# Patient Record
Sex: MALE | Race: WHITE | NOT HISPANIC OR LATINO | Employment: OTHER | URBAN - METROPOLITAN AREA
[De-identification: names, ages, dates, MRNs, and addresses within clinical notes are randomized per-mention and may not be internally consistent; named-entity substitution may affect disease eponyms.]

---

## 2017-03-28 ENCOUNTER — ALLSCRIPTS OFFICE VISIT (OUTPATIENT)
Dept: OTHER | Facility: OTHER | Age: 77
End: 2017-03-28

## 2017-04-06 ENCOUNTER — GENERIC CONVERSION - ENCOUNTER (OUTPATIENT)
Dept: OTHER | Facility: OTHER | Age: 77
End: 2017-04-06

## 2017-05-06 ENCOUNTER — LAB CONVERSION - ENCOUNTER (OUTPATIENT)
Dept: OTHER | Facility: OTHER | Age: 77
End: 2017-05-06

## 2017-05-06 LAB — PROSTATE SPECIFIC ANTIGEN TOTAL (HISTORICAL): 4.6 NG/ML

## 2017-05-07 ENCOUNTER — GENERIC CONVERSION - ENCOUNTER (OUTPATIENT)
Dept: OTHER | Facility: OTHER | Age: 77
End: 2017-05-07

## 2017-06-02 ENCOUNTER — ALLSCRIPTS OFFICE VISIT (OUTPATIENT)
Dept: OTHER | Facility: OTHER | Age: 77
End: 2017-06-02

## 2017-06-02 LAB
BILIRUB UR QL STRIP: NORMAL
CLARITY UR: NORMAL
COLOR UR: YELLOW
GLUCOSE (HISTORICAL): NORMAL
HGB UR QL STRIP.AUTO: NORMAL
KETONES UR STRIP-MCNC: NORMAL MG/DL
LEUKOCYTE ESTERASE UR QL STRIP: NORMAL
NITRITE UR QL STRIP: NORMAL
PH UR STRIP.AUTO: 5 [PH]
PROT UR STRIP-MCNC: NORMAL MG/DL
SP GR UR STRIP.AUTO: 1.03
UROBILINOGEN UR QL STRIP.AUTO: NORMAL

## 2017-10-03 LAB
A/G RATIO (HISTORICAL): 1.7 (CALC) (ref 1–2.5)
ALBUMIN SERPL BCP-MCNC: 4.1 G/DL (ref 3.6–5.1)
ALP SERPL-CCNC: 50 U/L (ref 40–115)
ALT SERPL W P-5'-P-CCNC: 29 U/L (ref 9–46)
AST SERPL W P-5'-P-CCNC: 20 U/L (ref 10–35)
BILIRUB SERPL-MCNC: 0.7 MG/DL (ref 0.2–1.2)
BUN SERPL-MCNC: 19 MG/DL (ref 7–25)
BUN/CREA RATIO (HISTORICAL): 15 (CALC) (ref 6–22)
CALCIUM (ADJUSTED FOR ALBUMIN) (HISTORICAL): 9.5 MG/DL (CALC) (ref 8.6–10.2)
CALCIUM SERPL-MCNC: 9.3 MG/DL (ref 8.6–10.3)
CHLORIDE SERPL-SCNC: 103 MMOL/L (ref 98–110)
CHOLEST SERPL-MCNC: 175 MG/DL
CHOLEST/HDLC SERPL: 4.9 (CALC)
CO2 SERPL-SCNC: 27 MMOL/L (ref 20–31)
CREAT SERPL-MCNC: 1.27 MG/DL (ref 0.7–1.18)
EGFR AFRICAN AMERICAN (HISTORICAL): 63 ML/MIN/1.73M2
EGFR-AMERICAN CALC (HISTORICAL): 55 ML/MIN/1.73M2
GAMMA GLOBULIN (HISTORICAL): 2.4 G/DL (CALC) (ref 1.9–3.7)
GLUCOSE (HISTORICAL): 86 MG/DL (ref 65–99)
HDLC SERPL-MCNC: 36 MG/DL
LDL CHOLESTEROL (HISTORICAL): 114 MG/DL (CALC)
NON-HDL-CHOL (CHOL-HDL) (HISTORICAL): 139 MG/DL (CALC)
POTASSIUM SERPL-SCNC: 4.6 MMOL/L (ref 3.5–5.3)
PROSTATE SPECIFIC ANTIGEN FREE (HISTORICAL): 1.1 NG/ML
PROSTATE SPECIFIC ANTIGEN PERCENT FREE (HISTORICAL): 15 % (CALC)
PROSTATE SPECIFIC ANTIGEN TOTAL (HISTORICAL): 7.3 NG/ML
SODIUM SERPL-SCNC: 139 MMOL/L (ref 135–146)
TOTAL PROTEIN (HISTORICAL): 6.5 G/DL (ref 6.1–8.1)
TRIGL SERPL-MCNC: 135 MG/DL

## 2017-10-06 ENCOUNTER — ALLSCRIPTS OFFICE VISIT (OUTPATIENT)
Dept: OTHER | Facility: OTHER | Age: 77
End: 2017-10-06

## 2017-10-27 ENCOUNTER — GENERIC CONVERSION - ENCOUNTER (OUTPATIENT)
Dept: OTHER | Facility: OTHER | Age: 77
End: 2017-10-27

## 2017-11-02 ENCOUNTER — GENERIC CONVERSION - ENCOUNTER (OUTPATIENT)
Dept: OTHER | Facility: OTHER | Age: 77
End: 2017-11-02

## 2018-01-10 NOTE — RESULT NOTES
Verified Results  (1) PSA (SCREEN) (Dx V76 44 Screen for Prostate Cancer) 20NRV2588 01:32PM Silvia Bowen     Test Name Result Flag Reference   PSA, TOTAL 4 6 ng/mL H < OR = 4 0   This test was performed using the Siemens  chemiluminescent method  Values obtained from  different assay methods cannot be used  interchangeably  PSA levels, regardless of  value, should not be interpreted as absolute  evidence of the presence or absence of disease         Plan  Elevated PSA    · *1 -  [facility]  *  Status: Active  Requested for:  12FHC7285  Care Summary provided  : Yes

## 2018-01-14 VITALS
BODY MASS INDEX: 31.9 KG/M2 | OXYGEN SATURATION: 97 % | SYSTOLIC BLOOD PRESSURE: 136 MMHG | TEMPERATURE: 98 F | RESPIRATION RATE: 16 BRPM | HEIGHT: 68 IN | DIASTOLIC BLOOD PRESSURE: 88 MMHG | WEIGHT: 210.5 LBS | HEART RATE: 53 BPM

## 2018-01-14 VITALS
RESPIRATION RATE: 16 BRPM | DIASTOLIC BLOOD PRESSURE: 88 MMHG | BODY MASS INDEX: 32.8 KG/M2 | HEART RATE: 53 BPM | TEMPERATURE: 97.9 F | SYSTOLIC BLOOD PRESSURE: 134 MMHG | HEIGHT: 67 IN | OXYGEN SATURATION: 95 % | WEIGHT: 209 LBS

## 2018-01-14 NOTE — PROGRESS NOTES
Assessment    1  Encounter for preventive health examination (V70 0) (Z00 00)   2  Elevated PSA (790 93) (R97 20)   3  Benign essential hypertension (401 1) (I10)   4  Hyperlipidemia (272 4) (E78 5)   5  Inflamed sebaceous cyst (706 2) (L72 3)   6  Need for vaccination (V05 9) (Z23)    Plan  Benign essential hypertension    · AmLODIPine Besylate 5 MG Oral Tablet; Take 1 tablet daily  Benign essential hypertension, Hyperlipidemia    · (1) CBC/PLT/DIFF; Status:Hold For - Exact Date; Requested for:After [id number];    · (1) COMPREHENSIVE METABOLIC PANEL; Status:Hold For - Exact Date; Requested  for:After [id number];    · (1) LIPID PANEL FASTING W DIRECT LDL REFLEX; Status:Hold For - Exact Date; Requested for:After [id number];    · (1) TSH WITH FT4 REFLEX; Status:Hold For - Exact Date; Requested for:After  [id number];   Need for vaccination    · Fluzone High-Dose 0 5 ML Intramuscular Suspension Prefilled Syringe;  INJECT 0 5  ML Intramuscular; To Be Done: 18IAO8724  PMH: Screening for colon cancer    · COLONOSCOPY ; every 10 years; Last 72TAD9965; Next 35DOC3034; Status:Active  Screening for depression    · *VB-Depression Screening; Status:Complete;   Done: 87THT6406 01:25PM  Screening for other and unspecified genitourinary condition    · *VB - Urinary Incontinence Screen (Dx Z13 89 Screen for UI); Status:Complete;   Done:  08GRX1297 01:25PM  Special screening for other neurological conditions    · *VB - Fall Risk Assessment  (Dx Z13 89 Screen for Neurologic Disorder);  Status:Complete;   Done: 82UWF9614 01:24PM    Discussion/Summary    69year-old physical examination/checkup/wellness exam  Depression screen negative  All risk was negative  Patient was given information regarding advanced directives and living will/healthcare power of   Flu shot given today  Current with pneumonia and Zostavax vaccination  Current with PSA and colonoscopy  Impression: Subsequent Annual Wellness Visit       Cardiovascular screening and counseling: the risks and benefits of screening were discussed  Diabetes screening and counseling: the risks and benefits of screening were discussed  Colorectal cancer screening and counseling: the risks and benefits of screening were discussed  Prostate cancer screening and counseling: the risks and benefits of screening were discussed  Osteoporosis screening and counseling: the risks and benefits of screening were discussed  Abdominal aortic aneurysm screening and counseling: the risks and benefits of screening were discussed  HIV screening and counseling: the risks and benefits of screening were discussed  Immunizations: the risks and benefits of influenza vaccination were discussed with the patient, influenza vaccine is due today, the risks and benefits of pneumococcal vaccination were discussed with the patient, the lifetime pneumococcal vaccine has been completed, the risks and benefits of the Zostavax vaccine were discussed with the patient, Zostavax vaccination up to date and the risks and benefits of the Td vaccine were discussed with the patient  Advance Directive Planning: not complete, paperwork and instructions were given to the patient, he was encouraged to follow-up with me to discuss his questions and/or decisions  Advice and education were given regarding nutrition (non-diabetic)  Chief Complaint  Pt presents for AWV  History of Present Illness  HPI: 18-year-old physical examination/checkup/Medicare wellness visit today  Welcome to [employer] and Wellness Visits: The patient is being seen for the subsequent annual wellness visit  Medicare Screening and Risk Factors   Hospitalizations: no previous hospitalizations  Once per lifetime medicare screening tests: ECG has not been done and AAA screening US has not yet been done  Medicare Screening Tests Risk Questions   Abdominal aortic aneurysm risk assessment: none indicated     Osteoporosis risk assessment: , over 48years of age and alcohol use, but none indicated  HIV risk assessment: none indicated  Drug and Alcohol Use: The patient has never smoked cigarettes and has never used smokeless tobacco  The patient reports frequent alcohol use and drinking 2 drinks per week  He is not ready to quit drinking  He has never used illicit drugs  Diet and Physical Activity: Current diet includes well balanced meals, low fat food choices, 1 servings of fruit per day, 1 servings of vegetables per day, 2 servings of meat per day, 1 servings of whole grains per day, 1 servings of dairy products per day, 2 cups of coffee per day, 0 cans of regular soda per day and 0 cans of diet soda per day  He exercises daily and exercises 3-4 times per week  Exercise: walking, swimming, [place]ARDEN[place] 30 minutes per day  Mood Disorder and Cognitive Impairment Screening: PHQ-9 Depression Scale   Over the past 2 weeks, how often have you been bothered by the following problems? 1 ) Little interest or pleasure in doing things? Not at all    2 ) Feeling down, depressed or hopeless? Not at all    3 ) Trouble falling asleep or sleeping too much? Half the days or more  4 ) Feeling tired or having little energy? Several days  5 ) Poor appetite or overeating? Several days  6 ) Feeling bad about yourself, or that you are a failure, or have let yourself or your family down? Not at all    7 ) Trouble concentrating on things, such as reading a newspaper or watching television? Not at all    8 ) Moving or speaking so slowly that other people could have noticed, or the opposite, moving or speaking faster than usual? Not at all  TOTAL SCORE: 4    How difficult have these problems made it for you to do your work, take care of things at home, or get along with people? Not at all  Depression screening  negative for symptoms  He denies feeling down, depressed, or hopeless over the past two weeks   He denies feeling little interest or pleasure in doing things over the past two weeks  Cognitive impairment screening: denies difficulty learning/retaining new information, denies difficulty handling complex tasks, denies difficulty with reasoning, denies difficulty with spatial ability and orientation, denies difficulty with language and denies difficulty with behavior  Functional Ability/Level of Safety: Hearing is slightly decreased, slightly decreased in the right ear and slightly decreased in the left ear  He denies hearing difficulties  He does not use a hearing aid  The patient is currently able to do activities of daily living without limitations, able to do instrumental activities of daily living without limitations, able to participate in social activities without limitations and able to drive without limitations  Activities of daily living details: does not need help using the phone, no transportation help needed, does not need help shopping, no meal preparation help needed, does not need help doing housework, does not need help managing medications and does not need help managing money  Fall risk factors: The patient fell 0 times in the past 12 months  Injury History: no polypharmacy, alcohol use, no mobility impairment, no antidepressant use, no deconditioning, no postural hypotension, no sedative use, no visual impairment, no urinary incontinence, no antihypertensive use, no cognitive impairment and up and go test was normal    Home safety risk factors:  loose rugs and no grab bars in the bathroom, but no unfamiliar surroundings, no poor household lighting, no uneven floors, no household clutter and handrails on the stairs  Advance Directives: Advance directives: living will, durable power of  for health care directives and advance directives  end of life decisions were reviewed with the patient and I agree with the patient's decisions     Co-Managers and Medical Equipment/Suppliers: See Patient Care Team   Reviewed Updated Jasmina Plana:   Last Medicare Wellness Visit Information was reviewed, patient interviewed and updates made to the record today  Patient Care Team    Care Team Member Role Specialty Office Number   Seema Mary DSOUZA  Orthopedic Surgery [phone]   [facility]  Orthopedic Surgery [phone]   Asaf Joshua MD  Urology [phone]     Review of Systems    Constitutional: negative  Cardiovascular: negative  Respiratory: negative  Gastrointestinal: negative  Genitourinary: negative  Over the past 2 weeks, how often have you been bothered by the following problems? 1 ) Little interest or pleasure in doing things? Not at all    2 ) Feeling down, depressed or hopeless? Not at all    3 ) Trouble falling asleep or sleeping too much? Half the days or more  4 ) Feeling tired or having little energy? Several days  5 ) Poor appetite or overeating? Several days  6 ) Feeling bad about yourself, or that you are a failure, or have let yourself or your family down? Not at all    7 ) Trouble concentrating on things, such as reading a newspaper or watching television? Not at all    8 ) Moving or speaking so slowly that other people could have noticed, or the opposite, moving or speaking faster than usual? Not at all  How difficult have these problems made it for you to do your work, take care of things at home, or get along with people? Not at all  Score 4      Active Problems    1  Benign essential hypertension (401 1) (I10)   2  Degeneration of cervical intervertebral disc (722 4) (M50 90)   3  Elevated PSA (790 93) (R97 20)   4  Erectile dysfunction (607 84) (N52 9)   5  Hyperlipidemia (272 4) (E78 5)   6  Obstruction of left eustachian tube (381 60) (H68 102)   7  Screening for depression (V79 0) (Z13 89)   8  Screening for other and unspecified genitourinary condition (V81 6) (Z13 89)   9  Special screening for other neurological conditions (V80 09) (Z13 89)   10   Vitamin D deficiency (268 9) (E55 9)    Past Medical History    · History of Acute upper respiratory infection (465 9) (J06 9)   · 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)   · History of Dermatophytosis of nail (110 1) (B35 1)   · History of Dog bite of finger (883 0,E906 0) (P30 377I,Z41  0XXA)   · History of anemia (V12 3) (Z86 2)   · History of chest pain (V13 89) (N88 702)   · History of dizziness (V13 89) (I47 333)   · History of Nail fungus (110 1) (B35 1)   · History of Need for influenza vaccination (V04 81) (Z23)   · History of Need for influenza vaccination (V04 81) (Z23)   · History of Need for pneumococcal vaccination (V03 82) (Z23)   · History of Persistent cough (786 2) (R05)   · History of PND (post-nasal drip) (784 91) (R09 82)   · History of Polyp of sigmoid colon (211 3) (D12 5)   · History of Rib pain on left side (786 50) (R07 81)   · History of Screening for colon cancer (V76 51) (Z12 11)   · History of Screening for depression (V79 0) (Z13 89)   · History of Screening for other and unspecified genitourinary condition (V81 6) (Z13 89)   · History of Special screening examination for neoplasm of prostate (V76 44) (Z12 5)   · History of Tinnitus, unspecified laterality (388 30) (H93 19)    The active problems and past medical history were reviewed and updated today  Surgical History    · History of Hemorrhoidectomy    Family History  Family History    · Denied: Family history of substance abuse   · Denied: Family history of Mental or behavioral problem    Social History    · Being A Social Drinker   · Never A Smoker  The social history was reviewed and updated today  The social history was reviewed and is unchanged  Current Meds   1  Aspirin Adult Low Strength 81 MG Oral Tablet Delayed Release; TAKE 1 TABLET DAILY; Therapy: 49SHJ1918 to Recorded   2  Fluticasone Propionate 50 MCG/ACT Nasal Suspension; USE 2 SPRAYS IN EACH   NOSTRIL ONCE DAILY;    Therapy: 20BOI5696 to (Last Rx:28Mar2017)  Requested for: 74UIC8009 Ordered   3  Losartan Potassium 100 MG Oral Tablet; take 1 tablet by mouth once daily; Therapy: 53XQZ6317 to (Evaluate:11Oct2017)  Requested for: 14Apr2017; Last   Rx:68Aaq3779 Ordered   4  Sildenafil Citrate 20 MG Oral Tablet; 20 mg as needed prior to intercourse; Therapy: 59JQX6596 to (Last Rx:02Jun2017)  Requested for: 02Jun2017 Ordered   5  Viagra 100 MG Oral Tablet; take as directed; Therapy: 67AUK0521 to (Evaluate:15Oct2017)  Requested for: 17ORS3542; Last   Rx:23Pzr6509 Ordered   6  Vitamin D 1000 UNIT Oral Tablet; Take 1 tablet daily; Therapy: 47HRO5433 to Recorded    The medication list was reviewed and updated today  Allergies    1  No Known Drug Allergies    Immunizations   ** Printed in Appendix #1 below  Vitals  Signs    Systolic: 626  Diastolic: 88   Temperature: 97 9 F, Tympanic  Heart Rate: 53  Pulse Quality: Normal  Respiration Quality: Normal  Respiration: 16  Systolic: 462, LUE, Sitting  Diastolic: 92, LUE, Sitting  Height: 5 ft 7 in  Weight: 209 lb   BMI Calculated: 32 73  BSA Calculated: 2 06  O2 Saturation: 95, RA  Pain Scale: 0    Physical Exam    Constitutional   General appearance: No acute distress, well appearing and well nourished  Pulmonary   Respiratory effort: No increased work of breathing or signs of respiratory distress  Auscultation of lungs: Clear to auscultation  Cardiovascular   Palpation of heart: Normal PMI, no thrills  Auscultation of heart: Normal rate and rhythm, normal S1 and S2, without murmurs  Examination of extremities for edema and/or varicosities: Normal     Abdomen   Abdomen: Non-tender, no masses  Liver and spleen: No hepatomegaly or splenomegaly  Lymphatic   Palpation of lymph nodes in neck: No lymphadenopathy      Musculoskeletal   Gait and station: Normal     Psychiatric   Orientation to person, place and time: Normal     Mood and affect: Normal        Results/Data  PHQ-9 Adult Depression Screening 72VLT9555 01: 28PM Vidal Arvinds     Test Name Result Flag Reference   PHQ-9 Adult Depression Score 4     Over the last two weeks, how often have you been bothered by any of the following problems? Little interest or pleasure in doing things: Not at all - 0  Feeling down, depressed, or hopeless: Not at all - 0  Trouble falling or staying asleep, or sleeping too much: More than half the days - 2  Feeling tired or having little energy: Several days - 1  Poor appetite or over eating: Several days - 1  Feeling bad about yourself - or that you are a failure or have let yourself or your family down: Not at all - 0  Trouble concentrating on things, such as reading the newspaper or watching television: Not at all - 0  Moving or speaking so slowly that other people could have noticed  Or the opposite -  being so fidgety or restless that you have been moving around a lot more than usual: Not at all - 0  Thoughts that you would be better off dead, or of hurting yourself in some way: Not at all - 0   PHQ-9 Adult Depression Screening Negative     PHQ-9 Difficulty Level Not difficult at all     PHQ-9 Severity Minimal Depression       *VB - Urinary Incontinence Screen (Dx Z13 89 Screen for UI) 18LCU8113 01:25PM Sabina Lien     Test Name Result Flag Reference   Urinary Incontinence Assessment 62FLU0705       *VB-Depression Screening 81RSY0013 01:25PM SabinaTrufflsn     Test Name Result Flag Reference   Depression Scale Result      Depression Screen - Negative For Symptoms     *VB - Fall Risk Assessment  (Dx Z13 89 Screen for Neurologic Disorder) 01GDI0571 01:24PM Slate Realtyn     Test Name Result Flag Reference   Falls Risk      No falls in the past year       Health Management  History of Screening for colon cancer   COLONOSCOPY; every 10 years; Last 44SWD7715; Next Due: 88EMF8498; Active  Health Maintenance   Medicare Annual Wellness Visit; every 1 year; Last 55GWV9276; Next Due: 45Ydy4177;   Overdue    Future Appointments    Date/Time Provider Specialty Site   2018 07:45 AM [facility]  Urology  Liseth ROSALES     Signatures   Electronically signed by :  Moncho Boudreaux DO; Oct  6 2017  2:04PM EST                       (Author)    Appendix #1     Patient: Pamela Araujo ; : 1940; MRN: [id number]      1 2 3 4 5    Influenza  20-Sep-2012 17-Sep-2013 04-Sep-2014 17-Sep-2015 23-Sep-2016    PCV  16-Mar-2015        PPSV  2011        Tdap  2011        Zoster  10-Vik-2014

## 2018-01-15 VITALS
WEIGHT: 209 LBS | SYSTOLIC BLOOD PRESSURE: 160 MMHG | DIASTOLIC BLOOD PRESSURE: 120 MMHG | HEART RATE: 64 BPM | HEIGHT: 67 IN | BODY MASS INDEX: 32.8 KG/M2

## 2018-01-22 VITALS
BODY MASS INDEX: 31.73 KG/M2 | WEIGHT: 209.38 LBS | RESPIRATION RATE: 18 BRPM | HEART RATE: 74 BPM | SYSTOLIC BLOOD PRESSURE: 130 MMHG | DIASTOLIC BLOOD PRESSURE: 90 MMHG | TEMPERATURE: 97.5 F | HEIGHT: 68 IN | OXYGEN SATURATION: 95 %

## 2018-01-22 VITALS
HEIGHT: 68 IN | SYSTOLIC BLOOD PRESSURE: 130 MMHG | TEMPERATURE: 96.9 F | RESPIRATION RATE: 17 BRPM | BODY MASS INDEX: 31.84 KG/M2 | DIASTOLIC BLOOD PRESSURE: 78 MMHG | HEART RATE: 52 BPM | OXYGEN SATURATION: 96 % | WEIGHT: 210.06 LBS

## 2018-03-08 ENCOUNTER — OFFICE VISIT (OUTPATIENT)
Dept: FAMILY MEDICINE CLINIC | Facility: CLINIC | Age: 78
End: 2018-03-08
Payer: COMMERCIAL

## 2018-03-08 VITALS
WEIGHT: 210.2 LBS | DIASTOLIC BLOOD PRESSURE: 78 MMHG | HEART RATE: 63 BPM | TEMPERATURE: 98.9 F | HEIGHT: 68 IN | OXYGEN SATURATION: 96 % | SYSTOLIC BLOOD PRESSURE: 122 MMHG | BODY MASS INDEX: 31.86 KG/M2 | RESPIRATION RATE: 16 BRPM

## 2018-03-08 DIAGNOSIS — R07.89 OTHER CHEST PAIN: Primary | ICD-10-CM

## 2018-03-08 PROCEDURE — 93000 ELECTROCARDIOGRAM COMPLETE: CPT | Performed by: FAMILY MEDICINE

## 2018-03-08 PROCEDURE — 99214 OFFICE O/P EST MOD 30 MIN: CPT | Performed by: FAMILY MEDICINE

## 2018-03-08 RX ORDER — SILDENAFIL CITRATE 20 MG/1
TABLET ORAL
COMMUNITY
Start: 2017-06-02 | End: 2018-03-08 | Stop reason: ALTCHOICE

## 2018-03-08 RX ORDER — AMLODIPINE BESYLATE 5 MG/1
1 TABLET ORAL DAILY
COMMUNITY
Start: 2017-10-06 | End: 2018-03-16 | Stop reason: SDUPTHER

## 2018-03-08 RX ORDER — SILDENAFIL 100 MG/1
TABLET, FILM COATED ORAL
COMMUNITY
Start: 2016-03-14 | End: 2019-01-07 | Stop reason: ALTCHOICE

## 2018-03-08 RX ORDER — ASPIRIN 81 MG/1
81 TABLET ORAL DAILY
COMMUNITY
Start: 2016-09-23

## 2018-03-08 RX ORDER — LOSARTAN POTASSIUM 100 MG/1
1 TABLET ORAL DAILY
COMMUNITY
Start: 2013-04-09 | End: 2018-03-16 | Stop reason: SDUPTHER

## 2018-03-08 NOTE — PROGRESS NOTES
Assessment/Plan:    Other chest pain  Pt started an intense workout regimen in the past few weeks  Pt has experienced a few episodes during (or immediately after exercise) that pt describes as chest pressure and dyspnea, but rapidly improves spontaneously  Pt does not experience any sxs during the rest of the day (or w/ rest)  He is currently asymptomatic  EKG todayShows sinus bradycardia with nonspecific ST changes     Will send for nuclear stress test (treadmill)   Continue low-dose aspirin  Hold off on Viagra for now  If symptoms recur before stress test is performed, recommend going directly to ER          Diagnoses and all orders for this visit:    Other chest pain  -     POCT ECG  -     NM myocardial perfusion spect (rx stress and/or rest); Future    Other orders  -     amLODIPine (NORVASC) 5 mg tablet; Take 1 tablet by mouth daily  -     aspirin (ASPIRIN ADULT LOW STRENGTH) 81 mg EC tablet; Take 1 tablet by mouth daily  -     losartan (COZAAR) 100 MG tablet; Take 1 tablet by mouth daily  -     Discontinue: sildenafil (REVATIO) 20 mg tablet; Take by mouth  -     sildenafil (VIAGRA) 100 mg tablet; Take by mouth  -     cholecalciferol (VITAMIN D3) 1,000 units tablet; Take 1 tablet by mouth daily          Subjective:      Patient ID: Miguel Smith is a 69 y o  male  Pt started an intense workout regimen in the past few weeks  Pt has experienced a few episodes during (or immediately after exercise) that pt describes as chest pressure and dyspnea, but rapidly improves spontaneously  Pt does not experience any sxs during the rest of the day (or w/ rest)  The following portions of the patient's history were reviewed and updated as appropriate: allergies, current medications, past family history, past medical history, past social history, past surgical history and problem list     Review of Systems   Respiratory: Negative  Cardiovascular: Positive for chest pain  Gastrointestinal: Negative  Genitourinary: Negative  Objective:      /78 (BP Location: Left arm, Patient Position: Sitting, Cuff Size: Standard)   Pulse 63   Temp 98 9 °F (37 2 °C) (Tympanic)   Resp 16   Ht 5' 7 52" (1 715 m)   Wt 95 3 kg (210 lb 3 2 oz)   SpO2 96%   BMI 32 42 kg/m²          Physical Exam   Cardiovascular: Normal rate, regular rhythm, normal heart sounds and intact distal pulses  Carotids: no bruits  Ext: no edema   Pulmonary/Chest: Effort normal  No respiratory distress  He has no wheezes  He has no rales  Psychiatric: He has a normal mood and affect   His behavior is normal  Thought content normal

## 2018-03-08 NOTE — ASSESSMENT & PLAN NOTE
Pt started an intense workout regimen in the past few weeks  Pt has experienced a few episodes during (or immediately after exercise) that pt describes as chest pressure and dyspnea, but rapidly improves spontaneously  Pt does not experience any sxs during the rest of the day (or w/ rest)  He is currently asymptomatic  EKG todayShows sinus bradycardia with nonspecific ST changes     Will send for nuclear stress test (treadmill)   Continue low-dose aspirin  Hold off on Viagra for now    If symptoms recur before stress test is performed, recommend going directly to ER

## 2018-03-13 ENCOUNTER — TELEPHONE (OUTPATIENT)
Dept: FAMILY MEDICINE CLINIC | Facility: CLINIC | Age: 78
End: 2018-03-13

## 2018-03-14 NOTE — TELEPHONE ENCOUNTER
I spoke with patient  His stress test has been denied    Waiting for appeal   I advised patient to go directly to the ER should his chest pain recur

## 2018-03-16 DIAGNOSIS — I10 ESSENTIAL HYPERTENSION: Primary | ICD-10-CM

## 2018-03-16 RX ORDER — LOSARTAN POTASSIUM 100 MG/1
TABLET ORAL
Qty: 30 TABLET | Refills: 5 | Status: SHIPPED | OUTPATIENT
Start: 2018-03-16 | End: 2018-11-04 | Stop reason: SDUPTHER

## 2018-03-16 RX ORDER — AMLODIPINE BESYLATE 5 MG/1
TABLET ORAL
Qty: 30 TABLET | Refills: 5 | Status: SHIPPED | OUTPATIENT
Start: 2018-03-16 | End: 2018-03-28

## 2018-03-20 ENCOUNTER — TELEPHONE (OUTPATIENT)
Dept: FAMILY MEDICINE CLINIC | Facility: CLINIC | Age: 78
End: 2018-03-20

## 2018-03-22 NOTE — TELEPHONE ENCOUNTER
New auth started  Not enough info was given for the previous auth and it was denied  Pt aware  New auth started under dr green

## 2018-03-26 ENCOUNTER — TELEPHONE (OUTPATIENT)
Dept: FAMILY MEDICINE CLINIC | Facility: CLINIC | Age: 78
End: 2018-03-26

## 2018-03-27 ENCOUNTER — TELEPHONE (OUTPATIENT)
Dept: FAMILY MEDICINE CLINIC | Facility: CLINIC | Age: 78
End: 2018-03-27

## 2018-03-27 NOTE — TELEPHONE ENCOUNTER
[facility] a few [facility] notes, blood work  any cardiology results  Records will be faxed to Leonor Hernandez at [phone]  Phone is [phone] I will be sending office notes, blood work and an ekg on patient

## 2018-03-28 ENCOUNTER — TRANSITIONAL CARE MANAGEMENT (OUTPATIENT)
Dept: FAMILY MEDICINE CLINIC | Facility: CLINIC | Age: 78
End: 2018-03-28

## 2018-03-28 ENCOUNTER — OFFICE VISIT (OUTPATIENT)
Dept: FAMILY MEDICINE CLINIC | Facility: CLINIC | Age: 78
End: 2018-03-28
Payer: COMMERCIAL

## 2018-03-28 VITALS
TEMPERATURE: 97.9 F | DIASTOLIC BLOOD PRESSURE: 70 MMHG | BODY MASS INDEX: 33.3 KG/M2 | HEIGHT: 67 IN | OXYGEN SATURATION: 98 % | WEIGHT: 212.19 LBS | SYSTOLIC BLOOD PRESSURE: 124 MMHG | RESPIRATION RATE: 18 BRPM | HEART RATE: 62 BPM

## 2018-03-28 DIAGNOSIS — N28.1 RENAL CYST: ICD-10-CM

## 2018-03-28 DIAGNOSIS — E78.2 MIXED HYPERLIPIDEMIA: ICD-10-CM

## 2018-03-28 DIAGNOSIS — I25.118 CORONARY ARTERY DISEASE OF NATIVE HEART WITH STABLE ANGINA PECTORIS, UNSPECIFIED VESSEL OR LESION TYPE (HCC): Primary | ICD-10-CM

## 2018-03-28 DIAGNOSIS — K76.9 LIVER LESION: ICD-10-CM

## 2018-03-28 DIAGNOSIS — I10 BENIGN ESSENTIAL HYPERTENSION: ICD-10-CM

## 2018-03-28 DIAGNOSIS — R91.1 PULMONARY NODULE: ICD-10-CM

## 2018-03-28 PROBLEM — R07.89 OTHER CHEST PAIN: Status: RESOLVED | Noted: 2018-03-08 | Resolved: 2018-03-28

## 2018-03-28 PROCEDURE — 99496 TRANSJ CARE MGMT HIGH F2F 7D: CPT | Performed by: FAMILY MEDICINE

## 2018-03-28 RX ORDER — METOPROLOL SUCCINATE 25 MG/1
12.5 TABLET, EXTENDED RELEASE ORAL DAILY
COMMUNITY
Start: 2018-03-27 | End: 2018-11-04 | Stop reason: SDUPTHER

## 2018-03-28 RX ORDER — ATORVASTATIN CALCIUM 40 MG/1
40 TABLET, FILM COATED ORAL
COMMUNITY
Start: 2018-03-26

## 2018-03-28 RX ORDER — CLOPIDOGREL BISULFATE 75 MG/1
75 TABLET ORAL DAILY
COMMUNITY
Start: 2018-03-27 | End: 2018-11-29 | Stop reason: ALTCHOICE

## 2018-03-28 RX ORDER — NITROGLYCERIN 0.4 MG/1
TABLET SUBLINGUAL
COMMUNITY
Start: 2018-03-26 | End: 2018-11-29 | Stop reason: ALTCHOICE

## 2018-03-28 NOTE — ASSESSMENT & PLAN NOTE
Patient was found to have questionable liver nodule/lesion during recent CT scan of the chest   Will sent for MRI of abdomen to clarify this

## 2018-03-28 NOTE — ASSESSMENT & PLAN NOTE
During recent admission patient was found to have what appeared to be renal cysts on CT scan of the chest   Will sent for renal ultrasound for clarification

## 2018-03-28 NOTE — PROGRESS NOTES
Assessment/Plan:    Benign essential hypertension  Controlled on losartan 100 and metoprolol xl 25    Hyperlipidemia  Pt on atorvastatin 40 now due to recent dx of CAD  Coronary artery disease of native heart with stable angina pectoris [facility])  This is a transition of care visit for patient who was admitted to [facility] from March 25th to March 27th due to persistent chest pain radiating into his arm  Patient had stress test which was positive for ischemia, which was followed by cardiac catheterization which confirmed near total occlusion of his left anterior descending artery  Patient received a stent at that time  He was discharged on March 27th in stable condition  He is now taking metoprolol 25 mg daily along with atorvastatin 40 mg daily (in addition to his other current medications)  He is currently feeling well without any chest pain or shortness of breath  He has nitroglycerin in his pocket, but has not needed  He will be starting cardiac rehab in the near future  His examination today is unremarkable  Recommend follow up with cardiology as directed  Renal cyst  During recent admission patient was found to have what appeared to be renal cysts on CT scan of the chest   Will sent for renal ultrasound for clarification    Liver lesion  Patient was found to have questionable liver nodule/lesion during recent CT scan of the chest   Will sent for MRI of abdomen to clarify this    Pulmonary nodule  During recent admission patient was found to have 5 mm right lower lobe nodule on CT scan  Patient is a nonsmoker  He is asymptomatic  Will repeat CT chest in 6 months       Diagnoses and all orders for this visit:    Coronary artery disease of native heart with stable angina pectoris, unspecified vessel or lesion type ([address] )    Benign essential hypertension    Mixed hyperlipidemia    Renal cyst  -     US retroperitoneal complete;  Future    Liver lesion  -     MRI abdomen w wo contrast; Future    Pulmonary nodule  -     CT chest wo contrast; Future    Other orders  -     clopidogrel (PLAVIX) 75 mg tablet; Take 75 mg by mouth daily  -     atorvastatin (LIPITOR) 40 mg tablet; Take 40 mg by mouth  -     metoprolol succinate (TOPROL-XL) 25 mg 24 hr tablet; Take 25 mg by mouth daily  -     nitroglycerin (NITROSTAT) 0 4 mg SL tablet;       Keep next regularly scheduled appointment in near future  Patient has Rx for blood work followed by appointment    Subjective:   Date and time hospital follow up call was made:  3/27/2018 10:21 AM  Hospital care reviewed:  Records reviewed  Patient was hopsitalized at:  [facility]  Date of admission:  3/25/18  Date of discharge:  3/27/18  Diagnosis:  Chest pain, unspecified type (Primary Dx); Coronary artery disease involving native coronary artery of native heart with unstable angina pectoris ([facility] [address] )  Were the patients medicaitons reviewed and updated:  Yes  Current symptoms:  None  Post hospital issues:  None  Should patient be enrolled in anticoag monitoring?:  No  Scheduled for follow up?:  Yes  Patients specialists:  Cardiologist  Did you obtain your prescribed medications:  Yes  Do you need help managing your perscriptions or medications:  No  Is transportation to your appointments needed:  No  I have advised the patient to call PCP with any new or worsening symptoms (please type in name along with any credentials):  Iris Mathews        Patient ID: Keerthi Rasheed is a 69 y o  male  This is a transition of care visit for patient who was admitted to [facility] from March 25th to March 27th due to persistent chest pain radiating into his arm  Patient had stress test which was positive for ischemia, which was followed by cardiac catheterization which confirmed near total occlusion of his left anterior descending artery  Patient received a stent at that time  He was discharged on March 27th in stable condition    He is now taking metoprolol 25 mg daily along with atorvastatin 40 mg daily (in addition to his other current medications)  He is currently feeling well without any chest pain or shortness of breath  He has nitroglycerin in his pocket, but has not needed  He will be starting cardiac rehab in the near future  He has a follow-up with his cardiologist in the near future as well  The following portions of the patient's history were reviewed and updated as appropriate: allergies, current medications, past family history, past medical history, past social history, past surgical history and problem list     Review of Systems   Respiratory: Negative  Cardiovascular: Negative  Gastrointestinal: Negative  Genitourinary: Negative  Objective:      /70 (BP Location: Left arm, Patient Position: Sitting, Cuff Size: Standard)   Pulse 62   Temp 97 9 °F (36 6 °C) (Tympanic)   Resp 18   Ht 5' 7" (1 702 m)   Wt 96 2 kg (212 lb 3 oz)   SpO2 98%   BMI 33 23 kg/m²          Physical Exam   Cardiovascular: Normal rate, regular rhythm, normal heart sounds and intact distal pulses  Carotids: no bruits  Ext: no edema   Pulmonary/Chest: Effort normal  No respiratory distress  He has no wheezes  He has no rales  Psychiatric: He has a normal mood and affect   His behavior is normal  Thought content normal

## 2018-03-28 NOTE — ASSESSMENT & PLAN NOTE
This is a transition of care visit for patient who was admitted to [facility] from March 25th to March 27th due to persistent chest pain radiating into his arm  Patient had stress test which was positive for ischemia, which was followed by cardiac catheterization which confirmed near total occlusion of his left anterior descending artery  Patient received a stent at that time  He was discharged on March 27th in stable condition  He is now taking metoprolol 25 mg daily along with atorvastatin 40 mg daily (in addition to his other current medications)  He is currently feeling well without any chest pain or shortness of breath  He has nitroglycerin in his pocket, but has not needed  He will be starting cardiac rehab in the near future  His examination today is unremarkable  Recommend follow up with cardiology as directed

## 2018-03-28 NOTE — ASSESSMENT & PLAN NOTE
During recent admission patient was found to have 5 mm right lower lobe nodule on CT scan  Patient is a nonsmoker  He is asymptomatic    Will repeat CT chest in 6 months

## 2018-03-30 ENCOUNTER — DOCUMENTATION (OUTPATIENT)
Dept: FAMILY MEDICINE CLINIC | Facility: CLINIC | Age: 78
End: 2018-03-30

## 2018-04-04 ENCOUNTER — HOSPITAL ENCOUNTER (OUTPATIENT)
Dept: ULTRASOUND IMAGING | Facility: MEDICAL CENTER | Age: 78
Discharge: HOME | End: 2018-04-04
Payer: COMMERCIAL

## 2018-04-04 DIAGNOSIS — N28.1 RENAL CYST: ICD-10-CM

## 2018-04-04 PROCEDURE — 76770 US EXAM ABDO BACK WALL COMP: CPT

## 2018-04-08 ENCOUNTER — HOSPITAL ENCOUNTER (OUTPATIENT)
Dept: MRI IMAGING | Facility: HOSPITAL | Age: 78
Discharge: HOME | End: 2018-04-08
Payer: COMMERCIAL

## 2018-04-08 DIAGNOSIS — K76.9 LIVER LESION: ICD-10-CM

## 2018-04-08 PROCEDURE — 74183 MRI ABD W/O CNTR FLWD CNTR: CPT

## 2018-04-08 PROCEDURE — A9585 GADOBUTROL INJECTION: HCPCS | Performed by: FAMILY MEDICINE

## 2018-04-08 RX ADMIN — GADOBUTROL 9 ML: 604.72 INJECTION INTRAVENOUS at 15:39

## 2018-04-10 ENCOUNTER — TELEPHONE (OUTPATIENT)
Dept: UROLOGY | Facility: CLINIC | Age: 78
End: 2018-04-10

## 2018-04-10 DIAGNOSIS — R97.20 ELEVATED PSA: Primary | ICD-10-CM

## 2018-04-10 NOTE — TELEPHONE ENCOUNTER
Pt has been himanshu for his OV on 5/2/18 @ 2:00  I faxed his lab slip to [facility] in [place] @ [phone]    Pt aware to get blood work prior to his appt

## 2018-04-10 NOTE — TELEPHONE ENCOUNTER
Patient saw me last in 6/2017  I received communication from his PCP about his most recent renal US, which shows large benign cysts  Although these are not concerning, I did review a PSA drawn in 9/2017 after our visit last year which did show a dramatic rise  I will ask the patient to obtain a repeated PSA and see me back sooner than our planned 6/2018 appointment

## 2018-04-29 LAB
ALBUMIN SERPL-MCNC: 4.1 G/DL (ref 3.6–5.1)
ALBUMIN/GLOB SERPL: 1.9 (CALC) (ref 1–2.5)
ALP SERPL-CCNC: 45 U/L (ref 40–115)
ALT SERPL-CCNC: 24 U/L (ref 9–46)
AST SERPL-CCNC: 18 U/L (ref 10–35)
BASOPHILS # BLD AUTO: 71 CELLS/UL (ref 0–200)
BASOPHILS NFR BLD AUTO: 1.2 %
BILIRUB SERPL-MCNC: 0.8 MG/DL (ref 0.2–1.2)
BUN SERPL-MCNC: 22 MG/DL (ref 7–25)
BUN/CREAT SERPL: 17 (CALC) (ref 6–22)
CALCIUM SERPL-MCNC: 9.2 MG/DL (ref 8.6–10.3)
CHLORIDE SERPL-SCNC: 103 MMOL/L (ref 98–110)
CHOLEST SERPL-MCNC: 107 MG/DL
CHOLEST/HDLC SERPL: 3.1 (CALC)
CO2 SERPL-SCNC: 31 MMOL/L (ref 20–31)
CREAT SERPL-MCNC: 1.28 MG/DL (ref 0.7–1.18)
EOSINOPHIL # BLD AUTO: 212 CELLS/UL (ref 15–500)
EOSINOPHIL NFR BLD AUTO: 3.6 %
ERYTHROCYTE [DISTWIDTH] IN BLOOD BY AUTOMATED COUNT: 12.7 % (ref 11–15)
GLOBULIN SER CALC-MCNC: 2.2 G/DL (CALC) (ref 1.9–3.7)
GLUCOSE SERPL-MCNC: 89 MG/DL (ref 65–99)
HCT VFR BLD AUTO: 44.2 % (ref 38.5–50)
HDLC SERPL-MCNC: 34 MG/DL
HGB BLD-MCNC: 15.5 G/DL (ref 13.2–17.1)
LDLC SERPL CALC-MCNC: 57 MG/DL (CALC)
LYMPHOCYTES # BLD AUTO: 1050 CELLS/UL (ref 850–3900)
LYMPHOCYTES NFR BLD AUTO: 17.8 %
MCH RBC QN AUTO: 31.7 PG (ref 27–33)
MCHC RBC AUTO-ENTMCNC: 35.1 G/DL (ref 32–36)
MCV RBC AUTO: 90.4 FL (ref 80–100)
MONOCYTES # BLD AUTO: 460 CELLS/UL (ref 200–950)
MONOCYTES NFR BLD AUTO: 7.8 %
NEUTROPHILS # BLD AUTO: 4106 CELLS/UL (ref 1500–7800)
NEUTROPHILS NFR BLD AUTO: 69.6 %
NONHDLC SERPL-MCNC: 73 MG/DL (CALC)
PLATELET # BLD AUTO: 170 THOUSAND/UL (ref 140–400)
PMV BLD REES-ECKER: 11.2 FL (ref 7.5–12.5)
POTASSIUM SERPL-SCNC: 4.8 MMOL/L (ref 3.5–5.3)
PROT SERPL-MCNC: 6.3 G/DL (ref 6.1–8.1)
RBC # BLD AUTO: 4.89 MILLION/UL (ref 4.2–5.8)
SL AMB EGFR AFRICAN AMERICAN: 63 ML/MIN/1.73M2
SL AMB EGFR NON AFRICAN AMERICAN: 54 ML/MIN/1.73M2
SODIUM SERPL-SCNC: 139 MMOL/L (ref 135–146)
TRIGL SERPL-MCNC: 80 MG/DL
TSH SERPL-ACNC: 1.47 MIU/L (ref 0.4–4.5)
WBC # BLD AUTO: 5.9 THOUSAND/UL (ref 3.8–10.8)

## 2018-05-02 ENCOUNTER — OFFICE VISIT (OUTPATIENT)
Dept: UROLOGY | Facility: CLINIC | Age: 78
End: 2018-05-02
Payer: COMMERCIAL

## 2018-05-02 VITALS
HEIGHT: 67 IN | HEART RATE: 61 BPM | SYSTOLIC BLOOD PRESSURE: 118 MMHG | DIASTOLIC BLOOD PRESSURE: 80 MMHG | WEIGHT: 207 LBS | BODY MASS INDEX: 32.49 KG/M2

## 2018-05-02 DIAGNOSIS — R97.20 ELEVATED PSA: Primary | ICD-10-CM

## 2018-05-02 DIAGNOSIS — N28.1 RENAL CYST: ICD-10-CM

## 2018-05-02 DIAGNOSIS — N52.03 COMBINED ARTERIAL INSUFFICIENCY AND CORPORO-VENOUS OCCLUSIVE ERECTILE DYSFUNCTION: ICD-10-CM

## 2018-05-02 DIAGNOSIS — R91.1 PULMONARY NODULE: ICD-10-CM

## 2018-05-02 PROBLEM — N40.2 PROSTATE NODULE: Status: ACTIVE | Noted: 2018-05-02

## 2018-05-02 PROCEDURE — 99213 OFFICE O/P EST LOW 20 MIN: CPT | Performed by: UROLOGY

## 2018-05-02 RX ORDER — BILBERRY FRUIT 1000 MG
3600 CAPSULE ORAL
COMMUNITY
End: 2019-05-30

## 2018-05-02 NOTE — LETTER
May 2, 2018     Andreas Olivera MD  [address] [address]    Patient: Alma Yun   YOB: 1940   Date of Visit: 5/2/2018       Dear Dr Donald Neely: Thank you for referring Norberto Pratt to me for evaluation  Below are my notes for this consultation  If you have questions, please do not hesitate to call me  I look forward to following your patient along with you  Sincerely,        Tiny Betancourt MD        CC: No Recipients  Tiny Betancourt MD  5/2/2018  2:39 PM  Sign at close encounter    [place] UP NOTE     Shoshana Gomez is a 69 y o  male with a complaint of   Chief Complaint   Patient presents with    Elevated PSA     PSA=8 6     Erectile Dysfunction       History of Present Illness:     69 y o  gentleman who has been undergoing routine prostate cancer screening with his primary care team  The patient has had a steady PSA but in March of 2017 was noted to have some oscillation  The patient was given a course of antibiotics and his PSA came down from the mid 5 range to 4 6  He initially presented in June 2017  Follow-up of his PSA values demonstrated concern for rapid rise  Patient was asked to return at a sooner time frame  4/30/18 PSA 8 6, free 19 6%  9/30/17 PSA 7 3, free 15%  5/5/17 PSA 4 6  3/20/17 PSA 5 4     The patient has erectile dysfunction and has been doing well with Viagra 50 mg  These are cost prohibitive however  We switched to sildenafil 20mg tablets at last visit  The patient was unable to obtain the medication  Patient has not been using sildenafil since his heart episode described below  In April, the patient was having asymptomatic chest pain and underwent coronary stenting  He is on dual anti-platelet therapy at this time      Past Medical History:     Past Medical History:   Diagnosis Date    Anemia     last assessed  1/7/14     Polyp of sigmoid colon     Tinnitus unspecified laterality / last assessed 4/9/13       PAST SURGICAL HISTORY:     Past Surgical History:   Procedure Laterality Date    HEMORRHOID SURGERY         CURRENT MEDICATIONS:     Current Outpatient Prescriptions   Medication Sig Dispense Refill    aspirin (ASPIRIN ADULT LOW STRENGTH) 81 mg EC tablet Take 1 tablet by mouth daily      atorvastatin (LIPITOR) 40 mg tablet Take 40 mg by mouth      cholecalciferol (VITAMIN D3) 1,000 units tablet Take 1 tablet by mouth daily      clopidogrel (PLAVIX) 75 mg tablet Take 75 mg by mouth daily      losartan (COZAAR) 100 MG tablet take 1 tablet by mouth once daily 30 tablet 5    metoprolol succinate (TOPROL-XL) 25 mg 24 hr tablet Take 25 mg by mouth daily      nitroglycerin (NITROSTAT) 0 4 mg SL tablet       sildenafil (VIAGRA) 100 mg tablet Take by mouth       No current facility-administered medications for this visit  ALLERGIES:   No Known Allergies    SOCIAL HISTORY:     Social History     Social History    Marital status: /Civil Union     Spouse name: N/A    Number of children: N/A    Years of education: N/A     Occupational History    retired      Social History Main Topics    Smoking status: Never Smoker    Smokeless tobacco: Never Used    Alcohol use Yes      Comment: Occuational / social     Drug use: No    Sexual activity: Not on file     Other Topics Concern    Not on file     Social History Narrative    Always uses seat belt     Daily caffeine consumption 2-3 servings a day     Feels safe at home       SOCIAL HISTORY:     Family History   Problem Relation Age of Onset   [facility] Breast cancer Mother     Hypertension Father        REVIEW OF SYSTEMS:     Review of Systems   Constitutional: Negative for chills, fever and unexpected weight change  Respiratory: Negative  Cardiovascular: Positive for chest pain  Gastrointestinal: Negative  Genitourinary: Negative  Musculoskeletal: Negative  Neurological: Negative  Psychiatric/Behavioral: Negative  PHYSICAL EXAM:     /80   Pulse 61   Ht 5' 7" (1 702 m)   Wt 93 9 kg (207 lb)   BMI 32 42 kg/m²      General:  Healthy appearing male in no acute distress  They have a normal affect  There is not appear to be any gross neurologic defects or abnormalities  HEENT:  Normocephalic, atraumatic  Neck is supple without any palpable lymphadenopathy  Cardiovascular:  Patient has normal palpable distal radial pulses  There is no significant peripheral edema  No JVD is noted  Respiratory:  Patient has unlabored respirations  There is no audible wheeze or rhonchi  Abdomen:  Abdomen is without surgical scars  Abdomen is soft and nontender  There is no tympany  Inguinal and umbilical hernia are not appreciated  Genitourinary:  Circumcised phallus, orthotopic meatus, testicles descended, digital rectal exam shows a 50 g prostate  There is a 1 to 1-1/2 cm nodule at the right side of the median sulcus towards the apex    Musculoskeletal:  Patient does not have significant CVA tenderness in the flank with palpation or percussion  They full range of motion in all 4 extremities  Strength in all 4 extremities appears congruent  Patient is able to ambulate without assistance or difficulty  Dermatologic:  Patient has no skin abnormalities or rashes  LABS:     CBC:   Lab Results   Component Value Date    WBC 5 8 2017    HGB 15 5 2018    HCT 44 2 2018    MCV 90 4 2018     2018       BMP:   Lab Results   Component Value Date    CALCIUM 9 2 2018     2017    K 4 6 2017    CO2 27 2017     2017    BUN 22 2018    CREATININE 1 28 (H) 2018     PSA trend in HPI      IMAGIN/4/18  RENAL ULTRASOUND     INDICATION:   N28 1: Cyst of kidney, acquired  71-year-old male with known renal cyst   This is for follow-up  Patient has no current complaints      COMPARISON: None    Note is made of a CT scan completed at an outside institution however those images are not available for review at this time      TECHNIQUE:   Ultrasound of the retroperitoneum was performed with a curvilinear transducer utilizing volumetric sweeps and still imaging techniques       FINDINGS:     KIDNEYS:  Symmetric and normal size  Right kidney:  11 4 x 6 4 cm with cortical thickness 1 5 cm  Left kidney:  13 3 x 7 17 m with cortical thickness 1 5 cm      Right kidney  Normal echogenicity and contour  No suspicious masses detected  Prominence of the renal collecting system compatible with mild hydronephrosis  In addition, there is a 1 4 x 0 7 x 0 5 cm and 1 4 x 0 9 x 0 7 cm anechoic parapelvic structure consistent with parapelvic cyst   No shadowing calculi  No perinephric fluid collections      Left kidney  Normal echogenicity and contour  No suspicious masses detected  Along the lower pole is an exophytic anechoic 9 2 x 9 2 x 8 5 cm nonvascular structure consistent with cyst   Additional 1 7 x 1 4 x 1 1 cm anechoic cysts noted in the interpolar region  No hydronephrosis  Within the lower pole is a 0 9 x 0 5 cm echogenic structure with shadowing consistent with nonobstructing stone  In addition there is twinkle artifact  There may be a separate stone adjacent to the lower pole cyst measuring 0 7 cm with shadowing  No perinephric fluid collections      URETERS:  Nonvisualized      BLADDER:   Normally distended  No focal thickening or mass lesions  Bilateral ureteral jets detected         IMPRESSION:     On the images provided, there is prominence of the right renal collecting system concerning for mild hydronephrosis  Comparison with the outside CT scan would be of benefit  Suspected parapelvic cysts are also noted      Additionally, there is a large 9 cm exophytic anechoic cyst and dating from the left kidney lower pole  Additional cyst is noted within the interpolar region    Additionally, there is at least one if not 2 nonobstructing stones within the lower pole of   the kidney      Recommend obtaining the outside scan for comparison and for permanent record within the [facility]  Can also obtain follow-up ultrasound in 6 months to evaluate for stability of these cysts  4/8/18  MRI OF THE ABDOMEN (LIVER) WITH AND WITHOUT CONTRAST     INDICATION:  Possible liver lesion      COMPARISON:  Renal ultrasound 4/4/2018      TECHNIQUE:  The following pulse sequences were obtained on a 1 5 T scanner:  Coronal and axial T2 with TE of 90 and 180 respectively, axial T2 with fat saturation, axial FIESTA fat-sat, axial T1-weighted in-and-out-of phase, axial DWI/ADC, pre-contrast   axial T1 with fat saturation, post-contrast dynamic axial T1 with fat saturation at 20, 70, and 180 seconds, followed by coronal and 7 minute delayed axial T1 with fat saturation        IV Contrast:  9 mL of gadobutrol injection (MULTI-DOSE)      FINDINGS:     LIVER:    General:  Normal in size and contour  No loss of signal on out-of-phase images to suggest hepatic steatosis  Lesions:  No enhancing solid lesions identified  No areas of abnormal arterial enhancement  There are 2 adjacent bilobed cysts identified measuring 1 9 x 1 7 x 1 8 cm within segment 8 of the right lobe and 2 2 x 1 5 x 2 3 cm within segment 5 of the   right lobe  Vasculature:  Portal and hepatic veins patent without evidence of thrombosis      BILIARY TREE:  Normal        GALLBLADDER:  Normal      PANCREAS:  Normal      ADRENAL GLANDS:  Normal      SPLEEN:  Normal      KIDNEYS:  Bilateral parapelvic and cortical cyst identified the largest measuring 10 cm exophytic from the left lower pole  No hydronephrosis, enhancing mass lesions, or perinephric collections      ABDOMINAL CAVITY:  No lymphadenopathy or mass   No ascites      BOWEL:  Unremarkable MRI appearance      OSSEOUS STRUCTURES:  No osseous destruction      EXTRAHEPATIC VASCULAR STRUCTURES:  Visualized vasculature is normal      ABDOMINAL WALL:  Normal      LOWER CHEST:  Unremarkable MRI appearance      IMPRESSION:        1   2 simple bilobed cysts in the right hepatic lobe  No suspicious solid mass lesions or arterial enhancement  2   Bilateral renal cysts, the largest measuring 10 cm exophytic from the left lower pole      ASSESSMENT:     69 y o  male with prostate nodule and elevated PSA    PLAN:     Based on the rising PSA and a prostate nodule, I would be more inclined to recommend a prostate biopsy  However, the patient recently has had a percutaneous cardiac intervention is on dual platelet therapy  Patient will likely need to remain until platelet therapy for 6 months to a year  I would be comfortable doing the biopsy on aspirin alone but not both aspirin and Plavix  I will reach out to the patient's cardiology team   I will plan to see the patient back in 6 months with an updated PS A  If he is cleared to stop his Plavix at that time, we will consider prostate biopsy  Patient will avoid use of sildenafil until cleared by his cardiologist     I have also reviewed the patient's recent renal ultrasound and MR I  Patient has benign cyst   They are large approaching 10 cm but the patient is asymptomatic  I would certainly not recommend any decortication or decompression at this point time

## 2018-05-02 NOTE — LETTER
May 2, 2018     Everett Deluca MD  [address] [address]    Patient: Susan Ziegler   YOB: 1940   Date of Visit: 5/2/2018       Dear Dr Zenon Owens: Thank you for referring Annie Dawn to me for evaluation  Below are my notes for this consultation  If you have questions, please do not hesitate to call me  I look forward to following your patient along with you  Sincerely,        Dolly Davila MD        CC: No Recipients  Dolly Davila MD  5/2/2018  2:01 PM  Sign at close encounter    UROLOGY {male adult master:[id number]::"NEW CONSULT","NEW ER FOLLOW UP","HOSPITAL FOLLOW Sally Skill FOLLOW UP","PROCEDURE","POSTOP","***"} NOTE     CHIEF Jamarcus Artist is a 69 y o  {Desc; male/female:1::"male"} with a complaint of   Chief Complaint   Patient presents with    Elevated PSA     PSA=8 6     Erectile Dysfunction       History of Present Illness:     69 y o  gentleman who has been undergoing routine prostate cancer screening with his primary care team  The patient has had a steady PSA but in March of 2017 was noted to have some oscillation  The patient was given a course of antibiotics and his PSA came down from the mid 5 range to 4 6  He initially presented in June 2017 4/30/18 PSA 8 6, free 19 6%  9/30/17 PSA 7 3, free 15%  5/5/17 PSA 4 6  3/20/17 PSA 5 4    The patient has erectile dysfunction and has been doing well with Viagra 50 mg  These are cost prohibitive however  We switched to sildenafil 20mg tablets at last visit      Past Medical History:     Past Medical History:   Diagnosis Date    Anemia     last assessed  1/7/14     Polyp of sigmoid colon     Tinnitus     unspecified laterality / last assessed 4/9/13       PAST SURGICAL HISTORY:     Past Surgical History:   Procedure Laterality Date    HEMORRHOID SURGERY         CURRENT MEDICATIONS:     Current Outpatient Prescriptions   Medication Sig Dispense Refill    aspirin (ASPIRIN ADULT LOW STRENGTH) 81 mg EC tablet Take 1 tablet by mouth daily      atorvastatin (LIPITOR) 40 mg tablet Take 40 mg by mouth      cholecalciferol (VITAMIN D3) 1,000 units tablet Take 1 tablet by mouth daily      clopidogrel (PLAVIX) 75 mg tablet Take 75 mg by mouth daily      losartan (COZAAR) 100 MG tablet take 1 tablet by mouth once daily 30 tablet 5    metoprolol succinate (TOPROL-XL) 25 mg 24 hr tablet Take 25 mg by mouth daily      nitroglycerin (NITROSTAT) 0 4 mg SL tablet       sildenafil (VIAGRA) 100 mg tablet Take by mouth       No current facility-administered medications for this visit  ALLERGIES:   No Known Allergies    SOCIAL HISTORY:     Social History     Social History    Marital status: /Civil Union     Spouse name: N/A    Number of children: N/A    Years of education: N/A     Occupational History    retired      Social History Main Topics    Smoking status: Never Smoker    Smokeless tobacco: Never Used    Alcohol use Yes      Comment: Occuational / social     Drug use: No    Sexual activity: Not on file     Other Topics Concern    Not on file     Social History Narrative    Always uses seat belt     Daily caffeine consumption 2-3 servings a day     Feels safe at home       SOCIAL HISTORY:     Family History   Problem Relation Age of Onset   [facility] Breast cancer Mother     Hypertension Father        REVIEW OF SYSTEMS:     Review of Systems      PHYSICAL EXAM:     /80   Pulse 61   Ht 5' 7" (1 702 m)   Wt 93 9 kg (207 lb)   BMI 32 42 kg/m²      General:  Healthy appearing {Desc; male/female:1::"male"} in no acute distress  They have a normal affect  There is not appear to be any gross neurologic defects or abnormalities  HEENT:  Normocephalic, atraumatic  Neck is supple without any palpable lymphadenopathy  Cardiovascular:  Patient has normal palpable distal radial pulses  There is no significant peripheral edema  No JVD is noted    Respiratory: Patient has unlabored respirations  There is no audible wheeze or rhonchi  Abdomen:  Abdomen is *** surgical scars  Abdomen is soft and nontender  There is no tympany  Inguinal and umbilical hernia are not appreciated  Genitourinary: {pe male genitalia:622823::"no penile lesions or discharge, no testicular masses or tenderness, no hernias"}    Musculoskeletal:  Patient {DOES NOT does:29857::"does not"} have significant CVA tenderness in the {RIGHT/LEFT:20294} flank with palpation or percussion  They full range of motion in all 4 extremities  Strength in all 4 extremities appears congruent  Patient is able to ambulate without assistance or difficulty  Dermatologic:  Patient has no skin abnormalities or rashes        LABS:     CBC:   Lab Results   Component Value Date    WBC 5 8 03/20/2017    HGB 15 5 04/28/2018    HCT 44 2 04/28/2018    MCV 90 4 04/28/2018     04/28/2018       BMP:   Lab Results   Component Value Date    CALCIUM 9 2 04/28/2018     09/30/2017    K 4 6 09/30/2017    CO2 27 09/30/2017     09/30/2017    BUN 22 04/28/2018    CREATININE 1 28 (H) 04/28/2018       IMAGING:     ***    PATHOLOGY:     ***    PROCEDURE:     ***    ASSESSMENT:     69 y o  {Desc; male/female:1::"male"} with ***    PLAN:     ***

## 2018-05-02 NOTE — PROGRESS NOTES
UROLOGY ROUTINE FOLLOW UP NOTE     CHIEF COMPLAINT   Sarah Deras is a 69 y o  male with a complaint of   Chief Complaint   Patient presents with    Elevated PSA     PSA=8 6     Erectile Dysfunction       History of Present Illness:     69 y o  gentleman who has been undergoing routine prostate cancer screening with his primary care team  The patient has had a steady PSA but in March of 2017 was noted to have some oscillation  The patient was given a course of antibiotics and his PSA came down from the mid 5 range to 4 6  He initially presented in June 2017  Follow-up of his PSA values demonstrated concern for rapid rise  Patient was asked to return at a sooner time frame  4/30/18 PSA 8 6, free 19 6%  9/30/17 PSA 7 3, free 15%  5/5/17 PSA 4 6  3/20/17 PSA 5 4     The patient has erectile dysfunction and has been doing well with Viagra 50 mg  These are cost prohibitive however  We switched to sildenafil 20mg tablets at last visit  The patient was unable to obtain the medication  Patient has not been using sildenafil since his heart episode described below  In April, the patient was having asymptomatic chest pain and underwent coronary stenting  He is on dual anti-platelet therapy at this time      Past Medical History:     Past Medical History:   Diagnosis Date    Anemia     last assessed  1/7/14     Polyp of sigmoid colon     Tinnitus     unspecified laterality / last assessed 4/9/13       PAST SURGICAL HISTORY:     Past Surgical History:   Procedure Laterality Date    HEMORRHOID SURGERY         CURRENT MEDICATIONS:     Current Outpatient Prescriptions   Medication Sig Dispense Refill    aspirin (ASPIRIN ADULT LOW STRENGTH) 81 mg EC tablet Take 1 tablet by mouth daily      atorvastatin (LIPITOR) 40 mg tablet Take 40 mg by mouth      cholecalciferol (VITAMIN D3) 1,000 units tablet Take 1 tablet by mouth daily      clopidogrel (PLAVIX) 75 mg tablet Take 75 mg by mouth daily      losartan (COZAAR) 100 MG tablet take 1 tablet by mouth once daily 30 tablet 5    metoprolol succinate (TOPROL-XL) 25 mg 24 hr tablet Take 25 mg by mouth daily      nitroglycerin (NITROSTAT) 0 4 mg SL tablet       sildenafil (VIAGRA) 100 mg tablet Take by mouth       No current facility-administered medications for this visit  ALLERGIES:   No Known Allergies    SOCIAL HISTORY:     Social History     Social History    Marital status: /Civil Union     Spouse name: N/A    Number of children: N/A    Years of education: N/A     Occupational History    retired      Social History Main Topics    Smoking status: Never Smoker    Smokeless tobacco: Never Used    Alcohol use Yes      Comment: Occuational / social     Drug use: No    Sexual activity: Not on file     Other Topics Concern    Not on file     Social History Narrative    Always uses seat belt     Daily caffeine consumption 2-3 servings a day     Feels safe at home       SOCIAL HISTORY:     Family History   Problem Relation Age of Onset   [facility] Breast cancer Mother     Hypertension Father        REVIEW OF SYSTEMS:     Review of Systems   Constitutional: Negative for chills, fever and unexpected weight change  Respiratory: Negative  Cardiovascular: Positive for chest pain  Gastrointestinal: Negative  Genitourinary: Negative  Musculoskeletal: Negative  Neurological: Negative  Psychiatric/Behavioral: Negative  PHYSICAL EXAM:     /80   Pulse 61   Ht 5' 7" (1 702 m)   Wt 93 9 kg (207 lb)   BMI 32 42 kg/m²     General:  Healthy appearing male in no acute distress  They have a normal affect  There is not appear to be any gross neurologic defects or abnormalities  HEENT:  Normocephalic, atraumatic  Neck is supple without any palpable lymphadenopathy  Cardiovascular:  Patient has normal palpable distal radial pulses  There is no significant peripheral edema  No JVD is noted    Respiratory:  Patient has unlabored respirations  There is no audible wheeze or rhonchi  Abdomen:  Abdomen is without surgical scars  Abdomen is soft and nontender  There is no tympany  Inguinal and umbilical hernia are not appreciated  Genitourinary:  Circumcised phallus, orthotopic meatus, testicles descended, digital rectal exam shows a 50 g prostate  There is a 1 to 1-1/2 cm nodule at the right side of the median sulcus towards the apex    Musculoskeletal:  Patient does not have significant CVA tenderness in the flank with palpation or percussion  They full range of motion in all 4 extremities  Strength in all 4 extremities appears congruent  Patient is able to ambulate without assistance or difficulty  Dermatologic:  Patient has no skin abnormalities or rashes  LABS:     CBC:   Lab Results   Component Value Date    WBC 5 8 2017    HGB 15 5 2018    HCT 44 2 2018    MCV 90 4 2018     2018       BMP:   Lab Results   Component Value Date    CALCIUM 9 2 2018     2017    K 4 6 2017    CO2 27 2017     2017    BUN 22 2018    CREATININE 1 28 (H) 2018     PSA trend in HPI      IMAGIN/4/18  RENAL ULTRASOUND     INDICATION:   N28 1: Cyst of kidney, acquired  52-year-old male with known renal cyst   This is for follow-up  Patient has no current complaints      COMPARISON: None  Note is made of a CT scan completed at an outside institution however those images are not available for review at this time      TECHNIQUE:   Ultrasound of the retroperitoneum was performed with a curvilinear transducer utilizing volumetric sweeps and still imaging techniques       FINDINGS:     KIDNEYS:  Symmetric and normal size  Right kidney:  11 4 x 6 4 cm with cortical thickness 1 5 cm  Left kidney:  13 3 x 7 17 m with cortical thickness 1 5 cm      Right kidney  Normal echogenicity and contour  No suspicious masses detected     Prominence of the renal collecting system compatible with mild hydronephrosis  In addition, there is a 1 4 x 0 7 x 0 5 cm and 1 4 x 0 9 x 0 7 cm anechoic parapelvic structure consistent with parapelvic cyst   No shadowing calculi  No perinephric fluid collections      Left kidney  Normal echogenicity and contour  No suspicious masses detected  Along the lower pole is an exophytic anechoic 9 2 x 9 2 x 8 5 cm nonvascular structure consistent with cyst   Additional 1 7 x 1 4 x 1 1 cm anechoic cysts noted in the interpolar region  No hydronephrosis  Within the lower pole is a 0 9 x 0 5 cm echogenic structure with shadowing consistent with nonobstructing stone  In addition there is twinkle artifact  There may be a separate stone adjacent to the lower pole cyst measuring 0 7 cm with shadowing  No perinephric fluid collections      URETERS:  Nonvisualized      BLADDER:   Normally distended  No focal thickening or mass lesions  Bilateral ureteral jets detected         IMPRESSION:     On the images provided, there is prominence of the right renal collecting system concerning for mild hydronephrosis  Comparison with the outside CT scan would be of benefit  Suspected parapelvic cysts are also noted      Additionally, there is a large 9 cm exophytic anechoic cyst and dating from the left kidney lower pole  Additional cyst is noted within the interpolar region  Additionally, there is at least one if not 2 nonobstructing stones within the lower pole of   the kidney      Recommend obtaining the outside scan for comparison and for permanent record within the [facility]'s PACS  Can also obtain follow-up ultrasound in 6 months to evaluate for stability of these cysts      4/8/18  MRI OF THE ABDOMEN (LIVER) WITH AND WITHOUT CONTRAST     INDICATION:  Possible liver lesion      COMPARISON:  Renal ultrasound 4/4/2018      TECHNIQUE:  The following pulse sequences were obtained on a 1 5 T scanner:  Coronal and axial T2 with TE of 90 and 180 respectively, axial T2 with fat saturation, axial FIESTA fat-sat, axial T1-weighted in-and-out-of phase, axial DWI/ADC, pre-contrast   axial T1 with fat saturation, post-contrast dynamic axial T1 with fat saturation at 20, 70, and 180 seconds, followed by coronal and 7 minute delayed axial T1 with fat saturation        IV Contrast:  9 mL of gadobutrol injection (MULTI-DOSE)      FINDINGS:     LIVER:    General:  Normal in size and contour  No loss of signal on out-of-phase images to suggest hepatic steatosis  Lesions:  No enhancing solid lesions identified  No areas of abnormal arterial enhancement  There are 2 adjacent bilobed cysts identified measuring 1 9 x 1 7 x 1 8 cm within segment 8 of the right lobe and 2 2 x 1 5 x 2 3 cm within segment 5 of the   right lobe  Vasculature:  Portal and hepatic veins patent without evidence of thrombosis      BILIARY TREE:  Normal        GALLBLADDER:  Normal      PANCREAS:  Normal      ADRENAL GLANDS:  Normal      SPLEEN:  Normal      KIDNEYS:  Bilateral parapelvic and cortical cyst identified the largest measuring 10 cm exophytic from the left lower pole  No hydronephrosis, enhancing mass lesions, or perinephric collections      ABDOMINAL CAVITY:  No lymphadenopathy or mass  No ascites      BOWEL:  Unremarkable MRI appearance      OSSEOUS STRUCTURES:  No osseous destruction      EXTRAHEPATIC VASCULAR STRUCTURES:  Visualized vasculature is normal      ABDOMINAL WALL:  Normal      LOWER CHEST:  Unremarkable MRI appearance      IMPRESSION:        1   2 simple bilobed cysts in the right hepatic lobe  No suspicious solid mass lesions or arterial enhancement  2   Bilateral renal cysts, the largest measuring 10 cm exophytic from the left lower pole      ASSESSMENT:     69 y o  male with prostate nodule and elevated PSA    PLAN:     Based on the rising PSA and a prostate nodule, I would be more inclined to recommend a prostate biopsy    However, the patient recently has had a percutaneous cardiac intervention is on dual platelet therapy  Patient will likely need to remain until platelet therapy for 6 months to a year  I would be comfortable doing the biopsy on aspirin alone but not both aspirin and Plavix  I will reach out to the patient's cardiology team   I will plan to see the patient back in 6 months with an updated PS A  If he is cleared to stop his Plavix at that time, we will consider prostate biopsy  Patient will avoid use of sildenafil until cleared by his cardiologist     I have also reviewed the patient's recent renal ultrasound and MR I  Patient has benign cyst   They are large approaching 10 cm but the patient is asymptomatic  I would certainly not recommend any decortication or decompression at this point time

## 2018-05-09 ENCOUNTER — OFFICE VISIT (OUTPATIENT)
Dept: FAMILY MEDICINE CLINIC | Facility: CLINIC | Age: 78
End: 2018-05-09
Payer: COMMERCIAL

## 2018-05-09 VITALS
SYSTOLIC BLOOD PRESSURE: 126 MMHG | DIASTOLIC BLOOD PRESSURE: 84 MMHG | BODY MASS INDEX: 32.08 KG/M2 | OXYGEN SATURATION: 98 % | WEIGHT: 204.4 LBS | HEART RATE: 67 BPM | TEMPERATURE: 98.3 F | HEIGHT: 67 IN | RESPIRATION RATE: 18 BRPM

## 2018-05-09 DIAGNOSIS — I25.118 CORONARY ARTERY DISEASE OF NATIVE HEART WITH STABLE ANGINA PECTORIS, UNSPECIFIED VESSEL OR LESION TYPE (HCC): ICD-10-CM

## 2018-05-09 DIAGNOSIS — R97.20 ELEVATED PSA: ICD-10-CM

## 2018-05-09 DIAGNOSIS — I10 BENIGN ESSENTIAL HYPERTENSION: Primary | ICD-10-CM

## 2018-05-09 DIAGNOSIS — E78.2 MIXED HYPERLIPIDEMIA: ICD-10-CM

## 2018-05-09 PROCEDURE — 3079F DIAST BP 80-89 MM HG: CPT | Performed by: FAMILY MEDICINE

## 2018-05-09 PROCEDURE — 3074F SYST BP LT 130 MM HG: CPT | Performed by: FAMILY MEDICINE

## 2018-05-09 PROCEDURE — 99214 OFFICE O/P EST MOD 30 MIN: CPT | Performed by: FAMILY MEDICINE

## 2018-05-09 NOTE — PROGRESS NOTES
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)  Stable without chest pain or shortness of breath  Patient recently finished cardiac rehab  Continue follow-up with his cardiologist at [facility]    Elevated PSA  Most recent PSA 8 6, was 7 3  Patient was recently seen by his urologist   They discussed possibility of getting a prostate biopsy     Continue follow-up with Dr Nichole Bonilla  Diagnoses and all orders for this visit:    Benign essential hypertension  -     TSH, 3rd generation with T4 reflex; Future    Mixed hyperlipidemia  -     Comprehensive metabolic panel; Future  -     Lipid Panel with Direct LDL reflex; Future  -     TSH, 3rd generation with T4 reflex; Future    Coronary artery disease of native heart with stable angina pectoris, unspecified vessel or lesion type (HCC)  -     TSH, 3rd generation with T4 reflex; Future    Elevated PSA        Recheck 6 months, fasting blood work at [facility] prior  Also AWV    Subjective:      Patient ID: Marcia Barillas is a 69 y o  male  Patient presents for recheck of chronic medical problems today  He has been seeing Urology because he has a prostate nodule and his elevated PSA  Doing well on blood pressure medications, losartan and metoprolol  Doing well on atorvastatin for high cholesterol  Patient had labs drawn at [facility] on April 27th        The following portions of the patient's history were reviewed and updated as appropriate: allergies, current medications, past family history, past medical history, past social history, past surgical history and problem list     Review of Systems   Respiratory: Negative  Cardiovascular: Negative  Gastrointestinal: Negative  Genitourinary: Negative            Objective:      /84 Pulse 67   Temp 98 3 °F (36 8 °C) (Tympanic)   Resp 18   Ht 5' 7" (1 702 m)   Wt 92 7 kg (204 lb 6 4 oz)   SpO2 98%   BMI 32 01 kg/m²          Physical Exam   Cardiovascular: Normal rate, regular rhythm, normal heart sounds and intact distal pulses  Carotids: no bruits  Ext: no edema   Pulmonary/Chest: Effort normal  No respiratory distress  He has no wheezes  He has no rales  Psychiatric: He has a normal mood and affect   His behavior is normal  Thought content normal

## 2018-05-09 NOTE — ASSESSMENT & PLAN NOTE
Stable without chest pain or shortness of breath  Patient recently finished cardiac rehab    Continue follow-up with his cardiologist at [facility]

## 2018-05-09 NOTE — ASSESSMENT & PLAN NOTE
Most recent PSA 8 6, was 7 3  Patient was recently seen by his urologist   They discussed possibility of getting a prostate biopsy     Continue follow-up with Dr Efra Hernández

## 2018-06-02 DIAGNOSIS — Z12.5 ENCOUNTER FOR SCREENING FOR MALIGNANT NEOPLASM OF PROSTATE: ICD-10-CM

## 2018-06-02 DIAGNOSIS — R97.20 ELEVATED PROSTATE SPECIFIC ANTIGEN (PSA): ICD-10-CM

## 2018-07-23 ENCOUNTER — OFFICE VISIT (OUTPATIENT)
Dept: FAMILY MEDICINE CLINIC | Facility: CLINIC | Age: 78
End: 2018-07-23
Payer: COMMERCIAL

## 2018-07-23 VITALS
HEART RATE: 54 BPM | RESPIRATION RATE: 15 BRPM | HEIGHT: 67 IN | DIASTOLIC BLOOD PRESSURE: 72 MMHG | WEIGHT: 198.1 LBS | SYSTOLIC BLOOD PRESSURE: 110 MMHG | BODY MASS INDEX: 31.09 KG/M2 | OXYGEN SATURATION: 94 % | TEMPERATURE: 97.4 F
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
## 2018-07-23 DIAGNOSIS — S61.412A LACERATION OF LEFT HAND WITHOUT FOREIGN BODY, INITIAL ENCOUNTER: Primary | ICD-10-CM

## 2018-07-23 PROCEDURE — 99213 OFFICE O/P EST LOW 20 MIN: CPT | Performed by: PHYSICIAN ASSISTANT

## 2018-07-23 RX ORDER — ISOSORBIDE MONONITRATE 30 MG/1
30 TABLET, EXTENDED RELEASE ORAL DAILY
Refills: 0 | COMMUNITY
Start: 2018-05-18 | End: 2018-11-29 | Stop reason: ALTCHOICE

## 2018-07-26 PROBLEM — Z95.5 S/P CORONARY ARTERY STENT PLACEMENT: Status: ACTIVE | Noted: 2018-04-16

## 2018-07-26 PROBLEM — I25.118 CORONARY ARTERY DISEASE OF NATIVE ARTERY OF NATIVE HEART WITH STABLE ANGINA PECTORIS (HCC): Status: ACTIVE | Noted: 2018-04-05

## 2018-07-26 PROBLEM — N40.0 BPH (BENIGN PROSTATIC HYPERPLASIA): Status: ACTIVE | Noted: 2017-11-02

## 2018-07-26 PROBLEM — L72.3 INFLAMED SEBACEOUS CYST: Status: ACTIVE | Noted: 2017-10-27

## 2018-07-26 NOTE — PROGRESS NOTES
Assessment/Plan:         Diagnoses and all orders for this visit:    Laceration of left hand without foreign body, initial encounter    Injury while gardening    Other orders  -     isosorbide mononitrate (IMDUR) 30 mg 24 hr tablet; Take 30 mg by mouth daily      Pt instructed to keep wound clean, dressed, and dry, and to monitor for signs of local infection  He is to RTO PRN  Chief Complaint   Patient presents with    cut left hand     pt is concerned of infection, insterment was not sterile  Subjective:      Patient ID: Charo Moctezuma is a 69 y o  male  Pt presents with what he reports is a laceration of his left palm which occurred earlier today while using a gardening tool  He reports that it was very superficial but is concerned about infection as the instrument was not clean  Denies FB, loss of sensation or function  He is UTD with his tetanus status  The following portions of the patient's history were reviewed and updated as appropriate:   He  has a past medical history of Anemia; Polyp of sigmoid colon; and Tinnitus    He   Patient Active Problem List    Diagnosis Date Noted    Combined arterial insufficiency and corporo-venous occlusive erectile dysfunction 05/02/2018    Prostate nodule 05/02/2018    S/P coronary artery stent placement 04/16/2018    Elevated PSA 04/10/2018    Coronary artery disease of native artery of native heart with stable angina pectoris ([facility] [address] ) 04/05/2018    Coronary artery disease of native heart with stable angina pectoris ([facility] [address] ) 03/28/2018    Renal cyst 03/28/2018    Liver lesion 03/28/2018    Pulmonary nodule 03/28/2018    BPH (benign prostatic hyperplasia) 11/02/2017    Inflamed sebaceous cyst 10/27/2017    Obstruction of left eustachian tube 03/14/2016    Vitamin D deficiency 09/04/2014    DDD (degenerative disc disease), cervical 01/22/2014    Hyperlipidemia 03/11/2013    Benign essential hypertension 08/08/2012     He  has a past surgical history that includes Hemorrhoid surgery  His family history includes Breast cancer in his mother; Hypertension in his father  He  reports that he has never smoked  He has never used smokeless tobacco  He reports that he drinks alcohol  He reports that he does not use drugs  Current Outpatient Prescriptions   Medication Sig Dispense Refill    aspirin (ASPIRIN ADULT LOW STRENGTH) 81 mg EC tablet Take 1 tablet by mouth daily      atorvastatin (LIPITOR) 40 mg tablet Take 40 mg by mouth      cholecalciferol (VITAMIN D3) 1,000 units tablet Take 1 tablet by mouth daily      clopidogrel (PLAVIX) 75 mg tablet Take 75 mg by mouth daily      isosorbide mononitrate (IMDUR) 30 mg 24 hr tablet Take 30 mg by mouth daily  0    losartan (COZAAR) 100 MG tablet take 1 tablet by mouth once daily 30 tablet 5    metoprolol succinate (TOPROL-XL) 25 mg 24 hr tablet Take 25 mg by mouth daily      nitroglycerin (NITROSTAT) 0 4 mg SL tablet       Saw [place] 1000 MG CAPS Take 3,600 mg by mouth      sildenafil (VIAGRA) 100 mg tablet Take by mouth       No current facility-administered medications for this visit  Current Outpatient Prescriptions on File Prior to Visit   Medication Sig    aspirin (ASPIRIN ADULT LOW STRENGTH) 81 mg EC tablet Take 1 tablet by mouth daily    atorvastatin (LIPITOR) 40 mg tablet Take 40 mg by mouth    cholecalciferol (VITAMIN D3) 1,000 units tablet Take 1 tablet by mouth daily    clopidogrel (PLAVIX) 75 mg tablet Take 75 mg by mouth daily    losartan (COZAAR) 100 MG tablet take 1 tablet by mouth once daily    metoprolol succinate (TOPROL-XL) 25 mg 24 hr tablet Take 25 mg by mouth daily    nitroglycerin (NITROSTAT) 0 4 mg SL tablet     [place] 1000 MG CAPS Take 3,600 mg by mouth    sildenafil (VIAGRA) 100 mg tablet Take by mouth     No current facility-administered medications on file prior to visit  He has No Known Allergies       Review of Systems   Constitutional: Negative  Respiratory: Negative  Cardiovascular: Negative  Gastrointestinal: Negative  Genitourinary: Negative  Skin: Positive for wound (Left palm)  Neurological: Negative  Objective:      /72 (BP Location: Left arm, Patient Position: Sitting, Cuff Size: Large)   Pulse (!) 54   Temp (!) 97 4 °F (36 3 °C) (Tympanic)   Resp 15   Ht 5' 7" (1 702 m)   Wt 89 9 kg (198 lb 1 6 oz)   SpO2 94%   BMI 31 03 kg/m²          Physical Exam   Constitutional: He is oriented to person, place, and time  He appears well-developed and well-nourished  No distress  Neurological: He is alert and oriented to person, place, and time  Skin:   Left palm with small, < 1cm superficial linear laceration  Hemostasis has been achieved  No FB visualized  Edges are well approximated  Dressed with Neosporin and Band Aid after thorough cleansing with hydrogen peroxide and saline  Psychiatric: He has a normal mood and affect  Vitals reviewed

## 2018-08-28 ENCOUNTER — TELEPHONE (OUTPATIENT)
Dept: FAMILY MEDICINE CLINIC | Facility: CLINIC | Age: 78
End: 2018-08-28

## 2018-08-28 NOTE — TELEPHONE ENCOUNTER
I spoke with patient  His cardiologist recently decreased his metoprolol to 12 5 mg in his losartan to 50 mg due to elevated creatinine level  His home blood pressures have been excellent ( 110 over 60s)  He is questioning whether not he can go off of these medications  I advised him to stay on present dose of meds for now, because they are protective for his heart  Also he has a follow-up appointment with me in 1 month with fasting blood work prior  We will re-evaluate his creatinine at that visit and determine further action ( IE further adjustment of medication versus referral to nephrologist)

## 2018-08-28 NOTE — TELEPHONE ENCOUNTER
PT HAS SOME QUESTIONS ABOUT THE RX'S HE HAS BEEN ON AND HIS BP RATES   Mahin Garcia HIM @[phone]

## 2018-09-07 ENCOUNTER — DOCUMENTATION (OUTPATIENT)
Dept: FAMILY MEDICINE CLINIC | Facility: CLINIC | Age: 78
End: 2018-09-07

## 2018-09-14 ENCOUNTER — OFFICE VISIT (OUTPATIENT)
Dept: FAMILY MEDICINE CLINIC | Facility: CLINIC | Age: 78
End: 2018-09-14
Payer: COMMERCIAL

## 2018-09-14 VITALS
DIASTOLIC BLOOD PRESSURE: 70 MMHG | SYSTOLIC BLOOD PRESSURE: 118 MMHG | HEIGHT: 67 IN | OXYGEN SATURATION: 95 % | TEMPERATURE: 98.3 F | WEIGHT: 193.7 LBS | BODY MASS INDEX: 30.4 KG/M2 | HEART RATE: 57 BPM

## 2018-09-14 DIAGNOSIS — H60.332 ACUTE SWIMMER'S EAR OF LEFT SIDE: ICD-10-CM

## 2018-09-14 DIAGNOSIS — Z23 NEED FOR VACCINATION: Primary | ICD-10-CM

## 2018-09-14 PROCEDURE — 99213 OFFICE O/P EST LOW 20 MIN: CPT | Performed by: FAMILY MEDICINE

## 2018-09-14 PROCEDURE — 90662 IIV NO PRSV INCREASED AG IM: CPT

## 2018-09-14 PROCEDURE — G0008 ADMIN INFLUENZA VIRUS VAC: HCPCS

## 2018-09-14 RX ORDER — CIPROFLOXACIN 500 MG/1
500 TABLET, FILM COATED ORAL EVERY 12 HOURS SCHEDULED
Qty: 10 TABLET | Refills: 0 | Status: SHIPPED | OUTPATIENT
Start: 2018-09-14 | End: 2018-09-19

## 2018-09-14 RX ORDER — NEOMYCIN SULFATE, POLYMYXIN B SULFATE AND HYDROCORTISONE 10; 3.5; 1 MG/ML; MG/ML; [USP'U]/ML
4 SUSPENSION/ DROPS AURICULAR (OTIC) 4 TIMES DAILY
Qty: 10 ML | Refills: 1 | Status: SHIPPED | OUTPATIENT
Start: 2018-09-14 | End: 2018-11-14 | Stop reason: ALTCHOICE

## 2018-09-14 NOTE — PROGRESS NOTES
Assessment/Plan:         Diagnoses and all orders for this visit:    Need for vaccination  -     influenza vaccine, 9827-7135, high-dose, PF 0 5 mL, for patients 65 yr+ (FLUZONE HIGH-DOSE)    Acute swimmer's ear of left side  Comments:   Rx given for Cipro 500 b i d  x5 days along with Cortisporin otic  Call further problems  Orders:  -     neomycin-polymyxin-hydrocortisone (CORTISPORIN) 0 35%-10,000 units/mL-1% otic suspension; Administer 4 drops into the left ear 4 (four) times a day  -     ciprofloxacin (CIPRO) 500 mg tablet; Take 1 tablet (500 mg total) by mouth every 12 (twelve) hours for 5 days       Flu shot given today    Subjective:      Patient ID: Jorge Traore is a 69 y o  male  L ear feels blocked w/ pain recently  Also, some associated jaw pain  Denies uri sxs  Denies fevers  Earache          The following portions of the patient's history were reviewed and updated as appropriate: allergies, current medications, past family history, past medical history, past social history, past surgical history and problem list     Review of Systems   HENT: Positive for ear pain  Respiratory: Negative  Cardiovascular: Negative            Objective:      /70 (BP Location: Left arm, Patient Position: Sitting)   Pulse 57   Temp 98 3 °F (36 8 °C) (Tympanic)   Ht 5' 6 73" (1 695 m)   Wt 87 9 kg (193 lb 11 2 oz)   SpO2 95%   BMI 30 58 kg/m²          Physical Exam

## 2018-09-28 ENCOUNTER — HOSPITAL ENCOUNTER (OUTPATIENT)
Dept: CT IMAGING | Facility: HOSPITAL | Age: 78
Discharge: HOME | End: 2018-09-28
Payer: COMMERCIAL

## 2018-09-28 DIAGNOSIS — R91.1 PULMONARY NODULE: ICD-10-CM

## 2018-09-28 PROCEDURE — 71250 CT THORAX DX C-: CPT

## 2018-10-01 ENCOUNTER — TELEPHONE (OUTPATIENT)
Dept: FAMILY MEDICINE CLINIC | Facility: CLINIC | Age: 78
End: 2018-10-01

## 2018-10-01 NOTE — TELEPHONE ENCOUNTER
Pt called the office his blood pressure has been elevated today per pt constantly high  Pt checked today it hs been around 140- 1150/90  Pt did not check his blood pressure this past Saturday 9/29/2018 and or yesterday 9/30/2018  Pt has had a slight headache the past few days  No dizziness or vomiting  Pt is asking would you recommend he change his blood pressure medication dosage  Pt is currently taking Losartan 50 mg daily  Metoprolol 12 5 mg  Aspirin OTC 81 mg  Plavix 75 mg   Lipitor 40 mg  Losartan 50 mg     Pt was advised I could place a message out for another provider and or Dr Cid he preferred Dr Bejarano I advised pt that you will be back in the office later tomorrow morning  Pt was advised if his symptoms worsen for example blood pressure goes higher, dizziness and vomiting we would recommend he go to ER

## 2018-10-02 ENCOUNTER — TELEPHONE (OUTPATIENT)
Dept: UROLOGY | Facility: CLINIC | Age: 78
End: 2018-10-02

## 2018-10-02 DIAGNOSIS — R97.20 ELEVATED PSA: Primary | ICD-10-CM

## 2018-10-02 NOTE — TELEPHONE ENCOUNTER
I called and spoke to London Kin advised him that you recommended he increase losartan to 100 mg daily and continue checking blood pressure at home and will check at next scheduled appointment which is in November  Pt does think it was raised to lack of hydration pt called his cardiologist last night and they advised him to take another 50 mg of his losartan so yesterday he had a total dose of 100 mg  He drank a lot of water last night and feels that helped he stated systolic was around average of 123  This morning  his blood pressure before any medication was 146-93 and pulse of 60 and after he ate breakfast and went to the gym it was 123/78 and a pulse of 70   Pt  stated it did drop and whenever his blood pressure drops his pulse gets higher  Pt states he has enough of his rx since usually he takes 1/2 of the 100 mg tablet of losartan so he gets a daily dose of 50 gm I did advise pt that since he is taking a full tablet to take 200 mg he may be going through them faster he stated he is in good shape I did advise pt if he finds he is running more low to please call us so we can send in an rx  He states the last 4 week he had been only taking half of the 100 mg  Losartan

## 2018-10-02 NOTE — TELEPHONE ENCOUNTER
Patient's primary doctor called me about abnormal CT chest with sclerotic lesion  He is obtaining bone scan and repeat PSA  He has appt end of November  Can we please move up his appointment to review bone scan and PSA once they are scheduled? Thanks

## 2018-10-02 NOTE — TELEPHONE ENCOUNTER
I spoke with patient  He states that his blood pressure has since normalized  Will maintain at losartan 50 for now    Patient will continue home blood pressure monitoring and I will recheck it had his next regular appointment

## 2018-10-02 NOTE — TELEPHONE ENCOUNTER
Call patient  Recommend increasing losartan to 100 mg daily for now  Continue home blood pressure monitoring  I will recheck his blood pressure at his  Next regularly scheduled appointment   What pharmacy would he like me to send increase dosage to?

## 2018-10-03 NOTE — TELEPHONE ENCOUNTER
Spoke with patient about scheduling  Bone scan and obtaining PSA prior to visit  Patient aware CT chest shows abnormality of T10 vertebra  Patient wishes bone scan to  be scheduled at [facility] and requesting a morning time  Instructed patient to obtain PSA at the same time of bone scan  Once bone scan is scheduled will reschedule appointment with Dr Jesus Salazar  Patient aware of the above

## 2018-10-03 NOTE — TELEPHONE ENCOUNTER
Patient is scheduled for Bone Scan at [facility] on Friday 10/5/18 for 7:30am injection and then return at 10:00 for bone scan  Follow up with Dr Katie Grove is scheduled for Monday 10/8/18 at 9:30am   I called the reading room and they will make sure that the radiologist has testing read prior to appointment with Dr Katie Grove  I left message for patient to call me back to confirm these appointments

## 2018-10-05 ENCOUNTER — APPOINTMENT (OUTPATIENT)
Dept: LAB | Facility: HOSPITAL | Age: 78
End: 2018-10-05
Attending: UROLOGY
Payer: COMMERCIAL

## 2018-10-05 ENCOUNTER — HOSPITAL ENCOUNTER (OUTPATIENT)
Dept: NUCLEAR MEDICINE | Facility: HOSPITAL | Age: 78
Discharge: HOME | End: 2018-10-05
Attending: UROLOGY
Payer: COMMERCIAL

## 2018-10-05 DIAGNOSIS — R97.20 ELEVATED PSA: ICD-10-CM

## 2018-10-05 PROCEDURE — A9503 TC99M MEDRONATE: HCPCS

## 2018-10-05 PROCEDURE — 84153 ASSAY OF PSA TOTAL: CPT

## 2018-10-05 PROCEDURE — 36415 COLL VENOUS BLD VENIPUNCTURE: CPT

## 2018-10-05 PROCEDURE — 78306 BONE IMAGING WHOLE BODY: CPT

## 2018-10-05 PROCEDURE — 84154 ASSAY OF PSA FREE: CPT

## 2018-10-06 LAB
PSA FREE MFR SERPL: 19.8 %
PSA FREE SERPL-MCNC: 1.94 NG/ML
PSA SERPL-MCNC: 9.8 NG/ML (ref 0–4)

## 2018-10-08 ENCOUNTER — OFFICE VISIT (OUTPATIENT)
Dept: UROLOGY | Facility: CLINIC | Age: 78
End: 2018-10-08
Payer: COMMERCIAL

## 2018-10-08 VITALS
WEIGHT: 192.6 LBS | SYSTOLIC BLOOD PRESSURE: 142 MMHG | DIASTOLIC BLOOD PRESSURE: 80 MMHG | HEIGHT: 67 IN | BODY MASS INDEX: 30.23 KG/M2

## 2018-10-08 DIAGNOSIS — Z95.5 S/P CORONARY ARTERY STENT PLACEMENT: ICD-10-CM

## 2018-10-08 DIAGNOSIS — R97.20 ELEVATED PSA: ICD-10-CM

## 2018-10-08 DIAGNOSIS — R94.8 ABNORMAL RADIONUCLIDE BONE SCAN: ICD-10-CM

## 2018-10-08 DIAGNOSIS — N40.2 PROSTATE NODULE: Primary | ICD-10-CM

## 2018-10-08 PROCEDURE — 99213 OFFICE O/P EST LOW 20 MIN: CPT | Performed by: UROLOGY

## 2018-10-08 NOTE — LETTER
October 8, 2018     Savi De Guzman DO  [address] [address]    Patient: Shun Paredes   YOB: 1940   Date of Visit: 10/8/2018       Dear Dr Callie Hernandez:    Thank you for referring Ashwin Khan to me for evaluation  Below are my notes for this consultation  If you have questions, please do not hesitate to call me  I look forward to following your patient along with you  Sincerely,        Linda Navarro MD        CC: Keturah Brittle, MD Orvan Harm, MD  10/8/2018  9:53 AM  Sign at close encounter    [place] UP NOTE     Dia Major is a 69 y o  male with a complaint of   Chief Complaint   Patient presents with    Elevated PSA       History of Present Illness:     69 y o  gentleman who has been undergoing routine prostate cancer screening with his primary care team  The patient has had a steady PSA but in March of 2017 was noted to have some oscillation  The patient was given a course of antibiotics and his PSA came down from the mid 5 range to 4 6  He initially presented in June 2017  We initially recommended follow-up in 1 year  Follow-up of his PSA values ordered by his primary team demonstrated concern for rapid rise  The patient return to see me in May of 2018  At that time, we recommended a prostate biopsy but the patient had just undergone percutaneous stenting and was on dual anti-platelet therapy  We recommend delay  The patient underwent a CT scan of his chest which demonstrated some sclerotic lesions in his primary care team again contacted us  We recommended repeat PSA and a bone scan  PSA has risen again and bone scan does demonstrate some concern  10/5/18 PSA 9 8  4/30/18 PSA 8 6, free 19 6%  9/30/17 PSA 7 3, free 15%  5/5/17 PSA 4 6  3/20/17 PSA 5 4     The patient has erectile dysfunction and has been doing well with Viagra 50 mg  These are cost prohibitive however   We switched to sildenafil 20mg tablets at last visit  The patient was unable to obtain the medication  Patient has not been using sildenafil since his heart episode described below  Patient is doing very well from a cardiac standpoint  He is still on Plavix and aspirin  He has lost 40 pounds with aggressive cardiac rehab and is exercising daily  He feels very good  He denies bony pain  He is concerned about the findings of his bone scan  Past Medical History:     Past Medical History:   Diagnosis Date    Anemia     last assessed  1/7/14     Polyp of sigmoid colon     Tinnitus     unspecified laterality / last assessed 4/9/13       PAST SURGICAL HISTORY:     Past Surgical History:   Procedure Laterality Date    HEMORRHOID SURGERY         CURRENT MEDICATIONS:     Current Outpatient Prescriptions   Medication Sig Dispense Refill    aspirin (ASPIRIN ADULT LOW STRENGTH) 81 mg EC tablet Take 1 tablet by mouth daily      cholecalciferol (VITAMIN D3) 1,000 units tablet Take 1 tablet by mouth daily      clopidogrel (PLAVIX) 75 mg tablet Take 75 mg by mouth daily      losartan (COZAAR) 100 MG tablet take 1 tablet by mouth once daily 30 tablet 5    metoprolol tartrate (LOPRESSOR) 25 mg tablet Take 25 mg by mouth      neomycin-polymyxin-hydrocortisone (CORTISPORIN) 0 35%-10,000 units/mL-1% otic suspension Administer 4 drops into the left ear 4 (four) times a day 10 mL 1    Saw Palmetto 1000 MG CAPS Take 3,600 mg by mouth      sildenafil (VIAGRA) 100 mg tablet Take by mouth      atorvastatin (LIPITOR) 40 mg tablet Take 40 mg by mouth      isosorbide mononitrate (IMDUR) 30 mg 24 hr tablet Take 30 mg by mouth daily  0    metoprolol succinate (TOPROL-XL) 25 mg 24 hr tablet Take 12 5 mg by mouth daily        nitroglycerin (NITROSTAT) 0 4 mg SL tablet        No current facility-administered medications for this visit          ALLERGIES:   No Known Allergies    SOCIAL HISTORY:     Social History     Social History    Marital status: /Civil [employer]     Spouse name: N/A    Number of children: N/A    Years of education: N/A     Occupational History    retired      Social History Main Topics    Smoking status: Never Smoker    Smokeless tobacco: Never Used    Alcohol use Yes      Comment: Occuational / social     Drug use: No    Sexual activity: Not Asked     Other Topics Concern    None     Social History Narrative    Always uses seat belt     Daily caffeine consumption 2-3 servings a day     Feels safe at home       SOCIAL HISTORY:     Family History   Problem Relation Age of Onset   Juan Montgomery Breast cancer Mother     Hypertension Father        REVIEW OF SYSTEMS:     Review of Systems   Constitutional: Negative for activity change, chills, fever and unexpected weight change (Planned weight loss with exercise)  Respiratory: Negative  Cardiovascular: Positive for chest pain  Gastrointestinal: Negative  Genitourinary: Negative  Musculoskeletal: Negative  Negative for back pain  Neurological: Negative  Psychiatric/Behavioral: Negative  PHYSICAL EXAM:     /80 (BP Location: Right arm, Patient Position: Sitting)   Ht 5' 7" (1 702 m)   Wt 87 4 kg (192 lb 9 6 oz)   BMI 30 17 kg/m²      General:  Healthy appearing male in no acute distress  They have a normal affect  There is not appear to be any gross neurologic defects or abnormalities  HEENT:  Normocephalic, atraumatic  Neck is supple without any palpable lymphadenopathy  Cardiovascular:  Patient has normal palpable distal radial pulses  There is no significant peripheral edema  No JVD is noted  Respiratory:  Patient has unlabored respirations  There is no audible wheeze or rhonchi  Abdomen:  Abdomen is without surgical scars  Abdomen is soft and nontender  There is no tympany  Inguinal and umbilical hernia are not appreciated  Genitourinary:  Circumcised phallus, orthotopic meatus, testicles descended, digital rectal exam shows a 50 g prostate  There is a 1 to 1-1/2 cm nodule at the right side of the median sulcus towards the apex    Musculoskeletal:  Patient does not have significant CVA tenderness in the flank with palpation or percussion  They full range of motion in all 4 extremities  Strength in all 4 extremities appears congruent  Patient is able to ambulate without assistance or difficulty  Dermatologic:  Patient has no skin abnormalities or rashes  LABS:     CBC:   Lab Results   Component Value Date    WBC 5 9 04/28/2018    HGB 15 5 04/28/2018    HCT 44 2 04/28/2018    MCV 90 4 04/28/2018     04/28/2018       BMP:   Lab Results   Component Value Date    CALCIUM 9 2 04/28/2018     09/30/2017    K 4 6 09/30/2017    CO2 31 04/28/2018     04/28/2018    BUN 22 04/28/2018    CREATININE 1 28 (H) 04/28/2018     10/5/18 PSA 9 8      IMAGING:      10/5/18  BONE SCAN  WHOLE BODY     INDICATION: R97 20: Elevated prostate specific antigen (PSA)     PREVIOUS FILM CORRELATION:    CT chest 9/28/2018     TECHNIQUE:   This study was performed following the intravenous administration of 26 3 mCi Tc-99m labeled MDP  Delayed, anterior and posterior whole body images were acquired, 2-3 hours after radiopharmaceutical administration      FINDINGS:  Focal radiotracer activity in the posterior lower thoracic spine likely at the T10 level and corresponding with previously seen sclerotic focus  Additional sclerotic focus in the left posterior 7th rib  This also corresponds to a sclerotic density seen   on prior CT  Increased activity in the distal right clavicle may be degenerative or metastatic  Degenerative change in the spine  Activity in the posterior right ankle region may be degenerative or posttraumatic  Symmetric renal uptake      IMPRESSION:     1  Focal activity in the posterior T10 level and left posterior 7th rib, most concerning for osseous metastases    2   Increased activity in the distal right clavicle may be degenerative or metastatic  ASSESSMENT:     69 y o  male with prostate nodule and elevated PSA, now with abnormal sclerotic lesions and positive bone scan    PLAN:     Despite the patient's dual anti-platelet therapy, I think at this point, it is reasonable to move forward with IR bone biopsy  This will help us to determine whether not the patient has metastatic prostate cancer to bone  Although it is less likely given the patient's PSA being less than 10, it is not impossible and a bone biopsy will help us to elucidate the source of these bony abnormalities  Patient will reach out to his cardiologist to determine whether not he can stop 1 of the 2 anti-platelet therapies  We will need to discuss with interventional Radiology potential biopsy during the use of Plavix  I do not think it is reasonable to continue to wait until the patient can come off of these medications which would likely be around 1 year (April 2019)  Should the patient's bone biopsy come back negative for prostate cancer, I would recommend moving forward with a transrectal ultrasound-guided biopsy of the prostate  This would be based on the patient's PSA and abnormal exam     The patient is having some urinary symptoms and has previously used saw palmetto  If these biopsies are negative, we can consider instituting therapy in the form of alpha blockade or 5 alpha reductase inhibitors  Patient will return after his IR biopsy

## 2018-10-08 NOTE — PROGRESS NOTES
UROLOGY ROUTINE FOLLOW UP NOTE     CHIEF COMPLAINT   Apryl Michel is a 69 y o  male with a complaint of   Chief Complaint   Patient presents with    Elevated PSA       History of Present Illness:     69 y o  gentleman who has been undergoing routine prostate cancer screening with his primary care team  The patient has had a steady PSA but in March of 2017 was noted to have some oscillation  The patient was given a course of antibiotics and his PSA came down from the mid 5 range to 4 6  He initially presented in June 2017  We initially recommended follow-up in 1 year  Follow-up of his PSA values ordered by his primary team demonstrated concern for rapid rise  The patient return to see me in May of 2018  At that time, we recommended a prostate biopsy but the patient had just undergone percutaneous stenting and was on dual anti-platelet therapy  We recommend delay  The patient underwent a CT scan of his chest which demonstrated some sclerotic lesions in his primary care team again contacted us  We recommended repeat PSA and a bone scan  PSA has risen again and bone scan does demonstrate some concern  10/5/18 PSA 9 8  4/30/18 PSA 8 6, free 19 6%  9/30/17 PSA 7 3, free 15%  5/5/17 PSA 4 6  3/20/17 PSA 5 4     The patient has erectile dysfunction and has been doing well with Viagra 50 mg  These are cost prohibitive however  We switched to sildenafil 20mg tablets at last visit  The patient was unable to obtain the medication  Patient has not been using sildenafil since his heart episode described below  Patient is doing very well from a cardiac standpoint  He is still on Plavix and aspirin  He has lost 40 pounds with aggressive cardiac rehab and is exercising daily  He feels very good  He denies bony pain  He is concerned about the findings of his bone scan      Past Medical History:     Past Medical History:   Diagnosis Date    Anemia     last assessed  1/7/14     Polyp of sigmoid colon     Tinnitus     unspecified laterality / last assessed 4/9/13       PAST SURGICAL HISTORY:     Past Surgical History:   Procedure Laterality Date    HEMORRHOID SURGERY         CURRENT MEDICATIONS:     Current Outpatient Prescriptions   Medication Sig Dispense Refill    aspirin (ASPIRIN ADULT LOW STRENGTH) 81 mg EC tablet Take 1 tablet by mouth daily      cholecalciferol (VITAMIN D3) 1,000 units tablet Take 1 tablet by mouth daily      clopidogrel (PLAVIX) 75 mg tablet Take 75 mg by mouth daily      losartan (COZAAR) 100 MG tablet take 1 tablet by mouth once daily 30 tablet 5    metoprolol tartrate (LOPRESSOR) 25 mg tablet Take 25 mg by mouth      neomycin-polymyxin-hydrocortisone (CORTISPORIN) 0 35%-10,000 units/mL-1% otic suspension Administer 4 drops into the left ear 4 (four) times a day 10 mL 1    Saw Palmetto 1000 MG CAPS Take 3,600 mg by mouth      sildenafil (VIAGRA) 100 mg tablet Take by mouth      atorvastatin (LIPITOR) 40 mg tablet Take 40 mg by mouth      isosorbide mononitrate (IMDUR) 30 mg 24 hr tablet Take 30 mg by mouth daily  0    metoprolol succinate (TOPROL-XL) 25 mg 24 hr tablet Take 12 5 mg by mouth daily        nitroglycerin (NITROSTAT) 0 4 mg SL tablet        No current facility-administered medications for this visit          ALLERGIES:   No Known Allergies    SOCIAL HISTORY:     Social History     Social History    Marital status: /Civil Union     Spouse name: N/A    Number of children: N/A    Years of education: N/A     Occupational History    retired      Social History Main Topics    Smoking status: Never Smoker    Smokeless tobacco: Never Used    Alcohol use Yes      Comment: Occuational / social     Drug use: No    Sexual activity: Not Asked     Other Topics Concern    None     Social History Narrative    Always uses seat belt     Daily caffeine consumption 2-3 servings a day     Feels safe at home       SOCIAL HISTORY:     Family History   Problem Relation Age of Onset    Breast cancer Mother     Hypertension Father        REVIEW OF SYSTEMS:     Review of Systems   Constitutional: Negative for activity change, chills, fever and unexpected weight change (Planned weight loss with exercise)  Respiratory: Negative  Cardiovascular: Positive for chest pain  Gastrointestinal: Negative  Genitourinary: Negative  Musculoskeletal: Negative  Negative for back pain  Neurological: Negative  Psychiatric/Behavioral: Negative  PHYSICAL EXAM:     /80 (BP Location: Right arm, Patient Position: Sitting)   Ht 5' 7" (1 702 m)   Wt 87 4 kg (192 lb 9 6 oz)   BMI 30 17 kg/m²     General:  Healthy appearing male in no acute distress  They have a normal affect  There is not appear to be any gross neurologic defects or abnormalities  HEENT:  Normocephalic, atraumatic  Neck is supple without any palpable lymphadenopathy  Cardiovascular:  Patient has normal palpable distal radial pulses  There is no significant peripheral edema  No JVD is noted  Respiratory:  Patient has unlabored respirations  There is no audible wheeze or rhonchi  Abdomen:  Abdomen is without surgical scars  Abdomen is soft and nontender  There is no tympany  Inguinal and umbilical hernia are not appreciated  Genitourinary:  Circumcised phallus, orthotopic meatus, testicles descended, digital rectal exam shows a 50 g prostate  There is a 1 to 1-1/2 cm nodule at the right side of the median sulcus towards the apex    Musculoskeletal:  Patient does not have significant CVA tenderness in the flank with palpation or percussion  They full range of motion in all 4 extremities  Strength in all 4 extremities appears congruent  Patient is able to ambulate without assistance or difficulty  Dermatologic:  Patient has no skin abnormalities or rashes        LABS:     CBC:   Lab Results   Component Value Date    WBC 5 9 04/28/2018    HGB 15 5 04/28/2018    HCT 44 2 04/28/2018    MCV 90 4 04/28/2018     04/28/2018       BMP:   Lab Results   Component Value Date    CALCIUM 9 2 04/28/2018     09/30/2017    K 4 6 09/30/2017    CO2 31 04/28/2018     04/28/2018    BUN 22 04/28/2018    CREATININE 1 28 (H) 04/28/2018     10/5/18 PSA 9 8      IMAGING:      10/5/18  BONE SCAN  WHOLE BODY     INDICATION: R97 20: Elevated prostate specific antigen (PSA)     PREVIOUS FILM CORRELATION:    CT chest 9/28/2018     TECHNIQUE:   This study was performed following the intravenous administration of 26 3 mCi Tc-99m labeled MDP  Delayed, anterior and posterior whole body images were acquired, 2-3 hours after radiopharmaceutical administration      FINDINGS:  Focal radiotracer activity in the posterior lower thoracic spine likely at the T10 level and corresponding with previously seen sclerotic focus  Additional sclerotic focus in the left posterior 7th rib  This also corresponds to a sclerotic density seen   on prior CT  Increased activity in the distal right clavicle may be degenerative or metastatic  Degenerative change in the spine  Activity in the posterior right ankle region may be degenerative or posttraumatic  Symmetric renal uptake      IMPRESSION:     1  Focal activity in the posterior T10 level and left posterior 7th rib, most concerning for osseous metastases  2   Increased activity in the distal right clavicle may be degenerative or metastatic  ASSESSMENT:     69 y o  male with prostate nodule and elevated PSA, now with abnormal sclerotic lesions and positive bone scan    PLAN:     Despite the patient's dual anti-platelet therapy, I think at this point, it is reasonable to move forward with IR bone biopsy  This will help us to determine whether not the patient has metastatic prostate cancer to bone    Although it is less likely given the patient's PSA being less than 10, it is not impossible and a bone biopsy will help us to elucidate the source of these bony abnormalities  Patient will reach out to his cardiologist to determine whether not he can stop 1 of the 2 anti-platelet therapies  We will need to discuss with interventional Radiology potential biopsy during the use of Plavix  I do not think it is reasonable to continue to wait until the patient can come off of these medications which would likely be around 1 year (April 2019)  Should the patient's bone biopsy come back negative for prostate cancer, I would recommend moving forward with a transrectal ultrasound-guided biopsy of the prostate  This would be based on the patient's PSA and abnormal exam     The patient is having some urinary symptoms and has previously used saw palmetto  If these biopsies are negative, we can consider instituting therapy in the form of alpha blockade or 5 alpha reductase inhibitors  Patient will return after his IR biopsy

## 2018-10-09 ENCOUNTER — TELEPHONE (OUTPATIENT)
Dept: UROLOGY | Facility: CLINIC | Age: 78
End: 2018-10-09

## 2018-10-09 DIAGNOSIS — R94.8 ABNORMAL RADIONUCLIDE BONE SCAN: Primary | ICD-10-CM

## 2018-10-09 DIAGNOSIS — N40.2 PROSTATE NODULE: ICD-10-CM

## 2018-10-09 DIAGNOSIS — R97.20 ELEVATED PSA: ICD-10-CM

## 2018-10-09 RX ORDER — CIPROFLOXACIN 500 MG/1
500 TABLET, FILM COATED ORAL EVERY 12 HOURS SCHEDULED
Qty: 6 TABLET | Refills: 0 | Status: SHIPPED | OUTPATIENT
Start: 2018-10-09 | End: 2018-10-12

## 2018-10-09 NOTE — TELEPHONE ENCOUNTER
After discussion with Dr Juliane Canavan in interventional radiology, it is felt that thoracic spinal lesion and rib lesion are not amenable to interventional biopsy  We will cancel the plan for bone biopsy  I have discussed this with the patient  I will plan to move forward with a transrectal ultrasound-guided biopsy of the prostate  The patient is currently on dual anti-platelet therapy given his coronary stenting in April of 2018  I have reached out to Dr Shannon Modi, [address] [place] [address] cardiology to assess if either can be stopped  Dr Marcellus Hurt feels that the Plavix can be stopped 5 days prior to the biopsy and should be restarted within 48 hr  The patient should remain on aspirin  The risk of bleeding is certainly present on a single anti-platelet therapy  There is also risk for occlusion of the patient's recent stents  The patient understands that we will move forward with TRUS biopsy

## 2018-10-10 NOTE — TELEPHONE ENCOUNTER
Patient scheduled for 10/24/18 at 11:30 in the [facility] [address] office with Dr Jason Baum  Please call patient with appointment time

## 2018-10-11 NOTE — TELEPHONE ENCOUNTER
Patient is aware of this appointment and also the prep instructions  Prep instructions for biopsy were emailed to patient as well  Please advise of a discussion spot for follow up

## 2018-10-12 NOTE — TELEPHONE ENCOUNTER
Patient scheduled for prostate biopsy result discussion on 11/9/18 at 11:00  Please call patient with appointment time

## 2018-10-16 ENCOUNTER — TELEPHONE (OUTPATIENT)
Dept: UROLOGY | Facility: CLINIC | Age: 78
End: 2018-10-16

## 2018-10-16 NOTE — TELEPHONE ENCOUNTER
Patient managed by Dr Sharmin Salazar, seen in [place] office  Patient calling today, wants to confirm that he needs to stop his Plavix prior to his prostate biopsy next week on 10/24/18  Per previous telephone note, Dr Sharmin Salazar had talked with patient's cardiologist Dr Alonzo Joaquin, who had advised Plavix can be stopped 5 days prior to biopsy, but patient should continue his aspirin  Patient instructed on same

## 2018-10-22 ENCOUNTER — TELEPHONE (OUTPATIENT)
Dept: FAMILY MEDICINE CLINIC | Facility: CLINIC | Age: 78
End: 2018-10-22

## 2018-10-22 NOTE — TELEPHONE ENCOUNTER
Teto Ramos called to verify if his fasting blood work script had been sent to [id number] and if it is fasting    I did advise pt I would gladly fax his fasting blood work script to the [address] [place] [address] in [place] to [phone] pt's  Munira Diaz was faxed on 10/22/18 at 4:11pm

## 2018-10-24 ENCOUNTER — PROCEDURE VISIT (OUTPATIENT)
Dept: UROLOGY | Facility: CLINIC | Age: 78
End: 2018-10-24
Payer: COMMERCIAL

## 2018-10-24 VITALS
WEIGHT: 193 LBS | HEART RATE: 48 BPM | BODY MASS INDEX: 30.23 KG/M2 | DIASTOLIC BLOOD PRESSURE: 90 MMHG | SYSTOLIC BLOOD PRESSURE: 130 MMHG

## 2018-10-24 DIAGNOSIS — R97.20 ELEVATED PSA: ICD-10-CM

## 2018-10-24 DIAGNOSIS — N40.2 PROSTATE NODULE: Primary | ICD-10-CM

## 2018-10-24 DIAGNOSIS — R94.8 ABNORMAL RADIONUCLIDE BONE SCAN: ICD-10-CM

## 2018-10-24 PROCEDURE — 76872 US TRANSRECTAL: CPT | Performed by: UROLOGY

## 2018-10-24 PROCEDURE — 76942 ECHO GUIDE FOR BIOPSY: CPT | Performed by: UROLOGY

## 2018-10-24 PROCEDURE — 88363 XM ARCHIVE TISSUE MOLEC ANAL: CPT | Performed by: PATHOLOGY

## 2018-10-24 PROCEDURE — G0416 PROSTATE BIOPSY, ANY MTHD: HCPCS | Performed by: PATHOLOGY

## 2018-10-24 PROCEDURE — 55700 PR BIOPSY OF PROSTATE,NEEDLE/PUNCH: CPT | Performed by: UROLOGY

## 2018-10-24 NOTE — PROGRESS NOTES
UROLOGY ROUTINE FOLLOW UP NOTE     CHIEF COMPLAINT   Cayetano Ngo is a 69 y o  male with a complaint of   Chief Complaint   Patient presents with    prostate nodule       History of Present Illness:     69 y o  gentleman who has been undergoing routine prostate cancer screening with his primary care team  The patient has had a steady PSA but in March of 2017 was noted to have some oscillation  The patient was given a course of antibiotics and his PSA came down from the mid 5 range to 4 6  He initially presented in June 2017  We initially recommended follow-up in 1 year  Follow-up of his PSA values ordered by his primary team demonstrated concern for rapid rise  The patient return to see me in May of 2018  At that time, we recommended a prostate biopsy but the patient had just undergone percutaneous stenting and was on dual anti-platelet therapy  We recommend delay  The patient underwent a CT scan of his chest which demonstrated some sclerotic lesions in his primary care team again contacted us  We recommended repeat PSA and a bone scan  PSA has risen again and bone scan does demonstrate some concern  10/5/18 PSA 9 8  4/30/18 PSA 8 6, free 19 6%  9/30/17 PSA 7 3, free 15%  5/5/17 PSA 4 6  3/20/17 PSA 5 4     The patient has erectile dysfunction and has been doing well with Viagra 50 mg  These are cost prohibitive however  We switched to sildenafil 20mg tablets at last visit  The patient was unable to obtain the medication  Patient has not been using sildenafil since his heart episode described below  Patient is doing very well from a cardiac standpoint  He is still on Plavix and aspirin  He has lost 40 pounds with aggressive cardiac rehab and is exercising daily  He feels very good  He denies bony pain  He is concerned about the findings of his bone scan      We did discuss with Interventional Radiology possible biopsy of the patient's thoracic and rib lesions although these were not felt to be safe for attempt  As such, the patient was arranged for a prostate biopsy here in our office  I did discuss with the patient's cardiologist who felt it would be safe for the patient to stop his Plavix but remain on his aspirin for the procedure  He presents today for this  Past Medical History:     Past Medical History:   Diagnosis Date    Anemia     last assessed  1/7/14     Polyp of sigmoid colon     Tinnitus     unspecified laterality / last assessed 4/9/13       PAST SURGICAL HISTORY:     Past Surgical History:   Procedure Laterality Date    HEMORRHOID SURGERY      PROSTATE BIOPSY  10/24/2018       CURRENT MEDICATIONS:     Current Outpatient Prescriptions   Medication Sig Dispense Refill    aspirin (ASPIRIN ADULT LOW STRENGTH) 81 mg EC tablet Take 1 tablet by mouth daily      cholecalciferol (VITAMIN D3) 1,000 units tablet Take 1 tablet by mouth daily      losartan (COZAAR) 100 MG tablet take 1 tablet by mouth once daily 30 tablet 5    metoprolol tartrate (LOPRESSOR) 25 mg tablet Take 25 mg by mouth      nitroglycerin (NITROSTAT) 0 4 mg SL tablet       Saw [place] 1000 MG CAPS Take 3,600 mg by mouth      atorvastatin (LIPITOR) 40 mg tablet Take 40 mg by mouth      bisacodyl (FLEET) 10 MG/30ML ENEM Insert 30 mL (10 mg total) into the rectum once for 1 dose Day of biopsy 30 mL 0    clopidogrel (PLAVIX) 75 mg tablet Take 75 mg by mouth daily      isosorbide mononitrate (IMDUR) 30 mg 24 hr tablet Take 30 mg by mouth daily  0    metoprolol succinate (TOPROL-XL) 25 mg 24 hr tablet Take 12 5 mg by mouth daily        neomycin-polymyxin-hydrocortisone (CORTISPORIN) 0 35%-10,000 units/mL-1% otic suspension Administer 4 drops into the left ear 4 (four) times a day 10 mL 1    sildenafil (VIAGRA) 100 mg tablet Take by mouth       No current facility-administered medications for this visit          ALLERGIES:   No Known Allergies    SOCIAL HISTORY:     Social History     Social History    Marital status: /Civil Union     Spouse name: N/A    Number of children: N/A    Years of education: N/A     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    Smoking status: Never Smoker    Smokeless tobacco: Never Used    Alcohol use Yes      Comment: Occuational / social     Drug use: No    Sexual activity: Not Asked     Other Topics Concern    None     Social History Narrative    Always uses seat belt     Daily caffeine consumption 2-3 servings a day     Feels safe at home       SOCIAL HISTORY:     Family History   Problem Relation Age of Onset   Juan Rosebud Breast cancer Mother     Hypertension Father        REVIEW OF SYSTEMS:     Review of Systems   Constitutional: Negative for activity change, chills, fever and unexpected weight change (Planned weight loss with exercise)  Respiratory: Negative  Cardiovascular: Positive for chest pain  Gastrointestinal: Negative  Genitourinary: Negative  Musculoskeletal: Negative  Negative for back pain  Neurological: Negative  Psychiatric/Behavioral: Negative  PHYSICAL EXAM:     /90   Pulse (!) 48   Wt 87 5 kg (193 lb)   BMI 30 23 kg/m²     General:  Healthy appearing male in no acute distress  They have a normal affect  There is not appear to be any gross neurologic defects or abnormalities  HEENT:  Normocephalic, atraumatic  Neck is supple without any palpable lymphadenopathy  Cardiovascular:  Patient has normal palpable distal radial pulses  There is no significant peripheral edema  No JVD is noted  Respiratory:  Patient has unlabored respirations  There is no audible wheeze or rhonchi  Abdomen:  Abdomen is without surgical scars  Abdomen is soft and nontender  There is no tympany  Inguinal and umbilical hernia are not appreciated  Genitourinary:  Circumcised phallus, orthotopic meatus, testicles descended, digital rectal exam shows a 50 g prostate    There is a 1 to 1-1/2 cm nodule at the right side of the median sulcus towards the apex    Musculoskeletal:  Patient does not have significant CVA tenderness in the flank with palpation or percussion  They full range of motion in all 4 extremities  Strength in all 4 extremities appears congruent  Patient is able to ambulate without assistance or difficulty  Dermatologic:  Patient has no skin abnormalities or rashes  LABS:     CBC:   Lab Results   Component Value Date    WBC 5 9 04/28/2018    HGB 15 5 04/28/2018    HCT 44 2 04/28/2018    MCV 90 4 04/28/2018     04/28/2018       BMP:   Lab Results   Component Value Date    CALCIUM 9 2 04/28/2018     09/30/2017    K 4 8 04/28/2018    CO2 31 04/28/2018     04/28/2018    BUN 22 04/28/2018    CREATININE 1 27 (H) 09/30/2017     10/5/18 PSA 9 8  Lab Results   Component Value Date    PSA 9 8 (H) 10/05/2018     IMAGING:      10/5/18  BONE SCAN  WHOLE BODY     INDICATION: R97 20: Elevated prostate specific antigen (PSA)     PREVIOUS FILM CORRELATION:    CT chest 9/28/2018     TECHNIQUE:   This study was performed following the intravenous administration of 26 3 mCi Tc-99m labeled MDP  Delayed, anterior and posterior whole body images were acquired, 2-3 hours after radiopharmaceutical administration      FINDINGS:  Focal radiotracer activity in the posterior lower thoracic spine likely at the T10 level and corresponding with previously seen sclerotic focus  Additional sclerotic focus in the left posterior 7th rib  This also corresponds to a sclerotic density seen   on prior CT  Increased activity in the distal right clavicle may be degenerative or metastatic  Degenerative change in the spine  Activity in the posterior right ankle region may be degenerative or posttraumatic  Symmetric renal uptake      IMPRESSION:     1  Focal activity in the posterior T10 level and left posterior 7th rib, most concerning for osseous metastases    2   Increased activity in the distal right clavicle may be degenerative or metastatic  PROCEDURE:     SEE NOTE    ASSESSMENT:     69 y o  male with prostate nodule and elevated PSA, now with abnormal sclerotic lesions and positive bone scan    PLAN:     Bony lesions were not able to be biopsied after discussion with interventional radiology  If the patient has localized prostate cancer, we will have to consider additional imaging available for the thoracic lesions including possible PET scan  Presented for biopsy today  I discussed with the patient potential side effects from prostate biopsy including bleeding from his bladder, bleeding from his rectum, and bleeding in his semen up to about 2-4 weeks  The patient knows that should he have severe uncontrolled bleeding he is to call the office or proceed immediately to the emergency room  Additionally counseled the patient to monitor for fevers greater 100 3  He will finish out his course of antibiotics  Patient will return in 2 weeks for discussion of his pathology

## 2018-11-01 LAB
ALBUMIN SERPL-MCNC: 4.2 G/DL (ref 3.6–5.1)
ALBUMIN/GLOB SERPL: 1.7 (CALC) (ref 1–2.5)
ALP SERPL-CCNC: 53 U/L (ref 40–115)
ALT SERPL-CCNC: 22 U/L (ref 9–46)
AST SERPL-CCNC: 22 U/L (ref 10–35)
BILIRUB SERPL-MCNC: 0.9 MG/DL (ref 0.2–1.2)
BUN SERPL-MCNC: 20 MG/DL (ref 7–25)
BUN/CREAT SERPL: 16 (CALC) (ref 6–22)
CALCIUM SERPL-MCNC: 9.5 MG/DL (ref 8.6–10.3)
CHLORIDE SERPL-SCNC: 103 MMOL/L (ref 98–110)
CHOLEST SERPL-MCNC: 117 MG/DL
CHOLEST/HDLC SERPL: 2.7 (CALC)
CO2 SERPL-SCNC: 32 MMOL/L (ref 20–32)
CREAT SERPL-MCNC: 1.23 MG/DL (ref 0.7–1.18)
GLOBULIN SER CALC-MCNC: 2.5 G/DL (CALC) (ref 1.9–3.7)
GLUCOSE SERPL-MCNC: 98 MG/DL (ref 65–99)
HDLC SERPL-MCNC: 43 MG/DL
LDLC SERPL CALC-MCNC: 59 MG/DL (CALC)
NONHDLC SERPL-MCNC: 74 MG/DL (CALC)
POTASSIUM SERPL-SCNC: 4.7 MMOL/L (ref 3.5–5.3)
PROT SERPL-MCNC: 6.7 G/DL (ref 6.1–8.1)
SL AMB EGFR AFRICAN AMERICAN: 65 ML/MIN/1.73M2
SL AMB EGFR NON AFRICAN AMERICAN: 56 ML/MIN/1.73M2
SODIUM SERPL-SCNC: 140 MMOL/L (ref 135–146)
TRIGL SERPL-MCNC: 66 MG/DL
TSH SERPL-ACNC: 2.13 MIU/L (ref 0.4–4.5)

## 2018-11-02 DIAGNOSIS — I10 ESSENTIAL HYPERTENSION: Primary | ICD-10-CM

## 2018-11-02 RX ORDER — METOPROLOL TARTRATE 50 MG/1
TABLET, FILM COATED ORAL
Qty: 30 TABLET | Refills: 5 | Status: SHIPPED | OUTPATIENT
Start: 2018-11-02 | End: 2018-11-14 | Stop reason: ALTCHOICE

## 2018-11-04 ENCOUNTER — OFFICE VISIT (OUTPATIENT)
Dept: FAMILY MEDICINE CLINIC | Facility: CLINIC | Age: 78
End: 2018-11-04
Payer: COMMERCIAL

## 2018-11-04 VITALS
HEIGHT: 65 IN | RESPIRATION RATE: 16 BRPM | HEART RATE: 50 BPM | SYSTOLIC BLOOD PRESSURE: 130 MMHG | BODY MASS INDEX: 32.37 KG/M2 | WEIGHT: 194.3 LBS | TEMPERATURE: 97.1 F | OXYGEN SATURATION: 99 % | DIASTOLIC BLOOD PRESSURE: 90 MMHG

## 2018-11-04 DIAGNOSIS — S39.012A STRAIN OF LUMBAR REGION, INITIAL ENCOUNTER: Primary | ICD-10-CM

## 2018-11-04 DIAGNOSIS — I10 ESSENTIAL HYPERTENSION: ICD-10-CM

## 2018-11-04 PROCEDURE — 3725F SCREEN DEPRESSION PERFORMED: CPT | Performed by: FAMILY MEDICINE

## 2018-11-04 PROCEDURE — 99213 OFFICE O/P EST LOW 20 MIN: CPT | Performed by: FAMILY MEDICINE

## 2018-11-04 RX ORDER — MELOXICAM 15 MG/1
15 TABLET ORAL DAILY
Qty: 15 TABLET | Refills: 0 | Status: SHIPPED | OUTPATIENT
Start: 2018-11-04 | End: 2018-11-14 | Stop reason: ALTCHOICE

## 2018-11-04 RX ORDER — TIZANIDINE 2 MG/1
2 TABLET ORAL 2 TIMES DAILY
Qty: 20 TABLET | Refills: 0 | Status: SHIPPED | OUTPATIENT
Start: 2018-11-04 | End: 2018-11-14 | Stop reason: ALTCHOICE

## 2018-11-04 RX ORDER — METOPROLOL SUCCINATE 25 MG/1
12.5 TABLET, EXTENDED RELEASE ORAL DAILY
Qty: 90 TABLET | Refills: 1 | Status: SHIPPED | OUTPATIENT
Start: 2018-11-04 | End: 2019-09-05 | Stop reason: SDUPTHER

## 2018-11-04 RX ORDER — LOSARTAN POTASSIUM 100 MG/1
100 TABLET ORAL DAILY
Qty: 90 TABLET | Refills: 1 | Status: SHIPPED | OUTPATIENT
Start: 2018-11-04 | End: 2019-04-28 | Stop reason: SDUPTHER

## 2018-11-04 NOTE — PROGRESS NOTES
Assessment/Plan:      Lumbar strain  Treat with anti-inflammatory, muscle relaxant moist heat and stretches  Diagnoses and all orders for this visit:    Strain of lumbar region, initial encounter  -     meloxicam (MOBIC) 15 mg tablet; Take 1 tablet (15 mg total) by mouth daily  -     tiZANidine (ZANAFLEX) 2 mg tablet; Take 1 tablet (2 mg total) by mouth 2 (two) times a day    Essential hypertension  -     metoprolol succinate (TOPROL-XL) 25 mg 24 hr tablet; Take 0 5 tablets (12 5 mg total) by mouth daily for 180 days  -     losartan (COZAAR) 100 MG tablet; Take 1 tablet (100 mg total) by mouth daily          Subjective:      Patient ID: Tatyana Harrington is a 69 y o  male  Patient presents with a chief complaint of pain in his left lower back  This is been present approximately 1 week  It began while he was exercising  He felt a pull in his low back and is persisted over the course of the last week  Does not radiate  He describes it as a tightness and a pulling  He is wearing a support belt and he has taken Tylenol without significant improvement in his symptoms  He is concerned because he does have cardiac stress test scheduled for tomorrow  The following portions of the patient's history were reviewed and updated as appropriate: allergies, current medications, past family history, past medical history, past social history, past surgical history and problem list     Review of Systems   Musculoskeletal: Positive for back pain  Objective:      /90 (BP Location: Left arm, Patient Position: Sitting, Cuff Size: Adult)   Pulse (!) 50   Temp (!) 97 1 °F (36 2 °C) (Tympanic)   Resp 16   Ht 5' 5" (1 651 m)   Wt 88 1 kg (194 lb 4 8 oz)   SpO2 99%   BMI 32 33 kg/m²          Physical Exam   Musculoskeletal:     Mild tenderness with spasm left lumbar region  Negative straight leg raising and normal reflexes

## 2018-11-09 ENCOUNTER — OFFICE VISIT (OUTPATIENT)
Dept: UROLOGY | Facility: CLINIC | Age: 78
End: 2018-11-09
Payer: COMMERCIAL

## 2018-11-09 VITALS
WEIGHT: 195 LBS | HEART RATE: 60 BPM | BODY MASS INDEX: 32.45 KG/M2 | SYSTOLIC BLOOD PRESSURE: 140 MMHG | DIASTOLIC BLOOD PRESSURE: 90 MMHG

## 2018-11-09 DIAGNOSIS — C61 PROSTATE CANCER (HCC): Primary | ICD-10-CM

## 2018-11-09 PROBLEM — R97.20 ELEVATED PSA: Status: RESOLVED | Noted: 2018-04-10 | Resolved: 2018-11-09

## 2018-11-09 PROCEDURE — 99214 OFFICE O/P EST MOD 30 MIN: CPT | Performed by: UROLOGY

## 2018-11-09 PROCEDURE — 96372 THER/PROPH/DIAG INJ SC/IM: CPT

## 2018-11-09 NOTE — LETTER
November 9, 2018     Radha Ellis DO  [address] [address]    Patient: Arlie Lombard   YOB: 1940   Date of Visit: 11/9/2018       Dear Dr Valiente Sol:    Thank you for referring Brenden Enciso to me for evaluation  Below are my notes for this consultation  If you have questions, please do not hesitate to call me  I look forward to following your patient along with you  Sincerely,        Carlyn Fair MD        CC: No Recipients  Carlyn Fair MD  11/9/2018  8:10 AM  Sign at close encounter    [facility] NOTE     CHIEF COMPLAINT   Arlie Lombard is a 69 y o  male with a complaint of   No chief complaint on file  History of Present Illness:     69 y o  gentleman who has been undergoing routine prostate cancer screening with his primary care team  The patient has had a steady PSA but in March of 2017 was noted to have some oscillation  The patient was given a course of antibiotics and his PSA came down from the mid 5 range to 4 6  He initially presented in June 2017  We initially recommended follow-up in 1 year  Follow-up of his PSA values ordered by his primary team demonstrated concern for rapid rise  The patient return to see me in May of 2018  At that time, we recommended a prostate biopsy but the patient had just undergone percutaneous stenting and was on dual anti-platelet therapy  We recommend delay  The patient underwent a CT scan of his chest which demonstrated some sclerotic lesions in his primary care team again contacted us  We recommended repeat PSA and a bone scan  PSA has risen again and bone scan does demonstrate some concern  10/5/18 PSA 9 8  4/30/18 PSA 8 6, free 19 6%  9/30/17 PSA 7 3, free 15%  5/5/17 PSA 4 6  3/20/17 PSA 5 4     The patient has erectile dysfunction and has been doing well with Viagra 50 mg  These are cost prohibitive however  We switched to sildenafil 20mg tablets at last visit    The patient was unable to obtain the medication  Patient has not been using sildenafil since his heart episode described below  Patient is doing very well from a cardiac standpoint  He is still on Plavix and aspirin  He has lost 40 pounds with aggressive cardiac rehab and is exercising daily  He feels very good  He denies bony pain  He is concerned about the findings of his bone scan  We did discuss with Interventional Radiology possible biopsy of the patient's thoracic and rib lesions although these were not felt to be safe for attempt  As such, the patient was arranged for a prostate biopsy here in our office  I did discuss with the patient's cardiologist who felt it would be safe for the patient to stop his Plavix but remain on his aspirin for the procedure  Underwent prostate biopsy  Returns for pathology      Past Medical History:     Past Medical History:   Diagnosis Date    Anemia     last assessed  1/7/14     Polyp of sigmoid colon     Tinnitus     unspecified laterality / last assessed 4/9/13       PAST SURGICAL HISTORY:     Past Surgical History:   Procedure Laterality Date    HEMORRHOID SURGERY      PROSTATE BIOPSY  10/24/2018       CURRENT MEDICATIONS:     Current Outpatient Prescriptions   Medication Sig Dispense Refill    aspirin (ASPIRIN ADULT LOW STRENGTH) 81 mg EC tablet Take 1 tablet by mouth daily      atorvastatin (LIPITOR) 40 mg tablet Take 40 mg by mouth      bisacodyl (FLEET) 10 MG/30ML ENEM Insert 30 mL (10 mg total) into the rectum once for 1 dose Day of biopsy 30 mL 0    cholecalciferol (VITAMIN D3) 1,000 units tablet Take 1 tablet by mouth daily      clopidogrel (PLAVIX) 75 mg tablet Take 75 mg by mouth daily      isosorbide mononitrate (IMDUR) 30 mg 24 hr tablet Take 30 mg by mouth daily  0    losartan (COZAAR) 100 MG tablet Take 1 tablet (100 mg total) by mouth daily 90 tablet 1    meloxicam (MOBIC) 15 mg tablet Take 1 tablet (15 mg total) by mouth daily 15 tablet 0    metoprolol succinate (TOPROL-XL) 25 mg 24 hr tablet Take 0 5 tablets (12 5 mg total) by mouth daily for 180 days 90 tablet 1    metoprolol tartrate (LOPRESSOR) 25 mg tablet Take 25 mg by mouth      metoprolol tartrate (LOPRESSOR) 50 mg tablet take 1 tablet by mouth once daily (Patient not taking: Reported on 11/4/2018) 30 tablet 5    neomycin-polymyxin-hydrocortisone (CORTISPORIN) 0 35%-10,000 units/mL-1% otic suspension Administer 4 drops into the left ear 4 (four) times a day (Patient not taking: Reported on 11/4/2018 ) 10 mL 1    nitroglycerin (NITROSTAT) 0 4 mg SL tablet       Saw [place] 1000 MG CAPS Take 3,600 mg by mouth      sildenafil (VIAGRA) 100 mg tablet Take by mouth      tiZANidine (ZANAFLEX) 2 mg tablet Take 1 tablet (2 mg total) by mouth 2 (two) times a day 20 tablet 0     No current facility-administered medications for this visit  ALLERGIES:   No Known Allergies    SOCIAL HISTORY:     Social History     Social History    Marital status: /Civil Union     Spouse name: N/A    Number of children: N/A    Years of education: N/A     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    Smoking status: Never Smoker    Smokeless tobacco: Never Used    Alcohol use Yes      Comment: Occuational / social     Drug use: No    Sexual activity: Not on file     Other Topics Concern    Not on file     Social History Narrative    Always uses seat belt     Daily caffeine consumption 2-3 servings a day     Feels safe at home       SOCIAL HISTORY:     Family History   Problem Relation Age of Onset   April Alcocer Breast cancer Mother     Hypertension Father        REVIEW OF SYSTEMS:     Review of Systems   Constitutional: Negative for activity change, chills, fever and unexpected weight change (Planned weight loss with exercise)  Respiratory: Negative  Cardiovascular: Positive for chest pain  Gastrointestinal: Negative  Genitourinary: Negative  Musculoskeletal: Negative  Negative for back pain  Neurological: Negative  Psychiatric/Behavioral: Negative  PHYSICAL EXAM:     There were no vitals taken for this visit  General:  Healthy appearing male in no acute distress  They have a normal affect  There is not appear to be any gross neurologic defects or abnormalities  HEENT:  Normocephalic, atraumatic  Neck is supple without any palpable lymphadenopathy  Cardiovascular:  Patient has normal palpable distal radial pulses  There is no significant peripheral edema  No JVD is noted  Respiratory:  Patient has unlabored respirations  There is no audible wheeze or rhonchi  Abdomen:  Abdomen is without surgical scars  Abdomen is soft and nontender  There is no tympany  Inguinal and umbilical hernia are not appreciated  Genitourinary:  Circumcised phallus, orthotopic meatus, testicles descended, digital rectal exam shows a 50 g prostate  There is a 1 to 1-1/2 cm nodule at the right side of the median sulcus towards the apex    Musculoskeletal:  Patient does not have significant CVA tenderness in the flank with palpation or percussion  They full range of motion in all 4 extremities  Strength in all 4 extremities appears congruent  Patient is able to ambulate without assistance or difficulty  Dermatologic:  Patient has no skin abnormalities or rashes        LABS:     CBC:   Lab Results   Component Value Date    WBC 5 9 04/28/2018    HGB 15 5 04/28/2018    HCT 44 2 04/28/2018    MCV 90 4 04/28/2018     04/28/2018       BMP:   Lab Results   Component Value Date    CALCIUM 9 5 10/31/2018     09/30/2017    K 4 7 10/31/2018    CO2 32 10/31/2018     10/31/2018    BUN 20 10/31/2018    CREATININE 1 27 (H) 09/30/2017     10/5/18 PSA 9 8  Lab Results   Component Value Date    PSA 9 8 (H) 10/05/2018     IMAGING:      10/5/18  BONE SCAN  WHOLE BODY     INDICATION: R97 20: Elevated prostate specific antigen (PSA)     PREVIOUS FILM CORRELATION:    CT chest 9/28/2018     TECHNIQUE:   This study was performed following the intravenous administration of 26 3 mCi Tc-99m labeled MDP  Delayed, anterior and posterior whole body images were acquired, 2-3 hours after radiopharmaceutical administration      FINDINGS:  Focal radiotracer activity in the posterior lower thoracic spine likely at the T10 level and corresponding with previously seen sclerotic focus  Additional sclerotic focus in the left posterior 7th rib  This also corresponds to a sclerotic density seen   on prior CT  Increased activity in the distal right clavicle may be degenerative or metastatic  Degenerative change in the spine  Activity in the posterior right ankle region may be degenerative or posttraumatic  Symmetric renal uptake      IMPRESSION:     1  Focal activity in the posterior T10 level and left posterior 7th rib, most concerning for osseous metastases  2   Increased activity in the distal right clavicle may be degenerative or metastatic  PATHOLOGY:     Final Diagnosis   A  Right lateral base prostate core biopsy:  - Adenocarcinoma, Vitaliy score 4+ 5 = 9/10 (grade 5 comprises 10% of core biopsy), grade group 5, continuously involving greater than 95% of this core biopsy  - No perineural invasion is seen     B  Right lateral mid prostate core biopsy:  - Adenocarcinoma, [place] score 4+ 5 = 9/10 (grade 5 conprises 20% of core biopsy), grade group 5, continuously involving 90% of this core biopsy  - Perineural invasion is seen      C  Left lateral apex prostate core biopsy:  - Adenocarcinoma, Vitaliy score 5 + 4 = 9/10 (grade 5 comprises 60% of core biopsy), grade group 5, continuously involving 60% of this core biopsy  - Perineural invasion is seen     D   Right base prostate core biopsy:  - Adenocarcinoma, Vitaliy score 4 + 5 = 9/10 (grade 5 comprises 10% of core biopsy), grade group 5, continuously involving 85% of this core biopsy  - Perineural invasion is seen     E  Right mid prostate core biopsy   - Adenocarcinoma, Vitaliy score 5 + 4 = 9/10 (grade 5 comprises 95% of core biopsy), grade group 5, continuously involving 60% of this core biopsy  - Perineural invasion is seen     F  Right apex prostate core biopsy:  - Adenocarcinoma, Vitaliy score 4 + 5 = 9/10 (grade 5 comprises 45% of core biopsy), grade group 5, continuously involving 70% of this core biopsy  - Perineural invasion is seen     G  Left base prostate core biopsy:  - Adenocarcinoma, [place]ledge score 4 + 5 = 9/10 (grade 5 comprises 35% of core biopsy), grade group 5, continuously involving 70% of this core biopsy  - No perineural invasion is seen     H  Left mid prostate core biopsy:  - Adenocarcinoma, Vitaliy score 4 + 4 = 8/10, grade group 4, discontinuously involving 20% of this core biopsy  - No perineural invasion is seen      I  Left apex prostate core biopsy:  - Benign prostatic tissue     J  Left lateral base prostate core biopsy:  - Adenocarcinoma, Sledge score 4 + 5 = 9/10, (grade 5 comprises 35% of core biopsy), grade group 5, discontinuously involving 20% of this core biopsy  - No perineural invasion is seen      K  Left lateral mid prostate core biopsy:  - Adenocarcinoma, Sledge score 4 + 4 = 8/10, grade group 4, continuously involving 5% of this core biopsy  - No perineural invasion is seen      L   Left lateral apex prostate core biopsy:  - Adenocarcinoma, Sledge score 4 + 4 = 8/10, grade group 4, discontinuously involving 5% of this core biopsy  - No perineural invasion is seen      Note: Block for Prolaris testing if required: E     ASSESSMENT:     69 y o  male with high volume, high risk Sledge 5+4=9 CaP, now with abnormal sclerotic lesions and positive bone scan    PLAN:

## 2018-11-09 NOTE — PROGRESS NOTES
UROLOGY ROUTINE FOLLOW UP NOTE     CHIEF COMPLAINT   Chandra Chan is a 69 y o  male with a complaint of   Chief Complaint   Patient presents with    Elevated PSA     results of path report       History of Present Illness:     69 y o  gentleman who has been undergoing routine prostate cancer screening with his primary care team  The patient has had a steady PSA but in March of 2017 was noted to have some oscillation  The patient was given a course of antibiotics and his PSA came down from the mid 5 range to 4 6  He initially presented in June 2017  We initially recommended follow-up in 1 year  Follow-up of his PSA values ordered by his primary team demonstrated concern for rapid rise  The patient return to see me in May of 2018  At that time, we recommended a prostate biopsy but the patient had just undergone percutaneous stenting and was on dual anti-platelet therapy  We recommend delay  The patient underwent a CT scan of his chest which demonstrated some sclerotic lesions in his primary care team again contacted us  We recommended repeat PSA and a bone scan  PSA has risen again and bone scan does demonstrate some concern  10/5/18 PSA 9 8  4/30/18 PSA 8 6, free 19 6%  9/30/17 PSA 7 3, free 15%  5/5/17 PSA 4 6  3/20/17 PSA 5 4     The patient has erectile dysfunction and has been doing well with Viagra 50 mg  These are cost prohibitive however  We switched to sildenafil 20mg tablets at last visit  The patient was unable to obtain the medication  Patient has not been using sildenafil since his heart episode described below  Patient is doing very well from a cardiac standpoint  He is still on Plavix and aspirin  He has lost 40 pounds with aggressive cardiac rehab and is exercising daily  He feels very good  He denies bony pain  He is concerned about the findings of his bone scan      We did discuss with Interventional Radiology possible biopsy of the patient's thoracic and rib lesions although these were not felt to be safe for attempt  As such, the patient was arranged for a prostate biopsy here in our office  I did discuss with the patient's cardiologist who felt it would be safe for the patient to stop his Plavix but remain on his aspirin for the procedure  Underwent prostate biopsy  Returns for pathology      Past Medical History:     Past Medical History:   Diagnosis Date    Anemia     last assessed  1/7/14     Polyp of sigmoid colon     Tinnitus     unspecified laterality / last assessed 4/9/13       PAST SURGICAL HISTORY:     Past Surgical History:   Procedure Laterality Date    HEMORRHOID SURGERY      PROSTATE BIOPSY  10/24/2018       CURRENT MEDICATIONS:     Current Outpatient Prescriptions   Medication Sig Dispense Refill    aspirin (ASPIRIN ADULT LOW STRENGTH) 81 mg EC tablet Take 1 tablet by mouth daily      cholecalciferol (VITAMIN D3) 1,000 units tablet Take 1 tablet by mouth daily      losartan (COZAAR) 100 MG tablet Take 1 tablet (100 mg total) by mouth daily 90 tablet 1    metoprolol succinate (TOPROL-XL) 25 mg 24 hr tablet Take 0 5 tablets (12 5 mg total) by mouth daily for 180 days 90 tablet 1    nitroglycerin (NITROSTAT) 0 4 mg SL tablet       atorvastatin (LIPITOR) 40 mg tablet Take 40 mg by mouth      bisacodyl (FLEET) 10 MG/30ML ENEM Insert 30 mL (10 mg total) into the rectum once for 1 dose Day of biopsy 30 mL 0    clopidogrel (PLAVIX) 75 mg tablet Take 75 mg by mouth daily      isosorbide mononitrate (IMDUR) 30 mg 24 hr tablet Take 30 mg by mouth daily  0    meloxicam (MOBIC) 15 mg tablet Take 1 tablet (15 mg total) by mouth daily (Patient not taking: Reported on 11/9/2018 ) 15 tablet 0    metoprolol tartrate (LOPRESSOR) 25 mg tablet Take 25 mg by mouth      metoprolol tartrate (LOPRESSOR) 50 mg tablet take 1 tablet by mouth once daily (Patient not taking: Reported on 11/4/2018) 30 tablet 5    neomycin-polymyxin-hydrocortisone (CORTISPORIN) 0 35%-10,000 units/mL-1% otic suspension Administer 4 drops into the left ear 4 (four) times a day (Patient not taking: Reported on 11/4/2018 ) 10 mL 1    Saw Palmetto 1000 MG CAPS Take 3,600 mg by mouth      sildenafil (VIAGRA) 100 mg tablet Take by mouth      tiZANidine (ZANAFLEX) 2 mg tablet Take 1 tablet (2 mg total) by mouth 2 (two) times a day (Patient not taking: Reported on 11/9/2018 ) 20 tablet 0     Current Facility-Administered Medications   Medication Dose Route Frequency Provider Last Rate Last Dose    degarelix acetate (FIRMAGON) injection 240 mg  240 mg Subcutaneous Once Lonny Artis MD           ALLERGIES:   No Known Allergies    SOCIAL HISTORY:     Social History     Social History    Marital status: /Civil Union     Spouse name: N/A    Number of children: N/A    Years of education: N/A     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    Smoking status: Never Smoker    Smokeless tobacco: Never Used    Alcohol use Yes      Comment: Occuational / social     Drug use: No    Sexual activity: Not Asked     Other Topics Concern    None     Social History Narrative    Always uses seat belt     Daily caffeine consumption 2-3 servings a day     Feels safe at home       SOCIAL HISTORY:     Family History   Problem Relation Age of Onset    Breast cancer Mother     Hypertension Father        REVIEW OF SYSTEMS:     Review of Systems   Constitutional: Negative for activity change, chills, fever and unexpected weight change (Planned weight loss with exercise)  Respiratory: Negative  Cardiovascular: Positive for chest pain  Gastrointestinal: Negative  Genitourinary: Negative  Musculoskeletal: Negative  Negative for back pain  Neurological: Negative  Psychiatric/Behavioral: Negative  PHYSICAL EXAM:     /90   Pulse 60   Wt 88 5 kg (195 lb)   BMI 32 45 kg/m²     General:  Healthy appearing male in no acute distress    They have a normal affect  There is not appear to be any gross neurologic defects or abnormalities  HEENT:  Normocephalic, atraumatic  Neck is supple without any palpable lymphadenopathy  Cardiovascular:  Patient has normal palpable distal radial pulses  There is no significant peripheral edema  No JVD is noted  Respiratory:  Patient has unlabored respirations  There is no audible wheeze or rhonchi  Abdomen:  Abdomen is without surgical scars  Abdomen is soft and nontender  There is no tympany  Inguinal and umbilical hernia are not appreciated  Genitourinary:  Circumcised phallus, orthotopic meatus, testicles descended, digital rectal exam shows a 50 g prostate  There is a 1 to 1-1/2 cm nodule at the right side of the median sulcus towards the apex    Musculoskeletal:  Patient does not have significant CVA tenderness in the flank with palpation or percussion  They full range of motion in all 4 extremities  Strength in all 4 extremities appears congruent  Patient is able to ambulate without assistance or difficulty  Dermatologic:  Patient has no skin abnormalities or rashes  LABS:     CBC:   Lab Results   Component Value Date    WBC 5 9 04/28/2018    HGB 15 5 04/28/2018    HCT 44 2 04/28/2018    MCV 90 4 04/28/2018     04/28/2018       BMP:   Lab Results   Component Value Date    CALCIUM 9 5 10/31/2018     09/30/2017    K 4 7 10/31/2018    CO2 32 10/31/2018     10/31/2018    BUN 20 10/31/2018    CREATININE 1 27 (H) 09/30/2017     10/5/18 PSA 9 8  Lab Results   Component Value Date    PSA 9 8 (H) 10/05/2018     IMAGING:      10/5/18  BONE SCAN  WHOLE BODY     INDICATION: R97 20: Elevated prostate specific antigen (PSA)     PREVIOUS FILM CORRELATION:    CT chest 9/28/2018     TECHNIQUE:   This study was performed following the intravenous administration of 26 3 mCi Tc-99m labeled MDP    Delayed, anterior and posterior whole body images were acquired, 2-3 hours after radiopharmaceutical administration      FINDINGS:  Focal radiotracer activity in the posterior lower thoracic spine likely at the T10 level and corresponding with previously seen sclerotic focus  Additional sclerotic focus in the left posterior 7th rib  This also corresponds to a sclerotic density seen   on prior CT  Increased activity in the distal right clavicle may be degenerative or metastatic  Degenerative change in the spine  Activity in the posterior right ankle region may be degenerative or posttraumatic  Symmetric renal uptake      IMPRESSION:     1  Focal activity in the posterior T10 level and left posterior 7th rib, most concerning for osseous metastases  2   Increased activity in the distal right clavicle may be degenerative or metastatic  PATHOLOGY:     Final Diagnosis   A  Right lateral base prostate core biopsy:  - Adenocarcinoma, [place] score 4+ 5 = 9/10 (grade 5 comprises 10% of core biopsy), grade group 5, continuously involving greater than 95% of this core biopsy  - No perineural invasion is seen     B  Right lateral mid prostate core biopsy:  - Adenocarcinoma, Vitaliy score 4+ 5 = 9/10 (grade 5 conprises 20% of core biopsy), grade group 5, continuously involving 90% of this core biopsy  - Perineural invasion is seen      C  Left lateral apex prostate core biopsy:  - Adenocarcinoma, Vitaliy score 5 + 4 = 9/10 (grade 5 comprises 60% of core biopsy), grade group 5, continuously involving 60% of this core biopsy  - Perineural invasion is seen     D  Right base prostate core biopsy:  - Adenocarcinoma, Vitaliy score 4 + 5 = 9/10 (grade 5 comprises 10% of core biopsy), grade group 5, continuously involving 85% of this core biopsy  - Perineural invasion is seen     E  Right mid prostate core biopsy   - Adenocarcinoma, Vitaliy score 5 + 4 = 9/10 (grade 5 comprises 95% of core biopsy), grade group 5, continuously involving 60% of this core biopsy  - Perineural invasion is seen     F   Right apex prostate core biopsy:  - Adenocarcinoma, [place] score 4 + 5 = 9/10 (grade 5 comprises 45% of core biopsy), grade group 5, continuously involving 70% of this core biopsy  - Perineural invasion is seen     G  Left base prostate core biopsy:  - Adenocarcinoma, Vitaliy score 4 + 5 = 9/10 (grade 5 comprises 35% of core biopsy), grade group 5, continuously involving 70% of this core biopsy  - No perineural invasion is seen     H  Left mid prostate core biopsy:  - Adenocarcinoma, [place] score 4 + 4 = 8/10, grade group 4, discontinuously involving 20% of this core biopsy  - No perineural invasion is seen      I  Left apex prostate core biopsy:  - Benign prostatic tissue     J  Left lateral base prostate core biopsy:  - Adenocarcinoma, [place] score 4 + 5 = 9/10, (grade 5 comprises 35% of core biopsy), grade group 5, discontinuously involving 20% of this core biopsy  - No perineural invasion is seen      K  Left lateral mid prostate core biopsy:  - Adenocarcinoma, [place] score 4 + 4 = 8/10, grade group 4, continuously involving 5% of this core biopsy  - No perineural invasion is seen      L  Left lateral apex prostate core biopsy:  - Adenocarcinoma, [place] score 4 + 4 = 8/10, grade group 4, discontinuously involving 5% of this core biopsy  - No perineural invasion is seen      Note: Block for Prolaris testing if required: E     ASSESSMENT:     69 y o  male with high volume, high risk Vitaliy 5+4=9 CaP, now with abnormal sclerotic lesions and positive bone scan    PLAN:     Yaspenne Forward, his wife and I discussed the results of his prostate biopsy which demonstrate high volume high risk Vitaliy 5 + 4 equals 9 prostate cancer  We have previously discussed the abnormal sclerotic lesions in his films and bone scan  This may represent stage IV metastatic disease  Unfortunate this cannot be biopsied  I have recommended that the patient undergo induction androgen deprivation therapy  He will receive his 1st injection of Juanice Boots today    He will return in 1 month for 6 month depot of Lupron  The patient will obtain a PSA in the next 3-4 months to ensure response to the androgen deprivation therapy  I may consider repeating the bone scan at that time to assess whether not the bony lesions change in their appearance  The patient will be discussed at this months multidisciplinary urologic tumor Board  My question will be whether not local external beam radiation therapy would be prudent for the patient  Despite his recent cardiac event, he is a fit 45-year-old gentleman and would be interested in the most aggressive treatment regimen possible  I will discuss the patient any adjustment to the plan after the upcoming presentation

## 2018-11-14 ENCOUNTER — OFFICE VISIT (OUTPATIENT)
Dept: FAMILY MEDICINE CLINIC | Facility: CLINIC | Age: 78
End: 2018-11-14
Payer: COMMERCIAL

## 2018-11-14 VITALS
BODY MASS INDEX: 30.1 KG/M2 | OXYGEN SATURATION: 96 % | RESPIRATION RATE: 18 BRPM | HEIGHT: 67 IN | SYSTOLIC BLOOD PRESSURE: 130 MMHG | WEIGHT: 191.8 LBS | DIASTOLIC BLOOD PRESSURE: 80 MMHG | TEMPERATURE: 97.8 F | HEART RATE: 64 BPM

## 2018-11-14 DIAGNOSIS — I25.118 CORONARY ARTERY DISEASE OF NATIVE ARTERY OF NATIVE HEART WITH STABLE ANGINA PECTORIS (HCC): ICD-10-CM

## 2018-11-14 DIAGNOSIS — Z00.00 ENCOUNTER FOR MEDICARE ANNUAL WELLNESS EXAM: ICD-10-CM

## 2018-11-14 DIAGNOSIS — C61 PROSTATE CANCER (HCC): ICD-10-CM

## 2018-11-14 DIAGNOSIS — C61 PROSTATE CANCER (HCC): Primary | ICD-10-CM

## 2018-11-14 DIAGNOSIS — E78.2 MIXED HYPERLIPIDEMIA: ICD-10-CM

## 2018-11-14 DIAGNOSIS — Z23 NEED FOR VACCINATION: ICD-10-CM

## 2018-11-14 DIAGNOSIS — I10 BENIGN ESSENTIAL HYPERTENSION: Primary | ICD-10-CM

## 2018-11-14 PROBLEM — N40.0 BPH (BENIGN PROSTATIC HYPERPLASIA): Status: RESOLVED | Noted: 2017-11-02 | Resolved: 2018-11-14

## 2018-11-14 PROCEDURE — 3079F DIAST BP 80-89 MM HG: CPT | Performed by: FAMILY MEDICINE

## 2018-11-14 PROCEDURE — 1170F FXNL STATUS ASSESSED: CPT | Performed by: FAMILY MEDICINE

## 2018-11-14 PROCEDURE — 99214 OFFICE O/P EST MOD 30 MIN: CPT | Performed by: FAMILY MEDICINE

## 2018-11-14 PROCEDURE — 1101F PT FALLS ASSESS-DOCD LE1/YR: CPT | Performed by: FAMILY MEDICINE

## 2018-11-14 PROCEDURE — G0439 PPPS, SUBSEQ VISIT: HCPCS | Performed by: FAMILY MEDICINE

## 2018-11-14 PROCEDURE — 1125F AMNT PAIN NOTED PAIN PRSNT: CPT | Performed by: FAMILY MEDICINE

## 2018-11-14 PROCEDURE — 3075F SYST BP GE 130 - 139MM HG: CPT | Performed by: FAMILY MEDICINE

## 2018-11-14 PROCEDURE — 1036F TOBACCO NON-USER: CPT | Performed by: FAMILY MEDICINE

## 2018-11-14 NOTE — TELEPHONE ENCOUNTER
[facility]  ARMINDA Bradley no auth needed needs to be ordered from [employer] the number is [phone]   Thanks   Alicia Valverde

## 2018-11-14 NOTE — PROGRESS NOTES
Assessment/Plan:    Benign essential hypertension   Reasonably controlled on metoprolol succinate 25, 1/2 tablet daily along with losartan 100    Coronary artery disease of native artery of native heart with stable angina pectoris (HCC)   Recent stress test was negative  Patient denies any chest pain or shortness of breath    Hyperlipidemia   Cholesterol 117/59  Doing well on atorvastatin 40    Prostate cancer [facility])   Patient with history of rising PSAs along with abnormal CT of the chest showing questionable bone Mets  Patient was started on Lupron and is being followed by Urology (dr Ofelia Valente)  Diagnoses and all orders for this visit:    Benign essential hypertension    Mixed hyperlipidemia  -     Comprehensive metabolic panel; Future  -     Lipid Panel with Direct LDL reflex; Future  -     Comprehensive metabolic panel  -     Lipid Panel with Direct LDL reflex    Coronary artery disease of native artery of native heart with stable angina pectoris (HCC)    Prostate cancer (HCC)  -     CBC and differential; Future  -     CBC and differential    Encounter for Medicare annual wellness exam    Need for vaccination  -     Zoster Vac Recomb Adjuvanted ([address]) 50 MCG SUSR; Inject 1 Dose into a muscle once for 1 dose       PATIENT HAD FLU SHOT   RX GIVEN FOR [facility]   6 MONTHS, FASTING BLOOD WORK PRIOR AT [facility]    Subjective:      Patient ID: Rhys Coker is a 69 y o  male  Patient presents for recheck of chronic medical problems today  Overall is feeling well  He is currently started Lupron injections due to prostate cancer  Doing well on metoprolol and losartan for high blood pressure  Doing well on atorvastatin for high cholesterol    Patient had labs drawn at [facility]        The following portions of the patient's history were reviewed and updated as appropriate: allergies, current medications, past family history, past medical history, past social history, past surgical history and problem list     Review of Systems   Respiratory: Negative  Cardiovascular: Negative  Gastrointestinal: Negative  Genitourinary: Negative  Objective:      /80   Pulse 64   Temp 97 8 °F (36 6 °C) (Tympanic)   Resp 18   Ht 5' 6 93" (1 7 m)   Wt 87 kg (191 lb 12 8 oz)   SpO2 96%   BMI 30 10 kg/m²          Physical Exam   Cardiovascular: Normal rate, regular rhythm, normal heart sounds and intact distal pulses  Carotids: no bruits  Ext: no edema   Pulmonary/Chest: Effort normal  No respiratory distress  He has no wheezes  He has no rales  Psychiatric: He has a normal mood and affect   His behavior is normal  Thought content normal

## 2018-11-14 NOTE — ASSESSMENT & PLAN NOTE
Patient with history of rising PSAs along with abnormal CT of the chest showing questionable bone Mets  Patient was started on Lupron and is being followed by Urology (dr Jose Dukes)

## 2018-11-14 NOTE — TELEPHONE ENCOUNTER
Spoke with Porter Halsted at [facility]  She started to open account for patient  Instructed to have patient call [phone] to finish setting up account and arrange payment       Spoke with pharmacist Jim Santizo, reports they did not receive script for Lupron, verbal order for Lupron 45 mg 6 month SQ kit provided to Jim Santizo per Adriana Hector (R), PAJuanC

## 2018-11-14 NOTE — TELEPHONE ENCOUNTER
Spoke with patient  Informed patient on need to get Lupron from [facility]  Provided patient with phone number to get in touch with [facility], [phone] to complete setting up his account and arrange payment  Patient questioned why he needs to get Lupron from the specialty pharmacy, instructed patient to contact his insurance regarding need to get medication from the speciality pharmacy       Will check in with pharmacy in a few days to set up shipment of the Lupron

## 2018-11-14 NOTE — PROGRESS NOTES
Assessment and Plan:  Problem List Items Addressed This Visit     Benign essential hypertension - Primary    Hyperlipidemia    Relevant Orders    Comprehensive metabolic panel    Lipid Panel with Direct LDL reflex    Prostate cancer ([facility] )    Relevant Orders    CBC and differential    Coronary artery disease of native artery of native heart with stable angina pectoris ([facility] )      Other Visit Diagnoses     Encounter for Medicare annual wellness exam        Need for vaccination        Relevant Medications    Zoster Vac Recomb Adjuvanted ([address]) [facility] Maintenance Due   Topic Date Due    SLP PLAN OF CARE  1940      MEDICARE WELLNESS VISIT  DEPRESSION SCREEN AND FALL RISK WERE NEGATIVE  PATIENT HAS A LIVING WILL AND HEALTHCARE POWER OF   PATIENT HAD FLU SHOT AND PNEUMONIA VACCINE SERIES  RX GIVEN FOR SHINGRIX    CURRENT WITH COLONOSCOPY AND PSA SCREENINGS    HPI:  Patient Active Problem List   Diagnosis    Benign essential hypertension    Hyperlipidemia    Coronary artery disease of native heart with stable angina pectoris (HCC)    Renal cyst    Liver lesion    Pulmonary nodule    Combined arterial insufficiency and corporo-venous occlusive erectile dysfunction    Prostate cancer (HCC)    BPH (benign prostatic hyperplasia)    Coronary artery disease of native artery of native heart with stable angina pectoris ([facility] [address] )    DDD (degenerative disc disease), cervical    Inflamed sebaceous cyst    Obstruction of left eustachian tube    S/P coronary artery stent placement    Vitamin D deficiency    Abnormal radionuclide bone scan     Past Medical History:   Diagnosis Date    Anemia     last assessed  1/7/14     Polyp of sigmoid colon     Tinnitus     unspecified laterality / last assessed 4/9/13     Past Surgical History:   Procedure Laterality Date    HEMORRHOID SURGERY      PROSTATE BIOPSY  10/24/2018     Family History   Problem Relation Age of Onset    Breast cancer Mother     Hypertension Father      History   Smoking Status    Never Smoker   Smokeless Tobacco    Never Used     History   Alcohol Use    Yes     Comment: Occuational / social       History   Drug Use No         Current Outpatient Prescriptions   Medication Sig Dispense Refill    aspirin (ASPIRIN ADULT LOW STRENGTH) 81 mg EC tablet Take 1 tablet by mouth daily      cholecalciferol (VITAMIN D3) 1,000 units tablet Take 1 tablet by mouth daily      losartan (COZAAR) 100 MG tablet Take 1 tablet (100 mg total) by mouth daily 90 tablet 1    metoprolol succinate (TOPROL-XL) 25 mg 24 hr tablet Take 0 5 tablets (12 5 mg total) by mouth daily for 180 days 90 tablet 1    Saw Taylor 1000 MG CAPS Take 3,600 mg by mouth      sildenafil (VIAGRA) 100 mg tablet Take by mouth      atorvastatin (LIPITOR) 40 mg tablet Take 40 mg by mouth      bisacodyl (FLEET) 10 MG/30ML ENEM Insert 30 mL (10 mg total) into the rectum once for 1 dose Day of biopsy 30 mL 0    clopidogrel (PLAVIX) 75 mg tablet Take 75 mg by mouth daily      isosorbide mononitrate (IMDUR) 30 mg 24 hr tablet Take 30 mg by mouth daily  0    nitroglycerin (NITROSTAT) 0 4 mg SL tablet       Zoster Vac Recomb Adjuvanted (SHINGRIX) 50 MCG SUSR Inject 1 Dose into a muscle once for 1 dose 1 each 1     Current Facility-Administered Medications   Medication Dose Route Frequency Provider Last Rate Last Dose    [START ON 12/10/2018] leuprolide (LUPRON DEPOT 6 MONTH KIT) IM injection kit 45 mg  45 mg Intramuscular Once Cy Cruz MD         No Known Allergies  Immunization History   Administered Date(s) Administered    H1N1, All Formulations 02/01/2010    Influenza 09/17/2015, 09/23/2016    Influenza Split High Dose Preservative Free IM 09/04/2014, 09/17/2015, 09/23/2016, 10/06/2017    Influenza TIV (IM) 09/20/2012, 09/17/2013    Influenza, high dose seasonal 0 5 mL 09/14/2018    Pneumococcal Conjugate 13-Valent 03/16/2015    Pneumococcal Polysaccharide PPV23 07/26/2011    Tdap 07/26/2011    Zoster 01/10/2014       Patient Care Team:  Kiah Brennan DO as PCP - General  MD Debo Prado DO Moss Guppy, MD    Medicare Screening Tests and Risk Assessments:  Doc Ragland is here for his Subsequent Wellness visit  Health Risk Assessment:  Patient rates overall health as good  Patient feels that their physical health rating is Same  Eyesight was rated as Same  Hearing was rated as Same  Patient feels that their emotional and mental health rating is Same  Pain experienced by patient in the last 7 days has been None  Emotional/Mental Health:  Patient has been feeling nervous/anxious  PHQ-9 Depression Screening:    Frequency of the following problems over the past two weeks:      1  Little interest or pleasure in doing things: 0 - not at all      2  Feeling down, depressed, or hopeless: 0 - not at all  PHQ-2 Score: 0          Broken Bones/Falls: Fall Risk Assessment:    In the past year, patient has experienced: No history of falling in past year          Bladder/Bowel:  Patient has not leaked urine accidently in the last six months  Patient reports no loss of bowel control  Immunizations:  Patient has had a flu vaccination within the last year  Patient has received a pneumonia shot  Patient has received tetanus/diphtheria shot  Date of tetanus/diphtheria shot: 7/26/2011    Home Safety:  Patient does not have trouble with stairs inside or outside of their home  Patient currently reports that there are no safety hazards present in home, working smoke alarms, working carbon monoxide detectors        Preventative Screenings:   prostate cancer screen performed, 10/6/2017  colon cancer screen completed, 10/6/2017  cholesterol screen completed, 10/31/2018        Nutrition:  Current diet: Low Cholesterol, No Added Salt, Limited junk food and Low Saturated Fat with servings of the following:    Medications:  Patient is able to manage medications  Lifestyle Choices:  Patient reports no tobacco use  Patient has not smoked or used tobacco in the past   Patient reports alcohol use  Alcohol use per week: 2 per week  Patient drives a vehicle  Patient wears seat belt  Activities of Daily Living:  Can get out of bed by his or her self, able to dress self, able to make own meals, able to do own shopping, able to bathe self, can do own laundry/housekeeping, can manage own money, pay bills and track expenses    Previous Hospitalizations:  No hospitalization or ED visit in past 12 months        Advanced Directives:  Patient has decided on a power of   Patient has spoken to designated power of   Patient has completed advanced directive  Preventative Screening/Counseling:      Cardiovascular:      General: Risks and Benefits Discussed          Diabetes:      General: Risks and Benefits Discussed          Colorectal Cancer:      General: Risks and Benefits Discussed          Prostate Cancer:      General: Risks and Benefits Discussed          Osteoporosis:      General: Risks and Benefits Discussed          AAA:      General: Risks and Benefits Discussed          Glaucoma:      General: Risks and Benefits Discussed          HIV:      General: Risks and Benefits Discussed          Hepatitis C:      General: Risks and Benefits Discussed        Advanced Directives:   Patient has living will for healthcare, Information on ACP and/or AD provided  End of life assessment reviewed with patient       Immunizations:      Influenza: Risks & Benefits Discussed and Influenza UTD This Year      Pneumococcal: Risks & Benefits Discussed and Lifetime Vaccine Completed      Shingrix: Risks & Benefits Discussed      TD: Risks & Benefits Discussed      Other Preventative Counseling (Non-Medicare):  Nutrition Counseling          No exam data present    Physical Exam:  Review of Systems   Gastrointestinal: Negative for bowel incontinence  Psychiatric/Behavioral: The patient is not nervous/anxious  Vitals:    11/14/18 0902 11/14/18 0921   BP: 130/72 130/80   BP Location: Left arm    Patient Position: Sitting    Cuff Size: Large    Pulse: 64    Resp: 18    Temp: 97 8 °F (36 6 °C)    TempSrc: Tympanic    SpO2: 96%    Weight: 87 kg (191 lb 12 8 oz)    Height: 5' 6 93" (1 7 m)    Body mass index is 30 1 kg/m²      Physical Exam

## 2018-11-20 NOTE — TELEPHONE ENCOUNTER
[facility]  Per Tayler Barron, patient is on the list to call to arrange payment of co-payment  Once payment settled, [facility] will call office to schedule delivery

## 2018-11-26 ENCOUNTER — TELEPHONE (OUTPATIENT)
Dept: UROLOGY | Facility: AMBULATORY SURGERY CENTER | Age: 78
End: 2018-11-26

## 2018-11-26 NOTE — TELEPHONE ENCOUNTER
Patient managed by Dr Asia Navas, seen in [facility] office  Patient had questions regarding upcoming Lupron injection  Per patient, he has already spoken with Alvina0 Madyson Booth regarding order for Lupron  Instructed patient, will call [facility] and schedule shipment  Advised patient, if he needs to do anything further regarding Lupron order, will contact him  Patient also reports he has noticed a lump on his right abdomen, since getting [place] injection  Patient also reports he has a lump on the left side as well, but has since resolved  Patient describes right sided lump as being 2 inch by half inch  Reports lump is non painful, no signs of infection  Patient also reports urinary frequency  Per patient he is urinating every 2 hours  Patient just wants Dr Asia Navas to know this, as he is concerned he may need further testing  Advised patient, will pass information along to Dr Asia Navas

## 2018-11-27 ENCOUNTER — TELEPHONE (OUTPATIENT)
Dept: UROLOGY | Facility: CLINIC | Age: 78
End: 2018-11-27

## 2018-11-27 ENCOUNTER — DOCUMENTATION (OUTPATIENT)
Dept: UROLOGY | Facility: AMBULATORY SURGERY CENTER | Age: 78
End: 2018-11-27

## 2018-11-27 DIAGNOSIS — C61 PROSTATE CANCER (HCC): Primary | ICD-10-CM

## 2018-11-27 NOTE — TELEPHONE ENCOUNTER
Patient scheduled for 12/10/18 at 1:30 in the [facility] office for Lupron injection

## 2018-11-27 NOTE — TELEPHONE ENCOUNTER
Patient's case was discussed at a multidisciplinary tumor board  After review with the medical oncology team, a cohort of other urologists present, and the radiology team, it is felt that the lesions in the bone do represent metastatic deposits  The recommendation was to refer the patient to Medical Oncology for discussion of intermediate therapy (possibly Jj Urena) in addition to his androgen deprivation therapy      I discussed this with the patient and will make the referral

## 2018-11-27 NOTE — TELEPHONE ENCOUNTER
[facility]  Per Don Townsend, they are working on getting copayment assistance for patient, as his co-pay for the Lupron is over $3000  This can take up to 24-48 hours to be finalized  Once this is done, they will arrange shipment with the office for the Lupron

## 2018-11-27 NOTE — TELEPHONE ENCOUNTER
I called the [facility] and spoke to [place]  Appointment scheduled with Dr Codey Weir on 12/6/18 at 4 pm at [address] [facility], [id number] [place] [address] [employer]  Called patient  Made him aware of appointment time and location  He was instructed to arrive at 3:30 for his appointment

## 2018-11-27 NOTE — TELEPHONE ENCOUNTER
Patient called back  He cannot make the appointment on 12/6/18  Called the [facility] to reschedule  Mignon Rajput will call patient to reschedule appointment

## 2018-11-27 NOTE — PROGRESS NOTES
Oncology Navigator Note: Urology Tumor Conference 11/27/18  Physician Recommended Plan     Name: Andrea Bowling              Male, 69 y o , 1940      Diagnosis: high volume high risk [place] 5 + 4 equals 9 prostate cancer      Patient was discussed at Urology Tumor Conference on 11/27/18  It was recommended that patient get a referral to Medical Oncology to consider Myrtle Meier

## 2018-11-27 NOTE — TELEPHONE ENCOUNTER
I spoke to Ye Diehl  He is on-board for the Lupron injection  Is it possible to schedule the injection in the PM so I can be present after my AM surgery? I discussed with him the injection site reaction from the [place] and the urinary symptoms which I will follow      Thanks,  Z

## 2018-11-29 ENCOUNTER — OFFICE VISIT (OUTPATIENT)
Dept: HEMATOLOGY ONCOLOGY | Facility: CLINIC | Age: 78
End: 2018-11-29
Payer: COMMERCIAL

## 2018-11-29 VITALS
RESPIRATION RATE: 18 BRPM | TEMPERATURE: 97.2 F | BODY MASS INDEX: 29.51 KG/M2 | OXYGEN SATURATION: 97 % | WEIGHT: 188 LBS | DIASTOLIC BLOOD PRESSURE: 81 MMHG | HEART RATE: 57 BPM | SYSTOLIC BLOOD PRESSURE: 128 MMHG | HEIGHT: 67 IN

## 2018-11-29 DIAGNOSIS — C61 PROSTATE CANCER (HCC): Primary | ICD-10-CM

## 2018-11-29 DIAGNOSIS — C79.51 BONE METASTASES (HCC): ICD-10-CM

## 2018-11-29 PROCEDURE — 99205 OFFICE O/P NEW HI 60 MIN: CPT | Performed by: INTERNAL MEDICINE

## 2018-11-29 NOTE — PROGRESS NOTES
Oncology Consult Note  Rhys Coker 69 y o  male MRN: [id number]  Unit/Bed#:  Encounter: [id number]      Presenting Complaint: high-risk prostate cancer Vitaliy score of 9    History of Presenting Illness:     a 45-year-old  male who was seen by Urology for elevation of the PSA, when he presented in June 2017 with PSA of 4 6, and subsequently in September of 2017 PSA of 7 3 and in May of 2000 18 PSA of 8 6, a biopsy was recommended but the patient had just undergone percutaneous stenting of the heart and he was on dual anti-platelet therapy     MRI of the abdomen in April 2018 showed 2 simple cyst in the right hepatic lobe, bilateral renal cysts the largest 1 measuring 10 cm in the left lower lobe     In October of 2018 PSA of 9 8, CT scan of the chest showed sclerotic lesions in T10, 5 mm nodule in the right lower lobe, bone scan showed activity in the posterior T10 level and left posterior 7th rib area concerning for osseous metastases and increased activity in the distal right clavicle might be degenerative or metastatic area    He drinks 1 glass of wine every night, he does not smoke  No family history suggestive of inherited prostate cancer     denies any headache blurred vision nausea vomiting diarrhea constipation dysuria hematuria melena hematochezia               Review of Systems - As stated in the HPI otherwise the fourteen point review of systems was negative      Past Medical History:   Diagnosis Date    Anemia     last assessed  1/7/14     Polyp of sigmoid colon     Tinnitus     unspecified laterality / last assessed 4/9/13       Social History     Social History    Marital status: /Civil Union     Spouse name: N/A    Number of children: N/A    Years of education: N/A     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    Smoking status: Never Smoker    Smokeless tobacco: Never Used    Alcohol use Yes      Comment: Occuational / social     Drug use: No    Sexual activity: Not on file     Other Topics Concern    Not on file     Social History Narrative    Always uses seat belt     Daily caffeine consumption 2-3 servings a day     Feels safe at home       Family History   Problem Relation Age of Onset    Breast cancer Mother     Hypertension Father        No Known Allergies      Current Outpatient Prescriptions:     aspirin (ASPIRIN ADULT LOW STRENGTH) 81 mg EC tablet, Take 1 tablet by mouth daily, Disp: , Rfl:     cholecalciferol (VITAMIN D3) 1,000 units tablet, Take 1 tablet by mouth daily, Disp: , Rfl:     losartan (COZAAR) 100 MG tablet, Take 1 tablet (100 mg total) by mouth daily, Disp: 90 tablet, Rfl: 1    metoprolol succinate (TOPROL-XL) 25 mg 24 hr tablet, Take 0 5 tablets (12 5 mg total) by mouth daily for 180 days, Disp: 90 tablet, Rfl: 1    Saw Palmetto 1000 MG CAPS, Take 3,600 mg by mouth, Disp: , Rfl:     sildenafil (VIAGRA) 100 mg tablet, Take by mouth, Disp: , Rfl:     atorvastatin (LIPITOR) 40 mg tablet, Take 40 mg by mouth, Disp: , Rfl:     leuprolide (ELIGARD 6 MONTH KIT) 45 MG injection, Inject 45 mg under the skin every 6 (six) months (Patient not taking: Reported on 11/29/2018 ), Disp: 1 kit, Rfl: 0    Current Facility-Administered Medications:     [START ON 12/10/2018] leuprolide (LUPRON DEPOT 6 MONTH KIT) IM injection kit 45 mg, 45 mg, Intramuscular, Once, Tevin Patel MD      /81 (BP Location: Left arm, Cuff Size: Large)   Pulse 57   Temp (!) 97 2 °F (36 2 °C) (Tympanic)   Resp 18   Ht 5' 7" (1 702 m)   Wt 85 3 kg (188 lb)   SpO2 97%   BMI 29 44 kg/m²       General Appearance:    Alert, oriented        Eyes:    PERRL   Ears:    Normal external ear canals, both ears   Nose:   Nares normal, septum midline   Throat:   Mucosa moist  Pharynx without injection      Neck:   Supple       Lungs:     Clear to auscultation bilaterally   Chest Wall:    No tenderness or deformity    Heart:    Regular rate and rhythm Abdomen:     Soft, non-tender, bowel sounds +, no organomegaly           Extremities:   Extremities no cyanosis or edema       Skin:   no rash or icterus  Lymph nodes:   Cervical, supraclavicular, and axillary nodes normal   Neurologic:   CNII-XII intact, normal strength, sensation and reflexes     Throughout               No results found for this or any previous visit (from the past 48 hour(s))  No results found  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 involving 12 core biopsies in a patient who presented with elevation of PSA,  CT scan showed involvement of the posterior side of the T10, also involvement of the left 7th rib and possible involvement of the distal point of the right clavicle    I had long discussion with the patient, he was initiated on femur gone initially and he will be on Lupron every 6 months by Urology     I believe treatment with enzalutamide 160 mg p o   Daily enhance the progression free survival in 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

## 2018-11-29 NOTE — LETTER
November 29, 2018     Mona Joy DO  [address] [address]    Patient: Rancho Jackson   YOB: 1940   Date of Visit: 11/29/2018       Dear Dr Delgado Human:    Thank you for referring Deshawn Mcfadden to me for evaluation  Below are my notes for this consultation  If you have questions, please do not hesitate to call me  I look forward to following your patient along with you           Sincerely,        Marii Mccoy MD        CC: MD Marii Grimes MD  11/29/2018  3:28 PM  Sign at close encounter  Oncology Consult Note  Rancho Sensor 69 y o  male MRN: [id number]  Unit/Bed#:  Encounter: [id number]      Presenting Complaint: high-risk prostate cancer [place] score of 9    History of Presenting Illness:     a 79-year-old  male who was seen by Urology for elevation of the PSA, when he presented in June 2017 with PSA of 4 6, and subsequently in September of 2017 PSA of 7 3 and in May of 2000 18 PSA of 8 6, a biopsy was recommended but the patient had just undergone percutaneous stenting of the heart and he was on dual anti-platelet therapy     MRI of the abdomen in April 2018 showed 2 simple cyst in the right hepatic lobe, bilateral renal cysts the largest 1 measuring 10 cm in the left lower lobe     In October of 2018 PSA of 9 8, CT scan of the chest showed sclerotic lesions in T10, 5 mm nodule in the right lower lobe, bone scan showed activity in the posterior T10 level and left posterior 7th rib area concerning for osseous metastases and increased activity in the distal right clavicle might be degenerative or metastatic area    He drinks 1 glass of wine every night, he does not smoke  No family history suggestive of inherited prostate cancer     denies any headache blurred vision nausea vomiting diarrhea constipation dysuria hematuria melena hematochezia               Review of Systems - As stated in the HPI otherwise the fourteen point review of systems was negative      Past Medical History:   Diagnosis Date    Anemia     last assessed  1/7/14     Polyp of sigmoid colon     Tinnitus     unspecified laterality / last assessed 4/9/13       Social History     Social History    Marital status: /Civil Union     Spouse name: N/A    Number of children: N/A    Years of education: N/A     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    Smoking status: Never Smoker    Smokeless tobacco: Never Used    Alcohol use Yes      Comment: Occuational / social     Drug use: No    Sexual activity: Not on file     Other Topics Concern    Not on file     Social History Narrative    Always uses seat belt     Daily caffeine consumption 2-3 servings a day     Feels safe at home       Family History   Problem Relation Age of Onset    Breast cancer Mother     Hypertension Father        No Known Allergies      Current Outpatient Prescriptions:     aspirin (ASPIRIN ADULT LOW STRENGTH) 81 mg EC tablet, Take 1 tablet by mouth daily, Disp: , Rfl:     cholecalciferol (VITAMIN D3) 1,000 units tablet, Take 1 tablet by mouth daily, Disp: , Rfl:     losartan (COZAAR) 100 MG tablet, Take 1 tablet (100 mg total) by mouth daily, Disp: 90 tablet, Rfl: 1    metoprolol succinate (TOPROL-XL) 25 mg 24 hr tablet, Take 0 5 tablets (12 5 mg total) by mouth daily for 180 days, Disp: 90 tablet, Rfl: 1    Saw Palmetto 1000 MG CAPS, Take 3,600 mg by mouth, Disp: , Rfl:     sildenafil (VIAGRA) 100 mg tablet, Take by mouth, Disp: , Rfl:     atorvastatin (LIPITOR) 40 mg tablet, Take 40 mg by mouth, Disp: , Rfl:     leuprolide (ELIGARD 6 MONTH KIT) 45 MG injection, Inject 45 mg under the skin every 6 (six) months (Patient not taking: Reported on 11/29/2018 ), Disp: 1 kit, Rfl: 0    Current Facility-Administered Medications:     [START ON 12/10/2018] leuprolide (LUPRON DEPOT 6 MONTH KIT) IM injection kit 45 mg, 45 mg, Intramuscular, Once, Kathleen Antony MD      /81 (BP Location: Left arm, Cuff Size: Large)   Pulse 57   Temp (!) 97 2 °F (36 2 °C) (Tympanic)   Resp 18   Ht 5' 7" (1 702 m)   Wt 85 3 kg (188 lb)   SpO2 97%   BMI 29 44 kg/m²        General Appearance:    Alert, oriented        Eyes:    PERRL   Ears:    Normal external ear canals, both ears   Nose:   Nares normal, septum midline   Throat:   Mucosa moist  Pharynx without injection  Neck:   Supple       Lungs:     Clear to auscultation bilaterally   Chest Wall:    No tenderness or deformity    Heart:    Regular rate and rhythm       Abdomen:     Soft, non-tender, bowel sounds +, no organomegaly           Extremities:   Extremities no cyanosis or edema       Skin:   no rash or icterus  Lymph nodes:   Cervical, supraclavicular, and axillary nodes normal   Neurologic:   CNII-XII intact, normal strength, sensation and reflexes     Throughout               No results found for this or any previous visit (from the past 48 hour(s))  No results found  stage IV castration sensitive prostate cancer with [place] score of 9 involving 12 core biopsies in a patient who presented with elevation of PSA,  CT scan showed involvement of the posterior side of the T10, also involvement of the left 7th rib and possible involvement of the distal point of the right clavicle    I had long discussion with the patient, he was initiated on femur gone initially and he will be on Lupron every 6 months by Urology     I believe treatment with enzalutamide 160 mg p o   Daily enhance the progression free survival in 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

## 2018-11-30 NOTE — TELEPHONE ENCOUNTER
Spoke with patient today  Patient is still trying to arrange assistance for the co-pay  Per patient his co-pay for the Lupron is $2700  Will check again next week to ensure co-payment is settled and to get Lupron shipped in time for 12/10 appointment

## 2018-12-04 ENCOUNTER — TELEPHONE (OUTPATIENT)
Dept: HEMATOLOGY ONCOLOGY | Facility: CLINIC | Age: 78
End: 2018-12-04

## 2018-12-04 ENCOUNTER — DOCUMENTATION (OUTPATIENT)
Dept: HEMATOLOGY ONCOLOGY | Facility: CLINIC | Age: 78
End: 2018-12-04

## 2018-12-04 DIAGNOSIS — C61 PROSTATE CANCER (HCC): Primary | ICD-10-CM

## 2018-12-04 RX ORDER — PREDNISONE 1 MG/1
5 TABLET ORAL 2 TIMES DAILY WITH MEALS
Qty: 60 TABLET | Refills: 0 | Status: SHIPPED | OUTPATIENT
Start: 2018-12-04 | End: 2019-02-16 | Stop reason: SDUPTHER

## 2018-12-04 RX ORDER — ABIRATERONE ACETATE 250 MG/1
1000 TABLET ORAL DAILY
Qty: 120 TABLET | Refills: 0 | Status: SHIPPED | OUTPATIENT
Start: 2018-12-04 | End: 2019-01-16 | Stop reason: SDUPTHER

## 2018-12-04 NOTE — PROGRESS NOTES
11/30/2018 received notification from clinical that the pt will be starting Xtandi  Submitted for auth through cover my meds    12/3/2018 received denial from [facility]  The patient does not meet the criteria since his cancer is prostate sensitive and not prostate resistant  Notified clinical   Per clinical we are to appeal    12/4/2018 sent appeal letter to  for review  Received notification that we are not going to appeal now  Dr Justin Geronimo is ordering zytiga

## 2018-12-04 NOTE — TELEPHONE ENCOUNTER
Pt left a message for lise Plascencia indicating that Isabel Lorenz was denied by the insurance for payment and wondering if there is some other options on our end

## 2018-12-04 NOTE — TELEPHONE ENCOUNTER
Cancer care is checking into available options for assistance for Lupron co-payment  No resolution as of today  Checked into patient's insurance coverage for Firmagon 80 mg  Insurance would pay 80%, patient would be responsible for 20%  Discussed with Dr Brooke Duque, at this point patient can receive dose of 80 mg Evelyn Purdy now and can wait to see depending on Lupron assistance  Patient can either switch to Lupron or receive monthly injections of 80 mg Firmagon  Patient agreeable to starting with 80 mg Firmagon on Monday, 12/10/18  Patient aware, will be responsible for 20% of the cost  Patient to keep appointment on 12/10/18, will get Firmagon injection  Can decide later on future options

## 2018-12-04 NOTE — TELEPHONE ENCOUNTER
Spoke to patient who is extremely upset that insurance company will not cover [employer]  Patient would like to know what he can do to help get medication  Would like to know if he needs to call insurance company? I am putting in orger for Zytiga 1000mg daily with prednisone 5 mg po twice daily  Someone from Finance needs to call patient    please

## 2018-12-06 ENCOUNTER — DOCUMENTATION (OUTPATIENT)
Dept: HEMATOLOGY ONCOLOGY | Facility: CLINIC | Age: 78
End: 2018-12-06

## 2018-12-06 NOTE — PROGRESS NOTES
12/4/2018  Received order for zytiga 250 mg        12/5/2018 submitted for auth  Through cover my meds    12/6/2018  Received approval letter from gladys Costa is valid from 10/6/2018 through 2/6/2019  Notified clinical, homestar, and financial      1/22/2019  Received message from financial that Pt is approved for free zytiga thru [employer] effective 01/11/19-12/31/19  [facility]iga tonight for delivery tomorrow by 3  Pt has to call [employer] when he is down to 7-10 days of pills left to set up the next shipment  Clinical was also notified

## 2018-12-10 ENCOUNTER — PROCEDURE VISIT (OUTPATIENT)
Dept: UROLOGY | Facility: CLINIC | Age: 78
End: 2018-12-10
Payer: COMMERCIAL

## 2018-12-10 ENCOUNTER — TELEPHONE (OUTPATIENT)
Dept: UROLOGY | Facility: CLINIC | Age: 78
End: 2018-12-10

## 2018-12-10 VITALS
DIASTOLIC BLOOD PRESSURE: 88 MMHG | HEIGHT: 67 IN | BODY MASS INDEX: 29.44 KG/M2 | TEMPERATURE: 60 F | SYSTOLIC BLOOD PRESSURE: 152 MMHG

## 2018-12-10 DIAGNOSIS — C61 PROSTATE CANCER (HCC): Primary | ICD-10-CM

## 2018-12-10 PROCEDURE — 4040F PNEUMOC VAC/ADMIN/RCVD: CPT | Performed by: UROLOGY

## 2018-12-10 PROCEDURE — 96372 THER/PROPH/DIAG INJ SC/IM: CPT | Performed by: UROLOGY

## 2018-12-10 NOTE — PROGRESS NOTES
12/10/2018  Manuelito Torres is a 69 y o  male  [id number]    Diagnosis:  Chief Complaint     Prostate Cancer          Patient presents for Channie Holger injection managed by Dr Noemi Ramírez:  Patient will follow up in one month for next firmagon injection vs  rediscussion of lupron injection based on insurance coverage  Medication administration:  Patient has chosen to receive Firmagon 80 mg today instead of lupron based on cost       Firmagon 80 mg injection provided without incident  Patient tolerated well        Administrations This Visit     degarelix acetate [facility]) injection 80 mg     Admin Date  12/10/2018 Action  Given Dose  80 mg Route  Subcutaneous Administered By  Dar Chase RN              Vitals:    12/10/18 1359   BP: 152/88   Temp: (!) 60 °F (15 6 °C)   Height: 5' 7" (1 702 m)         ARMINDA Vargas BSN

## 2018-12-10 NOTE — TELEPHONE ENCOUNTER
Patient seen this morning for Firmagon 80 mg injection this am   Per Dr Suzanna Glover he needs FU in one month for another firmagon 80 mg injection  We can also discuss Lupron at any time based on insurance coverage  Called and spoke with patient  Scheduled 1/7/19 at 11 am for next Injection

## 2018-12-11 ENCOUNTER — TELEPHONE (OUTPATIENT)
Dept: UROLOGY | Facility: AMBULATORY SURGERY CENTER | Age: 78
End: 2018-12-11

## 2018-12-11 ENCOUNTER — HOSPITAL ENCOUNTER (OUTPATIENT)
Dept: INFUSION CENTER | Facility: CLINIC | Age: 78
Discharge: HOME | End: 2018-12-11
Payer: COMMERCIAL

## 2018-12-11 VITALS
HEIGHT: 67 IN | WEIGHT: 189.6 LBS | TEMPERATURE: 98 F | RESPIRATION RATE: 18 BRPM | DIASTOLIC BLOOD PRESSURE: 65 MMHG | HEART RATE: 58 BPM | BODY MASS INDEX: 29.76 KG/M2 | SYSTOLIC BLOOD PRESSURE: 125 MMHG

## 2018-12-11 LAB
ALBUMIN SERPL BCP-MCNC: 3.7 G/DL (ref 3.5–5.7)
ALP SERPL-CCNC: 55 U/L (ref 46–116)
ALT SERPL W P-5'-P-CCNC: 28 U/L (ref 12–78)
ANION GAP SERPL CALCULATED.3IONS-SCNC: 9 MMOL/L (ref 4–13)
AST SERPL W P-5'-P-CCNC: 26 U/L (ref 5–45)
BILIRUB SERPL-MCNC: 1 MG/DL (ref 0.2–1)
BUN SERPL-MCNC: 19 MG/DL (ref 5–25)
CALCIUM SERPL-MCNC: 9.4 MG/DL (ref 8.3–10.1)
CHLORIDE SERPL-SCNC: 106 MMOL/L (ref 98–108)
CO2 SERPL-SCNC: 29 MMOL/L (ref 21–32)
CREAT SERPL-MCNC: 1.2 MG/DL (ref 0.6–1.3)
GFR SERPL CREATININE-BSD FRML MDRD: 58 ML/MIN/1.73SQ M
GLUCOSE SERPL-MCNC: 126 MG/DL (ref 65–140)
POTASSIUM SERPL-SCNC: 4.2 MMOL/L (ref 3.5–5.3)
PROT SERPL-MCNC: 6.6 G/DL (ref 6.4–8.2)
SODIUM SERPL-SCNC: 144 MMOL/L (ref 136–145)

## 2018-12-11 PROCEDURE — 96365 THER/PROPH/DIAG IV INF INIT: CPT

## 2018-12-11 PROCEDURE — 80053 COMPREHEN METABOLIC PANEL: CPT | Performed by: INTERNAL MEDICINE

## 2018-12-11 RX ORDER — SODIUM CHLORIDE 9 MG/ML
20 INJECTION, SOLUTION INTRAVENOUS CONTINUOUS
Status: DISCONTINUED | OUTPATIENT
Start: 2018-12-11 | End: 2018-12-14 | Stop reason: HOSPADM

## 2018-12-11 RX ADMIN — SODIUM CHLORIDE 20 ML/HR: 0.9 INJECTION, SOLUTION INTRAVENOUS at 14:11

## 2018-12-11 RX ADMIN — ZOLEDRONIC ACID 3.3 MG: 4 INJECTION, SOLUTION, CONCENTRATE INTRAVENOUS at 14:11

## 2018-12-11 NOTE — PROGRESS NOTES
Pt arrived to unit without complaint  Pt denies any recent or upcoming dental procedures  Pt here for first dose Zometa  CMP drawn today  Serum Ca=9 4, Serum creat=1 2, creat clearance=48  1  Zometa dose reduced to 3 3mg per pharmacy protocol  Zometa infusing presently, pt tolerating well

## 2018-12-11 NOTE — TELEPHONE ENCOUNTER
Called patient per request of Pop Cloud (urology nurse)  Patient wants to know "full picture" of out of pocket costs for his treatments and needs some assistance with choosing medical insurance  Urology did prior auth for lupron and firmagon  Firmagon 80 mg is more cost effective  Kirsty Lucio from Urology called patient's insurance again and [employer] [place] will call patient with what his out of pocket responsibility would be for Lupron  Dr Ortega Morris ordered that patient can either continue to receive firmagon 80 mg monthly or may switch to lupron 45 mg every 6 months  Patient was given a supply of xtandi and would like to continue with that instead of zytiga with prednisone  I have made Shailesh Alonzo (financial counselor) aware to call patient with information about cost of both of those medications  Gave patient phone number of [facility] to call and schedule an appointment with one of the [facility] counselors to review medicare benefits and [employer]  He is frustrated with his insurance denying xtandi and has several questions about choosing a medical insurance

## 2018-12-11 NOTE — PLAN OF CARE
Problem: Potential for Falls  Goal: Patient will remain free of falls  INTERVENTIONS:  - Assess patient frequently for physical needs  -  Identify cognitive and physical deficits and behaviors that affect risk of falls    -  May fall precautions as indicated by assessment   - Educate patient/family on patient safety including physical limitations  - Instruct patient to call for assistance with activity based on assessment  - Modify environment to reduce risk of injury  - Consider OT/PT consult to assist with strengthening/mobility   Outcome: Progressing

## 2018-12-12 ENCOUNTER — TELEPHONE (OUTPATIENT)
Dept: HEMATOLOGY ONCOLOGY | Facility: CLINIC | Age: 78
End: 2018-12-12

## 2018-12-12 NOTE — TELEPHONE ENCOUNTER
Patient called regarding denial letter he received for his xtandi  I stated he did not meet the criteria for xtandi (per prior note) and Dr Maura Mora was ordering Jojo Zafar  He questioned what the difference was between "resistant" and "sensitive"  Please call to discuss   [phone] Thank you

## 2018-12-13 NOTE — TELEPHONE ENCOUNTER
SPOKE WITH PATIENT AND WENT OVER ALL THE INFORMATION THAT MANFRED KNAPP PROVIDED  PATIENT ACKNOWLEDGES INFORMATION AND WILL SEE DR Juancarlos Ordonez IN 1/2019

## 2018-12-13 NOTE — TELEPHONE ENCOUNTER
As per documentation per financial group Philomena Bhardwaj    "11/30/2018 received notification from clinical that the pt will be starting Zechariah Burnett for auth through cover my meds     12/3/2018 received denial from [employer]  The patient does not meet the criteria since his cancer is prostate sensitive and not prostate resistant  "

## 2018-12-13 NOTE — TELEPHONE ENCOUNTER
Terminology should be castrate sensitive and castrate resistant  In modern times, androgen deprivation therapy (ADT) in the form of Lupron is used to lower testosterone  Castrate-resistant prostate cancer (CRPC) is defined by disease progression despite androgen depletion therapy (ADT) and may present as either a continuous rise in serum prostate-specific antigen (PSA) levels, the progression of pre-existing disease, and/or the appearance of new metastases  His insurance company approved Zytiga instead of Hayden  Brandi Kleinan has the indication for both Castration Sensitive and Castration Resistant Prostate cancer whereas Hayden has the indication for Castration Resistant Disease  Based on his rising PSA, and probable bone metastases at 7th rib and T10,  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

## 2018-12-24 ENCOUNTER — TELEPHONE (OUTPATIENT)
Dept: HEMATOLOGY ONCOLOGY | Facility: CLINIC | Age: 78
End: 2018-12-24

## 2018-12-24 NOTE — TELEPHONE ENCOUNTER
Vijay Ricketts with finance reached out to the patient and will be meeting with him on Wednesday 12/26/18

## 2018-12-24 NOTE — TELEPHONE ENCOUNTER
Patient will be running out of [place] by Saturday 12/29  It was discussed that he might be switching to [facility] but how the patient will get this med has not been resolved yet  He has been trying to contact Salena Titus about this but she has not responded  He says that she was looking into some financial support options for him  He isn't sure what med will be replacing the Xtandi  He also says that if something else is ordered, he wanted to know if it's ok to wait until the first day of 2019 because he has a $2500 copay, and if possible would like to avoid having to pay that twice (for this year and next)  But understands that if that's how it needs to be ordered then it will have to be that way  Please call patient to discuss

## 2018-12-26 NOTE — TELEPHONE ENCOUNTER
Reviewed with Dr Dorothy Lezama and he wants the patient to take another sample pack of [facility] while we wait for the zytiga to be approved   Dr Dorothy Lezama signed the patient assistance forms and will email back to [facility]

## 2018-12-29 LAB
ALBUMIN SERPL-MCNC: 3.9 G/DL (ref 3.6–5.1)
ALBUMIN/GLOB SERPL: 1.7 (CALC) (ref 1–2.5)
ALP SERPL-CCNC: 53 U/L (ref 40–115)
ALT SERPL-CCNC: 16 U/L (ref 9–46)
AST SERPL-CCNC: 18 U/L (ref 10–35)
BASOPHILS # BLD AUTO: 47 CELLS/UL (ref 0–200)
BASOPHILS NFR BLD AUTO: 0.8 %
BILIRUB SERPL-MCNC: 0.7 MG/DL (ref 0.2–1.2)
BUN SERPL-MCNC: 24 MG/DL (ref 7–25)
BUN/CREAT SERPL: ABNORMAL (CALC) (ref 6–22)
CALCIUM SERPL-MCNC: 8.8 MG/DL (ref 8.6–10.3)
CHLORIDE SERPL-SCNC: 104 MMOL/L (ref 98–110)
CO2 SERPL-SCNC: 30 MMOL/L (ref 20–32)
CREAT SERPL-MCNC: 1.17 MG/DL (ref 0.7–1.18)
EOSINOPHIL # BLD AUTO: 260 CELLS/UL (ref 15–500)
EOSINOPHIL NFR BLD AUTO: 4.4 %
ERYTHROCYTE [DISTWIDTH] IN BLOOD BY AUTOMATED COUNT: 12 % (ref 11–15)
GLOBULIN SER CALC-MCNC: 2.3 G/DL (CALC) (ref 1.9–3.7)
GLUCOSE SERPL-MCNC: 101 MG/DL (ref 65–99)
HCT VFR BLD AUTO: 41.5 % (ref 38.5–50)
HGB BLD-MCNC: 14.6 G/DL (ref 13.2–17.1)
LYMPHOCYTES # BLD AUTO: 867 CELLS/UL (ref 850–3900)
LYMPHOCYTES NFR BLD AUTO: 14.7 %
MCH RBC QN AUTO: 31.7 PG (ref 27–33)
MCHC RBC AUTO-ENTMCNC: 35.2 G/DL (ref 32–36)
MCV RBC AUTO: 90 FL (ref 80–100)
MONOCYTES # BLD AUTO: 431 CELLS/UL (ref 200–950)
MONOCYTES NFR BLD AUTO: 7.3 %
NEUTROPHILS # BLD AUTO: 4295 CELLS/UL (ref 1500–7800)
NEUTROPHILS NFR BLD AUTO: 72.8 %
PLATELET # BLD AUTO: 187 THOUSAND/UL (ref 140–400)
PMV BLD REES-ECKER: 11 FL (ref 7.5–12.5)
POTASSIUM SERPL-SCNC: 5.1 MMOL/L (ref 3.5–5.3)
PROT SERPL-MCNC: 6.2 G/DL (ref 6.1–8.1)
RBC # BLD AUTO: 4.61 MILLION/UL (ref 4.2–5.8)
SL AMB EGFR AFRICAN AMERICAN: 69 ML/MIN/1.73M2
SL AMB EGFR NON AFRICAN AMERICAN: 60 ML/MIN/1.73M2
SODIUM SERPL-SCNC: 139 MMOL/L (ref 135–146)
WBC # BLD AUTO: 5.9 THOUSAND/UL (ref 3.8–10.8)

## 2019-01-02 ENCOUNTER — TELEPHONE (OUTPATIENT)
Dept: UROLOGY | Facility: AMBULATORY SURGERY CENTER | Age: 79
End: 2019-01-02

## 2019-01-02 NOTE — TELEPHONE ENCOUNTER
Calling in regards to Lupron injection  It is very costly and did provide some information on assistance with copay  Will follow up to see if he was approved on Friday

## 2019-01-02 NOTE — TELEPHONE ENCOUNTER
His insurance is still the same this year   [place] is ok to use office stock and no auth needed  Armando is no auth needed but needs to go through [employer] his copay is still around 2,000 dollars per the representative    Thanks  Lary Odonnell

## 2019-01-02 NOTE — TELEPHONE ENCOUNTER
Dr Gypsy Montemayor is ok with monthly Firmagon 80 mg for now  Next visit in Feb however should be scheduled as a visit with Dr Gypsy Montemayor to further discuss ADT moving forward

## 2019-01-07 ENCOUNTER — PROCEDURE VISIT (OUTPATIENT)
Dept: UROLOGY | Facility: CLINIC | Age: 79
End: 2019-01-07
Payer: COMMERCIAL

## 2019-01-07 ENCOUNTER — TELEPHONE (OUTPATIENT)
Dept: HEMATOLOGY ONCOLOGY | Facility: CLINIC | Age: 79
End: 2019-01-07

## 2019-01-07 ENCOUNTER — TELEPHONE (OUTPATIENT)
Dept: UROLOGY | Facility: AMBULATORY SURGERY CENTER | Age: 79
End: 2019-01-07

## 2019-01-07 VITALS
HEIGHT: 67 IN | WEIGHT: 192.4 LBS | BODY MASS INDEX: 30.2 KG/M2 | SYSTOLIC BLOOD PRESSURE: 138 MMHG | HEART RATE: 64 BPM | DIASTOLIC BLOOD PRESSURE: 70 MMHG

## 2019-01-07 DIAGNOSIS — C61 PROSTATE CANCER (HCC): Primary | ICD-10-CM

## 2019-01-07 PROCEDURE — 96372 THER/PROPH/DIAG INJ SC/IM: CPT

## 2019-01-07 NOTE — PROGRESS NOTES
1/7/2019  Milagros Gamez is a 69 y o  male  [id number]    Diagnosis:  Chief Complaint     Prostate Cancer          Patient presents for Firmagon 80mg managed by Dr Alphonso Mixon:  Patient will follow up in one month with Dr Nusrat Wallace with PSA prior to visit  At that time patient will proceed with lupron injections (please see telephone encounter from today) given more affordable copay this year  Medication administration:    Firmagon 80 mg administered without incident  Patient questions lupron injections which he had previously deferred due to high copay cost   He reports he [facility] with [facility] and it may be more affordable now  Will look into this for patient and follow up with a phone call      Administrations This Visit     degarelix acetate [facility]) injection 80 mg     Admin Date  01/07/2019 Action  Given Dose  80 mg Route  Subcutaneous Administered By  Tuyet Lindsey RN                Vitals:    01/07/19 1112   BP: 138/70   Pulse: 64   Weight: 87 3 kg (192 lb 6 4 oz)   Height: 5' 7" (1 702 m)         Alma Simpler, RN Shirl Nissen, BSN

## 2019-01-07 NOTE — TELEPHONE ENCOUNTER
Called and Spoke with Mo Puente at [facility] to see where the lupron order initiated from  According to the notes the lupron order is still the original order from November  The order was on hold until they received a phone call from the patient on 1/2/19  We also called to check to see if there were any insurance changes as of January first on 1/2/19 and were told there were no changes and we left the order on hold  The patient's phone call however asking for financial or copay assistance must have reactivated the order and when he gave payment information it reactivated it  According to Lei the order was still on hold as of 1/4/19 but the patient called on 1/5/19 and provided payment information  I was told today his copay is Approx  $331 and that is also what the patient was told when he spoke with Kellee GROVE over the weekend and gave his payment information  Called and explained the situation to the patient  He reports since copay is now quoted as much more affordable he will proceed with lupron at his next visit 2/8/19  Will [facility] to set up shipment to receive the medication prior to that visit  Called back and spoke with [address] [place] [address] Jen Melchor  She confirmed order for lupron 45 mg, dispense qty:1  She confirmed address for Yarelis Bhat, signature required  Delivery set for 1/28/19  We are to call within 24 hours for any concerns or if delivery does not show up as scheduled

## 2019-01-07 NOTE — TELEPHONE ENCOUNTER
Pt called and indicated he checked with pharmacy on file and they have the Prednisone but not the Zytiga  Pt is currently taking Xtandi and that is running out on Sat 1/12  Wondering where medication was called into  Looks like it was approved  If you can please follow up with pt

## 2019-01-07 NOTE — TELEPHONE ENCOUNTER
Cecilia  It appears we are continuing with Firmagon 80 mg for patient? Patient has appointment with you today  Please let me or Abbie Nathan know the status of the injections    Thanks

## 2019-01-07 NOTE — TELEPHONE ENCOUNTER
I am unaware who reordered the Lupron from [facility]  The last we had heard/was documented was that Clare Marie Dr had the order on hold from November and patient was proceeding with Firmagon 80 mg monthly due to copay costs  The patient did report today that he received a call from [employer] over the weekend regarding payment information and shipping  He went ahead and received the Firmagon 80 mg today as previously discussed  He is scheduled for follow up with Dr Tere Mallory on 2/8/19, if he wishes to change to lupron he may receive lupron injection at that time  Will need to contact patient for his preference

## 2019-01-07 NOTE — TELEPHONE ENCOUNTER
Called patient and explained that the zytiga will not come from his regular pharmacy  Also explained that I reached out to Darryl Bean and waiting to hear back regarding assistance

## 2019-01-10 ENCOUNTER — OFFICE VISIT (OUTPATIENT)
Dept: HEMATOLOGY ONCOLOGY | Facility: CLINIC | Age: 79
End: 2019-01-10
Payer: COMMERCIAL

## 2019-01-10 VITALS — BODY MASS INDEX: 29.98 KG/M2 | HEIGHT: 67 IN | RESPIRATION RATE: 18 BRPM | WEIGHT: 191 LBS | HEART RATE: 61 BPM

## 2019-01-10 DIAGNOSIS — C61 PROSTATE CANCER (HCC): Primary | ICD-10-CM

## 2019-01-10 PROCEDURE — 99214 OFFICE O/P EST MOD 30 MIN: CPT | Performed by: INTERNAL MEDICINE

## 2019-01-10 NOTE — PROGRESS NOTES
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, presented with elevation of PSA, CT scan showed involvement of the posterior side of the T10, left 7th rib area, involvement of the distal point of the right clavicle   The patient currently on Firmagon and he will be initiated on Lupron by Urology    He was given samples of enzalutamide 160 mg p o  Daily, the insurance did not approve it, the insurance approved abiraterone however he has a high co-payment  He still taking samples of enzalutamide, follow-up in 1 month with CBC, PSA, CMP    Zometa 4 mg IV every 3 months           HPI: 52-year-old  male who was seen by Urology for elevation of the PSA, when he presented in June 2017 with PSA of 4 6, and subsequently in September of 2017 PSA of 7 3 and in May of 2000 18 PSA of 8 6, a biopsy was recommended but the patient had just undergone percutaneous stenting of the heart and he was on dual anti-platelet therapy      MRI of the abdomen in April 2018 showed 2 simple cyst in the right hepatic lobe, bilateral renal cysts the largest 1 measuring 10 cm in the left lower lobe     In October of 2018 PSA of 9 8, CT scan of the chest showed sclerotic lesions in T10, 5 mm nodule in the right lower lobe, bone scan showed activity in the posterior T10 level and left posterior 7th rib area concerning for osseous metastases and increased activity in the distal right clavicle might be degenerative or metastatic area   diagnosed with castration sensitive metastatic prostate cancer, the insurance company could not approve enzalutamide, he also has a high co-payment for abiraterone    The patient initiated on samples of enzalutamide 160 mg p o   Daily since the beginning of December 2018      ECOG score 0            Test Results:    Imaging: No results found  Labs:   Lab Results   Component Value Date    WBC 5 9 12/28/2018    HGB 14 6 12/28/2018    HCT 41 5 12/28/2018    MCV 90 0 12/28/2018     12/28/2018     Lab Results   Component Value Date     09/30/2017    K 5 1 12/28/2018     12/28/2018    CO2 30 12/28/2018    BUN 24 12/28/2018    CREATININE 1 20 12/11/2018    CALCIUM 8 8 12/28/2018    AST 26 12/11/2018    ALT 28 12/11/2018    ALKPHOS 53 12/28/2018    PROT 6 5 09/30/2017    BILITOT 0 7 09/30/2017    EGFR 58 12/11/2018       No results found for: IRON, TIBC, FERRITIN    No results found for: [id number]      ROS:   Review of Systems   Constitutional: Negative for activity change, appetite change, chills, diaphoresis, fatigue, fever and unexpected weight change  HENT: Negative for congestion, dental problem, facial swelling, hearing loss, mouth sores, nosebleeds, postnasal drip, rhinorrhea, sore throat, trouble swallowing and voice change  Eyes: Negative for photophobia, pain, discharge, redness, itching and visual disturbance  Respiratory: Negative for cough, choking, chest tightness, shortness of breath and wheezing  Cardiovascular: Negative for chest pain, palpitations and leg swelling  Gastrointestinal: Negative for abdominal distention, abdominal pain, anal bleeding, blood in stool, constipation, diarrhea, nausea, rectal pain and vomiting  Endocrine: Negative for cold intolerance and heat intolerance  Genitourinary: Negative for decreased urine volume, difficulty urinating, dysuria, flank pain, frequency, hematuria and urgency  Musculoskeletal: Negative for arthralgias, back pain, gait problem, joint swelling, myalgias, neck pain and neck stiffness  Skin: Negative for color change, pallor, rash and wound  Allergic/Immunologic: Negative for immunocompromised state     Neurological: Negative for dizziness, tremors, seizures, syncope, facial asymmetry, speech difficulty, weakness, light-headedness, numbness and headaches  Hematological: Negative for adenopathy  Does not bruise/bleed easily  Psychiatric/Behavioral: Negative for agitation, confusion, decreased concentration, dysphoric mood and sleep disturbance  The patient is not nervous/anxious  All other systems reviewed and are negative  Current Medications: Reviewed  Allergies: Reviewed  PMH/FH/SH:  Reviewed      Physical Exam:    Body surface area is 1 97 meters squared  Wt Readings from Last 3 Encounters:   01/10/19 86 6 kg (191 lb)   01/07/19 87 3 kg (192 lb 6 4 oz)   12/11/18 86 kg (189 lb 9 5 oz)        Temp Readings from Last 3 Encounters:   12/11/18 98 °F (36 7 °C) (Tympanic)   12/10/18 (!) 60 °F (15 6 °C)   11/29/18 (!) 97 2 °F (36 2 °C) (Tympanic)        BP Readings from Last 3 Encounters:   01/07/19 138/70   12/11/18 125/65   12/10/18 152/88         Pulse Readings from Last 3 Encounters:   01/10/19 61   01/07/19 64   12/11/18 58        Physical Exam   Constitutional: He is oriented to person, place, and time  He appears well-developed and well-nourished  No distress  HENT:   Head: Normocephalic and atraumatic  Mouth/Throat: Oropharynx is clear and moist  No oropharyngeal exudate  Eyes: Pupils are equal, round, and reactive to light  Conjunctivae and EOM are normal    Neck: Normal range of motion  Neck supple  No tracheal deviation present  No thyromegaly present  Cardiovascular: Normal rate and regular rhythm  Exam reveals no gallop and no friction rub  No murmur heard  Pulmonary/Chest: Effort normal and breath sounds normal  No respiratory distress  He has no wheezes  He has no rales  He exhibits no tenderness  Abdominal: Soft  Bowel sounds are normal  He exhibits no distension and no mass  There is no tenderness  There is no rebound and no guarding  Musculoskeletal: Normal range of motion  He exhibits no edema  Lymphadenopathy:     He has no cervical adenopathy     Neurological: He is alert and oriented to person, place, and time  Skin: Skin is warm and dry  No rash noted  He is not diaphoretic  No erythema  No pallor  Psychiatric: He has a normal mood and affect  His behavior is normal  Judgment and thought content normal    Vitals reviewed  Goals and Barriers:  Current Goal: Minimize effects of disease  Barriers: None  Patient's Capacity to Self Care:  Patient is able to self care      Code Status: [facility]@

## 2019-01-14 ENCOUNTER — DOCUMENTATION (OUTPATIENT)
Dept: HEMATOLOGY ONCOLOGY | Facility: CLINIC | Age: 79
End: 2019-01-14

## 2019-01-16 DIAGNOSIS — C61 PROSTATE CANCER (HCC): ICD-10-CM

## 2019-01-16 RX ORDER — ABIRATERONE ACETATE 250 MG/1
1000 TABLET ORAL DAILY
Qty: 120 TABLET | Refills: 0 | Status: SHIPPED | OUTPATIENT
Start: 2019-01-16 | End: 2019-02-18 | Stop reason: SDUPTHER

## 2019-01-18 ENCOUNTER — DOCUMENTATION (OUTPATIENT)
Dept: HEMATOLOGY ONCOLOGY | Facility: CLINIC | Age: 79
End: 2019-01-18

## 2019-01-18 NOTE — PROGRESS NOTES
1/11/2019  Received notification from Clinical that pt still did not start the zytiga due to financial reasons  She requested that we try to obtain the auth for the Alpha again  Submitted for auth through cover my meds  1/14/2019 received denial from Jessie Hyman  Originally the pt wanted us to appeal the denial   Sent the appeal letter to Dr Bryan Henriquez for review and signature  1/18/2018  Received notification from clinical that since financial was able to obtain assistance for the zytiga, we will not be appealing the denial of xtandi  The pt has decided to start the zytiga

## 2019-01-21 ENCOUNTER — TELEPHONE (OUTPATIENT)
Dept: HEMATOLOGY ONCOLOGY | Facility: CLINIC | Age: 79
End: 2019-01-21

## 2019-01-21 NOTE — TELEPHONE ENCOUNTER
PT called in to see how long it would take for his financial assistance to go through for his Zytega  He asked me to touch base with Natty Bailey  I spoke with Veronica Bardales who stated that he calls just about everyday, and asked me to make everyone aware that she is working with Baltazar Castillo to get the process moving and she will be in touch with the patient

## 2019-01-23 ENCOUNTER — TELEPHONE (OUTPATIENT)
Dept: UROLOGY | Facility: MEDICAL CENTER | Age: 79
End: 2019-01-23

## 2019-01-23 NOTE — TELEPHONE ENCOUNTER
Spoke with patient  Advised patient, he will receive Lupron injection at his appointment with Dr Gypsy Montemayor on 2/12/19  Instructed patient, Lupron was already ordered from [employer] and should be received in our office next week  Patient had questions regarding payment and when his credit card will be charged  Advised patient, should call [facility] with any billing questions

## 2019-01-23 NOTE — TELEPHONE ENCOUNTER
Patient would like to know if he could have his Lupron shot the same day he comes to office for his 3 month follow up on 02/12/2019    Please advise

## 2019-01-29 ENCOUNTER — TELEPHONE (OUTPATIENT)
Dept: UROLOGY | Facility: CLINIC | Age: 79
End: 2019-01-29

## 2019-01-29 LAB — PSA SERPL-MCNC: 0.2 NG/ML

## 2019-01-29 NOTE — TELEPHONE ENCOUNTER
Patient called to clarify what labs he needed done prior to his OV with Dr Deven Baird  Made him aware that Dr Deven Baird ordered CBC, CMP and PSA to be done prior to OV on 2/14/19  He stated that he had a PSA done at [address] [place] [address] yesterday  Instructed him to have just the CBC and CMP done prior to OV with Med Onc since PSA was already done  He stated he does have lab slips and that he will have labs done 2/11/19

## 2019-02-12 ENCOUNTER — OFFICE VISIT (OUTPATIENT)
Dept: UROLOGY | Facility: CLINIC | Age: 79
End: 2019-02-12
Payer: COMMERCIAL

## 2019-02-12 VITALS
DIASTOLIC BLOOD PRESSURE: 88 MMHG | HEART RATE: 60 BPM | BODY MASS INDEX: 29.76 KG/M2 | HEIGHT: 67 IN | SYSTOLIC BLOOD PRESSURE: 128 MMHG | WEIGHT: 189.6 LBS

## 2019-02-12 DIAGNOSIS — C61 PROSTATE CANCER (HCC): Primary | ICD-10-CM

## 2019-02-12 DIAGNOSIS — R23.2 HOT FLASHES: ICD-10-CM

## 2019-02-12 DIAGNOSIS — R94.8 ABNORMAL RADIONUCLIDE BONE SCAN: ICD-10-CM

## 2019-02-12 LAB
ALBUMIN SERPL-MCNC: 4.1 G/DL (ref 3.6–5.1)
ALBUMIN/GLOB SERPL: 1.9 (CALC) (ref 1–2.5)
ALP SERPL-CCNC: 38 U/L (ref 40–115)
ALT SERPL-CCNC: 13 U/L (ref 9–46)
AST SERPL-CCNC: 16 U/L (ref 10–35)
BASOPHILS # BLD AUTO: 42 CELLS/UL (ref 0–200)
BASOPHILS NFR BLD AUTO: 0.7 %
BILIRUB SERPL-MCNC: 0.8 MG/DL (ref 0.2–1.2)
BUN SERPL-MCNC: 24 MG/DL (ref 7–25)
BUN/CREAT SERPL: 20 (CALC) (ref 6–22)
CALCIUM SERPL-MCNC: 9.1 MG/DL (ref 8.6–10.3)
CHLORIDE SERPL-SCNC: 104 MMOL/L (ref 98–110)
CO2 SERPL-SCNC: 31 MMOL/L (ref 20–32)
CREAT SERPL-MCNC: 1.23 MG/DL (ref 0.7–1.18)
EOSINOPHIL # BLD AUTO: 180 CELLS/UL (ref 15–500)
EOSINOPHIL NFR BLD AUTO: 3 %
ERYTHROCYTE [DISTWIDTH] IN BLOOD BY AUTOMATED COUNT: 13.2 % (ref 11–15)
GLOBULIN SER CALC-MCNC: 2.2 G/DL (CALC) (ref 1.9–3.7)
GLUCOSE SERPL-MCNC: 87 MG/DL (ref 65–139)
HCT VFR BLD AUTO: 44.1 % (ref 38.5–50)
HGB BLD-MCNC: 15.1 G/DL (ref 13.2–17.1)
LYMPHOCYTES # BLD AUTO: 1254 CELLS/UL (ref 850–3900)
LYMPHOCYTES NFR BLD AUTO: 20.9 %
MCH RBC QN AUTO: 31.1 PG (ref 27–33)
MCHC RBC AUTO-ENTMCNC: 34.2 G/DL (ref 32–36)
MCV RBC AUTO: 90.9 FL (ref 80–100)
MONOCYTES # BLD AUTO: 444 CELLS/UL (ref 200–950)
MONOCYTES NFR BLD AUTO: 7.4 %
NEUTROPHILS # BLD AUTO: 4080 CELLS/UL (ref 1500–7800)
NEUTROPHILS NFR BLD AUTO: 68 %
PLATELET # BLD AUTO: 190 THOUSAND/UL (ref 140–400)
PMV BLD REES-ECKER: 11.7 FL (ref 7.5–12.5)
POTASSIUM SERPL-SCNC: 4.3 MMOL/L (ref 3.5–5.3)
PROT SERPL-MCNC: 6.3 G/DL (ref 6.1–8.1)
RBC # BLD AUTO: 4.85 MILLION/UL (ref 4.2–5.8)
SL AMB EGFR AFRICAN AMERICAN: 65 ML/MIN/1.73M2
SL AMB EGFR NON AFRICAN AMERICAN: 56 ML/MIN/1.73M2
SODIUM SERPL-SCNC: 142 MMOL/L (ref 135–146)
WBC # BLD AUTO: 6 THOUSAND/UL (ref 3.8–10.8)

## 2019-02-12 PROCEDURE — 99213 OFFICE O/P EST LOW 20 MIN: CPT | Performed by: UROLOGY

## 2019-02-12 NOTE — PROGRESS NOTES
UROLOGY ROUTINE FOLLOW UP NOTE     CHIEF COMPLAINT   Adria Espino is a 69 y o  male with a complaint of   No chief complaint on file  History of Present Illness:     69 y o  gentleman who has been undergoing routine prostate cancer screening with his primary care team  The patient has had a steady PSA but in March of 2017 was noted to have some oscillation  The patient was given a course of antibiotics and his PSA came down from the mid 5 range to 4 6  He initially presented in June 2017  We initially recommended follow-up in 1 year  Follow-up of his PSA values ordered by his primary team demonstrated concern for rapid rise  The patient return to see me in May of 2018  At that time, we recommended a prostate biopsy but the patient had just undergone percutaneous stenting and was on dual anti-platelet therapy  We recommend delay  The patient underwent a CT scan of his chest which demonstrated some sclerotic lesions in his primary care team again contacted us  We recommended repeat PSA and a bone scan  PSA has risen again and bone scan does demonstrate some concern  Lab Results   Component Value Date    PSA 0 2 01/28/2019    PSA 9 8 (H) 10/05/2018   10/5/18 PSA 9 8  4/30/18 PSA 8 6, free 19 6%  9/30/17 PSA 7 3, free 15%  5/5/17 PSA 4 6  3/20/17 PSA 5 4     The patient has erectile dysfunction and has been doing well with Viagra 50 mg  These are cost prohibitive however  We switched to sildenafil 20mg tablets at last visit  The patient was unable to obtain the medication  Patient has not been using sildenafil since his heart episode described below  Patient is doing very well from a cardiac standpoint  He is still on Plavix and aspirin  He has lost 40 pounds with aggressive cardiac rehab and is exercising daily  He feels very good  He denies bony pain  He is concerned about the findings of his bone scan      We did discuss with Interventional Radiology possible biopsy of the patient's thoracic and rib lesions although these were not felt to be safe for attempt  As such, the patient was arranged for a prostate biopsy here in our office  I did discuss with the patient's cardiologist who felt it would be safe for the patient to stop his Plavix but remain on his aspirin for the procedure  Underwent prostate biopsy  This demonstrated high risk [place] 9 disease  Patient was discussed at multidisciplinary conference and although we were not able to biopsy his bony lesions, 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  The patient was started on androgen deprivation therapy and seen by Medical Oncology  He was started on enzalutamide and Zometa  He returns for androgen deprivation therapy  He has had good PSA response  Recently enzalutamide has transitioned to abiraterone  Patient has very minimal hot flashes in relation to his therapy      Past Medical History:     Past Medical History:   Diagnosis Date    Anemia     last assessed  1/7/14     Hypercholesteremia     Hypertension     Polyp of sigmoid colon     Prostate cancer ([facility] [address] )     Renal disorder     elevated serum creat    Tinnitus     unspecified laterality / last assessed 4/9/13       PAST SURGICAL HISTORY:     Past Surgical History:   Procedure Laterality Date    CARDIAC SURGERY      CORONARY ANGIOPLASTY WITH STENT PLACEMENT      HEMORRHOID SURGERY      PROSTATE BIOPSY  10/24/2018    TONSILLECTOMY         CURRENT MEDICATIONS:     Current Outpatient Medications   Medication Sig Dispense Refill    abiraterone (ZYTIGA) 250 mg tablet Take 4 tablets (1,000 mg total) by mouth daily 120 tablet 0    aspirin (ASPIRIN ADULT LOW STRENGTH) 81 mg EC tablet Take 1 tablet by mouth daily      atorvastatin (LIPITOR) 40 mg tablet Take 40 mg by mouth      Calcium 500 MG CHEW Chew      cholecalciferol (VITAMIN D3) 1,000 units tablet Take 1 tablet by mouth daily      losartan (COZAAR) 100 MG tablet Take 1 tablet (100 mg total) by mouth daily 90 tablet 1    metoprolol succinate (TOPROL-XL) 25 mg 24 hr tablet Take 0 5 tablets (12 5 mg total) by mouth daily for 180 days 90 tablet 1    predniSONE 5 mg tablet Take 1 tablet (5 mg total) by mouth 2 (two) times a day with meals 60 tablet 0    enzalutamide (XTANDI) 40 mg CAPS Take 160 mg by mouth daily      Saw [place] 1000 MG CAPS Take 3,600 mg by mouth       No current facility-administered medications for this visit          ALLERGIES:   No Known Allergies    SOCIAL HISTORY:     Social History     Socioeconomic History    Marital status: /Civil Union     Spouse name: None    Number of children: None    Years of education: None    Highest education level: None   Occupational History    Occupation: consultant   Social Needs    Financial resource strain: None    Food insecurity:     Worry: None     Inability: None    Transportation needs:     Medical: None     Non-medical: None   Tobacco Use    Smoking status: Never Smoker    Smokeless tobacco: Never Used   Substance and Sexual Activity    Alcohol use: Yes     Comment: Occuational / social     Drug use: No    Sexual activity: None   Lifestyle    Physical activity:     Days per week: None     Minutes per session: None    Stress: None   Relationships    Social connections:     Talks on phone: None     Gets together: None     Attends Confucianist service: None     Active member of club or organization: None     Attends meetings of clubs or organizations: None     Relationship status: None    Intimate partner violence:     Fear of current or ex partner: None     Emotionally abused: None     Physically abused: None     Forced sexual activity: None   Other Topics Concern    None   Social History Narrative    Always uses seat belt     Daily caffeine consumption 2-3 servings a day     Feels safe at home       SOCIAL HISTORY:     Family History   Problem Relation Age of Onset    Breast cancer Mother     Hypertension Father        REVIEW OF SYSTEMS:     Review of Systems   Constitutional: Negative for activity change, chills, fever and unexpected weight change (Planned weight loss with exercise)  Respiratory: Negative  Cardiovascular: Positive for chest pain  Gastrointestinal: Negative  Genitourinary: Negative  Musculoskeletal: Negative  Negative for back pain  Neurological: Negative  Psychiatric/Behavioral: Negative  PHYSICAL EXAM:     Ht 5' 6 75" (1 695 m)   Wt 86 kg (189 lb 9 6 oz)   BMI 29 92 kg/m²     General:  Healthy appearing male in no acute distress  They have a normal affect  There is not appear to be any gross neurologic defects or abnormalities  HEENT:  Normocephalic, atraumatic  Neck is supple without any palpable lymphadenopathy  Cardiovascular:  Patient has normal palpable distal radial pulses  There is no significant peripheral edema  No JVD is noted  Respiratory:  Patient has unlabored respirations  There is no audible wheeze or rhonchi  Abdomen:  Abdomen is without surgical scars  Abdomen is soft and nontender  There is no tympany  Inguinal and umbilical hernia are not appreciated  Genitourinary:  Circumcised phallus, orthotopic meatus, testicles descended, digital rectal exam shows a 50 g prostate  There is a 1 to 1-1/2 cm nodule at the right side of the median sulcus towards the apex    Musculoskeletal:  Patient does not have significant CVA tenderness in the flank with palpation or percussion  They full range of motion in all 4 extremities  Strength in all 4 extremities appears congruent  Patient is able to ambulate without assistance or difficulty  Dermatologic:  Patient has no skin abnormalities or rashes        LABS:     CBC:   Lab Results   Component Value Date    WBC 5 9 12/28/2018    HGB 14 6 12/28/2018    HCT 41 5 12/28/2018    MCV 90 0 12/28/2018     12/28/2018       BMP:   Lab Results   Component Value Date    CALCIUM 8 8 12/28/2018     09/30/2017    K 5 1 12/28/2018    CO2 30 12/28/2018     12/28/2018    BUN 24 12/28/2018    CREATININE 1 20 12/11/2018     10/5/18 PSA 9 8  Lab Results   Component Value Date    PSA 0 2 01/28/2019    PSA 9 8 (H) 10/05/2018     IMAGING:      10/5/18  BONE SCAN  WHOLE BODY     INDICATION: R97 20: Elevated prostate specific antigen (PSA)     PREVIOUS FILM CORRELATION:    CT chest 9/28/2018     TECHNIQUE:   This study was performed following the intravenous administration of 26 3 mCi Tc-99m labeled MDP  Delayed, anterior and posterior whole body images were acquired, 2-3 hours after radiopharmaceutical administration      FINDINGS:  Focal radiotracer activity in the posterior lower thoracic spine likely at the T10 level and corresponding with previously seen sclerotic focus  Additional sclerotic focus in the left posterior 7th rib  This also corresponds to a sclerotic density seen   on prior CT  Increased activity in the distal right clavicle may be degenerative or metastatic  Degenerative change in the spine  Activity in the posterior right ankle region may be degenerative or posttraumatic  Symmetric renal uptake      IMPRESSION:     1  Focal activity in the posterior T10 level and left posterior 7th rib, most concerning for osseous metastases  2   Increased activity in the distal right clavicle may be degenerative or metastatic  PATHOLOGY:     Final Diagnosis   A  Right lateral base prostate core biopsy:  - Adenocarcinoma, [place] score 4+ 5 = 9/10 (grade 5 comprises 10% of core biopsy), grade group 5, continuously involving greater than 95% of this core biopsy  - No perineural invasion is seen     B  Right lateral mid prostate core biopsy:  - Adenocarcinoma, [place] score 4+ 5 = 9/10 (grade 5 conprises 20% of core biopsy), grade group 5, continuously involving 90% of this core biopsy  - Perineural invasion is seen      C   Left lateral apex prostate core biopsy:  - Adenocarcinoma, [place] score 5 + 4 = 9/10 (grade 5 comprises 60% of core biopsy), grade group 5, continuously involving 60% of this core biopsy  - Perineural invasion is seen     D  Right base prostate core biopsy:  - Adenocarcinoma, [place] score 4 + 5 = 9/10 (grade 5 comprises 10% of core biopsy), grade group 5, continuously involving 85% of this core biopsy  - Perineural invasion is seen     E  Right mid prostate core biopsy   - Adenocarcinoma, [place] score 5 + 4 = 9/10 (grade 5 comprises 95% of core biopsy), grade group 5, continuously involving 60% of this core biopsy  - Perineural invasion is seen     F  Right apex prostate core biopsy:  - Adenocarcinoma, [place] score 4 + 5 = 9/10 (grade 5 comprises 45% of core biopsy), grade group 5, continuously involving 70% of this core biopsy  - Perineural invasion is seen     G  Left base prostate core biopsy:  - Adenocarcinoma, [place] score 4 + 5 = 9/10 (grade 5 comprises 35% of core biopsy), grade group 5, continuously involving 70% of this core biopsy  - No perineural invasion is seen     H  Left mid prostate core biopsy:  - Adenocarcinoma, [place] score 4 + 4 = 8/10, grade group 4, discontinuously involving 20% of this core biopsy  - No perineural invasion is seen      I  Left apex prostate core biopsy:  - Benign prostatic tissue     J  Left lateral base prostate core biopsy:  - Adenocarcinoma, [place] score 4 + 5 = 9/10, (grade 5 comprises 35% of core biopsy), grade group 5, discontinuously involving 20% of this core biopsy  - No perineural invasion is seen      K  Left lateral mid prostate core biopsy:  - Adenocarcinoma, [place] score 4 + 4 = 8/10, grade group 4, continuously involving 5% of this core biopsy  - No perineural invasion is seen      L   Left lateral apex prostate core biopsy:  - Adenocarcinoma, [place] score 4 + 4 = 8/10, grade group 4, discontinuously involving 5% of this core biopsy  - No perineural invasion is seen      Note: Block for Prolaris testing if required: E ASSESSMENT:     69 y o  male with high volume, high risk [place] 5+4=9 CaP, now with abnormal sclerotic lesions and positive bone scan    PLAN:     Sundar Espinal, his wife and I discussed the results of his prostate biopsy which demonstrate high volume high risk [place] 5 + 4 equals 9 prostate cancer  We have previously discussed the abnormal sclerotic lesions in his films and bone scan  Patient was discussed at the multidisciplinary tumor Board and it was agreed that these likely represent stage IV metastatic disease  Unfortunate this cannot be biopsied  Patient has been started on androgen deprivation hormonal therapy  Due to insurance issues, the patient was receiving monthly Firmagon  Last 1/7/19  Patient was seen by Medical Oncology and started on enzalutamide  He this was not well covered by his insurance  He was also given Zometa intravenously for bone health  His PSA is very low and the enzalutamide has now transitioned abiraterone  He is due to see the medical oncologist within the next week  He has now been approved for 6 month depot of Lupron and has been given this injection today  We will plan to see him back in 6 months with updated PSA for additional Lupron injections

## 2019-02-13 NOTE — PROGRESS NOTES
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, presented with elevation of PSA  CT scan chest 9/28/2018 showed involvement of the posterior side of the T10, left 7th rib area, involvement of the distal point of the right clavicle  He was on Firmagon per urology and  initiated on Lupron by Urology 2/12/2019      He had been on enzalutamide 160 mg p o  Daily since 12/2018  His insurance did not approve it and he has been using samples  His insurance approved abiraterone however he had a high co-payment  We were finally able to get financial assistance through [employer] and [employer]  [facility] with prednisone 5mg po bid without side effects  We had a lengthy discussion regarding prognosis, treatment options  PSA 0 2 1/28/2019 compared to 9 8 10/2018  CBCD CMP stable 2/12/2018  Follow-up in 6-8 weeks with CBC, PSA, CMP      Zometa 4 mg IV every 3 months initiated 12/2018       40 minutes spent with patient and his wife in counseling and coordination of care         HPI: 71-year-old  male who was seen by Urology for elevation of the PSA, when he presented in June 2017 with PSA of 4 6, Subsequently in September of 2017 PSA was 7 3 and in May of 2018 PSA was 8 6  A biopsy was recommended but the patient had just undergone percutaneous stenting of the heart and he was on dual anti-platelet therapy      MRI of the abdomen in April 2018 showed 2 simple cyst in the right hepatic lobe, bilateral renal cysts the largest 1 measuring 10 cm in the left lower lobe     In October of 2018 PSA was 9 8  CT scan of the chest 9/28/2018 showed sclerotic lesions in T10, 5 mm nodule in the right lower lobe    Bone scan showed activity in the posterior T10 level and left posterior 7th rib area concerning for osseous metastases and increased activity in the distal right clavicle might be degenerative or metastatic area  He was diagnosed with castration sensitive metastatic prostate cancer  His insurance company could not approve enzalutamide, he also has a high co-payment for abiraterone     The patient initiated on samples of enzalutamide 160 mg p o  daily since the beginning of December 2018        Test Results:        Labs:   Lab Results   Component Value Date    HGB 15 1 02/12/2019    HCT 44 1 02/12/2019    MCV 90 9 02/12/2019     02/12/2019    WBC 6 0 02/12/2019     Lab Results   Component Value Date     09/30/2017    K 4 3 02/12/2019     02/12/2019    CO2 31 02/12/2019    BUN 24 02/12/2019    CREATININE 1 20 12/11/2018    CALCIUM 9 1 02/12/2019    AST 16 02/12/2019    ALT 13 02/12/2019    ALKPHOS 38 (L) 02/12/2019    PROT 6 5 09/30/2017    BILITOT 0 7 09/30/2017    EGFR 58 12/11/2018       Imaging: No results found  ROS:  As mentioned in HPI & Interval History otherwise 14 point ROS negative  Allergies: No Known Allergies  Current Medications: Reviewed  PMH/FH/SH:  Reviewed      Physical Exam:    There is no height or weight on file to calculate BSA  Ht Readings from Last 3 Encounters:   02/12/19 5' 6 75" (1 695 m)   01/10/19 5' 6 54" (1 69 m)   01/07/19 5' 7" (1 702 m)        Wt Readings from Last 3 Encounters:   02/12/19 86 kg (189 lb 9 6 oz)   01/10/19 86 6 kg (191 lb)   01/07/19 87 3 kg (192 lb 6 4 oz)        Temp Readings from Last 3 Encounters:   12/11/18 98 °F (36 7 °C) (Tympanic)   12/10/18 (!) 60 °F (15 6 °C)   11/29/18 (!) 97 2 °F (36 2 °C) (Tympanic)        BP Readings from Last 3 Encounters:   02/12/19 128/88   01/07/19 138/70   12/11/18 125/65           Physical Exam   Constitutional: He is oriented to person, place, and time  He appears well-developed and well-nourished  No distress  HENT:   Head: Normocephalic and atraumatic     Eyes: Pupils are equal, round, and reactive to light  Conjunctivae and EOM are normal    Neck: Normal range of motion  Neck supple  No tracheal deviation present  Cardiovascular: Normal rate and regular rhythm  Exam reveals no gallop and no friction rub  No murmur heard  Pulmonary/Chest: Effort normal and breath sounds normal  No respiratory distress  He has no wheezes  He has no rales  He exhibits no tenderness  Abdominal: Soft  Bowel sounds are normal  He exhibits no distension and no mass  There is no tenderness  There is no rebound and no guarding  Musculoskeletal: Normal range of motion  Lymphadenopathy:     He has no cervical adenopathy  Neurological: He is alert and oriented to person, place, and time  Skin: Skin is warm and dry  No rash noted  He is not diaphoretic  No erythema  No pallor  Psychiatric: He has a normal mood and affect  His behavior is normal  Judgment and thought content normal    Vitals reviewed        ECOG:       Emergency Contacts:    Jl Martinez, [phone],

## 2019-02-14 ENCOUNTER — OFFICE VISIT (OUTPATIENT)
Dept: HEMATOLOGY ONCOLOGY | Facility: CLINIC | Age: 79
End: 2019-02-14
Payer: COMMERCIAL

## 2019-02-14 VITALS
DIASTOLIC BLOOD PRESSURE: 71 MMHG | HEIGHT: 67 IN | OXYGEN SATURATION: 97 % | BODY MASS INDEX: 29.98 KG/M2 | RESPIRATION RATE: 18 BRPM | TEMPERATURE: 97.9 F | HEART RATE: 57 BPM | WEIGHT: 191 LBS | SYSTOLIC BLOOD PRESSURE: 129 MMHG

## 2019-02-14 DIAGNOSIS — C61 PROSTATE CANCER (HCC): Primary | ICD-10-CM

## 2019-02-14 PROCEDURE — 99215 OFFICE O/P EST HI 40 MIN: CPT | Performed by: PHYSICIAN ASSISTANT

## 2019-02-14 RX ORDER — ABIRATERONE ACETATE 250 MG/1
TABLET ORAL
COMMUNITY
Start: 2019-02-03 | End: 2019-02-14 | Stop reason: SDUPTHER

## 2019-02-16 DIAGNOSIS — C61 PROSTATE CANCER (HCC): ICD-10-CM

## 2019-02-18 ENCOUNTER — TELEPHONE (OUTPATIENT)
Dept: HEMATOLOGY ONCOLOGY | Facility: CLINIC | Age: 79
End: 2019-02-18

## 2019-02-18 DIAGNOSIS — C61 PROSTATE CANCER (HCC): ICD-10-CM

## 2019-02-18 RX ORDER — PREDNISONE 1 MG/1
TABLET ORAL
Qty: 60 TABLET | Refills: 0 | Status: SHIPPED | OUTPATIENT
Start: 2019-02-18 | End: 2019-03-15 | Stop reason: SDUPTHER

## 2019-02-18 RX ORDER — ABIRATERONE ACETATE 250 MG/1
1000 TABLET ORAL DAILY
Qty: 120 TABLET | Refills: 4 | Status: SHIPPED | OUTPATIENT
Start: 2019-02-18 | End: 2020-01-09 | Stop reason: SDUPTHER

## 2019-02-18 NOTE — TELEPHONE ENCOUNTER
Patient called in for a refill on his Zytiga 50 mg  He asked if someone could give him a call when this is done, as he is going to be out by the end of the week       Patient can be reached at [phone]

## 2019-02-18 NOTE — TELEPHONE ENCOUNTER
Patient called again, asking was [employer] notified about his [place] [facility]    Carlos Manuelmerrys Skyler he will run out of medication on Sunday 2/23/19

## 2019-02-19 NOTE — TELEPHONE ENCOUNTER
Patient called today and stated he is almost out of zytiga and needs a new prescription sent to [facility]  I contacted Misale at [facility] and she faxed prescription to [facility] yesterday [phone]  Called patient back  Made him aware

## 2019-02-19 NOTE — TELEPHONE ENCOUNTER
Patient called back  Prescription for Zytiga needs to be faxed to [facility] [phone]

## 2019-03-04 RX ORDER — SODIUM CHLORIDE 9 MG/ML
20 INJECTION, SOLUTION INTRAVENOUS CONTINUOUS
Status: DISCONTINUED | OUTPATIENT
Start: 2019-03-05 | End: 2019-03-08 | Stop reason: HOSPADM

## 2019-03-05 ENCOUNTER — HOSPITAL ENCOUNTER (OUTPATIENT)
Dept: INFUSION CENTER | Facility: CLINIC | Age: 79
Discharge: HOME | End: 2019-03-05
Payer: COMMERCIAL

## 2019-03-05 VITALS
SYSTOLIC BLOOD PRESSURE: 133 MMHG | DIASTOLIC BLOOD PRESSURE: 83 MMHG | HEIGHT: 67 IN | BODY MASS INDEX: 30.45 KG/M2 | TEMPERATURE: 96.9 F | RESPIRATION RATE: 18 BRPM | HEART RATE: 60 BPM | WEIGHT: 194 LBS

## 2019-03-05 PROCEDURE — 96365 THER/PROPH/DIAG IV INF INIT: CPT

## 2019-03-05 RX ADMIN — SODIUM CHLORIDE 20 ML/HR: 0.9 INJECTION, SOLUTION INTRAVENOUS at 13:30

## 2019-03-05 RX ADMIN — ZOLEDRONIC ACID 3.3 MG: 4 INJECTION, SOLUTION, CONCENTRATE INTRAVENOUS at 13:31

## 2019-03-15 DIAGNOSIS — C61 PROSTATE CANCER (HCC): ICD-10-CM

## 2019-03-15 RX ORDER — PREDNISONE 1 MG/1
TABLET ORAL
Qty: 60 TABLET | Refills: 0 | Status: SHIPPED | OUTPATIENT
Start: 2019-03-15 | End: 2019-04-13 | Stop reason: SDUPTHER

## 2019-03-19 LAB
ALBUMIN SERPL-MCNC: 4.1 G/DL (ref 3.6–5.1)
ALBUMIN/GLOB SERPL: 1.7 (CALC) (ref 1–2.5)
ALP SERPL-CCNC: 44 U/L (ref 40–115)
ALT SERPL-CCNC: 16 U/L (ref 9–46)
AST SERPL-CCNC: 19 U/L (ref 10–35)
BASOPHILS # BLD AUTO: 41 CELLS/UL (ref 0–200)
BASOPHILS NFR BLD AUTO: 0.6 %
BILIRUB SERPL-MCNC: 0.8 MG/DL (ref 0.2–1.2)
BUN SERPL-MCNC: 26 MG/DL (ref 7–25)
BUN/CREAT SERPL: 18 (CALC) (ref 6–22)
CALCIUM SERPL-MCNC: 9.1 MG/DL (ref 8.6–10.3)
CHLORIDE SERPL-SCNC: 102 MMOL/L (ref 98–110)
CO2 SERPL-SCNC: 30 MMOL/L (ref 20–32)
CREAT SERPL-MCNC: 1.48 MG/DL (ref 0.7–1.18)
EOSINOPHIL # BLD AUTO: 88 CELLS/UL (ref 15–500)
EOSINOPHIL NFR BLD AUTO: 1.3 %
ERYTHROCYTE [DISTWIDTH] IN BLOOD BY AUTOMATED COUNT: 12.7 % (ref 11–15)
GLOBULIN SER CALC-MCNC: 2.4 G/DL (CALC) (ref 1.9–3.7)
GLUCOSE SERPL-MCNC: 95 MG/DL (ref 65–99)
HCT VFR BLD AUTO: 42.7 % (ref 38.5–50)
HGB BLD-MCNC: 14.7 G/DL (ref 13.2–17.1)
LYMPHOCYTES # BLD AUTO: 857 CELLS/UL (ref 850–3900)
LYMPHOCYTES NFR BLD AUTO: 12.6 %
MCH RBC QN AUTO: 31.7 PG (ref 27–33)
MCHC RBC AUTO-ENTMCNC: 34.4 G/DL (ref 32–36)
MCV RBC AUTO: 92.2 FL (ref 80–100)
MONOCYTES # BLD AUTO: 360 CELLS/UL (ref 200–950)
MONOCYTES NFR BLD AUTO: 5.3 %
NEUTROPHILS # BLD AUTO: 5454 CELLS/UL (ref 1500–7800)
NEUTROPHILS NFR BLD AUTO: 80.2 %
PLATELET # BLD AUTO: 213 THOUSAND/UL (ref 140–400)
PMV BLD REES-ECKER: 11.2 FL (ref 7.5–12.5)
POTASSIUM SERPL-SCNC: 3.9 MMOL/L (ref 3.5–5.3)
PROT SERPL-MCNC: 6.5 G/DL (ref 6.1–8.1)
PSA SERPL-MCNC: 0.1 NG/ML
RBC # BLD AUTO: 4.63 MILLION/UL (ref 4.2–5.8)
SL AMB EGFR AFRICAN AMERICAN: 52 ML/MIN/1.73M2
SL AMB EGFR NON AFRICAN AMERICAN: 45 ML/MIN/1.73M2
SODIUM SERPL-SCNC: 140 MMOL/L (ref 135–146)
WBC # BLD AUTO: 6.8 THOUSAND/UL (ref 3.8–10.8)

## 2019-03-28 ENCOUNTER — OFFICE VISIT (OUTPATIENT)
Dept: HEMATOLOGY ONCOLOGY | Facility: CLINIC | Age: 79
End: 2019-03-28
Payer: COMMERCIAL

## 2019-03-28 VITALS
HEART RATE: 64 BPM | WEIGHT: 194 LBS | RESPIRATION RATE: 16 BRPM | BODY MASS INDEX: 30.45 KG/M2 | OXYGEN SATURATION: 96 % | DIASTOLIC BLOOD PRESSURE: 80 MMHG | TEMPERATURE: 98.4 F | HEIGHT: 67 IN | SYSTOLIC BLOOD PRESSURE: 160 MMHG

## 2019-03-28 DIAGNOSIS — C61 PROSTATE CANCER (HCC): Primary | ICD-10-CM

## 2019-03-28 PROCEDURE — 99214 OFFICE O/P EST MOD 30 MIN: CPT | Performed by: PHYSICIAN ASSISTANT

## 2019-04-13 DIAGNOSIS — C61 PROSTATE CANCER (HCC): ICD-10-CM

## 2019-04-15 RX ORDER — PREDNISONE 1 MG/1
TABLET ORAL
Qty: 60 TABLET | Refills: 0 | Status: SHIPPED | OUTPATIENT
Start: 2019-04-15 | End: 2019-05-10 | Stop reason: SDUPTHER

## 2019-04-28 DIAGNOSIS — I10 ESSENTIAL HYPERTENSION: ICD-10-CM

## 2019-04-28 RX ORDER — LOSARTAN POTASSIUM 100 MG/1
TABLET ORAL
Qty: 90 TABLET | Refills: 1 | Status: SHIPPED | OUTPATIENT
Start: 2019-04-28 | End: 2019-10-26 | Stop reason: SDUPTHER

## 2019-04-29 DIAGNOSIS — C61 PROSTATE CANCER (HCC): ICD-10-CM

## 2019-04-29 DIAGNOSIS — C79.51 BONE METASTASES (HCC): ICD-10-CM

## 2019-04-29 RX ORDER — SODIUM CHLORIDE 9 MG/ML
20 INJECTION, SOLUTION INTRAVENOUS ONCE
Status: CANCELLED | OUTPATIENT
Start: 2019-05-28

## 2019-05-10 DIAGNOSIS — C61 PROSTATE CANCER (HCC): ICD-10-CM

## 2019-05-10 RX ORDER — PREDNISONE 1 MG/1
TABLET ORAL
Qty: 60 TABLET | Refills: 0 | Status: SHIPPED | OUTPATIENT
Start: 2019-05-10 | End: 2019-06-02 | Stop reason: SDUPTHER

## 2019-05-26 LAB
ALBUMIN SERPL-MCNC: 3.9 G/DL (ref 3.6–5.1)
ALBUMIN/GLOB SERPL: 1.7 (CALC) (ref 1–2.5)
ALP SERPL-CCNC: 36 U/L (ref 40–115)
ALT SERPL-CCNC: 18 U/L (ref 9–46)
AST SERPL-CCNC: 17 U/L (ref 10–35)
BASOPHILS # BLD AUTO: 50 CELLS/UL (ref 0–200)
BASOPHILS NFR BLD AUTO: 0.8 %
BILIRUB SERPL-MCNC: 0.8 MG/DL (ref 0.2–1.2)
BUN SERPL-MCNC: 26 MG/DL (ref 7–25)
BUN/CREAT SERPL: 24 (CALC) (ref 6–22)
CALCIUM SERPL-MCNC: 9.1 MG/DL (ref 8.6–10.3)
CHLORIDE SERPL-SCNC: 104 MMOL/L (ref 98–110)
CHOLEST SERPL-MCNC: 135 MG/DL
CHOLEST/HDLC SERPL: 2.5 (CALC)
CO2 SERPL-SCNC: 32 MMOL/L (ref 20–32)
CREAT SERPL-MCNC: 1.1 MG/DL (ref 0.7–1.18)
EOSINOPHIL # BLD AUTO: 88 CELLS/UL (ref 15–500)
EOSINOPHIL NFR BLD AUTO: 1.4 %
ERYTHROCYTE [DISTWIDTH] IN BLOOD BY AUTOMATED COUNT: 12.9 % (ref 11–15)
GLOBULIN SER CALC-MCNC: 2.3 G/DL (CALC) (ref 1.9–3.7)
GLUCOSE SERPL-MCNC: 93 MG/DL (ref 65–99)
HCT VFR BLD AUTO: 39.6 % (ref 38.5–50)
HDLC SERPL-MCNC: 53 MG/DL
HGB BLD-MCNC: 14 G/DL (ref 13.2–17.1)
LDLC SERPL CALC-MCNC: 68 MG/DL (CALC)
LYMPHOCYTES # BLD AUTO: 1096 CELLS/UL (ref 850–3900)
LYMPHOCYTES NFR BLD AUTO: 17.4 %
MCH RBC QN AUTO: 32.9 PG (ref 27–33)
MCHC RBC AUTO-ENTMCNC: 35.4 G/DL (ref 32–36)
MCV RBC AUTO: 93 FL (ref 80–100)
MONOCYTES # BLD AUTO: 460 CELLS/UL (ref 200–950)
MONOCYTES NFR BLD AUTO: 7.3 %
NEUTROPHILS # BLD AUTO: 4605 CELLS/UL (ref 1500–7800)
NEUTROPHILS NFR BLD AUTO: 73.1 %
NONHDLC SERPL-MCNC: 82 MG/DL (CALC)
PLATELET # BLD AUTO: 164 THOUSAND/UL (ref 140–400)
PMV BLD REES-ECKER: 11.2 FL (ref 7.5–12.5)
POTASSIUM SERPL-SCNC: 4.4 MMOL/L (ref 3.5–5.3)
PROT SERPL-MCNC: 6.2 G/DL (ref 6.1–8.1)
PSA SERPL-MCNC: 0.1 NG/ML
RBC # BLD AUTO: 4.26 MILLION/UL (ref 4.2–5.8)
SL AMB EGFR AFRICAN AMERICAN: 75 ML/MIN/1.73M2
SL AMB EGFR NON AFRICAN AMERICAN: 64 ML/MIN/1.73M2
SODIUM SERPL-SCNC: 141 MMOL/L (ref 135–146)
TRIGL SERPL-MCNC: 64 MG/DL
WBC # BLD AUTO: 6.3 THOUSAND/UL (ref 3.8–10.8)

## 2019-05-28 ENCOUNTER — HOSPITAL ENCOUNTER (OUTPATIENT)
Dept: INFUSION CENTER | Facility: CLINIC | Age: 79
Discharge: HOME | End: 2019-05-28
Payer: COMMERCIAL

## 2019-05-28 VITALS
WEIGHT: 196.21 LBS | HEIGHT: 67 IN | RESPIRATION RATE: 18 BRPM | HEART RATE: 65 BPM | DIASTOLIC BLOOD PRESSURE: 74 MMHG | SYSTOLIC BLOOD PRESSURE: 138 MMHG | TEMPERATURE: 97.9 F | BODY MASS INDEX: 30.8 KG/M2

## 2019-05-28 DIAGNOSIS — C61 PROSTATE CANCER (HCC): Primary | ICD-10-CM

## 2019-05-28 DIAGNOSIS — C79.51 BONE METASTASES (HCC): ICD-10-CM

## 2019-05-28 PROCEDURE — 96365 THER/PROPH/DIAG IV INF INIT: CPT

## 2019-05-28 RX ORDER — SODIUM CHLORIDE 9 MG/ML
20 INJECTION, SOLUTION INTRAVENOUS ONCE
Status: COMPLETED | OUTPATIENT
Start: 2019-05-28 | End: 2019-05-28

## 2019-05-28 RX ORDER — SODIUM CHLORIDE 9 MG/ML
20 INJECTION, SOLUTION INTRAVENOUS ONCE
Status: CANCELLED | OUTPATIENT
Start: 2019-08-20

## 2019-05-28 RX ADMIN — ZOLEDRONIC ACID 3.5 MG: 4 INJECTION, SOLUTION, CONCENTRATE INTRAVENOUS at 14:14

## 2019-05-28 RX ADMIN — SODIUM CHLORIDE 20 ML/HR: 0.9 INJECTION, SOLUTION INTRAVENOUS at 14:13

## 2019-05-28 NOTE — PROGRESS NOTES
Per the Zometa renal dose protocol, adjust Zometa dose to 3 5mg IV  Based on CrCl 59 3ml/min using ideal body weight    Ht:67 in  Wt:88kg  Cr: 1 1

## 2019-05-30 ENCOUNTER — OFFICE VISIT (OUTPATIENT)
Dept: HEMATOLOGY ONCOLOGY | Facility: CLINIC | Age: 79
End: 2019-05-30
Payer: COMMERCIAL

## 2019-05-30 ENCOUNTER — OFFICE VISIT (OUTPATIENT)
Dept: FAMILY MEDICINE CLINIC | Facility: CLINIC | Age: 79
End: 2019-05-30
Payer: COMMERCIAL

## 2019-05-30 VITALS
BODY MASS INDEX: 30.73 KG/M2 | OXYGEN SATURATION: 97 % | HEART RATE: 73 BPM | WEIGHT: 195.8 LBS | TEMPERATURE: 99.5 F | HEIGHT: 67 IN | DIASTOLIC BLOOD PRESSURE: 72 MMHG | SYSTOLIC BLOOD PRESSURE: 126 MMHG

## 2019-05-30 VITALS
SYSTOLIC BLOOD PRESSURE: 131 MMHG | HEIGHT: 67 IN | DIASTOLIC BLOOD PRESSURE: 81 MMHG | WEIGHT: 190 LBS | BODY MASS INDEX: 29.82 KG/M2

## 2019-05-30 DIAGNOSIS — C79.51 BONE METASTASES (HCC): ICD-10-CM

## 2019-05-30 DIAGNOSIS — I10 BENIGN ESSENTIAL HYPERTENSION: Primary | ICD-10-CM

## 2019-05-30 DIAGNOSIS — I25.118 CORONARY ARTERY DISEASE OF NATIVE ARTERY OF NATIVE HEART WITH STABLE ANGINA PECTORIS (HCC): ICD-10-CM

## 2019-05-30 DIAGNOSIS — E78.2 MIXED HYPERLIPIDEMIA: ICD-10-CM

## 2019-05-30 DIAGNOSIS — C61 PROSTATE CANCER (HCC): ICD-10-CM

## 2019-05-30 DIAGNOSIS — C61 PROSTATE CANCER (HCC): Primary | ICD-10-CM

## 2019-05-30 DIAGNOSIS — R25.8 CLONUS: ICD-10-CM

## 2019-05-30 PROCEDURE — 1160F RVW MEDS BY RX/DR IN RCRD: CPT | Performed by: FAMILY MEDICINE

## 2019-05-30 PROCEDURE — 99214 OFFICE O/P EST MOD 30 MIN: CPT | Performed by: FAMILY MEDICINE

## 2019-05-30 PROCEDURE — 3074F SYST BP LT 130 MM HG: CPT | Performed by: FAMILY MEDICINE

## 2019-05-30 PROCEDURE — 1036F TOBACCO NON-USER: CPT | Performed by: FAMILY MEDICINE

## 2019-05-30 PROCEDURE — 3078F DIAST BP <80 MM HG: CPT | Performed by: FAMILY MEDICINE

## 2019-05-30 PROCEDURE — 99214 OFFICE O/P EST MOD 30 MIN: CPT | Performed by: INTERNAL MEDICINE

## 2019-06-02 DIAGNOSIS — C61 PROSTATE CANCER (HCC): ICD-10-CM

## 2019-06-03 RX ORDER — PREDNISONE 1 MG/1
TABLET ORAL
Qty: 60 TABLET | Refills: 0 | Status: SHIPPED | OUTPATIENT
Start: 2019-06-03 | End: 2019-07-21 | Stop reason: SDUPTHER

## 2019-07-16 ENCOUNTER — OFFICE VISIT (OUTPATIENT)
Dept: URGENT CARE | Facility: CLINIC | Age: 79
End: 2019-07-16
Payer: COMMERCIAL

## 2019-07-16 VITALS
RESPIRATION RATE: 16 BRPM | SYSTOLIC BLOOD PRESSURE: 112 MMHG | HEART RATE: 61 BPM | DIASTOLIC BLOOD PRESSURE: 78 MMHG | TEMPERATURE: 98.2 F | OXYGEN SATURATION: 98 %

## 2019-07-16 DIAGNOSIS — R11.0 NAUSEA: ICD-10-CM

## 2019-07-16 DIAGNOSIS — R31.9 HEMATURIA, UNSPECIFIED TYPE: Primary | ICD-10-CM

## 2019-07-16 DIAGNOSIS — R10.9 FLANK PAIN: ICD-10-CM

## 2019-07-16 LAB
SL AMB  POCT GLUCOSE, UA: NEGATIVE
SL AMB LEUKOCYTE ESTERASE,UA: NEGATIVE
SL AMB POCT BILIRUBIN,UA: NEGATIVE
SL AMB POCT BLOOD,UA: ABNORMAL
SL AMB POCT CLARITY,UA: CLEAR
SL AMB POCT COLOR,UA: YELLOW
SL AMB POCT KETONES,UA: NEGATIVE
SL AMB POCT NITRITE,UA: NEGATIVE
SL AMB POCT PH,UA: 6
SL AMB POCT SPECIFIC GRAVITY,UA: 1.02
SL AMB POCT URINE PROTEIN: ABNORMAL
SL AMB POCT UROBILINOGEN: 0.2

## 2019-07-16 PROCEDURE — 99214 OFFICE O/P EST MOD 30 MIN: CPT | Performed by: PHYSICIAN ASSISTANT

## 2019-07-16 PROCEDURE — 87086 URINE CULTURE/COLONY COUNT: CPT | Performed by: PHYSICIAN ASSISTANT

## 2019-07-16 PROCEDURE — 81002 URINALYSIS NONAUTO W/O SCOPE: CPT | Performed by: PHYSICIAN ASSISTANT

## 2019-07-16 PROCEDURE — 96372 THER/PROPH/DIAG INJ SC/IM: CPT | Performed by: PHYSICIAN ASSISTANT

## 2019-07-16 RX ORDER — ONDANSETRON 4 MG/1
4 TABLET, ORALLY DISINTEGRATING ORAL EVERY 6 HOURS PRN
Qty: 20 TABLET | Refills: 0 | Status: SHIPPED | OUTPATIENT
Start: 2019-07-16 | End: 2019-08-07

## 2019-07-16 RX ORDER — METOPROLOL SUCCINATE 25 MG/1
TABLET, EXTENDED RELEASE ORAL
Refills: 0 | COMMUNITY
Start: 2019-06-11 | End: 2019-08-07

## 2019-07-16 RX ORDER — HYDROCODONE BITARTRATE AND ACETAMINOPHEN 5; 325 MG/1; MG/1
1 TABLET ORAL EVERY 6 HOURS PRN
Qty: 10 TABLET | Refills: 0 | Status: SHIPPED | OUTPATIENT
Start: 2019-07-16 | End: 2019-08-07

## 2019-07-16 RX ORDER — KETOROLAC TROMETHAMINE 30 MG/ML
30 INJECTION, SOLUTION INTRAMUSCULAR; INTRAVENOUS ONCE
Status: COMPLETED | OUTPATIENT
Start: 2019-07-16 | End: 2019-07-16

## 2019-07-16 RX ADMIN — KETOROLAC TROMETHAMINE 30 MG: 30 INJECTION, SOLUTION INTRAMUSCULAR; INTRAVENOUS at 11:07

## 2019-07-16 NOTE — PROGRESS NOTES
[facility]        NAME: Iman Wood is a 69 y o  male  : 1940    MRN: [id number]  DATE: 2019  TIME: 11:42 AM    Assessment and Plan   Hematuria, unspecified type [R31 9]  1  Hematuria, unspecified type  Urine culture   2  Flank pain  POCT urine dip    ketorolac (TORADOL) injection 30 mg    HYDROcodone-acetaminophen (NORCO) 5-325 mg per tablet    Urine culture   3  Nausea  ondansetron (ZOFRAN-ODT) 4 mg disintegrating tablet         Patient Instructions     Use Zofran as needed for nausea  Use Norco as needed for severe pain   Follow up with PCP in 3-5 days  Proceed to  ER if symptoms worsen  Chief Complaint     Chief Complaint   Patient presents with    Flank Pain     Left flank pain since this am  COnstipation a few days ago he states  Not really going much  Nausea this am with no emesis         History of Present Illness       20-year-old male presents with left flank pain  Symptoms started abruptly this morning and have been waxing and waning since then  Reports symptoms are coming in waves and has been getting some intermittent nausea with the symptoms  Denies any fevers or chills  No chest pain or shortness of breath  Reports he had similar symptoms like this many years ago when he had a kidney stone  Denies any abnormal urine frequency blood in his urine or pain with urination  Flank Pain   This is a new problem  The current episode started today  The problem has been waxing and waning since onset  Pain location: Left flank area  The quality of the pain is described as stabbing  The pain does not radiate  The pain is moderate  The pain is the same all the time  Pertinent negatives include no abdominal pain, bladder incontinence, bowel incontinence, chest pain, dysuria, headaches, numbness, paresis, perianal numbness or weakness  He has tried nothing for the symptoms  The treatment provided no relief         Review of Systems   Review of Systems   Constitutional: Negative  HENT: Negative  Eyes: Negative  Respiratory: Negative  Cardiovascular: Negative  Negative for chest pain  Gastrointestinal: Negative  Negative for abdominal pain and bowel incontinence  Genitourinary: Positive for flank pain  Negative for bladder incontinence and dysuria  Skin: Negative  Neurological: Negative  Negative for weakness, numbness and headaches  Current Medications       Current Outpatient Medications:     abiraterone (ZYTIGA) 250 mg tablet, Take 4 tablets (1,000 mg total) by mouth daily, Disp: 120 tablet, Rfl: 4    aspirin (ASPIRIN ADULT LOW STRENGTH) 81 mg EC tablet, Take 1 tablet by mouth daily, Disp: , Rfl:     Calcium 500 MG CHEW, Chew, Disp: , Rfl:     cholecalciferol (VITAMIN D3) 1,000 units tablet, Take 1 tablet by mouth daily, Disp: , Rfl:     leuprolide (LUPRON DEPOT 3 MONTH KIT) 22 5 mg injection, Inject 22 5 mg into a muscle every 6 (six) months, Disp: , Rfl:     losartan (COZAAR) 100 MG tablet, take 1 tablet by mouth once daily, Disp: 90 tablet, Rfl: 1    predniSONE 5 mg tablet, take 1 tablet by mouth twice a day with meals, Disp: 60 tablet, Rfl: 0    atorvastatin (LIPITOR) 40 mg tablet, Take 40 mg by mouth, Disp: , Rfl:     HYDROcodone-acetaminophen (NORCO) 5-325 mg per tablet, Take 1 tablet by mouth every 6 (six) hours as needed for painMax Daily Amount: 4 tablets, Disp: 10 tablet, Rfl: 0    metoprolol succinate (TOPROL-XL) 25 mg 24 hr tablet, Take 0 5 tablets (12 5 mg total) by mouth daily for 180 days, Disp: 90 tablet, Rfl: 1    metoprolol succinate (TOPROL-XL) 25 mg 24 hr tablet, , Disp: , Rfl: 0    ondansetron (ZOFRAN-ODT) 4 mg disintegrating tablet, Take 1 tablet (4 mg total) by mouth every 6 (six) hours as needed for nausea or vomiting, Disp: 20 tablet, Rfl: 0  No current facility-administered medications for this visit       Current Allergies     Allergies as of 07/16/2019    (No Known Allergies)            The following portions of the patient's history were reviewed and updated as appropriate: allergies, current medications, past family history, past medical history, past social history, past surgical history and problem list      Past Medical History:   Diagnosis Date    Anemia     last assessed  1/7/14     Hypercholesteremia     Hypertension     Polyp of sigmoid colon     Prostate cancer ([address] )     Renal disorder     elevated serum creat    Tinnitus     unspecified laterality / last assessed 4/9/13       Past Surgical History:   Procedure Laterality Date    CARDIAC SURGERY      CORONARY ANGIOPLASTY WITH STENT PLACEMENT      HEMORRHOID SURGERY      PROSTATE BIOPSY  10/24/2018    TONSILLECTOMY         Family History   Problem Relation Age of Onset    Breast cancer Mother     Hypertension Father          Medications have been verified  Objective   /78   Pulse 61   Temp 98 2 °F (36 8 °C) (Tympanic)   Resp 16   SpO2 98%        Physical Exam     Physical Exam   Constitutional: He is oriented to person, place, and time  He appears well-developed and well-nourished  No distress  HENT:   Head: Normocephalic and atraumatic  Right Ear: External ear normal    Left Ear: External ear normal    Nose: Nose normal    Mouth/Throat: Oropharynx is clear and moist  No oropharyngeal exudate  Eyes: Conjunctivae and EOM are normal  Right eye exhibits no discharge  Left eye exhibits no discharge  Neck: Normal range of motion  Neck supple  Cardiovascular: Normal rate, regular rhythm, normal heart sounds and intact distal pulses  No murmur heard  Pulmonary/Chest: Effort normal and breath sounds normal  No respiratory distress  He has no wheezes  He has no rales  Abdominal: Soft  Bowel sounds are normal  There is no hepatosplenomegaly  There is no tenderness  There is CVA tenderness (Mild to moderate left with percussion)   There is no rigidity, no rebound, no guarding, no tenderness at McBurney's point and negative Campbell's sign  Musculoskeletal: Normal range of motion  Lymphadenopathy:     He has no cervical adenopathy  Neurological: He is alert and oriented to person, place, and time  Skin: Skin is warm and dry  Psychiatric: He has a normal mood and affect  Nursing note and vitals reviewed

## 2019-07-16 NOTE — PATIENT INSTRUCTIONS
Use Zofran as needed for nausea  Use Norco as needed for severe pain   Follow up with PCP in 3-5 days  Proceed to  ER if symptoms worsen  Kidney Stones   AMBULATORY CARE:   Kidney stones  form in the urinary system when the water and waste in your urine are out of balance  When this happens, certain types of waste crystals separate from the urine  The crystals build up and form kidney stones  Kidney stones can be made of uric acid, calcium, phosphate, or oxalate crystals  You may have 1 or more kidney stones  Common symptoms include the following:   · Pain in the middle of your back that moves across to your side or that may spread to your groin    · Nausea and vomiting    · Urge to urinate often, burning feeling when you urinate, or pink or red urine    · Tenderness in your lower back, side, or stomach  Seek care immediately if:   · You have vomiting that is not relieved by medicine  Contact your healthcare provider if:   · You have a fever  · You have trouble passing urine  · You see blood in your urine  · You have severe pain  · You have any questions or concerns about your condition or care  Treatment for kidney stones  may include any of the following:  · NSAIDs , such as ibuprofen, help decrease swelling, pain, and fever  This medicine is available with or without a doctor's order  NSAIDs can cause stomach bleeding or kidney problems in certain people  If you take blood thinner medicine, always ask your healthcare provider if NSAIDs are safe for you  Always read the medicine label and follow directions  · Prescription medicine  may be given  Ask how to take this medicine safely  · Medicines  to balance your electrolytes may be needed  · A procedure or surgery  to remove the kidney stones may be needed if they do not pass on their own  Your treatment will depend on the size and location of your kidney stones  Manage your symptoms:   · Drink plenty of liquids    Your healthcare provider may tell you to drink at least 8 to 12 (eight-ounce) cups of liquids each day  This helps flush out the kidney stones when you urinate  Water is the best liquid to drink  · Strain your urine every time you go to the bathroom  Urinate through a strainer or a piece of thin cloth to catch the stones  Take the stones to your healthcare provider so they can be sent to the lab for tests  This will help your healthcare providers plan the best treatment for you  · Eat a variety of healthy foods  Healthy foods include fruits, vegetables, whole-grain breads, low-fat dairy products, beans, and fish  You may need to limit how much sodium (salt) or protein you eat  Ask for information about the best foods for you  · Exercise regularly  Your stones may pass more easily if you stay active  Ask about the best activities for you  After you pass your kidney stones:  Once you have passed your kidney stones, your healthcare provider may  order a 24-hour urine test  Results from a 24-hour urine test will help your healthcare provider plan ways to prevent more stones from forming  If you are told to do a 24-hour test, your healthcare provider will give you more instructions  Follow up with your healthcare provider as directed:  Write down your questions so you remember to ask them during your visits  © 2017 [address]  Information is for End User's use only and may not be sold, redistributed or otherwise used for commercial purposes  All illustrations and images included in CareNotes® are the copyrighted property of A D A M [employer] Inc  or Elmo Aleman  The above information is an  only  It is not intended as medical advice for individual conditions or treatments  Talk to your doctor, nurse or pharmacist before following any medical regimen to see if it is safe and effective for you        Flank Pain   AMBULATORY CARE:   Flank pain  is felt in the area below your ribcage and above your hip bones, often in the lower back  Your pain may be dull or so severe that you cannot get comfortable  The pain may stay in one area or radiate to another area  It may worsen and lighten in waves  Flank pain is often a sign of problems with your urinary tract, such as a kidney stone or infection  Seek care immediately if:   · You have a fever  · Your heart is fluttering or jumping  · You see blood in your urine  · Your pain radiates into your lower abdomen and genital area  · You have intense pain in your low back next to your spine  · You are much more tired than usual and have no desire to eat  · You have a headache and your muscles jerk  Contact your healthcare provider if:   · You have an upset stomach and are vomiting  · You have to urinate more often, and with urgency  · Your pain worsens or does not improve, and you cannot get comfortable  · You pass a stone when you urinate  · You have questions or concerns about your condition or care  Treatment for flank pain  may include medicine to decrease pain or treat a bacterial infection  Follow up with your healthcare provider as directed:  Write down your questions so you remember to ask them during your visits  © 2017 [address] [employer] Information is for End User's use only and may not be sold, redistributed or otherwise used for commercial purposes  All illustrations and images included in CareNotes® are the copyrighted property of [employer]  or Elmo Aleman  The above information is an  only  It is not intended as medical advice for individual conditions or treatments  Talk to your doctor, nurse or pharmacist before following any medical regimen to see if it is safe and effective for you

## 2019-07-17 LAB — BACTERIA UR CULT: NORMAL

## 2019-07-21 DIAGNOSIS — C61 PROSTATE CANCER (HCC): ICD-10-CM

## 2019-07-22 ENCOUNTER — TELEPHONE (OUTPATIENT)
Dept: UROLOGY | Facility: CLINIC | Age: 79
End: 2019-07-22

## 2019-07-22 RX ORDER — PREDNISONE 1 MG/1
TABLET ORAL
Qty: 60 TABLET | Refills: 0 | Status: SHIPPED | OUTPATIENT
Start: 2019-07-22 | End: 2019-08-11 | Stop reason: SDUPTHER

## 2019-07-22 NOTE — TELEPHONE ENCOUNTER
PT would like to know if we would like any labs or testing done PTV for his Aug 16th visit w Dr Temo Arredondo?  Pls call PT thank you

## 2019-07-22 NOTE — TELEPHONE ENCOUNTER
Called and talked to [address] [place] [address] - saw that there was a PSA order  When I printed it out to email to him it was ordered by Dr Ramone Boone     Still emailed to him @ [address] [place] [address]  [email or web address]

## 2019-07-25 ENCOUNTER — TELEPHONE (OUTPATIENT)
Dept: UROLOGY | Facility: AMBULATORY SURGERY CENTER | Age: 79
End: 2019-07-25

## 2019-07-25 ENCOUNTER — OFFICE VISIT (OUTPATIENT)
Dept: URGENT CARE | Facility: CLINIC | Age: 79
End: 2019-07-25
Payer: COMMERCIAL

## 2019-07-25 VITALS
DIASTOLIC BLOOD PRESSURE: 80 MMHG | HEART RATE: 71 BPM | RESPIRATION RATE: 18 BRPM | OXYGEN SATURATION: 98 % | SYSTOLIC BLOOD PRESSURE: 160 MMHG | TEMPERATURE: 98.2 F

## 2019-07-25 DIAGNOSIS — J02.9 SORE THROAT: Primary | ICD-10-CM

## 2019-07-25 LAB — S PYO AG THROAT QL: NEGATIVE

## 2019-07-25 PROCEDURE — 87880 STREP A ASSAY W/OPTIC: CPT | Performed by: NURSE PRACTITIONER

## 2019-07-25 PROCEDURE — 99213 OFFICE O/P EST LOW 20 MIN: CPT | Performed by: NURSE PRACTITIONER

## 2019-07-25 RX ORDER — NAPROXEN 500 MG/1
500 TABLET ORAL 2 TIMES DAILY WITH MEALS
Qty: 20 TABLET | Refills: 0 | Status: SHIPPED | OUTPATIENT
Start: 2019-07-25 | End: 2019-08-07

## 2019-07-25 NOTE — TELEPHONE ENCOUNTER
Patient managed by Dr Lucila Patel, seen in the [address] office  Patient has pending follow up on 8/16/19, due for Lupron shot at that visit  Returned call to [employer]  Spoke with Kyle Roberts to be delivered to [address] office on 7/31/19

## 2019-07-25 NOTE — PROGRESS NOTES
[facility]        NAME: Benigno Zurita is a 69 y o  male  : 1940    MRN: [id number]  DATE: 2019  TIME: 2:35 PM    Assessment and Plan   Sore throat [J02 9]  1  Sore throat  POCT rapid strepA    naproxen (NAPROSYN) 500 mg tablet     Already on prednisone 5 mg po bid due to the Zytiga  Rapid strep negative in office  Rec naproxyn for pain and inflammation relief  rec daily antihistamine  F/u at urgent care if symptoms worsen while on vacation  Pt in agreement with plan     Patient Instructions     Follow up with PCP in 3-5 days  Proceed to  ER if symptoms worsen  Chief Complaint     Chief Complaint   Patient presents with    Sore Throat     2 days with sore throat         History of Present Illness   Benigno Zurita presents to the clinic c/o    Started with a hoarse voice about 2 days ago  Throat started to get sore last night  Some PND  Denies a cough  Leaving tomorrow for vacation  Has not taken OTC medication to help with symptoms at this time  Review of Systems   Review of Systems   All other systems reviewed and are negative          Current Medications     Long-Term Medications   Medication Sig Dispense Refill    abiraterone (ZYTIGA) 250 mg tablet Take 4 tablets (1,000 mg total) by mouth daily 120 tablet 4    aspirin (ASPIRIN ADULT LOW STRENGTH) 81 mg EC tablet Take 1 tablet by mouth daily      atorvastatin (LIPITOR) 40 mg tablet Take 40 mg by mouth      Calcium 500 MG CHEW Chew      cholecalciferol (VITAMIN D3) 1,000 units tablet Take 1 tablet by mouth daily      leuprolide (LUPRON DEPOT 3 MONTH KIT) 22 5 mg injection Inject 22 5 mg into a muscle every 6 (six) months      losartan (COZAAR) 100 MG tablet take 1 tablet by mouth once daily 90 tablet 1    metoprolol succinate (TOPROL-XL) 25 mg 24 hr tablet Take 0 5 tablets (12 5 mg total) by mouth daily for 180 days 90 tablet 1    metoprolol succinate (TOPROL-XL) 25 mg 24 hr tablet   0    naproxen (NAPROSYN) 500 mg tablet Take 1 tablet (500 mg total) by mouth 2 (two) times a day with meals 20 tablet 0    ondansetron (ZOFRAN-ODT) 4 mg disintegrating tablet Take 1 tablet (4 mg total) by mouth every 6 (six) hours as needed for nausea or vomiting 20 tablet 0       Current Allergies     Allergies as of 07/25/2019    (No Known Allergies)            The following portions of the patient's history were reviewed and updated as appropriate: allergies, current medications, past family history, past medical history, past social history, past surgical history and problem list     Objective   /80   Pulse 71   Temp 98 2 °F (36 8 °C) (Tympanic)   Resp 18   SpO2 98%        Physical Exam     Physical Exam   Constitutional: Vital signs are normal  He appears well-developed and well-nourished  He is active  HENT:   Head: Normocephalic and atraumatic  Right Ear: Hearing, tympanic membrane, external ear and ear canal normal    Left Ear: Hearing, tympanic membrane, external ear and ear canal normal    Nose: Nose normal  Right sinus exhibits no maxillary sinus tenderness and no frontal sinus tenderness  Left sinus exhibits no maxillary sinus tenderness and no frontal sinus tenderness  Mouth/Throat: Uvula is midline and mucous membranes are normal  Posterior oropharyngeal erythema present  Tonsils are 1+ on the right  Tonsils are 1+ on the left  No tonsillar exudate  Eyes: Pupils are equal, round, and reactive to light  Conjunctivae and lids are normal    Cardiovascular: Normal rate, regular rhythm, S1 normal, S2 normal and normal heart sounds  Pulmonary/Chest: Effort normal and breath sounds normal    Skin: Skin is warm, dry and intact  Psychiatric: He has a normal mood and affect   His speech is normal and behavior is normal  Judgment and thought content normal  Cognition and memory are normal

## 2019-07-25 NOTE — PATIENT INSTRUCTIONS
Sore Throat, Ambulatory Care   GENERAL INFORMATION:   A sore throat  is often caused by a cold or flu virus  A sore throat may also be caused by bacteria such as strep  Other causes include smoking, a runny nose, allergies, or acid reflux  Seek immediate care for the following symptoms:   · Trouble breathing or swallowing because your throat is swollen or sore    · Drooling because it hurts too much to swallow    · A painful lump in your throat that does not go away after 5 days    · A fever higher than 102? F (39?C) or lasts longer than 3 days    · Confusion    · Blood in your throat or ear  Treatment for a sore throat  will depend on the cause how severe it is  A sore throat cause by a virus will go away on its own without treatment  You will need antibiotics if your sore throat is caused by bacteria  Your sore throat should start to feel better within 3 to 5 days for both viral and bacterial infections  Care for your sore throat:   · Gargle with salt water  Mix ¼ teaspoon salt in a glass of warm water and gargle  This may help reduce swelling in your throat  · Take ibuprofen or acetaminophen:  These medicines decrease pain and fever  They are available without a doctor's order  Ask your healthcare provider which medicine is best for you  Ask how much to take and how often to take it  · Drink more liquids  Cold or warm drinks may help soothe your sore throat  Drinking liquids can also help prevent dehydration  · Use a cool-steam humidifier  to help moisten the air in your room and reduce your throat pain  · Use lozenges, ice, soft foods, or popsicles  to soothe your throat  · Rest your throat as much as possible  Try not to use your voice  This may irritate your throat and worsen your symptoms  Follow up with your healthcare provider as directed:  Write down your questions so you remember to ask them during your visits  CARE AGREEMENT:   You have the right to help plan your care   Learn about your health condition and how it may be treated  Discuss treatment options with your caregivers to decide what care you want to receive  You always have the right to refuse treatment  The above information is an  only  It is not intended as medical advice for individual conditions or treatments  Talk to your doctor, nurse or pharmacist before following any medical regimen to see if it is safe and effective for you  © 2014 [address] is for End User's use only and may not be sold, redistributed or otherwise used for commercial purposes  All illustrations and images included in CareNotes® are the copyrighted property of A D A M , Inc  or Elmo Aleman

## 2019-08-07 ENCOUNTER — OFFICE VISIT (OUTPATIENT)
Dept: FAMILY MEDICINE CLINIC | Facility: CLINIC | Age: 79
End: 2019-08-07
Payer: COMMERCIAL

## 2019-08-07 VITALS
OXYGEN SATURATION: 97 % | TEMPERATURE: 98.3 F | HEIGHT: 67 IN | RESPIRATION RATE: 18 BRPM | SYSTOLIC BLOOD PRESSURE: 126 MMHG | WEIGHT: 200.13 LBS | DIASTOLIC BLOOD PRESSURE: 78 MMHG | BODY MASS INDEX: 31.41 KG/M2 | HEART RATE: 67 BPM

## 2019-08-07 DIAGNOSIS — S29.019A THORACIC MYOFASCIAL STRAIN, INITIAL ENCOUNTER: ICD-10-CM

## 2019-08-07 DIAGNOSIS — N20.0 KIDNEY STONE: Primary | ICD-10-CM

## 2019-08-07 PROCEDURE — 99214 OFFICE O/P EST MOD 30 MIN: CPT | Performed by: FAMILY MEDICINE

## 2019-08-07 NOTE — ASSESSMENT & PLAN NOTE
This is a f/u for recent L kidney stone diagnosed on 7/16 at [facility]  Pt states that stone passed w/o problems  He has been drinking a lot of fluids  Recommend continued increased fluid intake    Call if further problems

## 2019-08-07 NOTE — ASSESSMENT & PLAN NOTE
Also, pt has been c/o R sided mid back pain x 1-2 mos  No recent trauma  Upon examination, patient exhibits mild right paravertebral thoracic tenderness  Will refer to physical therapy    If symptoms persist, will sent for x-rays thoracic spine

## 2019-08-07 NOTE — PROGRESS NOTES
[facility]      NAME: Sarah Deras  AGE: 69 y o  SEX: male  : 1940   MRN: [id number]    DATE: 2019  TIME: 2:37 PM    Assessment and Plan     Problem List Items Addressed This Visit     Kidney stone - Primary     This is a f/u for recent L kidney stone diagnosed on  at [facility]  Pt states that stone passed w/o problems  He has been drinking a lot of fluids  Recommend continued increased fluid intake  Call if further problems             Thoracic myofascial strain     Also, pt has been c/o R sided mid back pain x 1-2 mos  No recent trauma  Upon examination, patient exhibits mild right paravertebral thoracic tenderness  Will refer to physical therapy  If symptoms persist, will sent for x-rays thoracic spine             Relevant Orders    Ambulatory referral to Physical Therapy              Return to office in:  Keep next regularly scheduled appointment    Chief Complaint     Chief Complaint   Patient presents with    Follow-up     to [facility] on 19 for hematuria and kidney stone  Pt reports feeling very well       History of Present Illness     This is a f/u for recent L kidney stone diagnosed on  at [facility]  Pt states that stone passed w/o problems  He has been drinking a lot of fluids  Also, pt has been c/o R sided mid back pain x 1-2 mos  No recent trauma  The following portions of the patient's history were reviewed and updated as appropriate: allergies, current medications, past family history, past medical history, past social history, past surgical history and problem list     Review of Systems   Review of Systems   Respiratory: Negative  Cardiovascular: Negative  Gastrointestinal: Negative  Genitourinary: Negative  Musculoskeletal: Positive for back pain         Active Problem List     Patient Active Problem List   Diagnosis    Benign essential hypertension    Hyperlipidemia    Coronary artery disease of native heart with stable angina pectoris [facility])    Renal cyst    Liver lesion    Pulmonary nodule    Combined arterial insufficiency and corporo-venous occlusive erectile dysfunction    Prostate cancer [facility] [address]    Coronary artery disease of native artery of native heart with stable angina pectoris ([facility] [address] )    DDD (degenerative disc disease), cervical    Inflamed sebaceous cyst    Obstruction of left eustachian tube    S/P coronary artery stent placement    Vitamin D deficiency    Abnormal radionuclide bone scan    Hot flashes    Bone metastases (HCC)    Clonus    Kidney stone    Thoracic myofascial strain       Objective   /78 (BP Location: Left arm, Patient Position: Sitting, Cuff Size: Adult)   Pulse 67   Temp 98 3 °F (36 8 °C) (Tympanic)   Resp 18   Ht 5' 7 32" (1 71 m)   Wt 90 8 kg (200 lb 2 oz)   SpO2 97%   BMI 31 04 kg/m²     Physical Exam    Pertinent Laboratory/Diagnostic Studies:  Urgent care consult    Current Medications     Current Outpatient Medications:     abiraterone (ZYTIGA) 250 mg tablet, Take 4 tablets (1,000 mg total) by mouth daily, Disp: 120 tablet, Rfl: 4    aspirin (ASPIRIN ADULT LOW STRENGTH) 81 mg EC tablet, Take 1 tablet by mouth daily, Disp: , Rfl:     atorvastatin (LIPITOR) 40 mg tablet, Take 40 mg by mouth daily , Disp: , Rfl:     Calcium 500 MG CHEW, Chew 1 tablet daily , Disp: , Rfl:     cholecalciferol (VITAMIN D3) 1,000 units tablet, Take 1 tablet by mouth daily, Disp: , Rfl:     leuprolide (LUPRON DEPOT 3 MONTH KIT) 22 5 mg injection, Inject 22 5 mg into a muscle every 6 (six) months, Disp: , Rfl:     losartan (COZAAR) 100 MG tablet, take 1 tablet by mouth once daily, Disp: 90 tablet, Rfl: 1    metoprolol succinate (TOPROL-XL) 25 mg 24 hr tablet, Take 0 5 tablets (12 5 mg total) by mouth daily for 180 days, Disp: 90 tablet, Rfl: 1    predniSONE 5 mg tablet, take 1 tablet by mouth twice a day with meals, Disp: 60 tablet, Rfl: 0    Health Maintenance     Health Maintenance   Topic Date Due  SLP PLAN OF CARE  1940    BMI: Followup Plan  09/19/1959    INFLUENZA VACCINE  10/07/2019 (Originally 7/1/2019)    Fall Risk  11/14/2019    Depression Screening PHQ  11/14/2019    Medicare Annual Wellness Visit (AWV)  11/14/2019    BMI: Adult  05/30/2020    DTaP,Tdap,and Td Vaccines (2 - Td) 07/26/2021    CRC Screening: Colonoscopy  10/06/2027    Pneumococcal Vaccine: 65+ Years  Completed    Pneumococcal Vaccine: Pediatrics (0 to 5 Years) and At-Risk Patients (6 to 59 Years)  Aged Out    HEPATITIS B VACCINES  Aged [employer] History   Administered Date(s) Administered    H1N1, All Formulations 02/01/2010    INFLUENZA 09/17/2015, 09/23/2016    Influenza Split High Dose Preservative Free IM 09/04/2014, 09/17/2015, 09/23/2016, 10/06/2017    Influenza TIV (IM) 09/20/2012, 09/17/2013    Influenza, high dose seasonal 0 5 mL 09/14/2018    Pneumococcal Conjugate 13-Valent 03/16/2015    Pneumococcal Polysaccharide PPV23 07/26/2011    Tdap 07/26/2011    Zoster 01/10/2014       Erwin Bhardwaj DO  [facility]

## 2019-08-08 ENCOUNTER — APPOINTMENT (OUTPATIENT)
Dept: LAB | Facility: CLINIC | Age: 79
End: 2019-08-08
Payer: COMMERCIAL

## 2019-08-08 LAB — PSA SERPL-MCNC: 0.1 NG/ML (ref 0–4)

## 2019-08-08 PROCEDURE — 84153 ASSAY OF PSA TOTAL: CPT | Performed by: INTERNAL MEDICINE

## 2019-08-10 ENCOUNTER — OFFICE VISIT (OUTPATIENT)
Dept: URGENT CARE | Facility: CLINIC | Age: 79
End: 2019-08-10
Payer: COMMERCIAL

## 2019-08-10 VITALS
HEART RATE: 64 BPM | OXYGEN SATURATION: 98 % | RESPIRATION RATE: 18 BRPM | DIASTOLIC BLOOD PRESSURE: 107 MMHG | SYSTOLIC BLOOD PRESSURE: 222 MMHG | HEIGHT: 67 IN | TEMPERATURE: 97.2 F | BODY MASS INDEX: 29.82 KG/M2 | WEIGHT: 190 LBS

## 2019-08-10 DIAGNOSIS — N20.0 KIDNEY STONE: Primary | ICD-10-CM

## 2019-08-10 PROBLEM — M79.18 RHOMBOID PAIN: Status: ACTIVE | Noted: 2018-08-09

## 2019-08-10 PROBLEM — E78.2 MIXED HYPERLIPIDEMIA: Status: ACTIVE | Noted: 2019-03-25

## 2019-08-10 PROBLEM — I10 HYPERTENSION, ESSENTIAL: Status: ACTIVE | Noted: 2018-03-25

## 2019-08-10 PROBLEM — R42 POSITIONAL LIGHTHEADEDNESS: Status: ACTIVE | Noted: 2018-08-09

## 2019-08-10 RX ORDER — ONDANSETRON 4 MG/1
4 TABLET, ORALLY DISINTEGRATING ORAL ONCE
Status: COMPLETED | OUTPATIENT
Start: 2019-08-10 | End: 2019-08-10

## 2019-08-10 RX ADMIN — ONDANSETRON 4 MG: 4 TABLET, ORALLY DISINTEGRATING ORAL at 09:00

## 2019-08-10 NOTE — PROGRESS NOTES
NAME: Kamran Schmid is a 69 y o  male  : 1940    MRN: [id number]      Assessment and Plan   Kidney stone [N20 0]  1  Kidney stone  ondansetron (ZOFRAN-ODT) dispersible tablet 4 mg    Transfer to other facility     Administrations This Visit     ondansetron (ZOFRAN-ODT) dispersible tablet 4 mg     Admin Date  08/10/2019 Action  Given Dose  4 mg Route  Oral Administered By  Stefany Escalona RN              [facility] was seen today for flank pain  Diagnoses and all orders for this visit:    Kidney stone  -     ondansetron (ZOFRAN-ODT) dispersible tablet 4 mg  -     Transfer to other facility    Pt left EMS AAOx3, however clammy, 10/10pain and slightly pale  Patient Instructions   Patient Instructions     Pt in severe pain going to the ER and wants to go to [facility]  Pt going to the Er now via EMS  zofran given at time of visit 4mg ODT  Urine dip NOT obtained at time of visit    Kidney Stones   WHAT YOU NEED TO KNOW:   Kidney stones form in the urinary system when the water and waste in your urine are out of balance  When this happens, certain types of waste crystals separate from the urine  The crystals build up and form kidney stones  You may have 1 or more kidney stones  DISCHARGE INSTRUCTIONS:   Return to the emergency department if:   · You have vomiting that is not relieved by medicine  Contact your healthcare provider if:   · You have a fever  · You have trouble passing urine  · You see blood in your urine  · You have severe pain  · You have any questions or concerns about your condition or care  Medicines:   · NSAIDs , such as ibuprofen, help decrease swelling, pain, and fever  This medicine is available with or without a doctor's order  NSAIDs can cause stomach bleeding or kidney problems in certain people  If you take blood thinner medicine, always ask your healthcare provider if NSAIDs are safe for you  Always read the medicine label and follow directions      · Prescription medicine may be given  Ask how to take this medicine safely  · Medicines  to balance your electrolytes may be needed  · Take your medicine as directed  Contact your healthcare provider if you think your medicine is not helping or if you have side effects  Tell him or her if you are allergic to any medicine  Keep a list of the medicines, vitamins, and herbs you take  Include the amounts, and when and why you take them  Bring the list or the pill bottles to follow-up visits  Carry your medicine list with you in case of an emergency  Follow up with your healthcare provider as directed: You may need to return for more tests  Write down your questions so you remember to ask them during your visits  Self-care:   · Drink plenty of liquids  Your healthcare provider may tell you to drink at least 8 to 12 (eight-ounce) cups of liquids each day  This helps flush out the kidney stones when you urinate  Water is the best liquid to drink  · Strain your urine every time you go to the bathroom  Urinate through a strainer or a piece of thin cloth to catch the stones  Take the stones to your healthcare provider so they can be sent to the lab for tests  This will help your healthcare providers plan the best treatment for you  · Eat a variety of healthy foods  Healthy foods include fruits, vegetables, whole-grain breads, low-fat dairy products, beans, and fish  You may need to limit how much sodium (salt) or protein you eat  Ask for information about the best foods for you  · Stay active  Your stones may pass more easily by if you stay active  Ask about the best activities for you  After you pass your kidney stones:  Once you have passed your kidney stones, your healthcare provider may  order a 24-hour urine test  Results from a 24-hour urine test will help your healthcare provider plan ways to prevent more stones from forming   If you are told to do a 24-hour test, your healthcare provider will give you more instructions  © 2017 [facility] Information is for End User's use only and may not be sold, redistributed or otherwise used for commercial purposes  All illustrations and images included in CareNotes® are the copyrighted property of A D A M [employer] Inc  or [employer]  The above information is an  only  It is not intended as medical advice for individual conditions or treatments  Talk to your doctor, nurse or pharmacist before following any medical regimen to see if it is safe and effective for you  Proceed to ER if symptoms worsen  Chief Complaint     Chief Complaint   Patient presents with    Flank Pain     Started this morning with left flank pain radiating to left lower abdomen  Experiencing nausea  Had a similar episode on same side and was prescribed a pain medication  Took Norco around 06:00 this morning  Denies pain with urination or any blood in urine         History of Present Illness     Pt here today with left flank pain, present for the past few hours, unableto urinate, however when he did urinate earlier denies having any blood  He has had kidney stones in the past  He arrives in 10/10 pain, uncomfortable, pale and diaphoretic  He has nausea and denies vomiting  Pt is pale and breathing heavy  He has intermittent lightheadedness  Wife at bedside  911 called on arrival to room 5 due to pts state  BP elevated and pt in 10/10 pain  Norco taken prior to arrival, unable to given toradol at this time due to kidney function  Flank Pain   This is a recurrent problem  The current episode started today  The problem occurs constantly  The problem has been gradually worsening since onset  The pain is present in the lumbar spine (left flank pain)  The quality of the pain is described as shooting and stabbing  Radiates to: to the left abdominal area and the left groin  The pain is at a severity of 10/10  The pain is severe  The pain is the same all the time  Associated symptoms include abdominal pain (left side) and dysuria  Pertinent negatives include no bowel incontinence, chest pain, fever or headaches  Risk factors: kidney stones  Treatments tried: norco  The treatment provided no relief  Review of Systems   Review of Systems   Constitutional: Positive for activity change  Negative for fever  Respiratory: Negative  Cardiovascular: Negative  Negative for chest pain  Gastrointestinal: Positive for abdominal pain (left side)  Negative for bowel incontinence  Genitourinary: Positive for difficulty urinating, dysuria, flank pain and hematuria  Musculoskeletal: Positive for back pain (left flank pain)  Skin: Negative  Neurological: Negative for headaches  Psychiatric/Behavioral: Negative  Current Medications       Current Outpatient Medications:     abiraterone (ZYTIGA) 250 mg tablet, Take 4 tablets (1,000 mg total) by mouth daily, Disp: 120 tablet, Rfl: 4    aspirin (ASPIRIN ADULT LOW STRENGTH) 81 mg EC tablet, Take 1 tablet by mouth daily, Disp: , Rfl:     atorvastatin (LIPITOR) 40 mg tablet, Take 40 mg by mouth daily , Disp: , Rfl:     Calcium 500 MG CHEW, Chew 1 tablet daily , Disp: , Rfl:     cholecalciferol (VITAMIN D3) 1,000 units tablet, Take 1 tablet by mouth daily, Disp: , Rfl:     leuprolide (LUPRON DEPOT 3 MONTH KIT) 22 5 mg injection, Inject 22 5 mg into a muscle every 6 (six) months, Disp: , Rfl:     losartan (COZAAR) 100 MG tablet, take 1 tablet by mouth once daily, Disp: 90 tablet, Rfl: 1    metoprolol succinate (TOPROL-XL) 25 mg 24 hr tablet, Take 0 5 tablets (12 5 mg total) by mouth daily for 180 days, Disp: 90 tablet, Rfl: 1    predniSONE 5 mg tablet, take 1 tablet by mouth twice a day with meals, Disp: 60 tablet, Rfl: 0  No current facility-administered medications for this visit       Current Allergies     Allergies as of 08/10/2019    (No Known Allergies)              Past Medical History:   Diagnosis Date  Anemia     last assessed  1/7/14     Hypercholesteremia     Hypertension     Polyp of sigmoid colon     Prostate cancer ([facility] [address] [facility]     Renal disorder     elevated serum creat    Tinnitus     unspecified laterality / last assessed 4/9/13       Past Surgical History:   Procedure Laterality Date    CARDIAC SURGERY      CORONARY ANGIOPLASTY WITH STENT PLACEMENT      HEMORRHOID SURGERY      PROSTATE BIOPSY  10/24/2018    TONSILLECTOMY         Family History   Problem Relation Age of Onset    Breast cancer Mother     Hypertension Father          Medications have been verified  The following portions of the patient's history were reviewed and updated as appropriate: allergies, current medications, past family history, past medical history, past social history, past surgical history and problem list     Objective   BP (!) 222/107 (BP Location: Right arm, Patient Position: Supine) Comment: Attempted x2  Pulse 64   Temp (!) 97 2 °F (36 2 °C) (Tympanic)   Resp 18   Ht 5' 7" (1 702 m)   Wt 86 2 kg (190 lb) Comment: Weighed yesterday at PCP  SpO2 98%   BMI 29 76 kg/m²      Physical Exam     Physical Exam   Constitutional: He is oriented to person, place, and time  He is cooperative  He appears ill  He appears distressed  Thriving in pain on stretcher bed, uncomfortable   Abdominal: Normal appearance  There is tenderness in the suprapubic area and left lower quadrant  There is CVA tenderness (left side)  Musculoskeletal:        Lumbar back: He exhibits tenderness and pain  Back:    Neurological: He is alert and oriented to person, place, and time  Psychiatric: His mood appears anxious         AUBREY Nuñez

## 2019-08-10 NOTE — PATIENT INSTRUCTIONS
Pt in severe pain going to the ER and wants to go to [facility]  Pt going to the Er now via EMS  zofran given at time of visit 4mg ODT  Urine dip NOT obtained at time of visit    Kidney Stones   WHAT YOU NEED TO KNOW:   Kidney stones form in the urinary system when the water and waste in your urine are out of balance  When this happens, certain types of waste crystals separate from the urine  The crystals build up and form kidney stones  You may have 1 or more kidney stones  DISCHARGE INSTRUCTIONS:   Return to the emergency department if:   · You have vomiting that is not relieved by medicine  Contact your healthcare provider if:   · You have a fever  · You have trouble passing urine  · You see blood in your urine  · You have severe pain  · You have any questions or concerns about your condition or care  Medicines:   · NSAIDs , such as ibuprofen, help decrease swelling, pain, and fever  This medicine is available with or without a doctor's order  NSAIDs can cause stomach bleeding or kidney problems in certain people  If you take blood thinner medicine, always ask your healthcare provider if NSAIDs are safe for you  Always read the medicine label and follow directions  · Prescription medicine  may be given  Ask how to take this medicine safely  · Medicines  to balance your electrolytes may be needed  · Take your medicine as directed  Contact your healthcare provider if you think your medicine is not helping or if you have side effects  Tell him or her if you are allergic to any medicine  Keep a list of the medicines, vitamins, and herbs you take  Include the amounts, and when and why you take them  Bring the list or the pill bottles to follow-up visits  Carry your medicine list with you in case of an emergency  Follow up with your healthcare provider as directed: You may need to return for more tests  Write down your questions so you remember to ask them during your visits    Self-care: · Drink plenty of liquids  Your healthcare provider may tell you to drink at least 8 to 12 (eight-ounce) cups of liquids each day  This helps flush out the kidney stones when you urinate  Water is the best liquid to drink  · Strain your urine every time you go to the bathroom  Urinate through a strainer or a piece of thin cloth to catch the stones  Take the stones to your healthcare provider so they can be sent to the lab for tests  This will help your healthcare providers plan the best treatment for you  · Eat a variety of healthy foods  Healthy foods include fruits, vegetables, whole-grain breads, low-fat dairy products, beans, and fish  You may need to limit how much sodium (salt) or protein you eat  Ask for information about the best foods for you  · Stay active  Your stones may pass more easily by if you stay active  Ask about the best activities for you  After you pass your kidney stones:  Once you have passed your kidney stones, your healthcare provider may  order a 24-hour urine test  Results from a 24-hour urine test will help your healthcare provider plan ways to prevent more stones from forming  If you are told to do a 24-hour test, your healthcare provider will give you more instructions  © 2017 [facility] [facility] Information is for End User's use only and may not be sold, redistributed or otherwise used for commercial purposes  All illustrations and images included in CareNotes® are the copyrighted property of [employer]  or [employer]mo Aleman  The above information is an  only  It is not intended as medical advice for individual conditions or treatments  Talk to your doctor, nurse or pharmacist before following any medical regimen to see if it is safe and effective for you

## 2019-08-11 DIAGNOSIS — C61 PROSTATE CANCER (HCC): ICD-10-CM

## 2019-08-12 ENCOUNTER — TELEPHONE (OUTPATIENT)
Dept: UROLOGY | Facility: CLINIC | Age: 79
End: 2019-08-12

## 2019-08-12 ENCOUNTER — TELEPHONE (OUTPATIENT)
Dept: UROLOGY | Facility: MEDICAL CENTER | Age: 79
End: 2019-08-12

## 2019-08-12 ENCOUNTER — TRANSITIONAL CARE MANAGEMENT (OUTPATIENT)
Dept: FAMILY MEDICINE CLINIC | Facility: CLINIC | Age: 79
End: 2019-08-12

## 2019-08-12 ENCOUNTER — PREP FOR PROCEDURE (OUTPATIENT)
Dept: UROLOGY | Facility: CLINIC | Age: 79
End: 2019-08-12

## 2019-08-12 ENCOUNTER — EVALUATION (OUTPATIENT)
Dept: PHYSICAL THERAPY | Facility: REHABILITATION | Age: 79
End: 2019-08-12
Payer: COMMERCIAL

## 2019-08-12 DIAGNOSIS — N20.1 LEFT URETERAL STONE: Primary | ICD-10-CM

## 2019-08-12 DIAGNOSIS — S29.019D THORACIC MYOFASCIAL STRAIN, SUBSEQUENT ENCOUNTER: Primary | ICD-10-CM

## 2019-08-12 PROCEDURE — 97110 THERAPEUTIC EXERCISES: CPT | Performed by: PHYSICAL THERAPIST

## 2019-08-12 PROCEDURE — 97161 PT EVAL LOW COMPLEX 20 MIN: CPT | Performed by: PHYSICAL THERAPIST

## 2019-08-12 RX ORDER — CEFAZOLIN SODIUM 2 G/50ML
2000 SOLUTION INTRAVENOUS ONCE
Status: CANCELLED | OUTPATIENT
Start: 2019-09-11 | End: 2019-08-12

## 2019-08-12 RX ORDER — PREDNISONE 1 MG/1
TABLET ORAL
Qty: 60 TABLET | Refills: 0 | Status: SHIPPED | OUTPATIENT
Start: 2019-08-12 | End: 2019-09-15 | Stop reason: SDUPTHER

## 2019-08-12 NOTE — TELEPHONE ENCOUNTER
Post er follow up  Please triage  Patient of Dr Axel Snow seen in the [facility] office  Patient advised he was seen in the ER on 8/10/19 and would like to schedule appointment for a stent removal  Please advise

## 2019-08-12 NOTE — PROGRESS NOTES
PT Evaluation     Today's date: 2019  Patient name: Wilman Steward  :   MRN: [id number]  Referring provider: Sabina Hahn DO  Dx:   Encounter Diagnosis     ICD-10-CM    1  Thoracic myofascial strain, initial encounter [id number] Ambulatory referral to Physical Therapy       Start Time: 930  Stop Time: 1010  Total time in clinic (min): 40 minutes    Assessment  Assessment details: Wilman Steward is a 69 y o  male who presents to physical therapy with diagnosis of thoracic myofascial strain, initial encounter  [place] presents to physical therapy with mild pain levels and minimal limitations shoulder/scapular strength  He demonstrates hypomobility of thoracic spine and demonstrates full thoracic, shoulder, and cervical AROM  He demonstrates improvements in his symptoms after instructed in postural correction  He will benefit from a home exercise program to enable him to manage symptoms independently  Patient case to remain open for 3-4 weeks  Patient advised to call therapist to update, to let PT know if symptoms remain or resolve  Patient demonstrated good understanding and technique of provided home exercise program  Thank you for this referral     Symptom irritability: lowUnderstanding of Dx/Px/POC: good   Prognosis: good    Goals  Goals:  Patient will be compliant with home exercise program  Patient will demonstrate decreased tenderness to R Rhomboid    Plan  Plan details: Patient case to remain open for 3-4 weeks   Patient advised to call therapist to update, to let PT know if symptoms remain or resolve  Patient would benefit from: skilled physical therapy  Planned modality interventions: thermotherapy: hydrocollator packs  Planned therapy interventions: therapeutic exercise, therapeutic activities, home exercise program, patient education, neuromuscular re-education and manual therapy  Treatment plan discussed with: patient        Subjective Evaluation    History of Present Illness  Mechanism of injury: Clarke Vizcarra is a 69 y o  male presenting to physical therapy on 19 with primary complaints of right sided back pain  He reports it surrounding his shoulder blade area, feels like a bruise  He reports it is soreness/achy  Pain started about 2 months ago  No traumatic injury  He reports he has improvements in symptoms when he exercises  He reports being involved in a vigours morning program at [employer] (push ups, weights, cardiovascular activities) he has been doing this for about 1 5-2 years  He reports no difficulty completing ADLs, IADLs, or any recreational activities  He reports he wants to see if PT can give him any stretches or exercises to stop soreness from happening    Pain  Current pain ratin  At best pain ratin  At worst pain ratin  Quality: dull ache    Exercise history: Exercises at [employer] every morning and every other saturday  Diagnostic Tests  No diagnostic tests performed  Treatments  No previous or current treatments  Patient Goals  Patient goals for therapy: decreased pain  Patient goal: know how to stretch to stop it from happening in the future  Objective     Postural Observations    Additional Postural Observation Details  Patient presents with rounded shoulders, soreness near R shoulder blade, with posture correction (pulling shoulder blades down/back) symptoms decreased  Palpation     Right   Tenderness of the middle trapezius, rhomboids and upper trapezius       Active Range of Motion   Cervical/Thoracic Spine       Cervical    Flexion:  WFL  Extension:  WFL  Left lateral flexion:  WFL  Right lateral flexion:  WFL  Left rotation:  WFL  Right rotation:  [facility]    Thoracic    Flexion:  WFL  Extension:  WFL  Left lateral flexion:  WFL  Right lateral flexion:  WFL  Left rotation:  WFL  Right rotation:  WFL  Left Shoulder   Normal active range of motion    Right Shoulder   Normal active range of motion    Joint Play     Hypomobile: T2, T3, T4, T5, T6, T7 and T8     Strength/Myotome Testing     Left Shoulder     Planes of Motion   Flexion: 4+   Abduction: 4+   External rotation at 0°: 4   Internal rotation at 0°: 4     Isolated Muscles   Lower trapezius: 4   Middle trapezius: 4   Serratus anterior: 4     Right Shoulder     Planes of Motion   Flexion: 4+   Abduction: 4+   External rotation at 0°: 4   Internal rotation at 0°: 4     Isolated Muscles   Lower trapezius: 4   Middle trapezius: 4   Serratus anterior: 4     Left Elbow   Flexion: 4+  Extension: 4+    Right Elbow   Flexion: 4+  Extension: 4+      Flowsheet Rows      Most Recent Value   PT/OT G-Codes   Current Score  97   Projected Score  97             Precautions: HTN, Prostate Cancer, CAD        Manual  8/12                                                                                 Exercise Diary  8/12            scap retractions 10"x10            Tband W 10x RTB            Tband Row 10x RTB            Tband Ext 10x RTB            Foam Roll Series 30" ea                                                                                                                                                                                                                   Modalities

## 2019-08-12 NOTE — TELEPHONE ENCOUNTER
Patient managed by Dr Chris Castillo at the [facility] office  Patient with metastatic prostate cancer and history of kidney stones  Patient seen at [facility] in [facility] on Saturday 08/10/2019 for left flank pain  Upon arrival to the Urgent Care 911 was called and patient taken to [facility]  Stent placed by [facility] for 7 x 11mm stone  In the left ureter  Contacted and spoke with patient to discuss the stent removal  Explained to patient he needs to be scheduled for surgery for stone removal  Cannot remove the stent at this time  Patient has appointment with Dr Chris Castillo on Friday 08/16/2019 for his prostate cancer and will plan to discuss  management of stone at that time

## 2019-08-16 ENCOUNTER — OFFICE VISIT (OUTPATIENT)
Dept: UROLOGY | Facility: CLINIC | Age: 79
End: 2019-08-16
Payer: COMMERCIAL

## 2019-08-16 VITALS
HEIGHT: 67 IN | DIASTOLIC BLOOD PRESSURE: 80 MMHG | BODY MASS INDEX: 30.61 KG/M2 | SYSTOLIC BLOOD PRESSURE: 140 MMHG | HEART RATE: 67 BPM | WEIGHT: 195 LBS

## 2019-08-16 DIAGNOSIS — R23.2 HOT FLASHES: ICD-10-CM

## 2019-08-16 DIAGNOSIS — C61 PROSTATE CANCER (HCC): Primary | ICD-10-CM

## 2019-08-16 DIAGNOSIS — N20.0 CALCULUS OF KIDNEY: ICD-10-CM

## 2019-08-16 PROCEDURE — 96402 CHEMO HORMON ANTINEOPL SQ/IM: CPT | Performed by: UROLOGY

## 2019-08-16 PROCEDURE — 99214 OFFICE O/P EST MOD 30 MIN: CPT | Performed by: UROLOGY

## 2019-08-16 NOTE — H&P (VIEW-ONLY)
UROLOGY ROUTINE FOLLOW UP NOTE     CHIEF COMPLAINT   Marcia Barillas is a 69 y o  male with a complaint of   Chief Complaint   Patient presents with    Prostate Cancer       History of Present Illness:     69 y o  gentleman who has been undergoing routine prostate cancer screening with his primary care team  The patient has had a steady PSA but in March of 2017 was noted to have some oscillation  The patient was given a course of antibiotics and his PSA came down from the mid 5 range to 4 6  He initially presented in June 2017  We initially recommended follow-up in 1 year  Follow-up of his PSA values ordered by his primary team demonstrated concern for rapid rise  The patient return to see me in May of 2018  At that time, we recommended a prostate biopsy but the patient had just undergone percutaneous stenting and was on dual anti-platelet therapy  We recommend delay  The patient underwent a CT scan of his chest which demonstrated some sclerotic lesions in his primary care team again contacted us  We recommended repeat PSA and a bone scan  PSA has risen again and bone scan does demonstrate some concern  Lab Results   Component Value Date    PSA 0 1 08/08/2019    PSA 0 1 05/25/2019    PSA 0 1 03/18/2019   10/5/18 PSA 9 8  4/30/18 PSA 8 6, free 19 6%  9/30/17 PSA 7 3, free 15%  5/5/17 PSA 4 6  3/20/17 PSA 5 4     The patient has erectile dysfunction and has been doing well with Viagra 50 mg  These are cost prohibitive however  We switched to sildenafil 20mg tablets at last visit  The patient was unable to obtain the medication  Patient has not been using sildenafil since his heart episode described below  Patient is doing very well from a cardiac standpoint  He is still on Plavix and aspirin  He has lost 40 pounds with aggressive cardiac rehab and is exercising daily  He feels very good  He denies bony pain  He is concerned about the findings of his bone scan      We did discuss with Interventional Radiology possible biopsy of the patient's thoracic and rib lesions although these were not felt to be safe for attempt  As such, the patient was arranged for a prostate biopsy here in our office  I did discuss with the patient's cardiologist who felt it would be safe for the patient to stop his Plavix but remain on his aspirin for the procedure  Underwent prostate biopsy  This demonstrated high risk Posey 9 disease  Patient was discussed at multidisciplinary conference and although we were not able to biopsy his bony lesions, 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  The patient was started on androgen deprivation therapy and seen by Medical Oncology  He was started on enzalutamide and Zometa  He returns for androgen deprivation therapy  He has had good PSA response  Recently enzalutamide has transitioned to abiraterone  Patient has very minimal hot flashes in relation to his therapy  in the interim, the patient was seen at [facility]   He had an 11 mm left-sided stone  Stent was placed  He returns today to plan his upcoming ureteroscopy on September 11, 2019  Having mild urgency from the stent      Past Medical History:     Past Medical History:   Diagnosis Date    Anemia     last assessed  1/7/14     Hypercholesteremia     Hypertension     Polyp of sigmoid colon     Prostate cancer ([facility] [address] )     Renal disorder     elevated serum creat    Tinnitus     unspecified laterality / last assessed 4/9/13       PAST SURGICAL HISTORY:     Past Surgical History:   Procedure Laterality Date    CARDIAC SURGERY      CORONARY ANGIOPLASTY WITH STENT PLACEMENT      HEMORRHOID SURGERY      PROSTATE BIOPSY  10/24/2018    TONSILLECTOMY         CURRENT MEDICATIONS:     Current Outpatient Medications   Medication Sig Dispense Refill    abiraterone (ZYTIGA) 250 mg tablet Take 4 tablets (1,000 mg total) by mouth daily 120 tablet 4    aspirin (ASPIRIN ADULT LOW STRENGTH) 81 mg EC tablet Take 1 tablet by mouth daily      atorvastatin (LIPITOR) 40 mg tablet Take 40 mg by mouth daily       Calcium 500 MG CHEW Chew 1 tablet daily       cholecalciferol (VITAMIN D3) 1,000 units tablet Take 1 tablet by mouth daily      leuprolide (LUPRON DEPOT 3 MONTH KIT) 22 5 mg injection Inject 22 5 mg into a muscle every 6 (six) months      losartan (COZAAR) 100 MG tablet take 1 tablet by mouth once daily 90 tablet 1    metoprolol succinate (TOPROL-XL) 25 mg 24 hr tablet Take 0 5 tablets (12 5 mg total) by mouth daily for 180 days 90 tablet 1    predniSONE 5 mg tablet take 1 tablet by mouth twice a day with meals 60 tablet 0     Current Facility-Administered Medications   Medication Dose Route Frequency Provider Last Rate Last Dose    leuprolide (LUPRON DEPOT 6 MONTH KIT) IM injection kit 45 mg  45 mg Intramuscular Once Cris Quick MD           ALLERGIES:   No Known Allergies    SOCIAL HISTORY:     Social History     Socioeconomic History    Marital status: /Civil Union     Spouse name: None    Number of children: None    Years of education: None    Highest education level: None   Occupational History    Occupation: consultant   Social Needs    Financial resource strain: None    Food insecurity:     Worry: None     Inability: None    Transportation needs:     Medical: None     Non-medical: None   Tobacco Use    Smoking status: Never Smoker    Smokeless tobacco: Never Used   Substance and Sexual Activity    Alcohol use: Yes     Comment: Occuational / social     Drug use: No    Sexual activity: None   Lifestyle    Physical activity:     Days per week: None     Minutes per session: None    Stress: None   Relationships    Social connections:     Talks on phone: None     Gets together: None     Attends Latter day service: None     Active member of club or organization: None     Attends meetings of clubs or organizations: None     Relationship status: None    Intimate partner violence:     Fear of current or ex partner: None     Emotionally abused: None     Physically abused: None     Forced sexual activity: None   Other Topics Concern    None   Social History Narrative    Always uses seat belt     Daily caffeine consumption 2-3 servings a day     Feels safe at home       SOCIAL HISTORY:     Family History   Problem Relation Age of Onset    Breast cancer Mother     Hypertension Father        REVIEW OF SYSTEMS:     Review of Systems   Constitutional: Negative for activity change, chills, fever and unexpected weight change (Planned weight loss with exercise)  Respiratory: Negative  Cardiovascular: Negative for chest pain  Gastrointestinal: Negative  Genitourinary: Negative  Musculoskeletal: Negative  Negative for back pain  Neurological: Negative  Psychiatric/Behavioral: Negative  PHYSICAL EXAM:     /80   Pulse 67   Ht 5' 7" (1 702 m)   Wt 88 5 kg (195 lb)   BMI 30 54 kg/m²     General:  Healthy appearing male in no acute distress  They have a normal affect  There is not appear to be any gross neurologic defects or abnormalities  HEENT:  Normocephalic, atraumatic  Neck is supple without any palpable lymphadenopathy  Cardiovascular:  Patient has normal palpable distal radial pulses  There is no significant peripheral edema  No JVD is noted  Respiratory:  Patient has unlabored respirations  There is no audible wheeze or rhonchi  Abdomen:  Abdomen is without surgical scars  Abdomen is soft and nontender  There is no tympany  Inguinal and umbilical hernia are not appreciated  Musculoskeletal:  Patient does not have significant CVA tenderness in the flank with palpation or percussion  They full range of motion in all 4 extremities  Strength in all 4 extremities appears congruent  Patient is able to ambulate without assistance or difficulty  Dermatologic:  Patient has no skin abnormalities or rashes        LABS: CBC:   Lab Results   Component Value Date    WBC 6 32 2019    HGB 13 9 2019    HCT 41 7 2019    MCV 97 2019     2019       BMP:   Lab Results   Component Value Date    CALCIUM 8 8 2019     2017    K 4 2 2019    CO2 28 2019     2019    BUN 24 2019    CREATININE 1 30 2019     10/5/18 PSA 9 8  Lab Results   Component Value Date    PSA 0 1 2019    PSA 0 1 2019    PSA 0 1 2019     IMAGIN/10/2019  Impression:    1  Evidence of a 7 x 11 mm stone in the mid left ureter causing significant left  obstructive uropathy and pyelocalyceal extravasation  Other renal findings as  noted  2  Evidence of sclerotic osseous metastatic disease for which clinical  correlation is recommended  Metastatic prostate cancer is the diagnosis of  exclusion  3  Other incidental findings in the chest, abdomen and pelvis as noted  Workstation:[id number]   Result Narrative   Examination: CT scan of the abdomen and pelvis with contrast    Comparisons: CT scan of the chest dated 2018  Indication: 52-year-old male with left flank pain radiating to the left groin  History of renal calculi  Technique: A CT scan of the abdomen and pelvis with intravenous contrast is  performed in the axial plane from the lung bases through the pelvis  85 cc of  Omnipaque 350 is administered intravenously  Findings: Evaluation of the lung bases demonstrates a stable 4 mm noncalcified  nodule in the lateral right lower lobe with characteristics of a benign  granuloma  The lung bases are otherwise clear  The base of the heart is  unremarkable  There is evidence of a small hiatus hernia  There are nonenhancing benign-appearing hypodensities in the liver with  characteristics of benign hepatic cysts  No suspicious hepatic mass or biliary  ductal dilatation is noted  The gallbladder is unremarkable   The pancreas,  spleen and the adrenal glands appear normal     Note is made of moderate left obstructive uropathy from a 7 x 11 mm stone in the  proximal left ureter at the L3-4 level  No other stones are noted in the left  kidney or proximal left ureter  There are no stones in the distal left ureter  There is no evidence of renal calculi in the right kidney  No right ureteric  stones are noted  No right obstructive uropathy is identified  A large simple  9 0 cm cortical cyst arises from the mid to lower pole of the left kidney  There  is evidence of bilateral parapelvic cysts and small cortical cysts also  No  solid renal masses are noted  There is evidence of increased density in the left  perinephric fat with evidence of pyelocalyceal extravasation  The urinary  bladder is unremarkable  There is mild enlargement of the prostate gland  The stomach is grossly normal  The small bowel is normal in caliber and is  unremarkable  No focal colonic lesion is identified  The appendix is identified  and is normal  There is evidence of mild colonic diverticulosis  No free  intraperitoneal fluid/ascites is identified in the abdomen or pelvis  There are  no pathologically enlarged lymph nodes in the abdomen or pelvis  Evaluation of the body wall soft tissues demonstrates zones of nonspecific  increased density in the subcutaneous fat of the ventral abdominal wall which  may relate to subcutaneous injections  The body wall soft tissues are otherwise  unremarkable  The inguinal regions appear normal     Evaluation of bone windows demonstrates a new 2 5 cm sclerotic lesion in the  posterior aspect of the T10 vertebral body  There are multiple other tiny  sclerotic foci scattered throughout the vertebral bodies and bilateral ribs  These sclerotic lesions are concerning for sclerotic metastatic disease  Malignancy such as prostate cancer must be excluded     Other Result Information   Interface, Rad Results In - 08/10/2019 12:40 PM EDT  Examination: CT scan of the abdomen and pelvis with contrast    Comparisons: CT scan of the chest dated 2/25/2018  Indication: 43-year-old male with left flank pain radiating to the left groin  History of renal calculi  Technique: A CT scan of the abdomen and pelvis with intravenous contrast is  performed in the axial plane from the lung bases through the pelvis  85 cc of  Omnipaque 350 is administered intravenously  Findings: Evaluation of the lung bases demonstrates a stable 4 mm noncalcified  nodule in the lateral right lower lobe with characteristics of a benign  granuloma  The lung bases are otherwise clear  The base of the heart is  unremarkable  There is evidence of a small hiatus hernia  There are nonenhancing benign-appearing hypodensities in the liver with  characteristics of benign hepatic cysts  No suspicious hepatic mass or biliary  ductal dilatation is noted  The gallbladder is unremarkable  The pancreas,  spleen and the adrenal glands appear normal     Note is made of moderate left obstructive uropathy from a 7 x 11 mm stone in the  proximal left ureter at the L3-4 level  No other stones are noted in the left  kidney or proximal left ureter  There are no stones in the distal left ureter  There is no evidence of renal calculi in the right kidney  No right ureteric  stones are noted  No right obstructive uropathy is identified  A large simple  9 0 cm cortical cyst arises from the mid to lower pole of the left kidney  There  is evidence of bilateral parapelvic cysts and small cortical cysts also  No  solid renal masses are noted  There is evidence of increased density in the left  perinephric fat with evidence of pyelocalyceal extravasation  The urinary  bladder is unremarkable  There is mild enlargement of the prostate gland  The stomach is grossly normal  The small bowel is normal in caliber and is  unremarkable  No focal colonic lesion is identified   The appendix is identified  and is normal  There is evidence of mild colonic diverticulosis  No free  intraperitoneal fluid/ascites is identified in the abdomen or pelvis  There are  no pathologically enlarged lymph nodes in the abdomen or pelvis  Evaluation of the body wall soft tissues demonstrates zones of nonspecific  increased density in the subcutaneous fat of the ventral abdominal wall which  may relate to subcutaneous injections  The body wall soft tissues are otherwise  unremarkable  The inguinal regions appear normal     Evaluation of bone windows demonstrates a new 2 5 cm sclerotic lesion in the  posterior aspect of the T10 vertebral body  There are multiple other tiny  sclerotic foci scattered throughout the vertebral bodies and bilateral ribs  These sclerotic lesions are concerning for sclerotic metastatic disease  Malignancy such as prostate cancer must be excluded  IMPRESSION:  Impression:    1  Evidence of a 7 x 11 mm stone in the mid left ureter causing significant left  obstructive uropathy and pyelocalyceal extravasation  Other renal findings as  noted  2  Evidence of sclerotic osseous metastatic disease for which clinical  correlation is recommended  Metastatic prostate cancer is the diagnosis of  exclusion  3  Other incidental findings in the chest, abdomen and pelvis as noted  PATHOLOGY:     Final Diagnosis   A  Right lateral base prostate core biopsy:  - Adenocarcinoma, Vitaliy score 4+ 5 = 9/10 (grade 5 comprises 10% of core biopsy), grade group 5, continuously involving greater than 95% of this core biopsy  - No perineural invasion is seen     B  Right lateral mid prostate core biopsy:  - Adenocarcinoma, [place] score 4+ 5 = 9/10 (grade 5 conprises 20% of core biopsy), grade group 5, continuously involving 90% of this core biopsy  - Perineural invasion is seen      C   Left lateral apex prostate core biopsy:  - Adenocarcinoma, Vitaliy score 5 + 4 = 9/10 (grade 5 comprises 60% of core biopsy), grade group 5, continuously involving 60% of this core biopsy  - Perineural invasion is seen     D  Right base prostate core biopsy:  - Adenocarcinoma, Vitaily score 4 + 5 = 9/10 (grade 5 comprises 10% of core biopsy), grade group 5, continuously involving 85% of this core biopsy  - Perineural invasion is seen     E  Right mid prostate core biopsy   - Adenocarcinoma, Vitaliy score 5 + 4 = 9/10 (grade 5 comprises 95% of core biopsy), grade group 5, continuously involving 60% of this core biopsy  - Perineural invasion is seen     F  Right apex prostate core biopsy:  - Adenocarcinoma, [place] score 4 + 5 = 9/10 (grade 5 comprises 45% of core biopsy), grade group 5, continuously involving 70% of this core biopsy  - Perineural invasion is seen     G  Left base prostate core biopsy:  - Adenocarcinoma, Vitaliy score 4 + 5 = 9/10 (grade 5 comprises 35% of core biopsy), grade group 5, continuously involving 70% of this core biopsy  - No perineural invasion is seen     H  Left mid prostate core biopsy:  - Adenocarcinoma, Vitaliy score 4 + 4 = 8/10, grade group 4, discontinuously involving 20% of this core biopsy  - No perineural invasion is seen      I  Left apex prostate core biopsy:  - Benign prostatic tissue     J  Left lateral base prostate core biopsy:  - Adenocarcinoma, [place] score 4 + 5 = 9/10, (grade 5 comprises 35% of core biopsy), grade group 5, discontinuously involving 20% of this core biopsy  - No perineural invasion is seen      K  Left lateral mid prostate core biopsy:  - Adenocarcinoma, Vitaliy score 4 + 4 = 8/10, grade group 4, continuously involving 5% of this core biopsy  - No perineural invasion is seen      L   Left lateral apex prostate core biopsy:  - Adenocarcinoma, [place] score 4 + 4 = 8/10, grade group 4, discontinuously involving 5% of this core biopsy  - No perineural invasion is seen      Note: Block for Prolaris testing if required: E     ASSESSMENT:     69 y o  male with high volume, high risk [place] 5+4=9 CaP, now with abnormal sclerotic lesions and positive bone scan   And new 11 mm left mid ureteral stone status post stent at outside hospital    PLAN:       Patient underwent Lupron injection today  Will return in 6 months for updated Lupron  Continue PSA checks  Patient will continue Zometa with the medical oncologist along with additional treatment and follow-up of his advanced prostate cancer  With regard to his left mid ureteral stone approaching 11 mm in size, the CT scan report from the outside hospital was reviewed  Patient was scheduled for left ureteroscopy  We discussed how this procedure is performed the inherent risks and benefits  We will move forward with a cystoscopy, left retrograde pyelogram, ureteroscopy, laser lithotripsy, basket extraction of stone and stent replacement on the 11th of September  Risks and benefits discussed and informed consent signed

## 2019-08-16 NOTE — PATIENT INSTRUCTIONS
Ureteroscopy   WHAT YOU NEED TO KNOW:   A ureteroscopy is a procedure to examine in the inside of your urinary tract, which includes your urethra, bladder, ureters, and kidneys  A ureteroscope is a small, thin tube with a light and camera on the end  Ureteroscopy can help your healthcare provider diagnose and treat problems in your urinary tract, such as kidney stones  HOW TO PREPARE:   The week before your procedure:   · Write down the correct date, time, and location of your procedure  · Ask your caregiver if you need to stop using aspirin or any other prescribed or over-the-counter medicine before your procedure or surgery  · Bring your medicine bottles or a list of your medicines when you see your caregiver  Tell your caregiver if you are allergic to any medicine  Tell your caregiver if you use any herbs, food supplements, or over-the-counter medicine  · Arrange a ride home  Ask a family member or friend to drive you home after your surgery or procedure  Do not drive yourself home  · You may need blood or urine tests before your procedure  You may also need an EKG  Ask your healthcare provider for more information about these and other tests that you may need  Write down the date, time, and location of each test   The night before your procedure:  Ask caregivers about directions for eating and drinking  The day of your procedure:   · Ask your caregiver before taking any medicine on the day of your procedure  These medicines include insulin, diabetic pills, high blood pressure pills, or heart pills  Bring a list of all the medicines you take, or your pill bottles, with you to the hospital     · You or a close family member will be asked to sign a legal document called a consent form  It gives caregivers permission to do the procedure or surgery  It also explains the problems that may happen, and your choices  Make sure all your questions are answered before you sign this form      · An anesthesiologist will talk to you before your surgery  You may need medicine to keep you asleep or numb an area of your body during surgery  Tell caregivers if you or anyone in your family has had a problem with anesthesia in the past     · Caregivers may insert an intravenous tube (IV) into your vein  A vein in the arm is usually chosen  Through the IV tube, you may be given liquids and medicine  WHAT WILL HAPPEN:   What will happen: Your healthcare provider will place the ureteroscope into your urethra  He will pass it through your bladder and into your ureters and kidneys  Your healthcare provider may place tools through the scope that will help him remove tissue or stones  The tools may also help him place stents or sheaths to help keep your ureters open  After your procedure: You will be taken to a room to rest until you are fully awake  Healthcare providers will monitor you closely for any problems  Do not get out of bed until your healthcare provider says it is okay  When your healthcare provider sees that you are okay, you will be able to go home or be taken to your hospital room  CONTACT YOUR HEALTHCARE PROVIDER IF:   · You cannot make it to your procedure  · You have a fever  · You get a cold or the flu  · You have questions or concerns about your procedure  SEEK CARE IMMEDIATELY IF:   · Your symptoms get worse  RISKS:   You may bleed more than expected or get an infection  One of your ureters may be injured  You may have a blockage in one of your ureters  You may need another procedure or surgery  CARE AGREEMENT:   You have the right to help plan your care  Learn about your health condition and how it may be treated  Discuss treatment options with your caregivers to decide what care you want to receive  You always have the right to refuse treatment     © 2017 [address] [employer] Information is for End User's use only and may not be sold, redistributed or otherwise used for commercial purposes  All illustrations and images included in CareNotes® are the copyrighted property of [employer]  or [employer]  The above information is an  only  It is not intended as medical advice for individual conditions or treatments  Talk to your doctor, nurse or pharmacist before following any medical regimen to see if it is safe and effective for you

## 2019-08-16 NOTE — PROGRESS NOTES
UROLOGY ROUTINE FOLLOW UP NOTE     CHIEF COMPLAINT   Maximino De Leon is a 69 y o  male with a complaint of   Chief Complaint   Patient presents with    Prostate Cancer       History of Present Illness:     69 y o  gentleman who has been undergoing routine prostate cancer screening with his primary care team  The patient has had a steady PSA but in March of 2017 was noted to have some oscillation  The patient was given a course of antibiotics and his PSA came down from the mid 5 range to 4 6  He initially presented in June 2017  We initially recommended follow-up in 1 year  Follow-up of his PSA values ordered by his primary team demonstrated concern for rapid rise  The patient return to see me in May of 2018  At that time, we recommended a prostate biopsy but the patient had just undergone percutaneous stenting and was on dual anti-platelet therapy  We recommend delay  The patient underwent a CT scan of his chest which demonstrated some sclerotic lesions in his primary care team again contacted us  We recommended repeat PSA and a bone scan  PSA has risen again and bone scan does demonstrate some concern  Lab Results   Component Value Date    PSA 0 1 08/08/2019    PSA 0 1 05/25/2019    PSA 0 1 03/18/2019   10/5/18 PSA 9 8  4/30/18 PSA 8 6, free 19 6%  9/30/17 PSA 7 3, free 15%  5/5/17 PSA 4 6  3/20/17 PSA 5 4     The patient has erectile dysfunction and has been doing well with Viagra 50 mg  These are cost prohibitive however  We switched to sildenafil 20mg tablets at last visit  The patient was unable to obtain the medication  Patient has not been using sildenafil since his heart episode described below  Patient is doing very well from a cardiac standpoint  He is still on Plavix and aspirin  He has lost 40 pounds with aggressive cardiac rehab and is exercising daily  He feels very good  He denies bony pain  He is concerned about the findings of his bone scan      We did discuss with Interventional Radiology possible biopsy of the patient's thoracic and rib lesions although these were not felt to be safe for attempt  As such, the patient was arranged for a prostate biopsy here in our office  I did discuss with the patient's cardiologist who felt it would be safe for the patient to stop his Plavix but remain on his aspirin for the procedure  Underwent prostate biopsy  This demonstrated high risk Dennis 9 disease  Patient was discussed at multidisciplinary conference and although we were not able to biopsy his bony lesions, 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  The patient was started on androgen deprivation therapy and seen by Medical Oncology  He was started on enzalutamide and Zometa  He returns for androgen deprivation therapy  He has had good PSA response  Recently enzalutamide has transitioned to abiraterone  Patient has very minimal hot flashes in relation to his therapy  in the interim, the patient was [facility]   He had an 11 mm left-sided stone  Stent was placed  He returns today to plan his upcoming ureteroscopy on September 11, 2019  Having mild urgency from the stent      Past Medical History:     Past Medical History:   Diagnosis Date    Anemia     last assessed  1/7/14     Hypercholesteremia     Hypertension     Polyp of sigmoid colon     Prostate cancer ([facility] [address] )     Renal disorder     elevated serum creat    Tinnitus     unspecified laterality / last assessed 4/9/13       PAST SURGICAL HISTORY:     Past Surgical History:   Procedure Laterality Date    CARDIAC SURGERY      CORONARY ANGIOPLASTY WITH STENT PLACEMENT      HEMORRHOID SURGERY      PROSTATE BIOPSY  10/24/2018    TONSILLECTOMY         CURRENT MEDICATIONS:     Current Outpatient Medications   Medication Sig Dispense Refill    abiraterone (ZYTIGA) 250 mg tablet Take 4 tablets (1,000 mg total) by mouth daily 120 tablet 4    aspirin (ASPIRIN ADULT LOW STRENGTH) 81 mg EC tablet Take 1 tablet by mouth daily      atorvastatin (LIPITOR) 40 mg tablet Take 40 mg by mouth daily       Calcium 500 MG CHEW Chew 1 tablet daily       cholecalciferol (VITAMIN D3) 1,000 units tablet Take 1 tablet by mouth daily      leuprolide (LUPRON DEPOT 3 MONTH KIT) 22 5 mg injection Inject 22 5 mg into a muscle every 6 (six) months      losartan (COZAAR) 100 MG tablet take 1 tablet by mouth once daily 90 tablet 1    metoprolol succinate (TOPROL-XL) 25 mg 24 hr tablet Take 0 5 tablets (12 5 mg total) by mouth daily for 180 days 90 tablet 1    predniSONE 5 mg tablet take 1 tablet by mouth twice a day with meals 60 tablet 0     Current Facility-Administered Medications   Medication Dose Route Frequency Provider Last Rate Last Dose    leuprolide (LUPRON DEPOT 6 MONTH KIT) IM injection kit 45 mg  45 mg Intramuscular Once Jessica Cardona MD           ALLERGIES:   No Known Allergies    SOCIAL HISTORY:     Social History     Socioeconomic History    Marital status: /Civil Union     Spouse name: None    Number of children: None    Years of education: None    Highest education level: None   Occupational History    Occupation: consultant   Social Needs    Financial resource strain: None    Food insecurity:     Worry: None     Inability: None    Transportation needs:     Medical: None     Non-medical: None   Tobacco Use    Smoking status: Never Smoker    Smokeless tobacco: Never Used   Substance and Sexual Activity    Alcohol use: Yes     Comment: Occuational / social     Drug use: No    Sexual activity: None   Lifestyle    Physical activity:     Days per week: None     Minutes per session: None    Stress: None   Relationships    Social connections:     Talks on phone: None     Gets together: None     Attends Zoroastrianism service: None     Active member of club or organization: None     Attends meetings of clubs or organizations: None     Relationship status: None    Intimate partner violence:     Fear of current or ex partner: None     Emotionally abused: None     Physically abused: None     Forced sexual activity: None   Other Topics Concern    None   Social History Narrative    Always uses seat belt     Daily caffeine consumption 2-3 servings a day     Feels safe at home       SOCIAL HISTORY:     Family History   Problem Relation Age of Onset    Breast cancer Mother     Hypertension Father        REVIEW OF SYSTEMS:     Review of Systems   Constitutional: Negative for activity change, chills, fever and unexpected weight change (Planned weight loss with exercise)  Respiratory: Negative  Cardiovascular: Negative for chest pain  Gastrointestinal: Negative  Genitourinary: Negative  Musculoskeletal: Negative  Negative for back pain  Neurological: Negative  Psychiatric/Behavioral: Negative  PHYSICAL EXAM:     /80   Pulse 67   Ht 5' 7" (1 702 m)   Wt 88 5 kg (195 lb)   BMI 30 54 kg/m²     General:  Healthy appearing male in no acute distress  They have a normal affect  There is not appear to be any gross neurologic defects or abnormalities  HEENT:  Normocephalic, atraumatic  Neck is supple without any palpable lymphadenopathy  Cardiovascular:  Patient has normal palpable distal radial pulses  There is no significant peripheral edema  No JVD is noted  Respiratory:  Patient has unlabored respirations  There is no audible wheeze or rhonchi  Abdomen:  Abdomen is without surgical scars  Abdomen is soft and nontender  There is no tympany  Inguinal and umbilical hernia are not appreciated  Musculoskeletal:  Patient does not have significant CVA tenderness in the flank with palpation or percussion  They full range of motion in all 4 extremities  Strength in all 4 extremities appears congruent  Patient is able to ambulate without assistance or difficulty  Dermatologic:  Patient has no skin abnormalities or rashes        LABS: CBC:   Lab Results   Component Value Date    WBC 6 32 2019    HGB 13 9 2019    HCT 41 7 2019    MCV 97 2019     2019       BMP:   Lab Results   Component Value Date    CALCIUM 8 8 2019     2017    K 4 2 2019    CO2 28 2019     2019    BUN 24 2019    CREATININE 1 30 2019     10/5/18 PSA 9 8  Lab Results   Component Value Date    PSA 0 1 2019    PSA 0 1 2019    PSA 0 1 2019     IMAGIN/10/2019  Impression:    1  Evidence of a 7 x 11 mm stone in the mid left ureter causing significant left  obstructive uropathy and pyelocalyceal extravasation  Other renal findings as  noted  2  Evidence of sclerotic osseous metastatic disease for which clinical  correlation is recommended  Metastatic prostate cancer is the diagnosis of  exclusion  3  Other incidental findings in the chest, abdomen and pelvis as noted  Workstation:[id number]   Result Narrative   Examination: CT scan of the abdomen and pelvis with contrast    Comparisons: CT scan of the chest dated 2018  Indication: 71-year-old male with left flank pain radiating to the left groin  History of renal calculi  Technique: A CT scan of the abdomen and pelvis with intravenous contrast is  performed in the axial plane from the lung bases through the pelvis  85 cc of  Omnipaque 350 is administered intravenously  Findings: Evaluation of the lung bases demonstrates a stable 4 mm noncalcified  nodule in the lateral right lower lobe with characteristics of a benign  granuloma  The lung bases are otherwise clear  The base of the heart is  unremarkable  There is evidence of a small hiatus hernia  There are nonenhancing benign-appearing hypodensities in the liver with  characteristics of benign hepatic cysts  No suspicious hepatic mass or biliary  ductal dilatation is noted  The gallbladder is unremarkable   The pancreas,  spleen and the adrenal glands appear normal     Note is made of moderate left obstructive uropathy from a 7 x 11 mm stone in the  proximal left ureter at the L3-4 level  No other stones are noted in the left  kidney or proximal left ureter  There are no stones in the distal left ureter  There is no evidence of renal calculi in the right kidney  No right ureteric  stones are noted  No right obstructive uropathy is identified  A large simple  9 0 cm cortical cyst arises from the mid to lower pole of the left kidney  There  is evidence of bilateral parapelvic cysts and small cortical cysts also  No  solid renal masses are noted  There is evidence of increased density in the left  perinephric fat with evidence of pyelocalyceal extravasation  The urinary  bladder is unremarkable  There is mild enlargement of the prostate gland  The stomach is grossly normal  The small bowel is normal in caliber and is  unremarkable  No focal colonic lesion is identified  The appendix is identified  and is normal  There is evidence of mild colonic diverticulosis  No free  intraperitoneal fluid/ascites is identified in the abdomen or pelvis  There are  no pathologically enlarged lymph nodes in the abdomen or pelvis  Evaluation of the body wall soft tissues demonstrates zones of nonspecific  increased density in the subcutaneous fat of the ventral abdominal wall which  may relate to subcutaneous injections  The body wall soft tissues are otherwise  unremarkable  The inguinal regions appear normal     Evaluation of bone windows demonstrates a new 2 5 cm sclerotic lesion in the  posterior aspect of the T10 vertebral body  There are multiple other tiny  sclerotic foci scattered throughout the vertebral bodies and bilateral ribs  These sclerotic lesions are concerning for sclerotic metastatic disease  Malignancy such as prostate cancer must be excluded     Other Result Information   Interface, Rad Results In - 08/10/2019 12:40 PM EDT  Examination: CT scan of the abdomen and pelvis with contrast    Comparisons: CT scan of the chest dated 2/25/2018  Indication: 72-year-old male with left flank pain radiating to the left groin  History of renal calculi  Technique: A CT scan of the abdomen and pelvis with intravenous contrast is  performed in the axial plane from the lung bases through the pelvis  85 cc of  Omnipaque 350 is administered intravenously  Findings: Evaluation of the lung bases demonstrates a stable 4 mm noncalcified  nodule in the lateral right lower lobe with characteristics of a benign  granuloma  The lung bases are otherwise clear  The base of the heart is  unremarkable  There is evidence of a small hiatus hernia  There are nonenhancing benign-appearing hypodensities in the liver with  characteristics of benign hepatic cysts  No suspicious hepatic mass or biliary  ductal dilatation is noted  The gallbladder is unremarkable  The pancreas,  spleen and the adrenal glands appear normal     Note is made of moderate left obstructive uropathy from a 7 x 11 mm stone in the  proximal left ureter at the L3-4 level  No other stones are noted in the left  kidney or proximal left ureter  There are no stones in the distal left ureter  There is no evidence of renal calculi in the right kidney  No right ureteric  stones are noted  No right obstructive uropathy is identified  A large simple  9 0 cm cortical cyst arises from the mid to lower pole of the left kidney  There  is evidence of bilateral parapelvic cysts and small cortical cysts also  No  solid renal masses are noted  There is evidence of increased density in the left  perinephric fat with evidence of pyelocalyceal extravasation  The urinary  bladder is unremarkable  There is mild enlargement of the prostate gland  The stomach is grossly normal  The small bowel is normal in caliber and is  unremarkable  No focal colonic lesion is identified   The appendix is identified  and is normal  There is evidence of mild colonic diverticulosis  No free  intraperitoneal fluid/ascites is identified in the abdomen or pelvis  There are  no pathologically enlarged lymph nodes in the abdomen or pelvis  Evaluation of the body wall soft tissues demonstrates zones of nonspecific  increased density in the subcutaneous fat of the ventral abdominal wall which  may relate to subcutaneous injections  The body wall soft tissues are otherwise  unremarkable  The inguinal regions appear normal     Evaluation of bone windows demonstrates a new 2 5 cm sclerotic lesion in the  posterior aspect of the T10 vertebral body  There are multiple other tiny  sclerotic foci scattered throughout the vertebral bodies and bilateral ribs  These sclerotic lesions are concerning for sclerotic metastatic disease  Malignancy such as prostate cancer must be excluded  IMPRESSION:  Impression:    1  Evidence of a 7 x 11 mm stone in the mid left ureter causing significant left  obstructive uropathy and pyelocalyceal extravasation  Other renal findings as  noted  2  Evidence of sclerotic osseous metastatic disease for which clinical  correlation is recommended  Metastatic prostate cancer is the diagnosis of  exclusion  3  Other incidental findings in the chest, abdomen and pelvis as noted  PATHOLOGY:     Final Diagnosis   A  Right lateral base prostate core biopsy:  - Adenocarcinoma, Vitaliy score 4+ 5 = 9/10 (grade 5 comprises 10% of core biopsy), grade group 5, continuously involving greater than 95% of this core biopsy  - No perineural invasion is seen     B  Right lateral mid prostate core biopsy:  - Adenocarcinoma, [place] score 4+ 5 = 9/10 (grade 5 conprises 20% of core biopsy), grade group 5, continuously involving 90% of this core biopsy  - Perineural invasion is seen      C   Left lateral apex prostate core biopsy:  - Adenocarcinoma, Vitaliy score 5 + 4 = 9/10 (grade 5 comprises 60% of core biopsy), grade group 5, continuously involving 60% of this core biopsy  - Perineural invasion is seen     D  Right base prostate core biopsy:  - Adenocarcinoma, Vitaliy score 4 + 5 = 9/10 (grade 5 comprises 10% of core biopsy), grade group 5, continuously involving 85% of this core biopsy  - Perineural invasion is seen     E  Right mid prostate core biopsy   - Adenocarcinoma, Vitaliy score 5 + 4 = 9/10 (grade 5 comprises 95% of core biopsy), grade group 5, continuously involving 60% of this core biopsy  - Perineural invasion is seen     F  Right apex prostate core biopsy:  - Adenocarcinoma, [place]aing score 4 + 5 = 9/10 (grade 5 comprises 45% of core biopsy), grade group 5, continuously involving 70% of this core biopsy  - Perineural invasion is seen     G  Left base prostate core biopsy:  - Adenocarcinoma, Vitaliy score 4 + 5 = 9/10 (grade 5 comprises 35% of core biopsy), grade group 5, continuously involving 70% of this core biopsy  - No perineural invasion is seen     H  Left mid prostate core biopsy:  - Adenocarcinoma, Vitaliy score 4 + 4 = 8/10, grade group 4, discontinuously involving 20% of this core biopsy  - No perineural invasion is seen      I  Left apex prostate core biopsy:  - Benign prostatic tissue     J  Left lateral base prostate core biopsy:  - Adenocarcinoma, [place] score 4 + 5 = 9/10, (grade 5 comprises 35% of core biopsy), grade group 5, discontinuously involving 20% of this core biopsy  - No perineural invasion is seen      K  Left lateral mid prostate core biopsy:  - Adenocarcinoma, Vitaliy score 4 + 4 = 8/10, grade group 4, continuously involving 5% of this core biopsy  - No perineural invasion is seen      L   Left lateral apex prostate core biopsy:  - Adenocarcinoma, Sebewaing score 4 + 4 = 8/10, grade group 4, discontinuously involving 5% of this core biopsy  - No perineural invasion is seen      Note: Block for Prolaris testing if required: E     ASSESSMENT:     69 y o  male with high volume, high risk [place] 5+4=9 CaP, now with abnormal sclerotic lesions and positive bone scan   And new 11 mm left mid ureteral stone status post stent at outside hospital    PLAN:       Patient underwent Lupron injection today  Will return in 6 months for updated Lupron  Continue PSA checks  Patient will continue Zometa with the medical oncologist along with additional treatment and follow-up of his advanced prostate cancer  With regard to his left mid ureteral stone approaching 11 mm in size, the CT scan report from the outside hospital was reviewed  Patient was scheduled for left ureteroscopy  We discussed how this procedure is performed the inherent risks and benefits  We will move forward with a cystoscopy, left retrograde pyelogram, ureteroscopy, laser lithotripsy, basket extraction of stone and stent replacement on the 11th of September  Risks and benefits discussed and informed consent signed

## 2019-08-19 ENCOUNTER — TELEPHONE (OUTPATIENT)
Dept: UROLOGY | Facility: MEDICAL CENTER | Age: 79
End: 2019-08-19

## 2019-08-19 NOTE — TELEPHONE ENCOUNTER
Patient left a message questioning what medications he can take the morning of his procedure 9/11/19  I advised the patient that PAT's RN will go over his day of medications when they call for his PAT/Anesthesia screening

## 2019-08-20 ENCOUNTER — HOSPITAL ENCOUNTER (OUTPATIENT)
Dept: INFUSION CENTER | Facility: CLINIC | Age: 79
Discharge: HOME | End: 2019-08-20

## 2019-08-20 DIAGNOSIS — C61 PROSTATE CANCER (HCC): ICD-10-CM

## 2019-08-20 DIAGNOSIS — C79.51 BONE METASTASES (HCC): ICD-10-CM

## 2019-08-20 NOTE — PROGRESS NOTES
Pt here for Zometa infusion  Pt's calcium = 8 1  Harish Gutierrez, ARMINDA notified  Per Dr Nam Hayes, pt is not to receive zometa today  Pt has next infusion appt for zometa on Nov 12th  Pt instructed to have CMP drawn prior to this appt  Pt verbalized understanding    Pt was provided with AVS

## 2019-08-30 ENCOUNTER — APPOINTMENT (OUTPATIENT)
Dept: LAB | Facility: CLINIC | Age: 79
End: 2019-08-30
Payer: COMMERCIAL

## 2019-08-30 ENCOUNTER — OFFICE VISIT (OUTPATIENT)
Dept: PODIATRY | Facility: CLINIC | Age: 79
End: 2019-08-30
Payer: COMMERCIAL

## 2019-08-30 VITALS
WEIGHT: 190 LBS | BODY MASS INDEX: 29.82 KG/M2 | DIASTOLIC BLOOD PRESSURE: 51 MMHG | HEART RATE: 61 BPM | SYSTOLIC BLOOD PRESSURE: 128 MMHG | HEIGHT: 67 IN

## 2019-08-30 DIAGNOSIS — C61 PROSTATE CANCER (HCC): ICD-10-CM

## 2019-08-30 DIAGNOSIS — S90.32XA CONTUSION OF HEEL, LEFT, INITIAL ENCOUNTER: Primary | ICD-10-CM

## 2019-08-30 DIAGNOSIS — S86.012A STRAIN OF LEFT ACHILLES TENDON, INITIAL ENCOUNTER: ICD-10-CM

## 2019-08-30 LAB
ALBUMIN SERPL BCP-MCNC: 3.6 G/DL (ref 3.5–5)
ALP SERPL-CCNC: 44 U/L (ref 46–116)
ALT SERPL W P-5'-P-CCNC: 52 U/L (ref 12–78)
ANION GAP SERPL CALCULATED.3IONS-SCNC: 4 MMOL/L (ref 4–13)
AST SERPL W P-5'-P-CCNC: 24 U/L (ref 5–45)
BASOPHILS # BLD AUTO: 0.06 THOUSANDS/ΜL (ref 0–0.1)
BASOPHILS NFR BLD AUTO: 1 % (ref 0–1)
BILIRUB SERPL-MCNC: 0.69 MG/DL (ref 0.2–1)
BUN SERPL-MCNC: 24 MG/DL (ref 5–25)
CALCIUM SERPL-MCNC: 8.7 MG/DL (ref 8.3–10.1)
CHLORIDE SERPL-SCNC: 105 MMOL/L (ref 100–108)
CO2 SERPL-SCNC: 31 MMOL/L (ref 21–32)
CREAT SERPL-MCNC: 1.15 MG/DL (ref 0.6–1.3)
EOSINOPHIL # BLD AUTO: 0.21 THOUSAND/ΜL (ref 0–0.61)
EOSINOPHIL NFR BLD AUTO: 3 % (ref 0–6)
ERYTHROCYTE [DISTWIDTH] IN BLOOD BY AUTOMATED COUNT: 12.7 % (ref 11.6–15.1)
GFR SERPL CREATININE-BSD FRML MDRD: 61 ML/MIN/1.73SQ M
GLUCOSE SERPL-MCNC: 82 MG/DL (ref 65–140)
HCT VFR BLD AUTO: 41.9 % (ref 36.5–49.3)
HGB BLD-MCNC: 14.1 G/DL (ref 12–17)
IMM GRANULOCYTES # BLD AUTO: 0.04 THOUSAND/UL (ref 0–0.2)
IMM GRANULOCYTES NFR BLD AUTO: 1 % (ref 0–2)
LYMPHOCYTES # BLD AUTO: 1.27 THOUSANDS/ΜL (ref 0.6–4.47)
LYMPHOCYTES NFR BLD AUTO: 16 % (ref 14–44)
MCH RBC QN AUTO: 32.6 PG (ref 26.8–34.3)
MCHC RBC AUTO-ENTMCNC: 33.7 G/DL (ref 31.4–37.4)
MCV RBC AUTO: 97 FL (ref 82–98)
MONOCYTES # BLD AUTO: 0.59 THOUSAND/ΜL (ref 0.17–1.22)
MONOCYTES NFR BLD AUTO: 7 % (ref 4–12)
NEUTROPHILS # BLD AUTO: 5.81 THOUSANDS/ΜL (ref 1.85–7.62)
NEUTS SEG NFR BLD AUTO: 72 % (ref 43–75)
NRBC BLD AUTO-RTO: 0 /100 WBCS
PLATELET # BLD AUTO: 197 THOUSANDS/UL (ref 149–390)
PMV BLD AUTO: 10.9 FL (ref 8.9–12.7)
POTASSIUM SERPL-SCNC: 4.4 MMOL/L (ref 3.5–5.3)
PROT SERPL-MCNC: 7 G/DL (ref 6.4–8.2)
PSA SERPL-MCNC: 0.2 NG/ML (ref 0–4)
RBC # BLD AUTO: 4.33 MILLION/UL (ref 3.88–5.62)
SODIUM SERPL-SCNC: 140 MMOL/L (ref 136–145)
WBC # BLD AUTO: 7.98 THOUSAND/UL (ref 4.31–10.16)

## 2019-08-30 PROCEDURE — 84153 ASSAY OF PSA TOTAL: CPT

## 2019-08-30 PROCEDURE — 85025 COMPLETE CBC W/AUTO DIFF WBC: CPT

## 2019-08-30 PROCEDURE — 99203 OFFICE O/P NEW LOW 30 MIN: CPT | Performed by: PODIATRIST

## 2019-08-30 PROCEDURE — 80053 COMPREHEN METABOLIC PANEL: CPT

## 2019-08-30 PROCEDURE — 36415 COLL VENOUS BLD VENIPUNCTURE: CPT

## 2019-08-30 RX ORDER — CALCIUM CARBONATE 500(1250)
1000 TABLET ORAL DAILY
COMMUNITY
Start: 2019-08-01

## 2019-08-30 NOTE — PROGRESS NOTES
PATIENT:  Matias Schaumann  1940       ASSESSMENT:     1  Contusion of heel, left, initial encounter  Cam Boot   2  Strain of left Achilles tendon, initial encounter  Cam Boot             PLAN:  Patient was counseled and educated on the condition and the diagnosis  X-ray was reviewed  The radiological findings were discussed with the patient  The exam and symptoms are consistent with achilles tendon strain / tendinitis  It is still possible he has avulsion fracture at the calcaneal spur around the achilles tendon insertion     The diagnosis, treatment options and prognosis were discussed with the patient  Start him on CAM walker  Instructed supportive care, home exercise, icing, and resting  Discussed possible further image such as MRI depending on the progress  Patient will return in 3 weeks for re-evaluation  Subjective:       HPI  The patient presents with chief complaint of left foot pain for 2 days  He stepped left foot wrong and hit his left heel with some over-extension  He felt pain immediately  It was about 8 out of 10  Pain is little better today  No significant swelling  No numbness  He did not feel any pop when he injured left foot  No bruises  He was using icing to left heel and using heel inserts to alleviate his pain  The following portions of the patient's history were reviewed and updated as appropriate: allergies, current medications, past family history, past medical history, past social history, past surgical history and problem list   All pertinent labs and images were reviewed        Past Medical History  Past Medical History:   Diagnosis Date    Anemia     last assessed  1/7/14     Hypercholesteremia     Hypertension     Polyp of sigmoid colon     Prostate cancer ([facility] [address] )     Renal disorder     elevated serum creat    Tinnitus     unspecified laterality / last assessed 4/9/13       Past Surgical History  Past Surgical History:   Procedure Laterality Date    CARDIAC SURGERY      CORONARY ANGIOPLASTY WITH STENT PLACEMENT      HEMORRHOID SURGERY      PROSTATE BIOPSY  10/24/2018    TONSILLECTOMY          Allergies:  Patient has no known allergies  Medications:  Current Outpatient Medications   Medication Sig Dispense Refill    abiraterone (ZYTIGA) 250 mg tablet Take 4 tablets (1,000 mg total) by mouth daily 120 tablet 4    aspirin (ASPIRIN ADULT LOW STRENGTH) 81 mg EC tablet Take 1 tablet by mouth daily      atorvastatin (LIPITOR) 40 mg tablet Take 40 mg by mouth daily       Calcium 500 MG tablet       cholecalciferol (VITAMIN D3) 1,000 units tablet Take 1 tablet by mouth daily      leuprolide (LUPRON DEPOT 3 MONTH KIT) 22 5 mg injection Inject 22 5 mg into a muscle every 6 (six) months      losartan (COZAAR) 100 MG tablet take 1 tablet by mouth once daily 90 tablet 1    metoprolol succinate (TOPROL-XL) 25 mg 24 hr tablet Take 0 5 tablets (12 5 mg total) by mouth daily for 180 days 90 tablet 1    predniSONE 5 mg tablet take 1 tablet by mouth twice a day with meals 60 tablet 0     No current facility-administered medications for this visit          Social History:  Social History     Socioeconomic History    Marital status: /Civil Union     Spouse name: None    Number of children: None    Years of education: None    Highest education level: None   Occupational History    Occupation: consultant   Social Needs    Financial resource strain: None    Food insecurity:     Worry: None     Inability: None    Transportation needs:     Medical: None     Non-medical: None   Tobacco Use    Smoking status: Never Smoker    Smokeless tobacco: Never Used   Substance and Sexual Activity    Alcohol use: Yes     Comment: Occuational / social     Drug use: No    Sexual activity: None   Lifestyle    Physical activity:     Days per week: None     Minutes per session: None    Stress: None   Relationships    Social connections:     Talks on phone: None     Gets together: None     Attends Confucianism service: None     Active member of club or organization: None     Attends meetings of clubs or organizations: None     Relationship status: None    Intimate partner violence:     Fear of current or ex partner: None     Emotionally abused: None     Physically abused: None     Forced sexual activity: None   Other Topics Concern    None   Social History Narrative    Always uses seat belt     Daily caffeine consumption 2-3 servings a day     Feels safe at home          Review of Systems   Constitutional: Negative for chills and fever  Respiratory: Negative for cough and shortness of breath  Cardiovascular: Negative for chest pain and leg swelling  Gastrointestinal: Negative for diarrhea, nausea and vomiting  Musculoskeletal: Negative for joint swelling  Skin: Negative for wound  Neurological: Negative for weakness and numbness  Hematological: Does not bruise/bleed easily  Psychiatric/Behavioral: Negative for confusion  Objective:      /51   Pulse 61   Ht 5' 7" (1 702 m)   Wt 86 2 kg (190 lb)   BMI 29 76 kg/m²          Physical Exam   Constitutional: He is oriented to person, place, and time  He appears well-developed and well-nourished  No distress  HENT:   Head: Normocephalic and atraumatic  Neck: Normal range of motion  Neck supple  Cardiovascular: Normal rate, regular rhythm and intact distal pulses  Pulmonary/Chest: Effort normal and breath sounds normal  No respiratory distress  Musculoskeletal: He exhibits no edema  Tenderness noted left posterior calcaneus  Negative Morales sign  Achilles tendon is intact left ankle  No significant edema  No ecchymosis  Neurological: He is alert and oriented to person, place, and time  No sensory deficit  He exhibits normal muscle tone  Skin: Capillary refill takes less than 2 seconds  No rash noted  No erythema  No pallor     Psychiatric: He has a normal mood and affect  His behavior is normal    Vitals reviewed

## 2019-09-03 NOTE — PRE-PROCEDURE INSTRUCTIONS
Pre-Surgery Instructions:   Medication Instructions    abiraterone (ZYTIGA) 250 mg tablet Instructed patient per Anesthesia Guidelines   aspirin (ASPIRIN ADULT LOW STRENGTH) 81 mg EC tablet Instructed patient per Anesthesia Guidelines   atorvastatin (LIPITOR) 40 mg tablet Instructed patient per Anesthesia Guidelines   Calcium 500 MG tablet Instructed patient per Anesthesia Guidelines   cholecalciferol (VITAMIN D3) 1,000 units tablet Instructed patient per Anesthesia Guidelines   leuprolide (LUPRON DEPOT 3 MONTH KIT) 22 5 mg injection Instructed patient per Anesthesia Guidelines   losartan (COZAAR) 100 MG tablet Instructed patient per Anesthesia Guidelines   metoprolol succinate (TOPROL-XL) 25 mg 24 hr tablet Instructed patient per Anesthesia Guidelines   predniSONE 5 mg tablet Instructed patient per Anesthesia Guidelines   Zoledronic Acid (ZOMETA IV) Instructed patient per Anesthesia Guidelines  Instructed to take Zytiga, Prednisone, and Metoprolol with sip of water the morning of surgery  Hold Losartan the day before and the day of surgery per anesthesia guidelines  No aspirin, NSAIDs, vitamins 1 week before surgery

## 2019-09-04 NOTE — PROGRESS NOTES
Hematology/Oncology Outpatient Follow- up Note  Matias Schaumann 69 y o  male MRN: @ Encounter: [id number]        Date:  9/5/2019      Assessment / Plan:    Prostate cancer, castration sensitive, Wister score of 9 when he presented with elevated PSA  CT scan on 09/2018 showed involvement of the posterior side of the 10th vertebral body and the left 7th rib area, distal point of the right clavicle, he was on Firmagon and initiated later on by Urology on Lupron on 02/2019        He received Xtandi 160 mg in 12/2018 for 1 month, the insurance did not approve Malick Love thereafter  He got a financial assistance through [employer], however, and he started abiraterone on 02/03/2019 and prednisone 5 mg p o  B i d  Without side effect, PSA 0 2      For bone metastases Zometa 4 mg IV every 3 months initiated on 12/2018 for total of 2 years           HPI:  60-year-old  male who was seen by Urology for elevation of the PSA, when he presented in June 2017 with PSA of 4 6, Subsequently in September of 2017 PSA was 7 3 and in May of 2018 PSA was 8  6   A biopsy was recommended but the patient had just undergone percutaneous stenting of the heart and he was on dual anti-platelet therapy      MRI of the abdomen in April 2018 showed 2 simple cyst in the right hepatic lobe, bilateral renal cysts the largest 1 measuring 10 cm in the left lower lobe     In October of 2018 PSA was 9 8  CT scan of the chest 9/28/2018 showed sclerotic lesions in T10, 5 mm nodule in the right lower lobe   Bone scan showed activity in the posterior T10 level and left posterior 7th rib area concerning for osseous metastases and increased activity in the distal right clavicle might be degenerative or metastatic area      He was diagnosed with castration sensitive metastatic prostate cancer  [facility] insurance company could not approve enzalutamide, he also has a high co-payment for abiraterone     The patient initiated on samples of enzalutamide 160 mg p o  daily since the beginning of December 2018   His insurance approved abiraterone however he had a high co-payment        We were finally able to get financial assistance through [employer] started Zytiga (abiraterone) 2/3/2019 with prednisone 5mg po bid without side effects       Interval History:      CT A/P 8/2019 compared to 2/25/18 [facility] at [facility]:  1  Evidence of a 7 x 11 mm stone in the mid left ureter causing significant left obstructive uropathy and pyelocalyceal extravasation  2  Evidence of sclerotic osseous metastatic disease  T10 vertebral body  There are multiple other tiny sclerotic foci scattered throughout the vertebral bodies and bilateral ribs  3  Other incidental findings in the chest, abdomen and pelvis as noted    On bone scan 10/5/2018  SL:  Focal activity in the posterior T10 level and left posterior 7th rib, most concerning for osseous metastases  Met with Dr Kirstin Zhu,  He is scheduled for  left ureteroscopy, cystoscopy, left retrograde pyelogram, laser lithotripsy/basket extraction of the stone and stent replacement on 9/11/2019  Tolerates zytiga well  Test Results:        Labs:   Lab Results   Component Value Date    HGB 14 1 08/30/2019    HCT 41 9 08/30/2019    MCV 97 08/30/2019     08/30/2019    WBC 7 98 08/30/2019    NRBC 0 08/30/2019     Lab Results   Component Value Date     09/30/2017    K 4 4 08/30/2019     08/30/2019    CO2 31 08/30/2019    BUN 24 08/30/2019    CREATININE 1 15 08/30/2019    GLUF 97 08/08/2019    CALCIUM 8 7 08/30/2019    AST 24 08/30/2019    ALT 52 08/30/2019    ALKPHOS 44 (L) 08/30/2019    PROT 6 5 09/30/2017    BILITOT 0 7 09/30/2017    EGFR 61 08/30/2019       Imaging: No results found  ROS:  As mentioned in HPI & Interval History otherwise 14 point ROS negative          Allergies: No Known Allergies  Current Medications: Reviewed  PMH/FH/SH:  Reviewed      Physical Exam:    There is no height or weight on file to calculate BSA  Ht Readings from Last 3 Encounters:   08/30/19 5' 7" (1 702 m)   08/16/19 5' 7" (1 702 m)   08/10/19 5' 7" (1 702 m)        Wt Readings from Last 3 Encounters:   08/30/19 86 2 kg (190 lb)   08/16/19 88 5 kg (195 lb)   08/10/19 86 2 kg (190 lb)        Temp Readings from Last 3 Encounters:   08/10/19 (!) 97 2 °F (36 2 °C) (Tympanic)   08/07/19 98 3 °F (36 8 °C) (Tympanic)   07/25/19 98 2 °F (36 8 °C) (Tympanic)        BP Readings from Last 3 Encounters:   08/30/19 128/51   08/16/19 140/80   08/10/19 (!) 222/107           Physical Exam   Constitutional: He is oriented to person, place, and time  He appears well-developed and well-nourished  No distress  HENT:   Head: Normocephalic and atraumatic  Eyes: Conjunctivae are normal    Neck: Normal range of motion  Neck supple  No tracheal deviation present  Cardiovascular: Normal rate and regular rhythm  Exam reveals no gallop and no friction rub  No murmur heard  Pulmonary/Chest: Effort normal and breath sounds normal  No respiratory distress  He has no wheezes  He has no rales  He exhibits no tenderness  Abdominal: Soft  He exhibits no distension  There is no tenderness  Lymphadenopathy:     He has no cervical adenopathy  Neurological: He is alert and oriented to person, place, and time  Skin: Skin is warm and dry  He is not diaphoretic  No erythema  No pallor  Psychiatric: He has a normal mood and affect  His behavior is normal  Judgment and thought content normal    Vitals reviewed            Emergency Contacts:    John Mack, [phone],

## 2019-09-05 ENCOUNTER — APPOINTMENT (OUTPATIENT)
Dept: LAB | Facility: MEDICAL CENTER | Age: 79
End: 2019-09-05
Payer: COMMERCIAL

## 2019-09-05 ENCOUNTER — OFFICE VISIT (OUTPATIENT)
Dept: HEMATOLOGY ONCOLOGY | Facility: CLINIC | Age: 79
End: 2019-09-05
Payer: COMMERCIAL

## 2019-09-05 ENCOUNTER — TELEPHONE (OUTPATIENT)
Dept: INFUSION CENTER | Facility: CLINIC | Age: 79
End: 2019-09-05

## 2019-09-05 VITALS
TEMPERATURE: 98.4 F | HEIGHT: 67 IN | BODY MASS INDEX: 29.82 KG/M2 | SYSTOLIC BLOOD PRESSURE: 140 MMHG | RESPIRATION RATE: 16 BRPM | OXYGEN SATURATION: 96 % | WEIGHT: 190 LBS | HEART RATE: 58 BPM | DIASTOLIC BLOOD PRESSURE: 82 MMHG

## 2019-09-05 DIAGNOSIS — N20.0 NEPHROLITHIASIS: ICD-10-CM

## 2019-09-05 DIAGNOSIS — C61 PROSTATE CANCER (HCC): ICD-10-CM

## 2019-09-05 DIAGNOSIS — C79.51 BONE METASTASES (HCC): Primary | ICD-10-CM

## 2019-09-05 DIAGNOSIS — I10 ESSENTIAL HYPERTENSION: ICD-10-CM

## 2019-09-05 PROCEDURE — 87086 URINE CULTURE/COLONY COUNT: CPT

## 2019-09-05 PROCEDURE — 99214 OFFICE O/P EST MOD 30 MIN: CPT | Performed by: PHYSICIAN ASSISTANT

## 2019-09-05 NOTE — TELEPHONE ENCOUNTER
Pt would like to change infusion appt on 12/3/19 to the same day as he sees the doctor   Rescheduled for 12/5/19 at 1pm

## 2019-09-06 LAB — BACTERIA UR CULT: NORMAL

## 2019-09-06 RX ORDER — METOPROLOL SUCCINATE 25 MG/1
TABLET, EXTENDED RELEASE ORAL
Qty: 90 TABLET | Refills: 1 | Status: SHIPPED | OUTPATIENT
Start: 2019-09-06 | End: 2020-05-06

## 2019-09-09 ENCOUNTER — TELEPHONE (OUTPATIENT)
Dept: INFUSION CENTER | Facility: CLINIC | Age: 79
End: 2019-09-09

## 2019-09-09 NOTE — TELEPHONE ENCOUNTER
Pt called - needs to change his next infusion appt   Unable to come on 9/12/19 at 1:30pm  Reschedule him for Friday 9/13/19 at 10am

## 2019-09-10 ENCOUNTER — ANESTHESIA EVENT (OUTPATIENT)
Dept: PERIOP | Facility: HOSPITAL | Age: 79
End: 2019-09-10
Payer: COMMERCIAL

## 2019-09-11 ENCOUNTER — APPOINTMENT (OUTPATIENT)
Dept: RADIOLOGY | Facility: HOSPITAL | Age: 79
End: 2019-09-11
Payer: COMMERCIAL

## 2019-09-11 ENCOUNTER — TELEPHONE (OUTPATIENT)
Dept: UROLOGY | Facility: CLINIC | Age: 79
End: 2019-09-11

## 2019-09-11 ENCOUNTER — ANESTHESIA (OUTPATIENT)
Dept: PERIOP | Facility: HOSPITAL | Age: 79
End: 2019-09-11
Payer: COMMERCIAL

## 2019-09-11 ENCOUNTER — HOSPITAL ENCOUNTER (OUTPATIENT)
Facility: HOSPITAL | Age: 79
Setting detail: OUTPATIENT SURGERY
Discharge: HOME | End: 2019-09-11
Attending: UROLOGY | Admitting: UROLOGY
Payer: COMMERCIAL

## 2019-09-11 VITALS
OXYGEN SATURATION: 98 % | BODY MASS INDEX: 29.82 KG/M2 | HEART RATE: 57 BPM | DIASTOLIC BLOOD PRESSURE: 83 MMHG | TEMPERATURE: 97.6 F | RESPIRATION RATE: 20 BRPM | HEIGHT: 67 IN | SYSTOLIC BLOOD PRESSURE: 172 MMHG | WEIGHT: 190 LBS

## 2019-09-11 DIAGNOSIS — N20.1 LEFT URETERAL STONE: ICD-10-CM

## 2019-09-11 DIAGNOSIS — N20.0 CALCULUS OF KIDNEY: Primary | ICD-10-CM

## 2019-09-11 PROCEDURE — C1769 GUIDE WIRE: HCPCS | Performed by: UROLOGY

## 2019-09-11 PROCEDURE — C1758 CATHETER, URETERAL: HCPCS | Performed by: UROLOGY

## 2019-09-11 PROCEDURE — 82360 CALCULUS ASSAY QUANT: CPT | Performed by: UROLOGY

## 2019-09-11 PROCEDURE — 74420 UROGRAPHY RTRGR +-KUB: CPT

## 2019-09-11 PROCEDURE — 52356 CYSTO/URETERO W/LITHOTRIPSY: CPT | Performed by: UROLOGY

## 2019-09-11 PROCEDURE — C2617 STENT, NON-COR, TEM W/O DEL: HCPCS | Performed by: UROLOGY

## 2019-09-11 DEVICE — STENT URETERAL 6 FR 26CM INLAY OPTIMA: Type: IMPLANTABLE DEVICE | Site: URETER | Status: FUNCTIONAL

## 2019-09-11 RX ORDER — PHENAZOPYRIDINE HYDROCHLORIDE 100 MG/1
100 TABLET, FILM COATED ORAL ONCE
Status: COMPLETED | OUTPATIENT
Start: 2019-09-11 | End: 2019-09-11

## 2019-09-11 RX ORDER — SODIUM CHLORIDE 9 MG/ML
125 INJECTION, SOLUTION INTRAVENOUS CONTINUOUS
Status: DISCONTINUED | OUTPATIENT
Start: 2019-09-11 | End: 2019-09-11 | Stop reason: HOSPADM

## 2019-09-11 RX ORDER — PROPOFOL 10 MG/ML
INJECTION, EMULSION INTRAVENOUS AS NEEDED
Status: DISCONTINUED | OUTPATIENT
Start: 2019-09-11 | End: 2019-09-11 | Stop reason: SURG

## 2019-09-11 RX ORDER — PHENAZOPYRIDINE HYDROCHLORIDE 100 MG/1
100 TABLET, FILM COATED ORAL 3 TIMES DAILY PRN
Qty: 20 TABLET | Refills: 0 | Status: SHIPPED | OUTPATIENT
Start: 2019-09-11 | End: 2019-12-05

## 2019-09-11 RX ORDER — SODIUM CHLORIDE 9 MG/ML
20 INJECTION, SOLUTION INTRAVENOUS ONCE
Status: CANCELLED | OUTPATIENT
Start: 2019-09-11

## 2019-09-11 RX ORDER — TAMSULOSIN HYDROCHLORIDE 0.4 MG/1
0.4 CAPSULE ORAL
Qty: 10 CAPSULE | Refills: 0 | Status: SHIPPED | OUTPATIENT
Start: 2019-09-11 | End: 2019-12-05

## 2019-09-11 RX ORDER — CEFUROXIME AXETIL 500 MG/1
500 TABLET ORAL EVERY 12 HOURS SCHEDULED
Qty: 6 TABLET | Refills: 0 | Status: SHIPPED | OUTPATIENT
Start: 2019-09-11 | End: 2019-09-14

## 2019-09-11 RX ORDER — FENTANYL CITRATE 50 UG/ML
INJECTION, SOLUTION INTRAMUSCULAR; INTRAVENOUS AS NEEDED
Status: DISCONTINUED | OUTPATIENT
Start: 2019-09-11 | End: 2019-09-11 | Stop reason: SURG

## 2019-09-11 RX ORDER — ONDANSETRON 2 MG/ML
INJECTION INTRAMUSCULAR; INTRAVENOUS AS NEEDED
Status: DISCONTINUED | OUTPATIENT
Start: 2019-09-11 | End: 2019-09-11 | Stop reason: SURG

## 2019-09-11 RX ORDER — LIDOCAINE HYDROCHLORIDE 20 MG/ML
JELLY TOPICAL AS NEEDED
Status: DISCONTINUED | OUTPATIENT
Start: 2019-09-11 | End: 2019-09-11 | Stop reason: HOSPADM

## 2019-09-11 RX ORDER — ALBUTEROL SULFATE 2.5 MG/3ML
2.5 SOLUTION RESPIRATORY (INHALATION) ONCE AS NEEDED
Status: DISCONTINUED | OUTPATIENT
Start: 2019-09-11 | End: 2019-09-11 | Stop reason: HOSPADM

## 2019-09-11 RX ORDER — MEPERIDINE HYDROCHLORIDE 50 MG/ML
12.5 INJECTION INTRAMUSCULAR; INTRAVENOUS; SUBCUTANEOUS AS NEEDED
Status: DISCONTINUED | OUTPATIENT
Start: 2019-09-11 | End: 2019-09-11 | Stop reason: HOSPADM

## 2019-09-11 RX ORDER — TAMSULOSIN HYDROCHLORIDE 0.4 MG/1
0.4 CAPSULE ORAL ONCE
Status: COMPLETED | OUTPATIENT
Start: 2019-09-11 | End: 2019-09-11

## 2019-09-11 RX ORDER — CEFAZOLIN SODIUM 2 G/50ML
2000 SOLUTION INTRAVENOUS ONCE
Status: COMPLETED | OUTPATIENT
Start: 2019-09-11 | End: 2019-09-11

## 2019-09-11 RX ORDER — FENTANYL CITRATE/PF 50 MCG/ML
50 SYRINGE (ML) INJECTION
Status: DISCONTINUED | OUTPATIENT
Start: 2019-09-11 | End: 2019-09-11 | Stop reason: HOSPADM

## 2019-09-11 RX ADMIN — ONDANSETRON 4 MG: 2 INJECTION INTRAMUSCULAR; INTRAVENOUS at 09:37

## 2019-09-11 RX ADMIN — SODIUM CHLORIDE 125 ML/HR: 0.9 INJECTION, SOLUTION INTRAVENOUS at 07:56

## 2019-09-11 RX ADMIN — FENTANYL CITRATE 50 MCG: 50 INJECTION, SOLUTION INTRAMUSCULAR; INTRAVENOUS at 09:21

## 2019-09-11 RX ADMIN — PHENAZOPYRIDINE HYDROCHLORIDE 100 MG: 100 TABLET ORAL at 10:54

## 2019-09-11 RX ADMIN — FENTANYL CITRATE 50 MCG: 50 INJECTION, SOLUTION INTRAMUSCULAR; INTRAVENOUS at 08:48

## 2019-09-11 RX ADMIN — TAMSULOSIN HYDROCHLORIDE 0.4 MG: 0.4 CAPSULE ORAL at 10:54

## 2019-09-11 RX ADMIN — PROPOFOL 200 MG: 10 INJECTION, EMULSION INTRAVENOUS at 08:48

## 2019-09-11 RX ADMIN — CEFAZOLIN SODIUM 2000 MG: 2 SOLUTION INTRAVENOUS at 08:41

## 2019-09-11 RX ADMIN — SODIUM CHLORIDE: 0.9 INJECTION, SOLUTION INTRAVENOUS at 09:10

## 2019-09-11 NOTE — TELEPHONE ENCOUNTER
Patient can have stent on string removal in 3-5 days  Can undergo KUB and US prior to his visit with me in February

## 2019-09-11 NOTE — OP NOTE
OPERATIVE REPORT  PATIENT NAME: Bianca Allen    :  1940  MRN: [id number]  Pt Location: [facility] OR ROOM 01    SURGERY DATE: 2019    Surgeon(s) and Role:     * Nohemy Bean MD - Primary    Preop Diagnosis:  Left ureteral stone [N20 1]    Post-Op Diagnosis Codes:     * Left ureteral stone [N20 1]    Procedure(s) (LRB):  CYSTO, URETEROSCOPY W/HOLMIUM LASER, BASKET STONE EXTRACTION, RETROGRADE PYELOGRAM, STENT EXCHANGE (Left)    Specimen(s):  ID Type Source Tests Collected by Time Destination   A :  Calculus Kidney, Left STONE ANALYSIS Nohemy Bean MD 2019 [id number]        Estimated Blood Loss:   Minimal    Drains:  Ureteral Drain/Stent Left ureter 6 Fr  (Active)   Number of days: 0       Anesthesia Type:   General    Operative Indications:  Left ureteral stone [N20 1]      Operative Findings:  Stone in lower pole of kidney, moved to upper pole and fragmented    Complications:   None    Procedure and Technique:  Bianca Allen is a 69y o -year-old male  with a history of left-sided stone with prior stent and an outside hospital     Risk and benefits of ureteroscopy were discussed and reviewed  Informed consent was obtained  The patient was brought to the operating room on 2019  After the smooth induction of general LMA anesthesia, the patient was placed in the dorsal lithotomy position  His genitalia was prepped and draped in a sterile fashion  Intravenous antibiotics were administered in the form of Ancef  A timeout was performed with all members of the operative team confirmed the patient's identity, procedure to be performed, and laterality of the case  A 22 Citizen of [place] rigid cystoscope with 30° lens was inserted  The bladder was thoroughly inspected  It was no evidence of mucosal abnormalities or lesions  There were no calculi identified  Attention was focused on the left ureteral orifice  Indwelling stent was present   flouroscopy demonstrated a radioopaque stone    A Bard Solo Plus wire was passed proximal to the indwelling stent and secured as a safety  The stent was then removed with a grasper  Long semi rigid ureteral scope was passed into the left ureteral orifice and up to the ureteropelvic junction  No stone was encounter  A 2nd Bard solo +wire was introduced and the ureteral scope was removed  A 12/1435 cm ureteral access sheath was placed over the working wire  An 8 3 [employer] Natasha HD Olympus flexible ureteral scope was then passed  We navigated the scope into the lower pole and used a [employer] light basket to grasp the stone  This was moved into the upper pole calyx  The stone was engaged and decimated with a 365nm  holmium laser fiber  The fragments were removed with an Escape basket  The ureteroscope was then repassed into the major and minor calices multiple times  Additional contrast was injected as a roadmap for stent insertion  The ureteroscope was then removed  Inspecting the ureter which was free and clear of stone debris  The wire was backloaded through the cystoscope  The cystoscope was repassed into the bladder  A 6 [place] 26 double-J ureteral stent was then placed over the previously banked safety wire without difficulty  The proximal coil was appreciated in the left renal pelvis and the distal coil was visualized within the bladder  The bladder was emptied and the cystoscope was removed  A string was left in place and secured to the dorsal penis with Tegaderm  2% viscous lidocaine was placed per urethra  Overall the patient tolerated the procedure well and were no complications  The patient was extubated in the operating room and transferred to the PACU in stable condition at the conclusion of the case      Plan-stent on string removal in 3-5 days     I was present for the entire procedure    Patient Disposition:  PACU     SIGNATURE: Ashok Joyce MD  DATE: September 11, 2019  TIME: 9:53 AM

## 2019-09-11 NOTE — ANESTHESIA PREPROCEDURE EVALUATION
Review of Systems/Medical History  Patient summary reviewed  Chart reviewed  No history of anesthetic complications     Cardiovascular  Hyperlipidemia, Hypertension , CAD , Cardiac stents    Comment: Ef 58%,  Pulmonary  Negative pulmonary ROS        GI/Hepatic  Negative GI/hepatic ROS          Kidney stones,        Endo/Other     GYN       Hematology  Anemia ,     Musculoskeletal    Arthritis     Neurology   Psychology           Physical Exam    Airway    Mallampati score: II  TM Distance: >3 FB  Neck ROM: full     Dental   No notable dental hx     Cardiovascular  Rhythm: regular, Rate: normal, Cardiovascular exam normal    Pulmonary  Pulmonary exam normal Breath sounds clear to auscultation,     Other Findings        Anesthesia Plan  ASA Score- 3     Anesthesia Type- general with ASA Monitors  Additional Monitors:   Airway Plan: ETT  Comment: Did not take beta blocker today   Plan Factors-    Induction- intravenous  Postoperative Plan-     Informed Consent- Anesthetic plan and risks discussed with patient

## 2019-09-11 NOTE — TELEPHONE ENCOUNTER
Contacted and spoke with patient to schedule an appointment for stent removal   Patient scheduled 09/16/2019 at 56 at the [facility] office with Yolie Nicholas RN

## 2019-09-11 NOTE — DISCHARGE INSTRUCTIONS
You have stent on a string, which will be removed in 3-5 days  Please take care not to remove with dressing or undressing  Our office staff will contact you to arrange an appointment for removal      Ureteroscopy   WHAT YOU NEED TO KNOW:   A ureteroscopy is a procedure to examine in the inside of your urinary tract, which includes your urethra, bladder, ureters, and kidneys  A ureteroscope is a small, thin tube with a light and camera on the end  Ureteroscopy can help your healthcare provider diagnose and treat problems in your urinary tract, such as kidney stones  DISCHARGE INSTRUCTIONS:   Medicine:   · Antibiotics  may be given to treat or prevent an infection  · Take your medicine as directed  Contact your healthcare provider if you think your medicine is not helping or if you have side effects  Tell him or her if you are allergic to any medicine  Keep a list of the medicines, vitamins, and herbs you take  Include the amounts, and when and why you take them  Bring the list or the pill bottles to follow-up visits  Carry your medicine list with you in case of an emergency  Follow up with your healthcare provider as directed:  Write down your questions so you remember to ask them during your visits  Drink liquids as directed  Liquids can help prevent kidney stones and urinary tract infections  Drink water and limit the amount of caffeine you drink  Caffeine may be found in coffee, tea, soda, sports drinks, and foods  Ask your healthcare provider how much liquid to drink each day  Contact your healthcare provider if:   · You have a fever  · You cannot urinate  · You have blood in your urine  · You are vomiting  · You have pain in your abdomen or side  · You have questions or concerns about your condition or care  © 2017 [address] [employer] Information is for End User's use only and may not be sold, redistributed or otherwise used for commercial purposes   All illustrations and images included in CareNotes® are the copyrighted property of A D A M , Inc  or Elmo Aleman  The above information is an  only  It is not intended as medical advice for individual conditions or treatments  Talk to your doctor, nurse or pharmacist before following any medical regimen to see if it is safe and effective for you

## 2019-09-11 NOTE — INTERVAL H&P NOTE
H&P reviewed  After examining the patient I find no changes in the patients condition since the H&P had been written      Vitals:    09/11/19 0739   BP: (!) 173/98   Pulse: 56   Resp: 16   Temp: 97 6 °F (36 4 °C)   SpO2: 97%

## 2019-09-12 ENCOUNTER — HOSPITAL ENCOUNTER (OUTPATIENT)
Dept: INFUSION CENTER | Facility: CLINIC | Age: 79
End: 2019-09-12

## 2019-09-13 ENCOUNTER — HOSPITAL ENCOUNTER (OUTPATIENT)
Dept: INFUSION CENTER | Facility: CLINIC | Age: 79
Discharge: HOME | End: 2019-09-13
Payer: COMMERCIAL

## 2019-09-13 VITALS
TEMPERATURE: 97.6 F | OXYGEN SATURATION: 96 % | HEART RATE: 65 BPM | DIASTOLIC BLOOD PRESSURE: 86 MMHG | SYSTOLIC BLOOD PRESSURE: 147 MMHG | RESPIRATION RATE: 16 BRPM | WEIGHT: 200.4 LBS | BODY MASS INDEX: 30.37 KG/M2 | HEIGHT: 68 IN

## 2019-09-13 DIAGNOSIS — C61 PROSTATE CANCER (HCC): ICD-10-CM

## 2019-09-13 DIAGNOSIS — C79.51 BONE METASTASES (HCC): Primary | ICD-10-CM

## 2019-09-13 PROCEDURE — 96365 THER/PROPH/DIAG IV INF INIT: CPT

## 2019-09-13 RX ORDER — SODIUM CHLORIDE 9 MG/ML
20 INJECTION, SOLUTION INTRAVENOUS ONCE
Status: COMPLETED | OUTPATIENT
Start: 2019-09-13 | End: 2019-09-13

## 2019-09-13 RX ORDER — SODIUM CHLORIDE 9 MG/ML
20 INJECTION, SOLUTION INTRAVENOUS ONCE
Status: CANCELLED | OUTPATIENT
Start: 2019-12-04

## 2019-09-13 RX ADMIN — ZOLEDRONIC ACID 3.5 MG: 4 INJECTION, SOLUTION, CONCENTRATE INTRAVENOUS at 11:17

## 2019-09-13 RX ADMIN — SODIUM CHLORIDE 20 ML/HR: 0.9 INJECTION, SOLUTION INTRAVENOUS at 10:59

## 2019-09-13 NOTE — PLAN OF CARE
Problem: Potential for Falls  Goal: Patient will remain free of falls  Description  INTERVENTIONS:  - Assess patient frequently for physical needs  -  Identify cognitive and physical deficits and behaviors that affect risk of falls    -  [place] fall precautions as indicated by assessment   - Educate patient/family on patient safety including physical limitations  - Instruct patient to call for assistance with activity based on assessment  - Modify environment to reduce risk of injury  - Consider OT/PT consult to assist with strengthening/mobility  Outcome: Progressing

## 2019-09-15 DIAGNOSIS — C61 PROSTATE CANCER (HCC): ICD-10-CM

## 2019-09-16 ENCOUNTER — PROCEDURE VISIT (OUTPATIENT)
Dept: UROLOGY | Facility: CLINIC | Age: 79
End: 2019-09-16
Payer: COMMERCIAL

## 2019-09-16 VITALS
HEART RATE: 62 BPM | SYSTOLIC BLOOD PRESSURE: 168 MMHG | DIASTOLIC BLOOD PRESSURE: 88 MMHG | HEIGHT: 67 IN | BODY MASS INDEX: 31.77 KG/M2 | WEIGHT: 202.4 LBS

## 2019-09-16 DIAGNOSIS — N20.0 CALCULUS OF KIDNEY: ICD-10-CM

## 2019-09-16 DIAGNOSIS — N20.1 LEFT URETERAL STONE: ICD-10-CM

## 2019-09-16 PROCEDURE — 99211 OFF/OP EST MAY X REQ PHY/QHP: CPT

## 2019-09-16 NOTE — PROGRESS NOTES
9/16/2019  Charo Moctezuma is a 69 y o  male  [id number]        Diagnosis  Chief Complaint     Nephrolithiasis          Patient is s/p left ureteroscopy with stone extraction on 9/11/19 with Dr Maryuri Guo  Patient will FU as scheduled in February for his routine prostate cancer FU and will also have KUB and renal US PTV  Procedure Stent with String Removal    Vitals:    09/16/19 1025   BP: 168/88   BP Location: Left arm   Patient Position: Sitting   Cuff Size: Standard   Pulse: 62   Weight: 91 8 kg (202 lb 6 4 oz)   Height: 5' 7" (1 702 m)       Stent with string removed intact without difficulty  Reviewed post stent removal symptoms including flank pain, dysuria, and hematuria  Instructed patient to increase oral fluid intake  Encouraged the use of NSAIDS and other prescribed pain medication as needed for discomfort  Patient instructed to call the office or report to the ER for uncontrolled pain, fever, chills, nausea or vomiting         Lauretha Rand, RN Cleophus Galeazzi, LORETTAN

## 2019-09-16 NOTE — PATIENT INSTRUCTIONS
URETERAL STENTS     [id number]  [id number]  A ureteral stent is a soft plastic tube with holes in it  It's temporarily inserted into a ureter to help drain urine into the bladder  One end goes in the kidney  The other end goes in the bladder  A coil on each end holds the stent in place  The stent can't be seen from outside the body  It shouldn't interfere with your normal routine  When Is a Ureteral Stent Used?  To bypass a blockage in a kidney or ureter   During kidney stone removal    To let a ureter heal after surgery  After the Procedure:  After the procedure you may experience the following symptoms  All of these are normal and should resolve within 1 or 2 days after your stent is removed:   Urinary frequency (urinating more often than usual)   Urinary urgency (the sensation that you need to urinate right away)   Painful urination (this can be pain in your bladder or in your back when  you urinate)   Blood in your urine ( a stent can irritate the lining of your bladder causing it to bleed)   Back/Flank pain, especially with urination    Staying Comfortable with a Stent in Place:   Fluids: Stay very well hydrated  Try to drink at least 6-8 glasses of water per day   Ibuprofen 600mg every 6-8 hours  Ask your doctor if you need to reduce the dose (ie, for renal insufficiency)   Flomax (tamsulosin): Usually provided at hospital discharge  Taken daily, relaxes the muscle around the ureter tube, and makes stents more comfortable   Narcotics (Percocet, Vicodin, Oxycodone) are usually provided at hospital discharge - for severe pain when needed   Use a stool softener! Constipation worsens stent discomfort  We recommend using Colace, Miralax, and/or Senna every day  Stent Removal:  You will be scheduled for stent removal in the office  In most cases this is removed by the nurse, using the black string which is secured from your urethra    Sometimes stents are removed in the office using a cystoscope  Stent removal   Blood may continue in the urine for a few days   May develop Flank pain on the side the stent was placed, this may last a few days  Take ibuprofen 600 mg by mouth, three times a day (every 8 hours), as needed   Increase water intake   No restrictions after stent removed      Call Your Doctor   You have a fever over 101°F (38 5°C), chills, nausea, or vomiting   Your urine contains blood clots or you see a large amount of blood-tinged urine      Your pain is not relieved with medication   You constantly leak urine   Your stent is accidentally removed

## 2019-09-16 NOTE — PROGRESS NOTES
9/16/19:  Patient did not f/u with phone call in regards to thoracic sx's and is currently at 4 weeks post IE    Pt is DC'd from PT

## 2019-09-17 RX ORDER — PREDNISONE 1 MG/1
5 TABLET ORAL 2 TIMES DAILY WITH MEALS
Qty: 60 TABLET | Refills: 0 | Status: SHIPPED | OUTPATIENT
Start: 2019-09-17 | End: 2019-10-19 | Stop reason: SDUPTHER

## 2019-09-18 LAB
CA PHOS MFR STONE: 5 %
CALCIUM OXALATE DIHYDRATE MFR STONE IR: 5 %
COLOR STONE: NORMAL
COM MFR STONE: 90 %
COMMENT-STONE3: NORMAL
COMPOSITION: NORMAL
LABORATORY COMMENT REPORT: NORMAL
NIDUS STONE QL: NORMAL
PHOTO: NORMAL
SIZE STONE: NORMAL MM
STONE ANALYSIS-IMP: NORMAL
SURFACE CRYSTALS: NORMAL
WT STONE: 96.5 MG

## 2019-09-20 ENCOUNTER — OFFICE VISIT (OUTPATIENT)
Dept: PODIATRY | Facility: CLINIC | Age: 79
End: 2019-09-20
Payer: COMMERCIAL

## 2019-09-20 VITALS
BODY MASS INDEX: 31.71 KG/M2 | SYSTOLIC BLOOD PRESSURE: 129 MMHG | HEIGHT: 67 IN | WEIGHT: 202 LBS | DIASTOLIC BLOOD PRESSURE: 85 MMHG | HEART RATE: 82 BPM

## 2019-09-20 DIAGNOSIS — S90.32XD CONTUSION OF LEFT HEEL, SUBSEQUENT ENCOUNTER: Primary | ICD-10-CM

## 2019-09-20 DIAGNOSIS — S86.012D STRAIN OF LEFT ACHILLES TENDON, SUBSEQUENT ENCOUNTER: ICD-10-CM

## 2019-09-20 PROCEDURE — 99213 OFFICE O/P EST LOW 20 MIN: CPT | Performed by: PODIATRIST

## 2019-09-20 NOTE — PROGRESS NOTES
PATIENT:  Brandon Mcfadden  1940       ASSESSMENT:     1  Contusion of left heel, subsequent encounter     2  Strain of left Achilles tendon, subsequent encounter               PLAN:  Patient was counseled and educated on the condition and the diagnosis  The diagnosis, treatment options and prognosis were discussed with the patient  Will continue CAM walker  Instructed ROM exercise  Continue supportive care, icing, and resting  Repeat X-ray in 2 weeks to ensure he does not have avulsion fracture of calcaneus  Discussed possible MRI depending on the progress  Patient will return in 2 weeks for re-evaluation  Subjective:       HPI  The patient presents for left foot evaluation  His pain is about 70% better now  No pain when he wears CAM walker  No edema  No numbness or paresthesia  The following portions of the patient's history were reviewed and updated as appropriate: allergies, current medications, past family history, past medical history, past social history, past surgical history and problem list   All pertinent labs and images were reviewed        Past Medical History  Past Medical History:   Diagnosis Date    Anemia     last assessed  1/7/14     Coronary artery disease     Hypercholesteremia     Hypertension     Kidney stone     Polyp of sigmoid colon     Prostate cancer ([facility] [address]     Renal disorder     elevated serum creat    Tinnitus     unspecified laterality / last assessed 4/9/13    Vitamin D deficiency        Past Surgical History  Past Surgical History:   Procedure Laterality Date    CARDIAC SURGERY      COLONOSCOPY      CORONARY ANGIOPLASTY WITH STENT PLACEMENT      FL RETROGRADE PYELOGRAM  9/11/2019    HEMORRHOID SURGERY      [place] CYSTO/URETERO W/LITHOTRIPSY &INDWELL STENT INSRT Left 9/11/2019    Procedure: CYSTO, URETEROSCOPY W/HOLMIUM LASER, BASKET STONE EXTRACTION, RETROGRADE PYELOGRAM, STENT EXCHANGE;  Surgeon: Cris Qiuck MD;  Location: [facility];  Service: Urology    PROSTATE BIOPSY  10/24/2018    TONSILLECTOMY          Allergies:  Patient has no known allergies  Medications:  Current Outpatient Medications   Medication Sig Dispense Refill    abiraterone (ZYTIGA) 250 mg tablet Take 4 tablets (1,000 mg total) by mouth daily 120 tablet 4    aspirin (ASPIRIN ADULT LOW STRENGTH) 81 mg EC tablet Take 1 tablet by mouth daily      atorvastatin (LIPITOR) 40 mg tablet Take 40 mg by mouth daily       Calcium 500 MG tablet Take 1,000 mg by mouth daily       cholecalciferol (VITAMIN D3) 1,000 units tablet Take 1 tablet by mouth daily      leuprolide (LUPRON DEPOT 3 MONTH KIT) 22 5 mg injection Inject 22 5 mg into a muscle every 6 (six) months      losartan (COZAAR) 100 MG tablet take 1 tablet by mouth once daily 90 tablet 1    metoprolol succinate (TOPROL-XL) 25 mg 24 hr tablet TAKE 1/2 TABLET BY MOUTH ONCE DAILY 90 tablet 1    predniSONE 5 mg tablet Take 1 tablet (5 mg total) by mouth 2 (two) times a day with meals 60 tablet 0    tamsulosin (FLOMAX) 0 4 mg Take 1 capsule (0 4 mg total) by mouth daily with dinner for 10 doses 10 capsule 0    Zoledronic Acid (ZOMETA IV) Infuse into a venous catheter every 3 (three) months      phenazopyridine (PYRIDIUM) 100 mg tablet Take 1 tablet (100 mg total) by mouth 3 (three) times a day as needed for bladder spasms (Patient not taking: Reported on 9/16/2019) 20 tablet 0     No current facility-administered medications for this visit          Social History:  Social History     Socioeconomic History    Marital status: /Civil Union     Spouse name: None    Number of children: None    Years of education: None    Highest education level: None   Occupational History    Occupation: consultant   Social Needs    Financial resource strain: None    Food insecurity:     Worry: None     Inability: None    Transportation needs:     Medical: None     Non-medical: None   Tobacco Use    Smoking status: Never Smoker    Smokeless tobacco: Never Used   Substance and Sexual Activity    Alcohol use: Yes     Frequency: 2-3 times a week     Comment: Occuational / social     Drug use: No    Sexual activity: None   Lifestyle    Physical activity:     Days per week: None     Minutes per session: None    Stress: None   Relationships    Social connections:     Talks on phone: None     Gets together: None     Attends Taoist service: None     Active member of club or organization: None     Attends meetings of clubs or organizations: None     Relationship status: None    Intimate partner violence:     Fear of current or ex partner: None     Emotionally abused: None     Physically abused: None     Forced sexual activity: None   Other Topics Concern    None   Social History Narrative    Always uses seat belt     Daily caffeine consumption 2-3 servings a day     Feels safe at home          Review of Systems   Constitutional: Negative for chills and fever  Respiratory: Negative for shortness of breath  Cardiovascular: Negative for chest pain and leg swelling  Gastrointestinal: Negative for nausea and vomiting  Musculoskeletal: Negative for joint swelling  Neurological: Negative for weakness and numbness  Objective:      /85   Pulse 82   Ht 5' 7" (1 702 m)   Wt 91 6 kg (202 lb) Comment: verbal pt is in cam walker  BMI 31 64 kg/m²          Physical Exam   Constitutional: He is oriented to person, place, and time  He appears well-developed and well-nourished  No distress  Cardiovascular: Normal rate, regular rhythm and intact distal pulses  Pulmonary/Chest: Effort normal and breath sounds normal  No respiratory distress  Musculoskeletal: He exhibits no edema  Mild tenderness noted left posterior calcaneus  Negative Morales sign  Achilles tendon is intact left ankle  No significant edema  No ecchymosis  ROM WNL left ankle     Neurological: He is alert and oriented to person, place, and time  No sensory deficit  He exhibits normal muscle tone  Skin: Capillary refill takes less than 2 seconds  No rash noted  No erythema  No pallor  Vitals reviewed

## 2019-10-03 ENCOUNTER — OFFICE VISIT (OUTPATIENT)
Dept: PODIATRY | Facility: CLINIC | Age: 79
End: 2019-10-03
Payer: COMMERCIAL

## 2019-10-03 VITALS — HEIGHT: 67 IN | WEIGHT: 202.4 LBS | BODY MASS INDEX: 31.77 KG/M2

## 2019-10-03 DIAGNOSIS — S86.012D STRAIN OF LEFT ACHILLES TENDON, SUBSEQUENT ENCOUNTER: Primary | ICD-10-CM

## 2019-10-03 DIAGNOSIS — M25.775 OSTEOPHYTE, LEFT FOOT: ICD-10-CM

## 2019-10-03 DIAGNOSIS — S90.32XD CONTUSION OF LEFT HEEL, SUBSEQUENT ENCOUNTER: ICD-10-CM

## 2019-10-03 PROCEDURE — 99213 OFFICE O/P EST LOW 20 MIN: CPT | Performed by: PODIATRIST

## 2019-10-03 NOTE — PROGRESS NOTES
PATIENT:  Baron Tovar  1940       ASSESSMENT:     1  Strain of left Achilles tendon, subsequent encounter     2  Contusion of left heel, subsequent encounter     3  Osteophyte, left foot               PLAN:  Patient was counseled and educated on the condition and the diagnosis  The diagnosis, treatment options and prognosis were discussed with the patient  He is responding to the treatment well  Instructed to continue ROM exercise, stretching, and icing  Recommended heel lift as well  Discussed preventive care  Will hold off on further X-ray at this time  Subjective:       HPI  The patient presents for left foot evaluation  His pain is minimal at this time  It is 1 out of 10  He presents with sneakers today  He tolerates regular shoes in the last few days  No edema  No numbness or paresthesia  The following portions of the patient's history were reviewed and updated as appropriate: allergies, current medications, past family history, past medical history, past social history, past surgical history and problem list   All pertinent labs and images were reviewed        Past Medical History  Past Medical History:   Diagnosis Date    Anemia     last assessed  1/7/14     Coronary artery disease     Hypercholesteremia     Hypertension     Kidney stone     Polyp of sigmoid colon     Prostate cancer ([facility] [address]     Renal disorder     elevated serum creat    Tinnitus     unspecified laterality / last assessed 4/9/13    Vitamin D deficiency        Past Surgical History  Past Surgical History:   Procedure Laterality Date    CARDIAC SURGERY      COLONOSCOPY      CORONARY ANGIOPLASTY WITH STENT PLACEMENT      FL RETROGRADE PYELOGRAM  9/11/2019    HEMORRHOID SURGERY      [place] CYSTO/URETERO W/LITHOTRIPSY &INDWELL STENT INSRT Left 9/11/2019    Procedure: CYSTO, URETEROSCOPY W/HOLMIUM LASER, BASKET STONE EXTRACTION, RETROGRADE PYELOGRAM, STENT EXCHANGE;  Surgeon: Lea Brown Rhys Enrique MD;  Location: [facility];  Service: Urology    PROSTATE BIOPSY  10/24/2018    TONSILLECTOMY          Allergies:  Patient has no known allergies  Medications:  Current Outpatient Medications   Medication Sig Dispense Refill    abiraterone (ZYTIGA) 250 mg tablet Take 4 tablets (1,000 mg total) by mouth daily 120 tablet 4    aspirin (ASPIRIN ADULT LOW STRENGTH) 81 mg EC tablet Take 1 tablet by mouth daily      atorvastatin (LIPITOR) 40 mg tablet Take 40 mg by mouth daily       Calcium 500 MG tablet Take 1,000 mg by mouth daily       cholecalciferol (VITAMIN D3) 1,000 units tablet Take 1 tablet by mouth daily      leuprolide (LUPRON DEPOT 3 MONTH KIT) 22 5 mg injection Inject 22 5 mg into a muscle every 6 (six) months      losartan (COZAAR) 100 MG tablet take 1 tablet by mouth once daily 90 tablet 1    metoprolol succinate (TOPROL-XL) 25 mg 24 hr tablet TAKE 1/2 TABLET BY MOUTH ONCE DAILY 90 tablet 1    predniSONE 5 mg tablet Take 1 tablet (5 mg total) by mouth 2 (two) times a day with meals 60 tablet 0    Zoledronic Acid (ZOMETA IV) Infuse into a venous catheter every 3 (three) months      phenazopyridine (PYRIDIUM) 100 mg tablet Take 1 tablet (100 mg total) by mouth 3 (three) times a day as needed for bladder spasms (Patient not taking: Reported on 9/16/2019) 20 tablet 0    tamsulosin (FLOMAX) 0 4 mg Take 1 capsule (0 4 mg total) by mouth daily with dinner for 10 doses 10 capsule 0     No current facility-administered medications for this visit          Social History:  Social History     Socioeconomic History    Marital status: /Civil Union     Spouse name: None    Number of children: None    Years of education: None    Highest education level: None   Occupational History    Occupation: consultant   Social Needs    Financial resource strain: None    Food insecurity:     Worry: None     Inability: None    Transportation needs:     Medical: None     Non-medical: None Tobacco Use    Smoking status: Never Smoker    Smokeless tobacco: Never Used   Substance and Sexual Activity    Alcohol use: Yes     Frequency: 2-3 times a week     Comment: Occuational / social     Drug use: No    Sexual activity: None   Lifestyle    Physical activity:     Days per week: None     Minutes per session: None    Stress: None   Relationships    Social connections:     Talks on phone: None     Gets together: None     Attends Latter-day service: None     Active member of club or organization: None     Attends meetings of clubs or organizations: None     Relationship status: None    Intimate partner violence:     Fear of current or ex partner: None     Emotionally abused: None     Physically abused: None     Forced sexual activity: None   Other Topics Concern    None   Social History Narrative    Always uses seat belt     Daily caffeine consumption 2-3 servings a day     Feels safe at home          Review of Systems   Constitutional: Negative for chills and fever  Respiratory: Negative for shortness of breath  Cardiovascular: Negative for chest pain and leg swelling  Gastrointestinal: Negative for nausea and vomiting  Musculoskeletal: Negative for joint swelling  Neurological: Negative for weakness and numbness  Objective:      Ht 5' 7" (1 702 m)   Wt 91 8 kg (202 lb 6 4 oz)   BMI 31 70 kg/m²          Physical Exam   Constitutional: He is oriented to person, place, and time  He appears well-developed and well-nourished  No distress  Cardiovascular: Normal rate, regular rhythm and intact distal pulses  Pulmonary/Chest: Effort normal and breath sounds normal  No respiratory distress  Musculoskeletal: He exhibits no edema  No tenderness noted left posterior calcaneus  bony prominence noted left posterior calcaneus without edema  Negative Morales sign  Achilles tendon is intact left ankle  ROM WNL left ankle     Neurological: He is alert and oriented to person, place, and time  No sensory deficit  He exhibits normal muscle tone  Skin: Capillary refill takes less than 2 seconds  No rash noted  No erythema  No pallor  Psychiatric: He has a normal mood and affect  Vitals reviewed

## 2019-10-19 DIAGNOSIS — C61 PROSTATE CANCER (HCC): ICD-10-CM

## 2019-10-20 RX ORDER — PREDNISONE 1 MG/1
TABLET ORAL
Qty: 60 TABLET | Refills: 0 | Status: SHIPPED | OUTPATIENT
Start: 2019-10-20 | End: 2019-11-10 | Stop reason: SDUPTHER

## 2019-10-21 ENCOUNTER — APPOINTMENT (OUTPATIENT)
Dept: RADIOLOGY | Facility: CLINIC | Age: 79
End: 2019-10-21
Payer: COMMERCIAL

## 2019-10-21 ENCOUNTER — OFFICE VISIT (OUTPATIENT)
Dept: FAMILY MEDICINE CLINIC | Facility: CLINIC | Age: 79
End: 2019-10-21
Payer: COMMERCIAL

## 2019-10-21 VITALS
RESPIRATION RATE: 17 BRPM | OXYGEN SATURATION: 96 % | HEIGHT: 68 IN | DIASTOLIC BLOOD PRESSURE: 84 MMHG | SYSTOLIC BLOOD PRESSURE: 146 MMHG | WEIGHT: 204.6 LBS | HEART RATE: 62 BPM | BODY MASS INDEX: 31.01 KG/M2 | TEMPERATURE: 97.3 F

## 2019-10-21 DIAGNOSIS — Z23 NEED FOR IMMUNIZATION AGAINST INFLUENZA: Primary | ICD-10-CM

## 2019-10-21 DIAGNOSIS — M54.50 ACUTE BILATERAL LOW BACK PAIN WITHOUT SCIATICA: ICD-10-CM

## 2019-10-21 PROCEDURE — 90662 IIV NO PRSV INCREASED AG IM: CPT | Performed by: FAMILY MEDICINE

## 2019-10-21 PROCEDURE — G0008 ADMIN INFLUENZA VIRUS VAC: HCPCS | Performed by: FAMILY MEDICINE

## 2019-10-21 PROCEDURE — 99214 OFFICE O/P EST MOD 30 MIN: CPT | Performed by: FAMILY MEDICINE

## 2019-10-21 PROCEDURE — 72110 X-RAY EXAM L-2 SPINE 4/>VWS: CPT

## 2019-10-21 RX ORDER — TIZANIDINE HYDROCHLORIDE 4 MG/1
4 CAPSULE, GELATIN COATED ORAL
Qty: 30 CAPSULE | Refills: 0 | Status: SHIPPED | OUTPATIENT
Start: 2019-10-21 | End: 2019-12-05

## 2019-10-21 NOTE — PROGRESS NOTES
Assessment/Plan:   1  Acute bilateral low back pain without sciatica  Reviewed patient's symptoms today  At this time, given the duration of his back pain, will check x-ray to rule out gross abnormalities  Continue with supportive care  Elevate legs at nighttime  He may use a heating pad alternating with an ice pack for 15 minutes duration multiple times a day  He may continue with Tylenol  Will add tizanidine for further treatment relief  He was advised that he would highly benefit from seeing physical therapy  Will check x-ray 1st and then proceed further with his treatment plan  - XR spine lumbar minimum 4 views non injury; Future  - TiZANidine (ZANAFLEX) 4 MG capsule; Take 1 capsule (4 mg total) by mouth daily at bedtime as needed for muscle spasms  Dispense: 30 capsule; Refill: 0     Diagnoses and all orders for this visit:    Need for immunization against influenza  -     influenza vaccine, 6727-4480, high-dose, PF 0 5 mL (FLUZONE HIGH-DOSE)          Subjective:    Chief Complaint   Patient presents with    Back Pain     Pt c/o back pain  Pt denies any injury  Patient ID: Toby Rollins is a 67 y o  male  Back Pain   This is a new problem  Episode onset: 3-4 weeks ago  The problem occurs intermittently  The problem is unchanged  The pain is present in the lumbar spine  The quality of the pain is described as stabbing  The pain does not radiate  The pain is at a severity of 7/10  The pain is mild  The symptoms are aggravated by position and bending  Pertinent negatives include no abdominal pain, bladder incontinence, bowel incontinence, chest pain, dysuria, fever, headaches, leg pain, numbness, paresthesias, perianal numbness or tingling  Treatments tried: tylenol  Review of Systems   Constitutional: Negative for activity change, chills, fatigue and fever  HENT: Negative for congestion, ear pain, sinus pressure and sore throat      Eyes: Negative for redness, itching and visual disturbance  Respiratory: Negative for cough and shortness of breath  Cardiovascular: Negative for chest pain and palpitations  Gastrointestinal: Negative for abdominal pain, bowel incontinence, diarrhea and nausea  Endocrine: Negative for cold intolerance and heat intolerance  Genitourinary: Negative for bladder incontinence, dysuria, flank pain and frequency  Musculoskeletal: Positive for back pain  Negative for arthralgias, gait problem and myalgias  Skin: Negative for color change  Allergic/Immunologic: Negative for environmental allergies  Neurological: Negative for dizziness, tingling, numbness, headaches and paresthesias  Psychiatric/Behavioral: Negative for behavioral problems and sleep disturbance  The following portions of the patient's history were reviewed and updated as appropriate : past family history, past medical history, past social history and past surgical history        Current Outpatient Medications:     abiraterone (ZYTIGA) 250 mg tablet, Take 4 tablets (1,000 mg total) by mouth daily, Disp: 120 tablet, Rfl: 4    aspirin (ASPIRIN ADULT LOW STRENGTH) 81 mg EC tablet, Take 1 tablet by mouth daily, Disp: , Rfl:     atorvastatin (LIPITOR) 40 mg tablet, Take 40 mg by mouth daily , Disp: , Rfl:     Calcium 500 MG tablet, Take 1,000 mg by mouth daily , Disp: , Rfl:     cholecalciferol (VITAMIN D3) 1,000 units tablet, Take 1 tablet by mouth daily, Disp: , Rfl:     leuprolide (LUPRON DEPOT 3 MONTH KIT) 22 5 mg injection, Inject 22 5 mg into a muscle every 6 (six) months, Disp: , Rfl:     losartan (COZAAR) 100 MG tablet, take 1 tablet by mouth once daily, Disp: 90 tablet, Rfl: 1    metoprolol succinate (TOPROL-XL) 25 mg 24 hr tablet, TAKE 1/2 TABLET BY MOUTH ONCE DAILY, Disp: 90 tablet, Rfl: 1    predniSONE 5 mg tablet, TAKE 1 TABLET BY MOUTH 2 TIMES A DAY WITH MEALS , Disp: 60 tablet, Rfl: 0    Zoledronic Acid (ZOMETA IV), Infuse into a venous catheter every 3 (three) months, Disp: , Rfl:     phenazopyridine (PYRIDIUM) 100 mg tablet, Take 1 tablet (100 mg total) by mouth 3 (three) times a day as needed for bladder spasms (Patient not taking: Reported on 9/16/2019), Disp: 20 tablet, Rfl: 0    tamsulosin (FLOMAX) 0 4 mg, Take 1 capsule (0 4 mg total) by mouth daily with dinner for 10 doses, Disp: 10 capsule, Rfl: 0    Objective:    Vitals:    10/21/19 1110   BP: 146/84   BP Location: Left arm   Patient Position: Sitting   Cuff Size: Adult   Pulse: 62   Resp: 17   Temp: (!) 97 3 °F (36 3 °C)   TempSrc: Tympanic   SpO2: 96%   Weight: 92 8 kg (204 lb 9 6 oz)   Height: 5' 7 56" (1 716 m)        Physical Exam   Constitutional: He is oriented to person, place, and time  He appears well-developed and well-nourished  HENT:   Head: Normocephalic and atraumatic  Nose: Nose normal    Mouth/Throat: No oropharyngeal exudate  Eyes: Pupils are equal, round, and reactive to light  Right eye exhibits no discharge  Left eye exhibits no discharge  Neck: Normal range of motion  Neck supple  No tracheal deviation present  Cardiovascular: Normal rate, regular rhythm and intact distal pulses  Exam reveals no gallop and no friction rub  No murmur heard  Pulses:       Dorsalis pedis pulses are 2+ on the right side, and 2+ on the left side  Posterior tibial pulses are 2+ on the right side, and 2+ on the left side  Pulmonary/Chest: Effort normal and breath sounds normal  No respiratory distress  He has no wheezes  He has no rales  Abdominal: Soft  Bowel sounds are normal  He exhibits no distension  There is no tenderness  There is no rebound and no guarding  Musculoskeletal: Normal range of motion  He exhibits no edema  Lumbar back: He exhibits tenderness, bony tenderness, pain and spasm  He exhibits normal range of motion  Lymphadenopathy:        Head (right side): No submental and no submandibular adenopathy present          Head (left side): No submental and no submandibular adenopathy present  He has no cervical adenopathy  Right cervical: No superficial cervical, no deep cervical and no posterior cervical adenopathy present  Left cervical: No superficial cervical, no deep cervical and no posterior cervical adenopathy present  Neurological: He is alert and oriented to person, place, and time  No cranial nerve deficit or sensory deficit  Skin: Skin is warm, dry and intact  Psychiatric: His speech is normal and behavior is normal  Judgment normal  His mood appears not anxious  Cognition and memory are normal  He does not exhibit a depressed mood  Vitals reviewed

## 2019-10-26 DIAGNOSIS — I10 ESSENTIAL HYPERTENSION: ICD-10-CM

## 2019-10-28 RX ORDER — LOSARTAN POTASSIUM 100 MG/1
TABLET ORAL
Qty: 90 TABLET | Refills: 1 | Status: SHIPPED | OUTPATIENT
Start: 2019-10-28 | End: 2020-05-06 | Stop reason: SDUPTHER

## 2019-10-30 DIAGNOSIS — M54.50 ACUTE BILATERAL LOW BACK PAIN WITHOUT SCIATICA: Primary | ICD-10-CM

## 2019-11-06 ENCOUNTER — EVALUATION (OUTPATIENT)
Dept: PHYSICAL THERAPY | Facility: REHABILITATION | Age: 79
End: 2019-11-06
Payer: COMMERCIAL

## 2019-11-06 DIAGNOSIS — M54.50 ACUTE BILATERAL LOW BACK PAIN WITHOUT SCIATICA: ICD-10-CM

## 2019-11-06 PROCEDURE — 97110 THERAPEUTIC EXERCISES: CPT

## 2019-11-06 PROCEDURE — 97161 PT EVAL LOW COMPLEX 20 MIN: CPT

## 2019-11-06 NOTE — PROGRESS NOTES
2019  Dale Gomez  : 1940  MRN: [id number]  JSXN:[id number] (home)   Mobile: [phone] (mobile)  Insurance Information: Payor: [address] [place] [address] REP / Plan: [employer] PPO 1968 [address] REP / Product Type: Medicare PPO /   Referring Provider: DO Scot Stuart    HPI: Dale Gomez is a 67 y o  male referred to outpatient physical therapy for   1  Acute bilateral low back pain without sciatica      Back Pain: Patient presents for presents evaluation of low back problems  Symptoms have been present for 2 month and include sharp pain, aching  which has considerable subsided, but feels stiffness in his lower back  Initial inciting event: after working in the gym developed lower back pain  Symptoms are worst: morning  Alleviating factors identifiable by patient are lifting during exercises    Exacerbating factors identifiable by patient are patient had held off on resisted exercises due to back pain episode  Treatments so far initiated by patient: medication: Zanaflex 4mg Previous lower back problems: none  Previous workup: none  Previous treatments: none  Home Environment: Two story home     X-Ray L/S :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 with minimal grade 1 anterolisthesis    Patient Goal: Return to go back to the gym       Lumbar Spine    AROM     Flexion    60    Extension    20    Rotation Left WNL    Rotation Right WNL    Lateral flex Left 20    Lateral Flex Right 20       AROM - hip Left Right   Flexion 95 100   Extension 25 25   Abduction wnl wnl   External Rotation wnl wnl   Internal rotation  10   10      AROM - knee Left Right   Flexion wnl  wnl   Extension  wnl   wnl      AROM - ankle Left Right   Flexion wnl  wnl   Extension  wnl   wnl      MMT - Hip Left Right   Flexion 4 4   Extension 4 4   Abduction 5 5   External Rotation 5 5      MMT - knee Left Right   Flexion 5 5   Extension 5 5      MMT - ankle Left Right Flexion 5 5   Extension  5       Palpation: Increased tone rgiht thoracic and lumbar paraspinals and left lumbar paraspinasl  Posture:   Mild Forward head, round shoulders  Reflexes: normal patellar/ aquilles  Repeated Movements:  Spine Mobility: Decreased T/S, L/S mobility with increased discomfort  FAIR test (piriformis):possitive left  SLR test:  Right 65   Left  55 with pain         Assessment/Plan    Patient presents to outpatient physical therapy with acute bilateral lower back pain  Patient's impairments includes decreased AROM back and proximal LE musculature, decreased muscle strength, muscle spasms B paraspinal musculature and L/S hypomobility   Miguelina Shields These impairments are currently limiting the patient's ability to participate in his gym program and perform weighted activities around the home  Patient will benefit from skilled outpatient physical therapy to address the above impairments in order to improve patient's QOL  STG's:     - Patient improves ROM by 10 degrees    - Patient is independent with initial home exercise program for improvements at home within 2 weeks    - Patient reports an overall reduction in back pain by 2 points on a 0-10 scale during active exercise program for improved QOL within 2 weeks  LTG's:   - Patient ambulates on treadmill for 10 consecutive minutes with no increase sx for improved QOL within 4 weeks   - Patient will be able to return to full gym exercise regime in 4 weeks   - Patient will have resolution of muscle spasm in his lower back  Plan:  Patient will benefit from skilled outpatient physical therapy  2 x/wk for 4 wk  Therapeutic exercises, Neuromuscular re-education and Manual therapy to include soft tissue  And joint mobilization        Objective     Manual   11/06                     Soft tisue mobs  nv                     Joint mobs   KS AP lower thoracic and lumbar Exercise Diary  11/06       TA 10 x :10       Hamstring stretch nv       Lower Trunk Rotation 10 x :10       SKC edge of bed 10 x :10 ea       Thoracic self mob 10 x :10       Scapular abd stance 5x :05                                                                                   Modalities

## 2019-11-10 DIAGNOSIS — C61 PROSTATE CANCER (HCC): ICD-10-CM

## 2019-11-11 ENCOUNTER — OFFICE VISIT (OUTPATIENT)
Dept: PHYSICAL THERAPY | Facility: REHABILITATION | Age: 79
End: 2019-11-11
Payer: COMMERCIAL

## 2019-11-11 DIAGNOSIS — M54.50 ACUTE BILATERAL LOW BACK PAIN WITHOUT SCIATICA: Primary | ICD-10-CM

## 2019-11-11 PROCEDURE — 97110 THERAPEUTIC EXERCISES: CPT

## 2019-11-11 PROCEDURE — 97140 MANUAL THERAPY 1/> REGIONS: CPT

## 2019-11-11 RX ORDER — PREDNISONE 1 MG/1
TABLET ORAL
Qty: 60 TABLET | Refills: 0 | Status: SHIPPED | OUTPATIENT
Start: 2019-11-11 | End: 2019-12-01 | Stop reason: SDUPTHER

## 2019-11-11 NOTE — PROGRESS NOTES
Daily Note     Today's date: 2019  Patient name: Hilda Chaudhary  :   MRN: [id number]  Referring provider: Brionna Barker DO  Dx:   Encounter Diagnosis     ICD-10-CM    1  Acute bilateral low back pain without sciatica M54 5        Start Time: 815  Stop Time: 915  Total time in clinic (min): 60 minutes    Subjective:  Patient reports 2/10 trigger point Right rhomboid, feeling tight LB  Reports trying to be consistent with HEP      Objective: See treatment diary below      Assessment: Tolerated treatment well  Progressed t stretching, and core strengthening eercises with good results  Inintialted IASTM to Lower back and lower thoracic paraspinals with good response  Patient demonstrated fatigue post treatment, he will continue to benefit from skilled PT  Plan: Continue per plan of care  Potential discharge next visit       Precautions: HTN, Prostate Cancer, CAD    Objective      Manual                      Soft tisue mobs  nv  IASTM 10' thor/lumb                   Joint mobs   KS AP lower thoracic and lumbar                                                                                                   Exercise Diary           TA 10 x :10           Hamstring stretch nv  :30 x 3         Lower Trunk Rotation 10 x :10  10 x :10         SKC edge of bed/ hip flex stretch 10 x :10 ea 10 x :10 ea         Thoracic self mob with towell :30 x 2   2 min         Scapular abd stance 5x :05  :10 x 3          Mini squats with TA     10 x 2          Gastro stretch    :30 x 3 standing          Soleus stretch    :30 x 3  standing          Prone hip ext    Knee bent   10 x 1 ea          SLR  Flex/ abd    nv                                                                       Modalities

## 2019-11-13 ENCOUNTER — OFFICE VISIT (OUTPATIENT)
Dept: PHYSICAL THERAPY | Facility: REHABILITATION | Age: 79
End: 2019-11-13
Payer: COMMERCIAL

## 2019-11-13 DIAGNOSIS — M54.50 ACUTE BILATERAL LOW BACK PAIN WITHOUT SCIATICA: Primary | ICD-10-CM

## 2019-11-13 PROCEDURE — 97140 MANUAL THERAPY 1/> REGIONS: CPT

## 2019-11-13 PROCEDURE — 97110 THERAPEUTIC EXERCISES: CPT

## 2019-11-13 NOTE — PROGRESS NOTES
Daily Note     Today's date: 2019  Patient name: Akira Woods  :   MRN: [id number]  Referring provider: Robin Batista DO  Dx:   Encounter Diagnosis     ICD-10-CM    1  Acute bilateral low back pain without sciatica M54 5        Start Time: 945  Stop Time: 1030  Total time in clinic (min): 45 minutes    Subjective:  Patient reports 1/10 back pain  " It is a lot looser"  Objective: See treatment diary below      Assessment: Tolerated treatment well  Client reports has been compliant with HEP  Continued with stretching exercises, client required verbal cues and tactlie facilitation for proper technique  He responded well to manual therapy intervention with decreased symptoms  Patient demonstrated fatigue post treatment, he will continue to benefit from skilled PT  Plan: Continue per plan of care  Potential discharge next visit       Precautions: HTN, Prostate Cancer, CAD    Objective      Manual                   Soft tisue mobs  nv  IASTM 10' thor/lumb  IASTM  10'               Joint mobs   KS AP lower thoracic and lumbar    AP lower thoracic/ lumbar                                                                                       Exercise Diary         TA 10 x :10  HEP   Hep    -  -   Hamstring stretch nv  :30 x 3  :30 x3           Lower Trunk Rotation 10 x :10  10 x :10  HEP       ---      ----   SKC edge of bed/ hip flex stretch 10 x :10 ea 10 x :10 ea  HEP       Thoracic self mob with towell :30 x 2   2 min  HEP       Scapular abd stance 5x :05  :10 x 3  HEP        Mini squats with TA     10 x 2  at bar  10 x 2        Gastro stretch    :30 x 3 standing  :30 x 3        Soleus stretch    :30 x 3  standing  :30 x 3        Prone hip ext    Knee bent   10 x 1 ea  10 x 1ea        SLR supine    nv  Flex 10 x 2  Abd 10 x 2        Bike        Level 1  7 min        Hip ABD      10 x 2        Hip Extension      10 x 2        Prone quad stretch       green strap  :30 x 3             Modalities

## 2019-11-19 ENCOUNTER — OFFICE VISIT (OUTPATIENT)
Dept: PHYSICAL THERAPY | Facility: REHABILITATION | Age: 79
End: 2019-11-19
Payer: COMMERCIAL

## 2019-11-19 DIAGNOSIS — M54.50 ACUTE BILATERAL LOW BACK PAIN WITHOUT SCIATICA: Primary | ICD-10-CM

## 2019-11-19 PROCEDURE — 97110 THERAPEUTIC EXERCISES: CPT

## 2019-11-19 PROCEDURE — 97140 MANUAL THERAPY 1/> REGIONS: CPT

## 2019-11-19 NOTE — PROGRESS NOTES
Daily Note     Today's date: 2019  Patient name: Roman Cole  : [birth year]  MRN: [id number]  Referring provider: Michael Recinos DO  Dx:   Encounter Diagnosis     ICD-10-CM    1  Acute bilateral low back pain without sciatica M54 5        Start Time: 0800  Stop Time: 0855  Total time in clinic (min): 55 minutes    Subjective:  Patient reports    5/10 with tightness in the morning  Continues to perform HEP daily  Objective: See treatment diary below      Assessment: Tolerated treatment well  Progressed to core/LE strengthening with theraband with good response  Verbal cues as for proper body mechanics  Anticipating 10 day vacation, client has been issued a HEP to continue during vacation time  PT will be resumed once returns Dec 2  To reassess and modify treatment plan as appropriate  Patient required  cues, he will continue to benefit from skilled PT        Plan: Continue with present treatment     Precautions: HTN, Prostate Cancer, CAD    Objective      Manual                 Soft tisue mobs  nv  IASTM 10' thor/lumb  IASTM  10'  IASTM 10'           Joint mobs   KS AP lower thoracic and lumbar    AP lower thoracic/ lumbar  AP  LT/Lumbar                                                                             Exercise Diary       TA 10 x :10  HEP   Hep    -HEP  -   Hamstring stretch nv  :30 x 3  :30 x3      :30 x 3     Lower Trunk Rotation 10 x :10  10 x :10  HEP      HEP      ----   SKC edge of bed/ hip flex stretch 10 x :10 ea 10 x :10 ea  HEP  HEP     Thoracic self mob with towell :30 x 2   2 min  HEP  HEP     Scapular abd stance stretch 5x :05  :10 x 3  HEP  HEP      Mini squats with TA     10 x 2  at bar  10 x 2  10 x 2 TA, GSets      Gastro stretch    :30 x 3 standing  :30 x 3  :30 x 3      Soleus stretch    :30 x 3  standing  :30 x 3  :30 x 3      Prone hip ext    Knee bent   10 x 1 ea  10 x 1ea  10 x 1      SLR supine    nv  Flex 10 x 2  Abd 10 x 2  Flex 10 x 2  ABD 10 x 2      Bike        Level 1  7 min  8 min upright      Hip ABD      10 x 2  10 x 2 red theraband      Hip Extension      10 x 2  10 x 2 RTB      Prone quad stretch       green strap  :30 x 3  green strap :30 x 3      Stand rows    OTB 10 x 2 :05    Lower trap    OTB 10 x 2 :05                            Modalities

## 2019-12-01 DIAGNOSIS — C61 PROSTATE CANCER (HCC): ICD-10-CM

## 2019-12-02 ENCOUNTER — APPOINTMENT (OUTPATIENT)
Dept: LAB | Facility: CLINIC | Age: 79
End: 2019-12-02
Payer: COMMERCIAL

## 2019-12-02 ENCOUNTER — TRANSCRIBE ORDERS (OUTPATIENT)
Dept: LAB | Facility: CLINIC | Age: 79
End: 2019-12-02

## 2019-12-02 ENCOUNTER — OFFICE VISIT (OUTPATIENT)
Dept: PHYSICAL THERAPY | Facility: REHABILITATION | Age: 79
End: 2019-12-02
Payer: COMMERCIAL

## 2019-12-02 DIAGNOSIS — C61 MALIGNANT NEOPLASM OF PROSTATE (HCC): ICD-10-CM

## 2019-12-02 DIAGNOSIS — E78.2 MIXED HYPERLIPIDEMIA: ICD-10-CM

## 2019-12-02 DIAGNOSIS — I10 ESSENTIAL HYPERTENSION, MALIGNANT: ICD-10-CM

## 2019-12-02 DIAGNOSIS — I10 ESSENTIAL HYPERTENSION, MALIGNANT: Primary | ICD-10-CM

## 2019-12-02 DIAGNOSIS — C61 PROSTATE CANCER (HCC): ICD-10-CM

## 2019-12-02 DIAGNOSIS — M54.50 ACUTE BILATERAL LOW BACK PAIN WITHOUT SCIATICA: Primary | ICD-10-CM

## 2019-12-02 DIAGNOSIS — C79.51 BONE METASTASES (HCC): ICD-10-CM

## 2019-12-02 LAB
ALBUMIN SERPL BCP-MCNC: 3.7 G/DL (ref 3.5–5)
ALP SERPL-CCNC: 40 U/L (ref 46–116)
ALT SERPL W P-5'-P-CCNC: 35 U/L (ref 12–78)
ANION GAP SERPL CALCULATED.3IONS-SCNC: 4 MMOL/L (ref 4–13)
AST SERPL W P-5'-P-CCNC: 19 U/L (ref 5–45)
BASOPHILS # BLD AUTO: 0.06 THOUSANDS/ΜL (ref 0–0.1)
BASOPHILS NFR BLD AUTO: 1 % (ref 0–1)
BILIRUB SERPL-MCNC: 0.82 MG/DL (ref 0.2–1)
BUN SERPL-MCNC: 18 MG/DL (ref 5–25)
CALCIUM SERPL-MCNC: 9.3 MG/DL (ref 8.3–10.1)
CHLORIDE SERPL-SCNC: 106 MMOL/L (ref 100–108)
CO2 SERPL-SCNC: 31 MMOL/L (ref 21–32)
CREAT SERPL-MCNC: 1.14 MG/DL (ref 0.6–1.3)
EOSINOPHIL # BLD AUTO: 0.23 THOUSAND/ΜL (ref 0–0.61)
EOSINOPHIL NFR BLD AUTO: 3 % (ref 0–6)
ERYTHROCYTE [DISTWIDTH] IN BLOOD BY AUTOMATED COUNT: 12.7 % (ref 11.6–15.1)
GFR SERPL CREATININE-BSD FRML MDRD: 61 ML/MIN/1.73SQ M
GLUCOSE SERPL-MCNC: 69 MG/DL (ref 65–140)
HCT VFR BLD AUTO: 41.4 % (ref 36.5–49.3)
HGB BLD-MCNC: 13.8 G/DL (ref 12–17)
IMM GRANULOCYTES # BLD AUTO: 0.03 THOUSAND/UL (ref 0–0.2)
IMM GRANULOCYTES NFR BLD AUTO: 0 % (ref 0–2)
LYMPHOCYTES # BLD AUTO: 1.6 THOUSANDS/ΜL (ref 0.6–4.47)
LYMPHOCYTES NFR BLD AUTO: 21 % (ref 14–44)
MCH RBC QN AUTO: 31.6 PG (ref 26.8–34.3)
MCHC RBC AUTO-ENTMCNC: 33.3 G/DL (ref 31.4–37.4)
MCV RBC AUTO: 95 FL (ref 82–98)
MONOCYTES # BLD AUTO: 0.6 THOUSAND/ΜL (ref 0.17–1.22)
MONOCYTES NFR BLD AUTO: 8 % (ref 4–12)
NEUTROPHILS # BLD AUTO: 5.18 THOUSANDS/ΜL (ref 1.85–7.62)
NEUTS SEG NFR BLD AUTO: 67 % (ref 43–75)
NRBC BLD AUTO-RTO: 0 /100 WBCS
PLATELET # BLD AUTO: 184 THOUSANDS/UL (ref 149–390)
PMV BLD AUTO: 10.9 FL (ref 8.9–12.7)
POTASSIUM SERPL-SCNC: 4.1 MMOL/L (ref 3.5–5.3)
PROT SERPL-MCNC: 6.7 G/DL (ref 6.4–8.2)
PSA SERPL-MCNC: 0.2 NG/ML (ref 0–4)
RBC # BLD AUTO: 4.37 MILLION/UL (ref 3.88–5.62)
SODIUM SERPL-SCNC: 141 MMOL/L (ref 136–145)
TSH SERPL DL<=0.05 MIU/L-ACNC: 2.47 UIU/ML (ref 0.36–3.74)
WBC # BLD AUTO: 7.7 THOUSAND/UL (ref 4.31–10.16)

## 2019-12-02 PROCEDURE — 84153 ASSAY OF PSA TOTAL: CPT

## 2019-12-02 PROCEDURE — 97140 MANUAL THERAPY 1/> REGIONS: CPT

## 2019-12-02 PROCEDURE — 80053 COMPREHEN METABOLIC PANEL: CPT

## 2019-12-02 PROCEDURE — 84443 ASSAY THYROID STIM HORMONE: CPT

## 2019-12-02 PROCEDURE — 97110 THERAPEUTIC EXERCISES: CPT

## 2019-12-02 PROCEDURE — 36415 COLL VENOUS BLD VENIPUNCTURE: CPT

## 2019-12-02 PROCEDURE — 85025 COMPLETE CBC W/AUTO DIFF WBC: CPT

## 2019-12-02 RX ORDER — SODIUM CHLORIDE 9 MG/ML
20 INJECTION, SOLUTION INTRAVENOUS ONCE
Status: CANCELLED | OUTPATIENT
Start: 2019-12-02

## 2019-12-02 RX ORDER — PREDNISONE 1 MG/1
TABLET ORAL
Qty: 60 TABLET | Refills: 0 | Status: SHIPPED | OUTPATIENT
Start: 2019-12-02 | End: 2020-01-13

## 2019-12-02 NOTE — PROGRESS NOTES
Daily Note     Today's date: 2019  Patient name: Abbie Kendrick  :   MRN: [id number]  Referring provider: Jamie Ramsey DO  Dx:   Encounter Diagnosis     ICD-10-CM    1  Acute bilateral low back pain without sciatica M54 5        Start Time: 08  Stop Time: 0915  Total time in clinic (min): 55 minutes    Subjective:  Patient reports was on vacation  He was unable to  Follow HEP as discussed  Reports 2/10 pain    Objective: See treatment diary below      Assessment: Tolerated treatment well  Returned from  [facility] with mild increased symptoms  Discussed importance of consistency with HEP  Client agreed to improve compliance  Patient required  cues, he will continue to benefit from skilled PT        Plan: Continue with present treatment     Precautions: HTN, Prostate Cancer, CAD    Objective      Manual        12         Soft tisue mobs  nv  IASTM 10' thor/lumb  IASTM  10'  IASTM 10'  KS         Joint mobs   KS AP lower thoracic and lumbar    AP lower thoracic/ lumbar  AP  LT/Lumbar  ks                                                                           Exercise Diary    12   TA 10 x :10  HEP   Hep    -HEP  -HEP   Hamstring stretch nv  :30 x 3  :30 x3      :30 x 3  :30 x a   Lower Trunk Rotation 10 x :10  10 x :10  HEP      HEP      ----   SKC edge of bed/ hip flex stretch 10 x :10 ea 10 x :10 ea  HEP  HEP     Thoracic self mob with towell :30 x 2   2 min  HEP  HEP     Scapular abd stance stretch 5x :05  :10 x 3  HEP  HEP      Mini squats with TA     10 x 2  at bar  10 x 2  10 x 2 TA, GSets  10 x 2    Gastro stretch    :30 x 3 standing  :30 x 3  :30 x 3  :30 x 3    Soleus stretch    :30 x 3  standing  :30 x 3  :30 x 3  :30 x 3    Prone hip ext    Knee bent   10 x 1 ea  10 x 1ea  10 x 1   HEP    SLR supine    nv  Flex 10 x 2  Abd 10 x 2  Flex 10 x 2  ABD 10 x 2  1 5 #  Flex/abd 10 x 2 ea    Bike        Level 1  7 min  8 min upright  8 min  upright    Hip ABD      10 x 2  10 x 2 red theraband  10 x 2 2#    Hip Extension      10 x 2  10 x 2 RTB  10 x 2 2#    Prone quad stretch       green strap  :30 x 3  green strap :30 x 3  G Strap :30 x 3 ea    Stand rows    OTB 10 x 2 :05 10 x 3 #30   Lower trap    OTB 10 x 2 :05   10 x 2 #30   Hamstring curls     10 x 2 #25   LAQ     10 x 2 #20         Modalities

## 2019-12-04 ENCOUNTER — OFFICE VISIT (OUTPATIENT)
Dept: PHYSICAL THERAPY | Facility: REHABILITATION | Age: 79
End: 2019-12-04
Payer: COMMERCIAL

## 2019-12-04 DIAGNOSIS — M54.50 ACUTE BILATERAL LOW BACK PAIN WITHOUT SCIATICA: Primary | ICD-10-CM

## 2019-12-04 PROCEDURE — 97110 THERAPEUTIC EXERCISES: CPT

## 2019-12-04 PROCEDURE — 97140 MANUAL THERAPY 1/> REGIONS: CPT

## 2019-12-04 NOTE — PROGRESS NOTES
Daily Note     Today's date: 2019  Patient name: Marcelle Reyes  :   MRN: [id number]  Referring provider: Cris Robbins DO  Dx:   Encounter Diagnosis     ICD-10-CM    1  Acute bilateral low back pain without sciatica M54 5        Start Time: 720  Stop Time: 0815  Total time in clinic (min): 55 minutes    Subjective:  Reports anticipating return to gym this week  Lower back pain /10 atr worse  Reports Right shoulder blade musculature pain  Objective: See treatment diary below     Palpation;  Right rhomboid Muscle spams with good response to IASTM and releases today  Assessment: Patient is making progress toward goals  Will start transitioning to gym workout  Patient required  cues, he will continue to benefit from skilled PT        Plan: Continue with present treatment     Precautions: HTN, Prostate Cancer, CAD    Objective      Manual          Soft tisue mobs    Lumbar and Right Rhomboid  nv  IASTM 10' thor/lumb  IASTM  10'  IASTM 10'  KS  KS       Joint mobs   KS AP lower thoracic and lumbar    AP lower thoracic/ lumbar  AP  LT/Lumbar  ks  KS                                                                         Exercise Diary     TA 10 x :10  HEP   Hep    -HEP  -HEP    Hamstring stretch nv  :30 x 3  :30 x3      :30 x 3  :30 x a :30 x 3   Lower Trunk Rotation 10 x :10  10 x :10  HEP      HEP      ----    SKC edge of bed/ hip flex stretch 10 x :10 ea 10 x :10 ea  HEP  HEP      Thoracic self mob with towell :30 x 2   2 min  HEP  HEP      Scapular abd stance stretch 5x :05  :10 x 3  HEP  HEP       Mini squats with TA     10 x 2  at bar  10 x 2  10 x 2 TA, GSets  10 x 2  10 x 2    Gastro stretch    :30 x 3 standing  :30 x 3  :30 x 3  :30 x 3  :30 x 3    Soleus stretch    :30 x 3  standing  :30 x 3  :30 x 3  :30 x 3  :30 x 3    Prone hip ext    Knee bent   10 x 1 ea  10 x 1ea  10 x 1   HEP     SLR supine    nv  Flex 10 x 2  Abd 10 x 2  Flex 10 x 2  ABD 10 x 2  1 5 #  Flex/abd 10 x 2 ea  1 5 # flex/ abd 10 x 2    Bike        Level 1  7 min  8 min upright  8 min  upright 10 min    Hip ABD      10 x 2  10 x 2 red theraband  10 x 2 2# 10 x 2 2#    Hip Extension      10 x 2  10 x 2 RTB  10 x 2 2#  10 x 2  2#    Prone quad stretch       green strap  :30 x 3  green strap :30 x 3  G Strap :30 x 3 ea HEP    Stand rows    OTB 10 x 2 :05 10 x 3 #30  10 x 3 #30   Lower trap    OTB 10 x 2 :05   10 x 2 #30  10 x 3 #30   Hamstring curls     10 x 2 #25 10 x 3 #30   LAQ     10 x 2 #20 10 x 3 #20   Sidelying       3 min on left side UE overhead         Modalities

## 2019-12-05 ENCOUNTER — OFFICE VISIT (OUTPATIENT)
Dept: HEMATOLOGY ONCOLOGY | Facility: CLINIC | Age: 79
End: 2019-12-05
Payer: COMMERCIAL

## 2019-12-05 ENCOUNTER — HOSPITAL ENCOUNTER (OUTPATIENT)
Dept: INFUSION CENTER | Facility: CLINIC | Age: 79
Discharge: HOME | End: 2019-12-05
Payer: COMMERCIAL

## 2019-12-05 VITALS
DIASTOLIC BLOOD PRESSURE: 80 MMHG | WEIGHT: 207 LBS | BODY MASS INDEX: 32.49 KG/M2 | HEART RATE: 67 BPM | RESPIRATION RATE: 14 BRPM | TEMPERATURE: 98.2 F | SYSTOLIC BLOOD PRESSURE: 146 MMHG | HEIGHT: 67 IN

## 2019-12-05 VITALS
HEIGHT: 67 IN | DIASTOLIC BLOOD PRESSURE: 81 MMHG | SYSTOLIC BLOOD PRESSURE: 146 MMHG | HEART RATE: 67 BPM | WEIGHT: 207.23 LBS | TEMPERATURE: 98.2 F | RESPIRATION RATE: 16 BRPM | BODY MASS INDEX: 32.53 KG/M2

## 2019-12-05 DIAGNOSIS — C79.51 BONE METASTASES (HCC): ICD-10-CM

## 2019-12-05 DIAGNOSIS — C61 PROSTATE CANCER (HCC): Primary | ICD-10-CM

## 2019-12-05 PROCEDURE — 96365 THER/PROPH/DIAG IV INF INIT: CPT

## 2019-12-05 PROCEDURE — 99214 OFFICE O/P EST MOD 30 MIN: CPT | Performed by: INTERNAL MEDICINE

## 2019-12-05 RX ORDER — SODIUM CHLORIDE 9 MG/ML
20 INJECTION, SOLUTION INTRAVENOUS ONCE
Status: CANCELLED | OUTPATIENT
Start: 2020-02-27

## 2019-12-05 RX ORDER — SODIUM CHLORIDE 9 MG/ML
20 INJECTION, SOLUTION INTRAVENOUS ONCE
Status: COMPLETED | OUTPATIENT
Start: 2019-12-05 | End: 2019-12-05

## 2019-12-05 RX ADMIN — ZOLEDRONIC ACID 3.5 MG: 4 INJECTION, SOLUTION, CONCENTRATE INTRAVENOUS at 13:44

## 2019-12-05 RX ADMIN — SODIUM CHLORIDE 20 ML/HR: 9 INJECTION, SOLUTION INTRAVENOUS at 13:27

## 2019-12-05 NOTE — PROGRESS NOTES
Pt arrived on unit for zometa infusion  Ca level 9 3, Crcl 52 5  PIV placed in RFA  Will cont to twan

## 2019-12-05 NOTE — PROGRESS NOTES
Pt tolerated infusion without adverse reaction  Pt aware of next appt  Ambulated off unit with steady gait

## 2019-12-05 NOTE — PATIENT INSTRUCTIONS
December 2019 Sunday Monday Tuesday Wednesday Thursday Friday Saturday   1     2    PT-TREATMENT   8:30 AM   (45 min )   Lennox Bustamante, PT   [facility]    LAB WALK IN  10:15 AM   (5 min )   [facility] LAB PSC CHAIR 1   [facility] [place] 3     4    PT-TREATMENT   7:30 AM   (45 min )   Lennox Bustamante, PT   [facility] 5    INF CHEMO-INFUSION 1 HR   1:00 PM   (120 min )   AL INF CHAIR 10   [address] [facility]    FOLLOW UP PG   2:45 PM   (20 min )   Faustine Buerger, MD   [facility] [place] 6     7           Cycle 1, Treatment 5     8     9    PT-TREATMENT   7:30 AM   (45 min )   Lennox Bustamante, PT   Physical Therapy at [address] PG  10:45 AM   (30 min )   Evangelista Wade DO   [facility] 10     11    PT-TREATMENT   7:30 AM   (45 min )   Lennox Bustamante, PT   [facility] 12     13     14                15     16     17     18     19     20     21                22     23     24     25     26     27     28                29     30     31                                         Treatment Details       12/5/2019 - Cycle 1, Treatment 5      Supportive Care: ONCBCN PROVIDER [id number], ZOLEDRONIC ACID IVPB

## 2019-12-05 NOTE — PROGRESS NOTES
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 and elevated PSA a CT scan on 09/2018 showed involvement of the posterior side of the 10th vertebral body and the left 7 rib area and the distal point of the right clavicle he received Firmagon and later on Lupron by Urology on 02/2019    Initially he received enzalutamide 160 mg 12/2018 for 1 month the insurance did not approve thereafter, he had financial assistance to obtain abiraterone from [facility]    PSA currently 0 2, continue treatment with abiraterone 1000 mg p o  Daily and prednisone 5 mg p o  B i d     Zometa 4 mg IV every three-month initiated on 12/2018 for total of 2 years treatment            HPI:  21-year-old  male who was seen by Urology for elevation of the PSA, when he presented in June 2017 with PSA of 4 6, Subsequently in September of 2017 PSA was 7 3 and in May of 2018 PSA was 8  6   A biopsy was recommended but the patient had just undergone percutaneous stenting of the heart and he was on dual anti-platelet therapy      MRI of the abdomen in April 2018 showed 2 simple cyst in the right hepatic lobe, bilateral renal cysts the largest 1 measuring 10 cm in the left lower lobe     In October of 2018 PSA was 9 8  CT scan of the chest 9/28/2018 showed sclerotic lesions in T10, 5 mm nodule in the right lower lobe   Bone scan showed activity in the posterior T10 level and left posterior 7th rib area concerning for osseous metastases and increased activity in the distal right clavicle might be degenerative or metastatic area      He was diagnosed with castration sensitive metastatic prostate cancer  [facility] insurance company could not approve enzalutamide, he also has a high co-payment for abiraterone     The patient initiated on samples of enzalutamide 160 mg p o  daily since the beginning of December 2018   His insurance approved abiraterone however he had a high co-payment        We were finally able to get financial assistance through [facility] Bustamante and Roseline started Kaleb (abiraterone) 2/3/2019 with prednisone 5mg po bid without side effects       Interval History:        Previous Treatment:         Test Results:    Imaging: No results found  Labs:   Lab Results   Component Value Date    WBC 7 70 12/02/2019    HGB 13 8 12/02/2019    HCT 41 4 12/02/2019    MCV 95 12/02/2019     12/02/2019     Lab Results   Component Value Date     09/30/2017    K 4 1 12/02/2019     12/02/2019    CO2 31 12/02/2019    BUN 18 12/02/2019    CREATININE 1 14 12/02/2019    GLUF 97 08/08/2019    CALCIUM 9 3 12/02/2019    AST 19 12/02/2019    ALT 35 12/02/2019    ALKPHOS 40 (L) 12/02/2019    PROT 6 5 09/30/2017    BILITOT 0 7 09/30/2017    EGFR 61 12/02/2019     PSA 0 2  No results found for: IRON, TIBC, FERRITIN    No results found for: [id number]      ROS: Review of Systems   Constitutional: Negative  Negative for appetite change, chills, diaphoresis, fatigue, fever and unexpected weight change  HENT:   Negative for hearing loss, lump/mass, mouth sores, nosebleeds, sore throat, trouble swallowing and voice change  Eyes: Negative  Negative for eye problems and icterus  Respiratory: Negative  Negative for chest tightness, cough, hemoptysis and shortness of breath  Cardiovascular: Negative for chest pain and leg swelling  Gastrointestinal: Negative for abdominal distention, abdominal pain, blood in stool, constipation, diarrhea and nausea  Endocrine: Negative  Genitourinary: Negative for dysuria, frequency, hematuria and pelvic pain  Musculoskeletal: Negative  Negative for arthralgias, back pain, flank pain, gait problem, myalgias and neck stiffness  Skin: Negative for itching and rash     Neurological: Negative for dizziness, gait problem, headaches, light-headedness, numbness and speech difficulty  Hematological: Negative for adenopathy  Does not bruise/bleed easily  Psychiatric/Behavioral: Negative for confusion, decreased concentration, depression and sleep disturbance  The patient is not nervous/anxious  Current Medications: Reviewed  Allergies: Reviewed  PMH/FH/SH:  Reviewed      Physical Exam:    Body surface area is 2 06 meters squared  Wt Readings from Last 3 Encounters:   19 93 9 kg (207 lb)   19 94 kg (207 lb 3 7 oz)   10/21/19 92 8 kg (204 lb 9 6 oz)        Temp Readings from Last 3 Encounters:   19 98 2 °F (36 8 °C)   19 98 2 °F (36 8 °C) (Tympanic)   10/21/19 (!) 97 3 °F (36 3 °C) (Tympanic)        BP Readings from Last 3 Encounters:   19 146/80   19 146/81   10/21/19 146/84         Pulse Readings from Last 3 Encounters:   19 67   19 67   10/21/19 62        Physical Exam   Constitutional: He is oriented to person, place, and time  He appears well-developed and well-nourished  No distress  HENT:   Head: Normocephalic and atraumatic  Eyes: Conjunctivae are normal    Neck: Normal range of motion  Neck supple  No tracheal deviation present  Cardiovascular: Normal rate and regular rhythm  Exam reveals no gallop and no friction rub  No murmur heard  Pulmonary/Chest: Effort normal and breath sounds normal  No respiratory distress  He has no wheezes  He has no rales  He exhibits no tenderness  Abdominal: Soft  He exhibits no distension  There is no tenderness  Lymphadenopathy:     He has no cervical adenopathy  Neurological: He is alert and oriented to person, place, and time  Skin: Skin is warm and dry  He is not diaphoretic  No erythema  No pallor  Psychiatric: He has a normal mood and affect  His behavior is normal  Judgment and thought content normal    Vitals reviewed  ECO    Goals and Barriers:  Current Goal: Minimize effects of disease     Barriers: None       Patient's Capacity to Self Care:  Patient is able to self care      Code Status: [unfilled]

## 2019-12-07 PROBLEM — I10 HYPERTENSION, ESSENTIAL: Status: RESOLVED | Noted: 2018-03-25 | Resolved: 2019-12-07

## 2019-12-07 PROBLEM — I25.118 CORONARY ARTERY DISEASE OF NATIVE HEART WITH STABLE ANGINA PECTORIS (HCC): Status: RESOLVED | Noted: 2018-03-28 | Resolved: 2019-12-07

## 2019-12-09 ENCOUNTER — OFFICE VISIT (OUTPATIENT)
Dept: FAMILY MEDICINE CLINIC | Facility: CLINIC | Age: 79
End: 2019-12-09
Payer: COMMERCIAL

## 2019-12-09 ENCOUNTER — OFFICE VISIT (OUTPATIENT)
Dept: PHYSICAL THERAPY | Facility: REHABILITATION | Age: 79
End: 2019-12-09
Payer: COMMERCIAL

## 2019-12-09 VITALS
BODY MASS INDEX: 32.33 KG/M2 | TEMPERATURE: 98 F | HEIGHT: 67 IN | RESPIRATION RATE: 16 BRPM | OXYGEN SATURATION: 96 % | WEIGHT: 206 LBS | HEART RATE: 77 BPM | DIASTOLIC BLOOD PRESSURE: 80 MMHG | SYSTOLIC BLOOD PRESSURE: 138 MMHG

## 2019-12-09 DIAGNOSIS — C61 PROSTATE CANCER (HCC): ICD-10-CM

## 2019-12-09 DIAGNOSIS — E78.2 MIXED HYPERLIPIDEMIA: ICD-10-CM

## 2019-12-09 DIAGNOSIS — I10 BENIGN ESSENTIAL HYPERTENSION: ICD-10-CM

## 2019-12-09 DIAGNOSIS — M54.50 ACUTE BILATERAL LOW BACK PAIN WITHOUT SCIATICA: Primary | ICD-10-CM

## 2019-12-09 DIAGNOSIS — I25.118 CORONARY ARTERY DISEASE OF NATIVE ARTERY OF NATIVE HEART WITH STABLE ANGINA PECTORIS (HCC): ICD-10-CM

## 2019-12-09 DIAGNOSIS — Z00.00 ENCOUNTER FOR MEDICARE ANNUAL WELLNESS EXAM: Primary | ICD-10-CM

## 2019-12-09 PROCEDURE — 99214 OFFICE O/P EST MOD 30 MIN: CPT | Performed by: FAMILY MEDICINE

## 2019-12-09 PROCEDURE — 1170F FXNL STATUS ASSESSED: CPT | Performed by: FAMILY MEDICINE

## 2019-12-09 PROCEDURE — 1036F TOBACCO NON-USER: CPT | Performed by: FAMILY MEDICINE

## 2019-12-09 PROCEDURE — 1101F PT FALLS ASSESS-DOCD LE1/YR: CPT | Performed by: FAMILY MEDICINE

## 2019-12-09 PROCEDURE — 1125F AMNT PAIN NOTED PAIN PRSNT: CPT | Performed by: FAMILY MEDICINE

## 2019-12-09 PROCEDURE — G0439 PPPS, SUBSEQ VISIT: HCPCS | Performed by: FAMILY MEDICINE

## 2019-12-09 PROCEDURE — 3079F DIAST BP 80-89 MM HG: CPT | Performed by: FAMILY MEDICINE

## 2019-12-09 PROCEDURE — 1160F RVW MEDS BY RX/DR IN RCRD: CPT | Performed by: FAMILY MEDICINE

## 2019-12-09 PROCEDURE — 3075F SYST BP GE 130 - 139MM HG: CPT | Performed by: FAMILY MEDICINE

## 2019-12-09 PROCEDURE — 97110 THERAPEUTIC EXERCISES: CPT

## 2019-12-09 PROCEDURE — 3725F SCREEN DEPRESSION PERFORMED: CPT | Performed by: FAMILY MEDICINE

## 2019-12-09 NOTE — PROGRESS NOTES
Assessment and Plan:    MEDICARE WELLNESS VISIT  PATIENT HAS LIVING WILL AND HEALTHCARE POWER OF   DEPRESSION SCREEN AND FALL RISK WERE NEGATIVE  PATIENT IS CURRENT WITH AGE APPROPRIATE VACCINATIONS  DISCUSSED SHINGRIX VACCINE  CURRENT WITH COLONOSCOPY AND PSA      Problem List Items Addressed This Visit     Benign essential hypertension    Hyperlipidemia    Prostate cancer ([facility] )    Coronary artery disease of native artery of native heart with stable angina pectoris ([facility] )      Other Visit Diagnoses     Encounter for Medicare annual wellness exam    -  Primary           Preventive health issues were discussed with patient, and age appropriate screening tests were ordered as noted in patient's After Visit Summary  Personalized health advice and appropriate referrals for health education or preventive services given if needed, as noted in patient's After Visit Summary  History of Present Illness:     Patient presents for Welcome to Medicare visit       Patient Care Team:  Alberto Arevalo DO as PCP - MD Antwan Alex DO Garvin Dadds, MD     Review of Systems:     Review of Systems   Problem List:     Patient Active Problem List   Diagnosis    Benign essential hypertension    Hyperlipidemia    Renal cyst    Liver lesion    Pulmonary nodule    Combined arterial insufficiency and corporo-venous occlusive erectile dysfunction    Prostate cancer ([facility] )    Coronary artery disease of native artery of native heart with stable angina pectoris ([facility] )    DDD (degenerative disc disease), cervical    Inflamed sebaceous cyst    Obstruction of left eustachian tube    S/P coronary artery stent placement    Vitamin D deficiency    Abnormal radionuclide bone scan    Hot flashes    Bone metastases ([facility] )    Clonus    Calculus of kidney    Thoracic myofascial strain    Positional lightheadedness    Rhomboid pain    Mixed hyperlipidemia    Left ureteral stone      Past Medical and Surgical History:     Past Medical History:   Diagnosis Date    Anemia     last assessed  1/7/14     Coronary artery disease     Hypercholesteremia     Hypertension     Kidney stone     Polyp of sigmoid colon     Prostate cancer ([address] )     Renal disorder     elevated serum creat    Tinnitus     unspecified laterality / last assessed 4/9/13    Vitamin D deficiency      Past Surgical History:   Procedure Laterality Date    CARDIAC SURGERY      COLONOSCOPY      CORONARY ANGIOPLASTY WITH STENT PLACEMENT      FL RETROGRADE PYELOGRAM  9/11/2019    HEMORRHOID SURGERY      TX CYSTO/URETERO W/LITHOTRIPSY &INDWELL STENT INSRT Left 9/11/2019    Procedure: CYSTO, URETEROSCOPY W/HOLMIUM LASER, BASKET STONE EXTRACTION, RETROGRADE PYELOGRAM, STENT EXCHANGE;  Surgeon: Marquis Bryn MD;  Location: [facility];  Service: Urology    PROSTATE BIOPSY  10/24/2018    TONSILLECTOMY        Family History:     Family History   Problem Relation Age of Onset   Daysi Villatoro Breast cancer Mother     Hypertension Father       Social History:     Social History     Socioeconomic History    Marital status: /Civil Union     Spouse name: None    Number of children: None    Years of education: None    Highest education level: None   Occupational History    Occupation: consultant   Social Needs    Financial resource strain: None    Food insecurity:     Worry: None     Inability: None    Transportation needs:     Medical: None     Non-medical: None   Tobacco Use    Smoking status: Never Smoker    Smokeless tobacco: Never Used   Substance and Sexual Activity    Alcohol use: Yes     Frequency: 2-3 times a week     Comment: Occuational / social     Drug use: No    Sexual activity: None   Lifestyle    Physical activity:     Days per week: None     Minutes per session: None    Stress: None   Relationships    Social connections:     Talks on phone: None     Gets together: None     Attends [employer] service: None     Active member of club or organization: None     Attends meetings of clubs or organizations: None     Relationship status: None    Intimate partner violence:     Fear of current or ex partner: None     Emotionally abused: None     Physically abused: None     Forced sexual activity: None   Other Topics Concern    None   Social History Narrative    Always uses seat belt     Daily caffeine consumption 2-3 servings a day     Feels safe at home      Medications and Allergies:     Current Outpatient Medications   Medication Sig Dispense Refill    abiraterone (ZYTIGA) 250 mg tablet Take 4 tablets (1,000 mg total) by mouth daily 120 tablet 4    aspirin (ASPIRIN ADULT LOW STRENGTH) 81 mg EC tablet Take 1 tablet by mouth daily      atorvastatin (LIPITOR) 40 mg tablet Take 40 mg by mouth daily       Calcium 500 MG tablet Take 1,000 mg by mouth daily       cholecalciferol (VITAMIN D3) 1,000 units tablet Take 1 tablet by mouth daily      leuprolide (LUPRON DEPOT 3 MONTH KIT) 22 5 mg injection Inject 22 5 mg into a muscle every 6 (six) months      losartan (COZAAR) 100 MG tablet take 1 tablet by mouth once daily 90 tablet 1    metoprolol succinate (TOPROL-XL) 25 mg 24 hr tablet TAKE 1/2 TABLET BY MOUTH ONCE DAILY 90 tablet 1    predniSONE 5 mg tablet take 1 tablet by mouth twice a day with meals 60 tablet 0    Zoledronic Acid (ZOMETA IV) Infuse into a venous catheter every 3 (three) months       No current facility-administered medications for this visit        No Known Allergies   Immunizations:     Immunization History   Administered Date(s) Administered    H1N1, All Formulations 02/01/2010    INFLUENZA 09/17/2015, 09/23/2016    Influenza Split High Dose Preservative Free IM 09/04/2014, 09/17/2015, 09/23/2016, 10/06/2017    Influenza TIV (IM) 09/20/2012, 09/17/2013    Influenza, high dose seasonal 0 5 mL 09/14/2018, 10/21/2019    Pneumococcal Conjugate 13-Valent 03/16/2015    Pneumococcal Polysaccharide PPV23 07/26/2011    Tdap 07/26/2011    Zoster 01/10/2014      Health Maintenance:         Topic Date Due    CRC Screening: Colonoscopy  10/06/2027     There are no preventive care reminders to display for this patient  Medicare Screening Tests and Risk Assessments:     Grace Villarreal is here for his Subsequent Wellness visit  Last Medicare Wellness visit information reviewed, patient interviewed and updates made to the record today  Health Risk Assessment:   Patient rates overall health as good  Patient feels that their physical health rating is same  Eyesight was rated as same  Hearing was rated as same  Patient feels that their emotional and mental health rating is same  Pain experienced in the last 7 days has been none  Patient states that he has experienced no weight loss or gain in last 6 months  Depression Screening:   PHQ-2 Score: 0      Fall Risk Screening: In the past year, patient has experienced: no history of falling in past year      Home Safety:  Patient has trouble with stairs inside or outside of their home  Patient has working smoke alarms and has no working carbon monoxide detector  Home safety hazards include: none  Nutrition:   Current diet is Regular  Medications:   Patient is currently taking over-the-counter supplements  OTC medications include: see medication list  Patient is able to manage medications  Activities of Daily Living (ADLs)/Instrumental Activities of Daily Living (IADLs):   Walk and transfer into and out of bed and chair?: Yes  Dress and groom yourself?: Yes    Bathe or shower yourself?: Yes    Feed yourself?  Yes  Do your laundry/housekeeping?: Yes  Manage your money, pay your bills and track your expenses?: Yes  Make your own meals?: Yes    Do your own shopping?: Yes    Previous Hospitalizations:   Any hospitalizations or ED visits within the last 12 months?: Yes    How many hospitalizations have you had in the last year?: 1-2    Advance Care Planning:   Living will: Yes    Durable POA for healthcare:  Yes    Advanced directive: Yes    Advanced directive counseling given: Yes    Five wishes given: Yes    End of Life Decisions reviewed with patient: Yes    Provider agrees with end of life decisions: Yes      Cognitive Screening:   Provider or family/friend/caregiver concerned regarding cognition?: No    PREVENTIVE SCREENINGS      Cardiovascular Screening:    General: Screening Not Indicated and History Lipid Disorder      Diabetes Screening:     General: Screening Current      Colorectal Cancer Screening:     General: Screening Current      Prostate Cancer Screening:    General: History Prostate Cancer and Screening Not Indicated      Osteoporosis Screening:    General: Risks and Benefits Discussed      Abdominal Aortic Aneurysm (AAA) Screening:        General: Risks and Benefits Discussed      Lung Cancer Screening:     General: Risks and Benefits Discussed      Hepatitis C Screening:    General: Risks and Benefits Discussed    No exam data present     Physical Exam:     /80 (BP Location: Left arm, Patient Position: Sitting, Cuff Size: Adult)   Pulse 77   Temp 98 °F (36 7 °C) (Tympanic)   Resp 16   Ht 5' 7 32" (1 71 m)   Wt 93 4 kg (206 lb)   SpO2 96%   BMI 31 96 kg/m²     Physical Exam    Lizzy Tate DO

## 2019-12-09 NOTE — PATIENT INSTRUCTIONS
Medicare Preventive Visit Patient Instructions  Thank you for completing your Welcome to Medicare Visit or Medicare Annual Wellness Visit today  Your next wellness visit will be due in one year (12/9/2020)  The screening/preventive services that you may require over the next 5-10 years are detailed below  Some tests may not apply to you based off risk factors and/or age  Screening tests ordered at today's visit but not completed yet may show as past due  Also, please note that scanned in results may not display below  Preventive Screenings:  Service Recommendations Previous Testing/Comments   Colorectal Cancer Screening  · Colonoscopy    · Fecal Occult Blood Test (FOBT)/Fecal Immunochemical Test (FIT)  · Fecal DNA/Cologuard Test  · Flexible Sigmoidoscopy Age: 54-65 years old   Colonoscopy: every 10 years (May be performed more frequently if at higher risk)  OR  FOBT/FIT: every 1 year  OR  Cologuard: every 3 years  OR  Sigmoidoscopy: every 5 years  Screening may be recommended earlier than age 48 if at higher risk for colorectal cancer  Also, an individualized decision between you and your healthcare provider will decide whether screening between the ages of 74-80 would be appropriate   Colonoscopy: 10/06/2017  FOBT/FIT: Not on file  Cologuard: Not on file  Sigmoidoscopy: Not on file    Screening Current     Prostate Cancer Screening Individualized decision between patient and health care provider in men between ages of 53-78   Medicare will cover every 12 months beginning on the day after your 50th birthday PSA: 0 2 ng/mL     History Prostate Cancer  Screening Not Indicated     Hepatitis C Screening Once for adults born between 1945 and 1965  More frequently in patients at high risk for Hepatitis C Hep C Antibody: Not on file       Diabetes Screening 1-2 times per year if you're at risk for diabetes or have pre-diabetes Fasting glucose: 97 mg/dL   A1C: No results in last 5 years    Screening Current   Cholesterol Screening Once every 5 years if you don't have a lipid disorder  May order more often based on risk factors  Lipid panel: 05/25/2019    Screening Not Indicated  History Lipid Disorder      Other Preventive Screenings Covered by Medicare:  1  Abdominal Aortic Aneurysm (AAA) Screening: covered once if your at risk  You're considered to be at risk if you have a family history of AAA or a male between the age of 73-68 who smoking at least 100 cigarettes in your lifetime  2  Lung Cancer Screening: covers low dose CT scan once per year if you meet all of the following conditions: (1) Age 50-69; (2) No signs or symptoms of lung cancer; (3) Current smoker or have quit smoking within the last 15 years; (4) You have a tobacco smoking history of at least 30 pack years (packs per day x number of years you smoked); (5) You get a written order from a healthcare provider  3  Glaucoma Screening: covered annually if you're considered high risk: (1) You have diabetes OR (2) Family history of glaucoma OR (3)  aged 48 and older OR (3)  American aged 72 and older  3  Osteoporosis Screening: covered every 2 years if you meet one of the following conditions: (1) Have a vertebral abnormality; (2) On glucocorticoid therapy for more than 3 months; (3) Have primary hyperparathyroidism; (4) On osteoporosis medications and need to assess response to drug therapy  5  HIV Screening: covered annually if you're between the age of 12-76  Also covered annually if you are younger than 13 and older than 72 with risk factors for HIV infection  For pregnant patients, it is covered up to 3 times per pregnancy      Immunizations:  Immunization Recommendations   Influenza Vaccine Annual influenza vaccination during flu season is recommended for all persons aged >= 6 months who do not have contraindications   Pneumococcal Vaccine (Prevnar and Pneumovax)  * Prevnar = PCV13  * Pneumovax = PPSV23 Adults 25-60 years old: 1-3 doses may be recommended based on certain risk factors  Adults 72 years old: Prevnar (PCV13) vaccine recommended followed by Pneumovax (PPSV23) vaccine  If already received PPSV23 since turning 65, then PCV13 recommended at least one year after PPSV23 dose  Hepatitis B Vaccine 3 dose series if at intermediate or high risk (ex: diabetes, end stage renal disease, liver disease)   Tetanus (Td) Vaccine - COST NOT COVERED BY MEDICARE PART B Following completion of primary series, a booster dose should be given every 10 years to maintain immunity against tetanus  Td may also be given as tetanus wound prophylaxis  Tdap Vaccine - COST NOT COVERED BY MEDICARE PART B Recommended at least once for all adults  For pregnant patients, recommended with each pregnancy  Shingles Vaccine (Shingrix) - COST NOT COVERED BY MEDICARE PART B  2 shot series recommended in those aged 48 and above     Health Maintenance Due:      Topic Date Due    CRC Screening: Colonoscopy  10/06/2027     Immunizations Due:  There are no preventive care reminders to display for this patient  Advance Directives   What are advance directives? Advance directives are legal documents that state your wishes and plans for medical care  These plans are made ahead of time in case you lose your ability to make decisions for yourself  Advance directives can apply to any medical decision, such as the treatments you want, and if you want to donate organs  What are the types of advance directives? There are many types of advance directives, and each state has rules about how to use them  You may choose a combination of any of the following:  · Living will: This is a written record of the treatment you want  You can also choose which treatments you do not want, which to limit, and which to stop at a certain time  This includes surgery, medicine, IV fluid, and tube feedings  · Durable power of  for healthcare [facility]):   This is a written record that states who you want to make healthcare choices for you when you are unable to make them for yourself  This person, called a proxy, is usually a family member or a friend  You may choose more than 1 proxy  · Do not resuscitate (DNR) order:  A DNR order is used in case your heart stops beating or you stop breathing  It is a request not to have certain forms of treatment, such as CPR  A DNR order may be included in other types of advance directives  · Medical directive: This covers the care that you want if you are in a coma, near death, or unable to make decisions for yourself  You can list the treatments you want for each condition  Treatment may include pain medicine, surgery, blood transfusions, dialysis, IV or tube feedings, and a ventilator (breathing machine)  · Values history: This document has questions about your views, beliefs, and how you feel and think about life  This information can help others choose the care that you would choose  Why are advance directives important? An advance directive helps you control your care  Although spoken wishes may be used, it is better to have your wishes written down  Spoken wishes can be misunderstood, or not followed  Treatments may be given even if you do not want them  An advance directive may make it easier for your family to make difficult choices about your care  Weight Management   Why it is important to manage your weight:  Being overweight increases your risk of health conditions such as heart disease, high blood pressure, type 2 diabetes, and certain types of cancer  It can also increase your risk for osteoarthritis, sleep apnea, and other respiratory problems  Aim for a slow, steady weight loss  Even a small amount of weight loss can lower your risk of health problems  How to lose weight safely:  A safe and healthy way to lose weight is to eat fewer calories and get regular exercise   You can lose up about 1 pound a week by decreasing the number of calories you eat by 500 calories each day  Healthy meal plan for weight management:  A healthy meal plan includes a variety of foods, contains fewer calories, and helps you stay healthy  A healthy meal plan includes the following:  · Eat whole-grain foods more often  A healthy meal plan should contain fiber  Fiber is the part of grains, fruits, and vegetables that is not broken down by your body  Whole-grain foods are healthy and provide extra fiber in your diet  Some examples of whole-grain foods are whole-wheat breads and pastas, oatmeal, brown rice, and bulgur  · Eat a variety of vegetables every day  Include dark, leafy greens such as spinach, kale, alissa greens, and mustard greens  Eat yellow and orange vegetables such as carrots, sweet potatoes, and winter squash  · Eat a variety of fruits every day  Choose fresh or canned fruit (canned in its own juice or light syrup) instead of juice  Fruit juice has very little or no fiber  · Eat low-fat dairy foods  Drink fat-free (skim) milk or 1% milk  Eat fat-free yogurt and low-fat cottage cheese  Try low-fat cheeses such as mozzarella and other reduced-fat cheeses  · Choose meat and other protein foods that are low in fat  Choose beans or other legumes such as split peas or lentils  Choose fish, skinless poultry (chicken or turkey), or lean cuts of red meat (beef or pork)  Before you cook meat or poultry, cut off any visible fat  · Use less fat and oil  Try baking foods instead of frying them  Add less fat, such as margarine, sour cream, regular salad dressing and mayonnaise to foods  Eat fewer high-fat foods  Some examples of high-fat foods include french fries, doughnuts, ice cream, and cakes  · Eat fewer sweets  Limit foods and drinks that are high in sugar  This includes candy, cookies, regular soda, and sweetened drinks  Exercise:  Exercise at least 30 minutes per day on most days of the week   Some examples of exercise include walking, biking, dancing, and swimming  You can also fit in more physical activity by taking the stairs instead of the elevator or parking farther away from stores  Ask your healthcare provider about the best exercise plan for you  © Copyright [employer] 2018 Information is for End User's use only and may not be sold, redistributed or otherwise used for commercial purposes  All illustrations and images included in CareNotes® are the copyrighted property of A D A M , Inc  or [facility] Katheryn Carroll   Obesity   AMBULATORY CARE:   Obesity  is when your body mass index (BMI) is greater than 30  Your healthcare provider will use your height and weight to measure your BMI  The risks of obesity include  many health problems, such as injuries or physical disability  You may need tests to check for the following:  · Diabetes     · High blood pressure or high cholesterol     · Heart disease     · Gallbladder or liver disease     · Cancer of the colon, breast, prostate, liver, or kidney     · Sleep apnea     · Arthritis or gout  Seek care immediately if:   · You have a severe headache, confusion, or difficulty speaking  · You have weakness on one side of your body  · You have chest pain, sweating, or shortness of breath  Contact your healthcare provider if:   · You have symptoms of gallbladder or liver disease, such as pain in your upper abdomen  · You have knee or hip pain and discomfort while walking  · You have symptoms of diabetes, such as intense hunger and thirst, and frequent urination  · You have symptoms of sleep apnea, such as snoring or daytime sleepiness  · You have questions or concerns about your condition or care  Treatment for obesity  focuses on helping you lose weight to improve your health  Even a small decrease in BMI can reduce the risk for many health problems  Your healthcare provider will help you set a weight-loss goal   · Lifestyle changes  are the first step in treating obesity   These include making healthy food choices and getting regular physical activity  Your healthcare provider may suggest a weight-loss program that involves coaching, education, and therapy  · Medicine  may help you lose weight when it is used with a healthy diet and physical activity  · Surgery  can help you lose weight if you are very obese and have other health problems  There are several types of weight-loss surgery  Ask your healthcare provider for more information  Be successful losing weight:   · Set small, realistic goals  An example of a small goal is to walk for 20 minutes 5 days a week  Anther goal is to lose 5% of your body weight  · Tell friends, family members, and coworkers about your goals  and ask for their support  Ask a friend to lose weight with you, or join a weight-loss support group  · Identify foods or triggers that may cause you to overeat , and find ways to avoid them  Remove tempting high-calorie foods from your home and workplace  Place a bowl of fresh fruit on your kitchen counter  If stress causes you to eat, then find other ways to cope with stress  · Keep a diary to track what you eat and drink  Also write down how many minutes of physical activity you do each day  Weigh yourself once a week and record it in your diary  Eating changes: You will need to eat 500 to 1,000 fewer calories each day than you currently eat to lose 1 to 2 pounds a week  The following changes will help you cut calories:  · Eat smaller portions  Use small plates, no larger than 9 inches in diameter  Fill your plate half full of fruits and vegetables  Measure your food using measuring cups until you know what a serving size looks like  · Eat 3 meals and 1 or 2 snacks each day  Plan your meals in advance  Sommer Grimaldo and eat at home most of the time  Eat slowly  · Eat fruits and vegetables at every meal   They are low in calories and high in fiber, which makes you feel full   Do not add butter, margarine, or cream sauce to vegetables  Use herbs to season steamed vegetables  · Eat less fat and fewer fried foods  Eat more baked or grilled chicken and fish  These protein sources are lower in calories and fat than red meat  Limit fast food  Dress your salads with olive oil and vinegar instead of bottled dressing  · Limit the amount of sugar you eat  Do not drink sugary beverages  Limit alcohol  Activity changes:  Physical activity is good for your body in many ways  It helps you burn calories and build strong muscles  It decreases stress and depression, and improves your mood  It can also help you sleep better  Talk to your healthcare provider before you begin an exercise program   · Exercise for at least 30 minutes 5 days a week  Start slowly  Set aside time each day for physical activity that you enjoy and that is convenient for you  It is best to do both weight training and an activity that increases your heart rate, such as walking, bicycling, or swimming  · Find ways to be more active  Do yard work and housecleaning  Walk up the stairs instead of using elevators  Spend your leisure time going to events that require walking, such as outdoor festivals or fairs  This extra physical activity can help you lose weight and keep it off  Follow up with your healthcare provider as directed: You may need to meet with a dietitian  Write down your questions so you remember to ask them during your visits  © 2017 [address] Information is for End User's use only and may not be sold, redistributed or otherwise used for commercial purposes  All illustrations and images included in CareNotes® are the copyrighted property of A D A M , Inc  or Elmo Aleman  The above information is an  only  It is not intended as medical advice for individual conditions or treatments   Talk to your doctor, nurse or pharmacist before following any medical regimen to see if it is safe and effective for you

## 2019-12-09 NOTE — PROGRESS NOTES
[facility]      NAME: Ryann Conrad  AGE: 67 y o  SEX: male  : 1940   MRN: [id number]    DATE: 2019  TIME: 11:35 AM    Assessment and Plan     Problem List Items Addressed This Visit     Benign essential hypertension       Reasonably controlled on metoprolol 12 5 mg daily and losartan 100         Hyperlipidemia      Doing well on atorvastatin 40  Will continue to monitor         Relevant Orders    Comprehensive metabolic panel    Lipid Panel with Direct LDL reflex    Prostate cancer ([facility] )      History of prostate cancer with possible mets to bone  Most recent PSA on  was 0 2  Continue regular follow-up with urologist and oncologist as directed         Coronary artery disease of native artery of native heart with stable angina pectoris ([facility] )      Stable without chest pain or shortness of breath  Recent stress test was negative  Continue regular follow-up with cardiologist, Dr Bernardo Hamilton as directed           Other Visit Diagnoses     Encounter for Medicare annual wellness exam    -  Primary    BMI 31 0-31 9,adult                  Return to office in: AWV  Today  Recheck 6 months, labs prior at [facility] Complaint     Chief Complaint   Patient presents with   [facility] Wellness Visit    Follow-up     6 months check up       History of Present Illness      Patient presents for recheck of chronic medical problems today  Patient had labs drawn on   Patient had flu shot this season  Overall patient is feeling well  Still sees urologist and oncologist for metastatic prostate cancer  Still sees cardiologist due to CAD  Doing well on current meds without side effects        The following portions of the patient's history were reviewed and updated as appropriate: allergies, current medications, past family history, past medical history, past social history, past surgical history and problem list     Review of Systems   Review of Systems Respiratory: Negative  Cardiovascular: Negative  Gastrointestinal: Negative  Genitourinary: Negative  Active Problem List     Patient Active Problem List   Diagnosis    Benign essential hypertension    Hyperlipidemia    Renal cyst    Liver lesion    Pulmonary nodule    Combined arterial insufficiency and corporo-venous occlusive erectile dysfunction    Prostate cancer (HCC)    Coronary artery disease of native artery of native heart with stable angina pectoris ([address] )    DDD (degenerative disc disease), cervical    Inflamed sebaceous cyst    Obstruction of left eustachian tube    S/P coronary artery stent placement    Vitamin D deficiency    Abnormal radionuclide bone scan    Hot flashes    Bone metastases (HCC)    Clonus    Calculus of kidney    Thoracic myofascial strain    Positional lightheadedness    Rhomboid pain    Mixed hyperlipidemia    Left ureteral stone       Objective   /80 (BP Location: Left arm, Patient Position: Sitting, Cuff Size: Adult)   Pulse 77   Temp 98 °F (36 7 °C) (Tympanic)   Resp 16   Ht 5' 7 32" (1 71 m)   Wt 93 4 kg (206 lb)   SpO2 96%   BMI 31 96 kg/m²     Physical Exam   Cardiovascular: Normal rate, regular rhythm, normal heart sounds and intact distal pulses  Carotids: no bruits  Ext: no edema   Pulmonary/Chest: Effort normal  No respiratory distress  He has no wheezes  He has no rales  Psychiatric: He has a normal mood and affect   His behavior is normal  Thought content normal        Pertinent Laboratory/Diagnostic Studies:    Lab results    Current Medications     Current Outpatient Medications:     abiraterone (ZYTIGA) 250 mg tablet, Take 4 tablets (1,000 mg total) by mouth daily, Disp: 120 tablet, Rfl: 4    aspirin (ASPIRIN ADULT LOW STRENGTH) 81 mg EC tablet, Take 1 tablet by mouth daily, Disp: , Rfl:     atorvastatin (LIPITOR) 40 mg tablet, Take 40 mg by mouth daily , Disp: , Rfl:     Calcium 500 MG tablet, Take 1,000 mg by mouth daily , Disp: , Rfl:     cholecalciferol (VITAMIN D3) 1,000 units tablet, Take 1 tablet by mouth daily, Disp: , Rfl:     leuprolide (LUPRON DEPOT 3 MONTH KIT) 22 5 mg injection, Inject 22 5 mg into a muscle every 6 (six) months, Disp: , Rfl:     losartan (COZAAR) 100 MG tablet, take 1 tablet by mouth once daily, Disp: 90 tablet, Rfl: 1    metoprolol succinate (TOPROL-XL) 25 mg 24 hr tablet, TAKE 1/2 TABLET BY MOUTH ONCE DAILY, Disp: 90 tablet, Rfl: 1    predniSONE 5 mg tablet, take 1 tablet by mouth twice a day with meals, Disp: 60 tablet, Rfl: 0    Zoledronic Acid (ZOMETA IV), Infuse into a venous catheter every 3 (three) months, Disp: , Rfl:     Health Maintenance     Health Maintenance   Topic Date Due    SLP PLAN OF CARE  1940    BMI: Followup Plan  09/19/1959    Medicare Annual Wellness Visit (AWV)  11/14/2019    PT PLAN OF CARE  12/06/2019    Fall Risk  11/06/2020    Depression Screening PHQ  11/06/2020    BMI: Adult  12/05/2020    DTaP,Tdap,and Td Vaccines (2 - Td) 07/26/2021    CRC Screening: Colonoscopy  10/06/2027    Influenza Vaccine  Completed    Pneumococcal Vaccine: 65+ Years  Completed    Pneumococcal Vaccine: Pediatrics (0 to 5 Years) and At-Risk Patients (6 to 59 Years)  Aged Out    HIB Vaccine  Aged Out    Hepatitis B Vaccine  Aged Out    IPV Vaccine  Aged Out    Hepatitis A Vaccine  Aged Out    Meningococcal ACWY Vaccine  Aged Out    HPV Vaccine  Aged [employer] History   Administered Date(s) Administered    H1N1, All Formulations 02/01/2010    INFLUENZA 09/17/2015, 09/23/2016    Influenza Split High Dose Preservative Free IM 09/04/2014, 09/17/2015, 09/23/2016, 10/06/2017    Influenza TIV (IM) 09/20/2012, 09/17/2013    Influenza, high dose seasonal 0 5 mL 09/14/2018, 10/21/2019    Pneumococcal Conjugate 13-Valent 03/16/2015    Pneumococcal Polysaccharide PPV23 07/26/2011    Tdap 07/26/2011    Zoster 01/10/2014       Lizzy Tate DO    [facility]  BMI Counseling: Body mass index is 31 96 kg/m²  The BMI is above normal  Nutrition recommendations include reducing portion sizes

## 2019-12-09 NOTE — ASSESSMENT & PLAN NOTE
Stable without chest pain or shortness of breath  Recent stress test was negative    Continue regular follow-up with cardiologist, Dr Charis Pallas as directed

## 2019-12-09 NOTE — PROGRESS NOTES
RE-Evaluation/Discharge Note     Today's date: 2019  Patient name: Inessa Gupta  :   MRN: [id number]  Referring provider: Stephan Mustafa DO  Dx:   Encounter Diagnosis     ICD-10-CM    1  Acute bilateral low back pain without sciatica M54 5        Start Time: 720  Stop Time: 805  Total time in clinic (min): 45 minutes      Assessment/Plan     Patient was referred to outpatient physical therapy with acute bilateral lower back pain  His impairments included decreased AROM back and proximal LE musculature, decreased muscle strength, muscle spasms B paraspinal musculature and L/S hypomobility  He received PT for 4 weeks with one week hold due to out of time vacation  He was re-evaluated today and shows resolution of pain, Improvement in ROM , MS and resolution of muscle spasms  He has been issued a home exercise program and has returned to the gym and is tolerating modified program well  He has met all set goals and is comfortable with transition back to his gym regime while performing with proper body mechanics  Will discharge at this time      STG's:     - Patient improves ROM by 10 degrees  MET    - Patient is independent with initial home exercise program for improvements at home within 2 weeks  MET    - Patient reports an overall reduction in back pain by 2 points on a 0-10 scale during active exercise program for improved QOL within 2 weeks  MET  LTG's:   - Patient ambulates on treadmill for 10 consecutive minutes with no increase sx for improved QOL within 4 weeks  MET   - Patient will be able to return to full gym exercise regime in 4 weeks  MET   - Patient will have resolution of muscle spasm in his lower back  MET   Plan:   Will discharge at this time      Lumbar Spine    AROM     Flexion   80     Extension    30     Rotation Left WNL     Rotation Right WNL     Lateral flex Left 40     Lateral Flex Right 40        AROM - hip Left Right   Flexion 110 110   Extension 30 30   Abduction wnl wnl External Rotation wnl wnl   Internal rotation  wfl   wfl      AROM - knee Left Right   Flexion wnl  wnl   Extension  wnl   wnl      AROM - ankle Left Right   Flexion wnl  wnl   Extension  wnl   wnl      MMT - Hip Left Right   Flexion 5 5   Extension 5 5   Abduction 5 5   External Rotation 5 5      MMT - knee Left Right   Flexion 5 5   Extension 5 5      MMT - ankle Left Right   Flexion 5 5   Extension  5  5      Palpation: Increased tone rgiht thoracic and lumbar paraspinals and left lumbar paraspinals  * Resolved: No muscle spasms   Posture:   Mild Forward head, round shoulders  * Resolved  Client ambulates with good alignment  Reflexes: normal patellar/ aquilles  Repeated Movements:  Spine Mobility: normal  FAIR test (piriformis):negative  SLR test:  Right 80   Left  80 without reproduction of pain symptoms        Subjective:  Client reports resolution of symptoms and return to gym  Requested review of HEP and quality of his body mechanics/tequine      Objective: See treatment diary below        Precautions: HTN, Prostate Cancer, CAD    Objective      Manual   11/06 11/11 11/13 11/19 12/2 12/4       Soft tisue mobs    Lumbar and Right Rhomboid  nv  IASTM 10' thor/lumb  IASTM  10'  IASTM 10'  KS  KS       Joint mobs   KS AP lower thoracic and lumbar    AP lower thoracic/ lumbar  AP  LT/Lumbar  ks  KS                                                                         Exercise Diary  11/06 11/11 11/13 11/19 12/02 12/04 12/09   TA 10 x :10  HEP   Hep    -HEP  -HEP     Hamstring stretch nv  :30 x 3  :30 x3      :30 x 3  :30 x a :30 x 3 :30 x 3   Lower Trunk Rotation 10 x :10  10 x :10  HEP      HEP      ----     SKC edge of bed/ hip flex stretch 10 x :10 ea 10 x :10 ea  HEP  HEP       Thoracic self mob with towell :30 x 2   2 min  HEP  HEP       Scapular abd stance stretch 5x :05  :10 x 3  HEP  HEP        Mini squats with TA     10 x 2  at bar  10 x 2  10 x 2 TA, GSets  10 x 2  10 x 2    J[place] stretch    :30 x 3 standing  :30 x 3  :30 x 3  :30 x 3  :30 x 3 :30 x 3    Soleus stretch    :30 x 3  standing  :30 x 3  :30 x 3  :30 x 3  :30 x 3 :30 x 3    Prone hip ext    Knee bent   10 x 1 ea  10 x 1ea  10 x 1   HEP  10 x:10    SLR supine    nv  Flex 10 x 2  Abd 10 x 2  Flex 10 x 2  ABD 10 x 2  1 5 #  Flex/abd 10 x 2 ea  1 5 # flex/ abd 10 x 2 1 5 # flex/ abd 10 x 2    Bike        Level 1  7 min  8 min upright  8 min  upright 10 min 10 min    Hip ABD      10 x 2  10 x 2 red theraband  10 x 2 2# 10 x 2 2# 10 x 2     Hip Extension      10 x 2  10 x 2 RTB  10 x 2 2#  10 x 2  2# 10 x 2    Prone quad stretch       green strap  :30 x 3  green strap :30 x 3  G Strap :30 x 3 ea HEP  G strap :30 x 3 ea    Stand rows    OTB 10 x 2 :05 10 x 3 #30  10 x 3 #30 10 x 3   Lower trap    OTB 10 x 2 :05   10 x 2 #30  10 x 3 #30 10 x 3 #30   Hamstring curls     10 x 2 #25 10 x 3 #30 10 x 3 #30   LAQ     10 x 2 #20 10 x 3 #20 10 x 3 #30   Sidelying       3 min on left side UE overhead     Treadmill       Warm up 3 min    10 min end of session         Modalities

## 2019-12-09 NOTE — ASSESSMENT & PLAN NOTE
History of prostate cancer with possible mets to bone  Most recent PSA on December 2nd was 0 2    Continue regular follow-up with urologist and oncologist as directed

## 2019-12-11 ENCOUNTER — APPOINTMENT (OUTPATIENT)
Dept: PHYSICAL THERAPY | Facility: REHABILITATION | Age: 79
End: 2019-12-11
Payer: COMMERCIAL

## 2020-01-03 DIAGNOSIS — C61 PROSTATE CANCER (HCC): ICD-10-CM

## 2020-01-09 ENCOUNTER — TELEPHONE (OUTPATIENT)
Dept: HEMATOLOGY ONCOLOGY | Facility: CLINIC | Age: 80
End: 2020-01-09

## 2020-01-09 DIAGNOSIS — C61 PROSTATE CANCER (HCC): ICD-10-CM

## 2020-01-09 RX ORDER — ABIRATERONE ACETATE 250 MG/1
1000 TABLET ORAL DAILY
Qty: 120 TABLET | Refills: 11 | Status: SHIPPED | OUTPATIENT
Start: 2020-01-09 | End: 2020-05-26

## 2020-01-12 DIAGNOSIS — C61 PROSTATE CANCER (HCC): ICD-10-CM

## 2020-01-13 RX ORDER — PREDNISONE 1 MG/1
TABLET ORAL
Qty: 60 TABLET | Refills: 0 | Status: SHIPPED | OUTPATIENT
Start: 2020-01-13 | End: 2020-02-10

## 2020-01-20 DIAGNOSIS — C61 PROSTATE CANCER (HCC): Primary | ICD-10-CM

## 2020-01-20 RX ORDER — ABIRATERONE ACETATE 250 MG/1
TABLET ORAL
OUTPATIENT
Start: 2020-01-20

## 2020-01-20 NOTE — TELEPHONE ENCOUNTER
[facility] called and they put an E Script through to us for Med Refill for the Lupron and got a response back Refill Not Appropriate, want to know if that is a mistake?   If not they will need a refill

## 2020-01-21 ENCOUNTER — TELEPHONE (OUTPATIENT)
Dept: UROLOGY | Facility: CLINIC | Age: 80
End: 2020-01-21

## 2020-01-21 NOTE — TELEPHONE ENCOUNTER
Roman Mcrae from [facility] called [phone], she is questioning on how we go about keeping track of Lupron

## 2020-01-21 NOTE — TELEPHONE ENCOUNTER
The patient was last seen for prostate cancer evaluation on 8/16/19 by Dr Darvin Ruiz in the [facility] location continuation of the medication was authorized at that time with repeat injection in 6 months  (February, 2020)  With respect to the previous message, I agree with [address] that the patient is due for another injection  The patient has an upcoming office visit scheduled for 2/28/20 again with Dr Darvin Ruiz in the [facility] location but will need drug in preparation for the visit  Request for same, Lupron 45mg with 1 refill was queued and forwarded to the Advanced Practitioner covering the [facility] location for approval     Clinical staff in the [facility] location was also notified of same

## 2020-01-21 NOTE — TELEPHONE ENCOUNTER
I left a message for the patient to return my call  I spoke with Mrs Adrianne Ramsey and she verified that the patient's medical insurance did NOT change with the new year

## 2020-01-21 NOTE — TELEPHONE ENCOUNTER
Kana Vernon to order Lupron 45 mg 6 month injection from [facility]  Patient due for Lupron in February  Scheduled for appointment with Dr Tatianna Ann on 2/28/20

## 2020-01-29 NOTE — PROGRESS NOTES
Biopsy prostate     Date/Time 10/24/2018 12:01 PM     Performed by  Joanna Crain by Jesus Ngo       Consent: Verbal consent obtained  Written consent obtained  Risks and benefits: risks, benefits and alternatives were discussed  Consent given by: patient  Patient understanding: patient states understanding of the procedure being performed  Patient identity confirmed: verbally with patient      Local anesthesia used: yes      Anesthesia: local infiltration and nerve block     Anesthesia   Local anesthesia used: yes  Local Anesthetic: lidocaine 1% without epinephrine     Sedation   Patient sedated: no      Specimen: yes    Culture: no   Procedure Details   Procedure Notes:   Prostate Biopsy note: The patient returns to the office today to undergo a transrectal ultrasound-guided biopsy of the prostate secondary to elevated PSA and abnormal prostate exam along with bony lesions on bone scan  Risks and benefits of the procedure were discussed  Informed consent was obtained  The patient's prebiopsy preparation was deemed to be adequate  Antibiotics had been taken as prescribed  If appropriate blood thinners, had been placed on hold  The patient completed an enema as prescribed  The patient was placed in the lateral decubitus position  Digital rectal examination was performed revealing a 50 gram prostate  Viscous lidocaine jelly was instilled into the rectum  Transrectal ultrasonography was then performed  The prostate measured 56 1 grams  5 cc of 2% lidocaine were then injected bilaterally between the junction of the base of the prostate and the seminal vesicles  Ultrasound guidance was utilized to place the needle into proper position for the administration of the local anesthetic    A standard 12 core biopsy was then performed with one core from each the right later base, mid and apex; right medial base, mid and apex; left medial base, mid and apex; left lateral base, late entry:    Spoke with surgery this morning, ordered CT A/P with PO/IV contrast per recs. Patient with NGT in place. Per hepatology recs- if CT with no obstruction - start lactulose and plan for paracentesis. On PO vanco for c.diff, discussed plan with Dr. Vernon mid, apex  Direct pressure was held for 5 minutes for hemostasis  Overall the patient tolerated the procedure and there were no complications          Patient Transportation: confirmed  Patient tolerance: Patient tolerated the procedure well with no immediate complications

## 2020-02-03 NOTE — TELEPHONE ENCOUNTER
I returned the patient's call  He's not sure who he needs to speak to to obtain finanacial information on copay assistance  The Fund he was referred to by Mariano doesn't have any idea who he is and likely didn't help him last year  FYI - Patient has [address] and does NOT required prior authorization for Lupron  Mariano has the financial relationship with the patient  We as the office, do not  He needs to call Patient Financial Services at [facility] to handle all matters financial    Patient aware of same and verbalized understanding

## 2020-02-03 NOTE — TELEPHONE ENCOUNTER
Patient called in regard to needing Lupron financial aide updated for 2020  Patient can be reached at [phone]  Please call to discuss    Thank you

## 2020-02-03 NOTE — TELEPHONE ENCOUNTER
I spoke with the patient  He needs to square[place]away copayment assistance particulars with [address] [place] [address] ([phone])  (This is the correct number than the phone number provided below   )  The patient also states I need to call and verify release of the medication to the office  Medication should be delivered to the [facility] location  I called [facility], spoke with José Luis Uriarte regarding same  She said it's still a bit too soon to schedule the product for delivery  I left her with my direct dial phone number  She will call me on 2/19/20 for a 2/24/20 delivery date  In the meantime, they will work with the patient regarding copayment assistance  No further action required

## 2020-02-03 NOTE — TELEPHONE ENCOUNTER
Patient calling for update on Lupron Order  Provided the number for pharmacy    [facility] [phone]    He can be reached at

## 2020-02-08 DIAGNOSIS — C61 PROSTATE CANCER (HCC): ICD-10-CM

## 2020-02-10 ENCOUNTER — APPOINTMENT (OUTPATIENT)
Dept: RADIOLOGY | Facility: MEDICAL CENTER | Age: 80
End: 2020-02-10
Payer: COMMERCIAL

## 2020-02-10 ENCOUNTER — HOSPITAL ENCOUNTER (OUTPATIENT)
Dept: ULTRASOUND IMAGING | Facility: MEDICAL CENTER | Age: 80
Discharge: HOME | End: 2020-02-10
Payer: COMMERCIAL

## 2020-02-10 DIAGNOSIS — N20.0 CALCULUS OF KIDNEY: ICD-10-CM

## 2020-02-10 PROCEDURE — 76770 US EXAM ABDO BACK WALL COMP: CPT

## 2020-02-10 PROCEDURE — 74018 RADEX ABDOMEN 1 VIEW: CPT

## 2020-02-10 RX ORDER — PREDNISONE 1 MG/1
TABLET ORAL
Qty: 60 TABLET | Refills: 0 | Status: SHIPPED | OUTPATIENT
Start: 2020-02-10 | End: 2020-03-09

## 2020-02-10 NOTE — TELEPHONE ENCOUNTER
Received call from patient in regard to co-pay  He spoke with pharmaceutical rep   The medication should be billed for co-pay as a Part B vs the Part D  Part D would cast $2600  Part B will bring the cost down to $300    Please call patient at [phone] if questions  Thank you

## 2020-02-21 LAB
ALBUMIN SERPL-MCNC: 3.9 G/DL (ref 3.6–5.1)
ALBUMIN/GLOB SERPL: 1.8 (CALC) (ref 1–2.5)
ALP SERPL-CCNC: 33 U/L (ref 35–144)
ALT SERPL-CCNC: 19 U/L (ref 9–46)
AST SERPL-CCNC: 19 U/L (ref 10–35)
BASOPHILS # BLD AUTO: 50 CELLS/UL (ref 0–200)
BASOPHILS NFR BLD AUTO: 0.8 %
BILIRUB SERPL-MCNC: 0.7 MG/DL (ref 0.2–1.2)
BUN SERPL-MCNC: 23 MG/DL (ref 7–25)
BUN/CREAT SERPL: ABNORMAL (CALC) (ref 6–22)
CALCIUM SERPL-MCNC: 8.9 MG/DL (ref 8.6–10.3)
CHLORIDE SERPL-SCNC: 104 MMOL/L (ref 98–110)
CO2 SERPL-SCNC: 31 MMOL/L (ref 20–32)
CREAT SERPL-MCNC: 1.1 MG/DL (ref 0.7–1.18)
EOSINOPHIL # BLD AUTO: 107 CELLS/UL (ref 15–500)
EOSINOPHIL NFR BLD AUTO: 1.7 %
ERYTHROCYTE [DISTWIDTH] IN BLOOD BY AUTOMATED COUNT: 12.2 % (ref 11–15)
GLOBULIN SER CALC-MCNC: 2.2 G/DL (CALC) (ref 1.9–3.7)
GLUCOSE SERPL-MCNC: 84 MG/DL (ref 65–99)
HCT VFR BLD AUTO: 39.5 % (ref 38.5–50)
HGB BLD-MCNC: 13.3 G/DL (ref 13.2–17.1)
LYMPHOCYTES # BLD AUTO: 1260 CELLS/UL (ref 850–3900)
LYMPHOCYTES NFR BLD AUTO: 20 %
MCH RBC QN AUTO: 31.2 PG (ref 27–33)
MCHC RBC AUTO-ENTMCNC: 33.7 G/DL (ref 32–36)
MCV RBC AUTO: 92.7 FL (ref 80–100)
MONOCYTES # BLD AUTO: 372 CELLS/UL (ref 200–950)
MONOCYTES NFR BLD AUTO: 5.9 %
NEUTROPHILS # BLD AUTO: 4511 CELLS/UL (ref 1500–7800)
NEUTROPHILS NFR BLD AUTO: 71.6 %
PLATELET # BLD AUTO: 194 THOUSAND/UL (ref 140–400)
PMV BLD REES-ECKER: 11.1 FL (ref 7.5–12.5)
POTASSIUM SERPL-SCNC: 4.4 MMOL/L (ref 3.5–5.3)
PROT SERPL-MCNC: 6.1 G/DL (ref 6.1–8.1)
PSA SERPL-MCNC: 0.3 NG/ML
RBC # BLD AUTO: 4.26 MILLION/UL (ref 4.2–5.8)
SL AMB EGFR AFRICAN AMERICAN: 74 ML/MIN/1.73M2
SL AMB EGFR NON AFRICAN AMERICAN: 64 ML/MIN/1.73M2
SODIUM SERPL-SCNC: 140 MMOL/L (ref 135–146)
WBC # BLD AUTO: 6.3 THOUSAND/UL (ref 3.8–10.8)

## 2020-02-27 ENCOUNTER — HOSPITAL ENCOUNTER (OUTPATIENT)
Dept: INFUSION CENTER | Facility: CLINIC | Age: 80
Discharge: HOME | End: 2020-02-27
Payer: COMMERCIAL

## 2020-02-27 ENCOUNTER — TELEPHONE (OUTPATIENT)
Dept: UROLOGY | Facility: MEDICAL CENTER | Age: 80
End: 2020-02-27

## 2020-02-27 VITALS
RESPIRATION RATE: 18 BRPM | BODY MASS INDEX: 32.01 KG/M2 | TEMPERATURE: 97.6 F | DIASTOLIC BLOOD PRESSURE: 90 MMHG | WEIGHT: 206.35 LBS | HEART RATE: 59 BPM | SYSTOLIC BLOOD PRESSURE: 177 MMHG

## 2020-02-27 DIAGNOSIS — C61 PROSTATE CANCER (HCC): Primary | ICD-10-CM

## 2020-02-27 DIAGNOSIS — C79.51 BONE METASTASES (HCC): ICD-10-CM

## 2020-02-27 PROCEDURE — 96365 THER/PROPH/DIAG IV INF INIT: CPT

## 2020-02-27 RX ORDER — SODIUM CHLORIDE 9 MG/ML
20 INJECTION, SOLUTION INTRAVENOUS ONCE
Status: COMPLETED | OUTPATIENT
Start: 2020-02-27 | End: 2020-02-27

## 2020-02-27 RX ORDER — SODIUM CHLORIDE 9 MG/ML
20 INJECTION, SOLUTION INTRAVENOUS ONCE
Status: CANCELLED | OUTPATIENT
Start: 2020-05-21

## 2020-02-27 RX ADMIN — ZOLEDRONIC ACID 4 MG: 4 INJECTION, SOLUTION, CONCENTRATE INTRAVENOUS at 10:49

## 2020-02-27 RX ADMIN — SODIUM CHLORIDE 20 ML/HR: 0.9 INJECTION, SOLUTION INTRAVENOUS at 10:48

## 2020-02-27 NOTE — TELEPHONE ENCOUNTER
Spoke with Karla Mensah who advised lupron will be delivered tomorrow  She was unable to give time due to there is no tracking number at this time  She states if we call in the am we should be able to get tracking number  Spoke with pts wife   appt moved to the afternoon tomorrow

## 2020-02-27 NOTE — PLAN OF CARE
Problem: Potential for Falls  Goal: Patient will remain free of falls  Description  INTERVENTIONS:  - Assess patient frequently for physical needs  -  Identify cognitive and physical deficits and behaviors that affect risk of falls    -  [place] fall precautions as indicated by assessment   - Educate patient/family on patient safety including physical limitations  - Instruct patient to call for assistance with activity based on assessment  - Modify environment to reduce risk of injury  - Consider OT/PT consult to assist with strengthening/mobility  Outcome: Progressing

## 2020-02-27 NOTE — TELEPHONE ENCOUNTER
Patient of Dr Walter Bumpers seen in the [address] office  [address] delivery confirm 2/28 to be shipped via [employer]   Please call Bryson Mcgregor at [phone] if any questions or need for equipment    Thank you

## 2020-02-28 ENCOUNTER — TELEPHONE (OUTPATIENT)
Dept: UROLOGY | Facility: CLINIC | Age: 80
End: 2020-02-28

## 2020-02-28 ENCOUNTER — OFFICE VISIT (OUTPATIENT)
Dept: UROLOGY | Facility: CLINIC | Age: 80
End: 2020-02-28
Payer: COMMERCIAL

## 2020-02-28 VITALS
BODY MASS INDEX: 32.49 KG/M2 | HEIGHT: 67 IN | DIASTOLIC BLOOD PRESSURE: 70 MMHG | SYSTOLIC BLOOD PRESSURE: 142 MMHG | HEART RATE: 68 BPM | WEIGHT: 207 LBS

## 2020-02-28 DIAGNOSIS — C79.51 BONE METASTASES (HCC): Primary | ICD-10-CM

## 2020-02-28 DIAGNOSIS — C61 PROSTATE CANCER (HCC): ICD-10-CM

## 2020-02-28 PROCEDURE — 3078F DIAST BP <80 MM HG: CPT | Performed by: UROLOGY

## 2020-02-28 PROCEDURE — 3008F BODY MASS INDEX DOCD: CPT | Performed by: UROLOGY

## 2020-02-28 PROCEDURE — 96402 CHEMO HORMON ANTINEOPL SQ/IM: CPT | Performed by: UROLOGY

## 2020-02-28 PROCEDURE — 99213 OFFICE O/P EST LOW 20 MIN: CPT | Performed by: UROLOGY

## 2020-02-28 PROCEDURE — 1036F TOBACCO NON-USER: CPT | Performed by: UROLOGY

## 2020-02-28 PROCEDURE — 4040F PNEUMOC VAC/ADMIN/RCVD: CPT | Performed by: UROLOGY

## 2020-02-28 PROCEDURE — 3077F SYST BP >= 140 MM HG: CPT | Performed by: UROLOGY

## 2020-02-28 PROCEDURE — 1160F RVW MEDS BY RX/DR IN RCRD: CPT | Performed by: UROLOGY

## 2020-02-28 NOTE — PROGRESS NOTES
UROLOGY ROUTINE FOLLOW UP NOTE     CHIEF COMPLAINT   Ramila Gonzalez is a 67 y o  male with a complaint of   Chief Complaint   Patient presents with    Follow-up    Prostate Cancer       History of Present Illness:     67 y o  gentleman who has been undergoing routine prostate cancer screening with his primary care team  The patient has had a steady PSA but in March of 2017 was noted to have some oscillation  The patient was given a course of antibiotics and his PSA came down from the mid 5 range to 4 6  He initially presented in June 2017  We initially recommended follow-up in 1 year  Follow-up of his PSA values ordered by his primary team demonstrated concern for rapid rise  The patient return to see me in May of 2018  At that time, we recommended a prostate biopsy but the patient had just undergone percutaneous stenting and was on dual anti-platelet therapy  We recommend delay  The patient underwent a CT scan of his chest which demonstrated some sclerotic lesions in his primary care team again contacted us  We recommended repeat PSA and a bone scan  PSA has risen again and bone scan does demonstrate some concern  Lab Results   Component Value Date    PSA 0 3 02/20/2020    PSA 0 2 12/02/2019    PSA 0 2 08/30/2019   10/5/18 PSA 9 8  4/30/18 PSA 8 6, free 19 6%  9/30/17 PSA 7 3, free 15%  5/5/17 PSA 4 6  3/20/17 PSA 5 4     The patient has erectile dysfunction and has been doing well with Viagra 50 mg  These are cost prohibitive however  We switched to sildenafil 20mg tablets at last visit  The patient was unable to obtain the medication  Patient has not been using sildenafil since his heart episode described below  Patient is doing very well from a cardiac standpoint  He is still on Plavix and aspirin  He has lost 40 pounds with aggressive cardiac rehab and is exercising daily  He feels very good  He denies bony pain  He is concerned about the findings of his bone scan      We did discuss with Interventional Radiology possible biopsy of the patient's thoracic and rib lesions although these were not felt to be safe for attempt  As such, the patient was arranged for a prostate biopsy here in our office  I did discuss with the patient's cardiologist who felt it would be safe for the patient to stop his Plavix but remain on his aspirin for the procedure  Underwent prostate biopsy  This demonstrated high risk [place] 9 disease  Patient was discussed at multidisciplinary conference and although we were not able to biopsy his bony lesions, 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  The patient was started on androgen deprivation therapy and seen by Medical Oncology  He was started on enzalutamide and Zometa  He returns for androgen deprivation therapy  He has had good PSA response  Recently enzalutamide has transitioned to abiraterone  Patient has very minimal hot flashes in relation to his therapy  patient has remained on Lupron  He obtains this from a specialty pharmacy  It is delayed in arriving today  He has had some slow incidental rise in his PSA  He is asymptomatic of bony pain fevers or chills night sweats or weight loss  Patient has had some weight gain  And hot flashes      Past Medical History:     Past Medical History:   Diagnosis Date    Anemia     last assessed  1/7/14     Coronary artery disease     Hypercholesteremia     Hypertension     Kidney stone     Polyp of sigmoid colon     Prostate cancer ([address] )     Renal disorder     elevated serum creat    Tinnitus     unspecified laterality / last assessed 4/9/13    Vitamin D deficiency        PAST SURGICAL HISTORY:     Past Surgical History:   Procedure Laterality Date    CARDIAC SURGERY      COLONOSCOPY      CORONARY ANGIOPLASTY WITH STENT PLACEMENT      FL RETROGRADE PYELOGRAM  9/11/2019    HEMORRHOID SURGERY      [facility] CYSTO/URETERO W/LITHOTRIPSY &INDWELL STENT INSRT Left 9/11/2019 Procedure: CYSTO, URETEROSCOPY W/HOLMIUM LASER, BASKET STONE EXTRACTION, RETROGRADE PYELOGRAM, STENT EXCHANGE;  Surgeon: Alecia Gray MD;  Location: [facility];  Service: Urology    PROSTATE BIOPSY  10/24/2018    TONSILLECTOMY         CURRENT MEDICATIONS:     Current Outpatient Medications   Medication Sig Dispense Refill    abiraterone (ZYTIGA) 250 mg tablet Take 4 tablets (1,000 mg total) by mouth daily 120 tablet 11    aspirin (ASPIRIN ADULT LOW STRENGTH) 81 mg EC tablet Take 1 tablet by mouth daily      atorvastatin (LIPITOR) 40 mg tablet Take 40 mg by mouth daily       Calcium 500 MG tablet Take 1,000 mg by mouth daily       cholecalciferol (VITAMIN D3) 1,000 units tablet Take 1 tablet by mouth daily      leuprolide (LUPRON DEPOT 6 MONTH KIT) 45 mg Inject 45 mg into a muscle every 6 (six) months 45 mg 1    losartan (COZAAR) 100 MG tablet take 1 tablet by mouth once daily 90 tablet 1    metoprolol succinate (TOPROL-XL) 25 mg 24 hr tablet TAKE 1/2 TABLET BY MOUTH ONCE DAILY 90 tablet 1    predniSONE 5 mg tablet take 1 tablet by mouth twice a day with meals 60 tablet 0    Zoledronic Acid (ZOMETA IV) Infuse into a venous catheter every 3 (three) months       No current facility-administered medications for this visit          ALLERGIES:   No Known Allergies    SOCIAL HISTORY:     Social History     Socioeconomic History    Marital status: /Civil Union     Spouse name: None    Number of children: None    Years of education: None    Highest education level: None   Occupational History    Occupation: consultant   Social Needs    Financial resource strain: None    Food insecurity:     Worry: None     Inability: None    Transportation needs:     Medical: None     Non-medical: None   Tobacco Use    Smoking status: Never Smoker    Smokeless tobacco: Never Used   Substance and Sexual Activity    Alcohol use: Yes     Frequency: 2-3 times a week     Comment: Occuational / social     Drug use: No    Sexual activity: None   Lifestyle    Physical activity:     Days per week: None     Minutes per session: None    Stress: None   Relationships    Social connections:     Talks on phone: None     Gets together: None     Attends Protestant service: None     Active member of club or organization: None     Attends meetings of clubs or organizations: None     Relationship status: None    Intimate partner violence:     Fear of current or ex partner: None     Emotionally abused: None     Physically abused: None     Forced sexual activity: None   Other Topics Concern    None   Social History Narrative    Always uses seat belt     Daily caffeine consumption 2-3 servings a day     Feels safe at home       SOCIAL HISTORY:     Family History   Problem Relation Age of Onset    Breast cancer Mother     Hypertension Father        REVIEW OF SYSTEMS:     Review of Systems   Constitutional: Positive for activity change and unexpected weight change  Respiratory: Negative  Cardiovascular: Negative  Gastrointestinal: Negative  Genitourinary: Positive for frequency (  Nocturia)  Musculoskeletal: Negative  Skin: Negative  Psychiatric/Behavioral: Negative  PHYSICAL EXAM:     /70 (BP Location: Right arm, Patient Position: Sitting, Cuff Size: Adult)   Pulse 68   Ht 5' 7" (1 702 m)   Wt 93 9 kg (207 lb)   BMI 32 42 kg/m²     General:  Healthy appearing male in no acute distress  They have a normal affect  There is not appear to be any gross neurologic defects or abnormalities  HEENT:  Normocephalic, atraumatic  Neck is supple without any palpable lymphadenopathy  Cardiovascular:  Patient has normal palpable distal radial pulses  There is no significant peripheral edema  No JVD is noted  Respiratory:  Patient has unlabored respirations  There is no audible wheeze or rhonchi  Abdomen:  Abdomen is without surgical scars  Abdomen is soft and nontender  There is no tympany  Inguinal and umbilical hernia are not appreciated  Musculoskeletal:  Patient does not have significant CVA tenderness in the flank with palpation or percussion  They full range of motion in all 4 extremities  Strength in all 4 extremities appears congruent  Patient is able to ambulate without assistance or difficulty  Dermatologic:  Patient has no skin abnormalities or rashes  LABS:     CBC:   Lab Results   Component Value Date    WBC 6 3 2020    HGB 13 3 2020    HCT 39 5 2020    MCV 92 7 2020     2020       BMP:   Lab Results   Component Value Date    CALCIUM 8 9 2020     2017    K 4 4 2020    CO2 31 2020     2020    BUN 23 2020    CREATININE 1 10 2020     10/5/18 PSA 9 8  Lab Results   Component Value Date    PSA 0 3 2020    PSA 0 2 2019    PSA 0 2 2019     IMAGIN/10/20  ABDOMEN     INDICATION:   N20 0: Calculus of kidney      COMPARISON:  None     VIEWS:  AP supine        FINDINGS:     There is a nonobstructive bowel gas pattern      No discernible free air on this supine study  Upright or left lateral decubitus imaging is more sensitive to detect subtle free air in the appropriate setting      No pathologic calcifications or soft tissue masses       Visualized lung bases are clear      Degenerative changes lumbar spine      IMPRESSION:     Unremarkable abdomen  2/10/20  RENAL ULTRASOUND     INDICATION:   N20 0: Calculus of kidney  History of left-sided nephrolithiasis status post ureteroscopy and stone extraction 2019     COMPARISON: MRI 2018 report outside CT 8/10/2019 from [address]     TECHNIQUE:   Ultrasound of the retroperitoneum was performed with a curvilinear transducer utilizing volumetric sweeps and still imaging techniques       FINDINGS:     KIDNEYS:  Symmetric and normal size  Right kidney:  11 2 x 6 2 cm    Left kidney:  13 1 x 5 6 cm      Right kidney  Normal echogenicity and contour  No suspicious masses detected  Small right parapelvic cyst measures 2 7 x 1 7 x 1 5 cm  No hydronephrosis  No shadowing calculi  No perinephric fluid collections      Left kidney  Normal echogenicity and contour  No suspicious masses detected  Simple cyst arising exophytically from the lower pole left kidney is again noted measuring 9 2 x 8 2 x 8 9 cm which is essentially unchanged  Additional lower pole simple cyst measures 2 2 x 1 7 x 1 7 cm  No hydronephrosis  No shadowing calculi  No perinephric fluid collections      URETERS:  Nonvisualized      BLADDER:   Normally distended  No focal thickening or mass lesions  Bilateral ureteral jets detected         IMPRESSION:        1  No evidence for urolithiasis or obstructive uropathy on either side  2  Bilateral renal cysts again noted  PATHOLOGY:     Final Diagnosis   A  Right lateral base prostate core biopsy:  - Adenocarcinoma, Vitaliy score 4+ 5 = 9/10 (grade 5 comprises 10% of core biopsy), grade group 5, continuously involving greater than 95% of this core biopsy  - No perineural invasion is seen     B  Right lateral mid prostate core biopsy:  - Adenocarcinoma, Vitaliy score 4+ 5 = 9/10 (grade 5 conprises 20% of core biopsy), grade group 5, continuously involving 90% of this core biopsy  - Perineural invasion is seen      C  Left lateral apex prostate core biopsy:  - Adenocarcinoma, Vitaliy score 5 + 4 = 9/10 (grade 5 comprises 60% of core biopsy), grade group 5, continuously involving 60% of this core biopsy  - Perineural invasion is seen     D  Right base prostate core biopsy:  - Adenocarcinoma, Vitaliy score 4 + 5 = 9/10 (grade 5 comprises 10% of core biopsy), grade group 5, continuously involving 85% of this core biopsy  - Perineural invasion is seen     E    Right mid prostate core biopsy   - Adenocarcinoma, Waldron score 5 + 4 = 9/10 (grade 5 comprises 95% of core biopsy), grade group 5, continuously involving 60% of this core biopsy  - Perineural invasion is seen     F  Right apex prostate core biopsy:  - Adenocarcinoma, Vitaliy score 4 + 5 = 9/10 (grade 5 comprises 45% of core biopsy), grade group 5, continuously involving 70% of this core biopsy  - Perineural invasion is seen     G  Left base prostate core biopsy:  - Adenocarcinoma, [place] score 4 + 5 = 9/10 (grade 5 comprises 35% of core biopsy), grade group 5, continuously involving 70% of this core biopsy  - No perineural invasion is seen     H  Left mid prostate core biopsy:  - Adenocarcinoma, Vitaliy score 4 + 4 = 8/10, grade group 4, discontinuously involving 20% of this core biopsy  - No perineural invasion is seen      I  Left apex prostate core biopsy:  - Benign prostatic tissue     J  Left lateral base prostate core biopsy:  - Adenocarcinoma, Vitaliy score 4 + 5 = 9/10, (grade 5 comprises 35% of core biopsy), grade group 5, discontinuously involving 20% of this core biopsy  - No perineural invasion is seen      K  Left lateral mid prostate core biopsy:  - Adenocarcinoma, [place] score 4 + 4 = 8/10, grade group 4, continuously involving 5% of this core biopsy  - No perineural invasion is seen      L  Left lateral apex prostate core biopsy:  - Adenocarcinoma, [place] score 4 + 4 = 8/10, grade group 4, discontinuously involving 5% of this core biopsy  - No perineural invasion is seen      Note: Block for Prolaris testing if required: E     ASSESSMENT:     67 y o  male with high volume, high risk [place] 5+4=9 CaP, now with abnormal sclerotic lesions and positive bone scan now status post ureteroscopy    PLAN:        Stone study showed no evidence of recurrence     patient obtains his Lupron from specialty pharmacy  Unfortunate their delays in its transport and was not able to be administered today  Will administer as soon as it arrives       patient does appear to be having some rise in his PSA test   The question is whether this is a slow incremental increase or whether we will start to see doubling over time  Patient is due for PSA testing with Medical Oncology in 3 months  I will see him back in 6 months with PSA  Additional systemic treatment will be administered by the medical oncology service  Patient will continue on bone infusions for bone health  *  Patient's Lupron arrived after his visit  We called him and he returned for a 6 month depot injection

## 2020-02-28 NOTE — TELEPHONE ENCOUNTER
Patient seen today by Dr Helena Roca was not delivered so patient did not receive injeciton  Please contact patient when medication arrives

## 2020-03-05 ENCOUNTER — OFFICE VISIT (OUTPATIENT)
Dept: HEMATOLOGY ONCOLOGY | Facility: CLINIC | Age: 80
End: 2020-03-05
Payer: COMMERCIAL

## 2020-03-05 VITALS
HEIGHT: 67 IN | BODY MASS INDEX: 32.33 KG/M2 | TEMPERATURE: 97.9 F | SYSTOLIC BLOOD PRESSURE: 142 MMHG | HEART RATE: 61 BPM | DIASTOLIC BLOOD PRESSURE: 82 MMHG | WEIGHT: 206 LBS | RESPIRATION RATE: 16 BRPM | OXYGEN SATURATION: 96 %

## 2020-03-05 DIAGNOSIS — C61 PROSTATE CANCER (HCC): Primary | ICD-10-CM

## 2020-03-05 PROCEDURE — 3077F SYST BP >= 140 MM HG: CPT | Performed by: PHYSICIAN ASSISTANT

## 2020-03-05 PROCEDURE — 99214 OFFICE O/P EST MOD 30 MIN: CPT | Performed by: PHYSICIAN ASSISTANT

## 2020-03-05 PROCEDURE — 4040F PNEUMOC VAC/ADMIN/RCVD: CPT | Performed by: PHYSICIAN ASSISTANT

## 2020-03-05 PROCEDURE — 3079F DIAST BP 80-89 MM HG: CPT | Performed by: PHYSICIAN ASSISTANT

## 2020-03-05 PROCEDURE — 3008F BODY MASS INDEX DOCD: CPT | Performed by: PHYSICIAN ASSISTANT

## 2020-03-05 PROCEDURE — 1036F TOBACCO NON-USER: CPT | Performed by: PHYSICIAN ASSISTANT

## 2020-03-05 PROCEDURE — 1160F RVW MEDS BY RX/DR IN RCRD: CPT | Performed by: PHYSICIAN ASSISTANT

## 2020-03-05 NOTE — PROGRESS NOTES
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 and elevated PSA dx 9/2018      CT scan on 09/2018 showed involvement of the posterior side of the 10th vertebral body and the left 7 rib area and the distal point of the right clavicle  He received [place] and later on Lupron by Urology      Initially, he received enzalutamide 160 mg 12/2018 for 1 month via samples  The insurance did not approve thereafter, he had financial assistance to obtain abiraterone from [employer]  Zytiga (abiraterone)1000mg po daily with prednisone 5mg po bid initiated 2/3/2019     PSA 0 3 2/20/20  Continue with Zytiga and prednisone  Reviewed with patient and his wife that it is anticipated that his disease will likely become refractory to current treatment at some point  He questions role for radiation  We again discussed his stage IV disease with bone metastases  We discussed at this point radiation would serve palliative purposes and the backbone of treatment is systemic  We reviewed that his PSA may continue to elevate and if he doesn't have measurable progression of disease corporally, we may continue with current treatment unless there was rapid elevation of PSA  At this time, will continue with Xtandi  Will arrange for f/u bone scan, CT C/A/P though I imagine he doesn't have progression due to PSA still being <0 5          2   Zometa 4 mg IV every three-month initiated on 12/2018 for total of 2 years treatment                 HPI:  25-year-old  male who was seen by Urology for elevation of the PSA, when he presented in June 2017 with PSA of 4 6, Subsequently in September of 2017 PSA was 7 3 and in May of 2018 PSA was 8  6   A biopsy was recommended but the patient had just undergone percutaneous stenting of the heart and he was on dual anti-platelet therapy      MRI of the abdomen in April 2018 showed 2 simple cyst in the right hepatic lobe, bilateral renal cysts the largest 1 measuring 10 cm in the left lower lobe     In October of 2018 PSA was 9 8  CT scan of the chest 9/28/2018 showed sclerotic lesions in T10, 5 mm nodule in the right lower lobe   Bone scan showed activity in the posterior T10 level and left posterior 7th rib area concerning for osseous metastases and increased activity in the distal right clavicle might be degenerative or metastatic area      He was diagnosed with castration sensitive metastatic prostate cancer  [facility] [employer] insurance company could not approve enzalutamide, he also has a high co-payment for abiraterone     The patient initiated on samples of enzalutamide 160 mg p o  daily since the beginning of December 2018   His insurance approved abiraterone however he had a high co-payment        We were finally able to get financial assistance through [facility]  He started Zytiga (abiraterone) 2/3/2019 with prednisone 5mg po bid without side effects         Interval History:    Patient continues to feel well  Test Results:        Labs:   Lab Results   Component Value Date    HGB 13 3 02/20/2020    HCT 39 5 02/20/2020    MCV 92 7 02/20/2020     02/20/2020    WBC 6 3 02/20/2020    NRBC 0 12/02/2019     Lab Results   Component Value Date     09/30/2017    K 4 4 02/20/2020     02/20/2020    CO2 31 02/20/2020    BUN 23 02/20/2020    CREATININE 1 10 02/20/2020    GLUF 97 08/08/2019    CALCIUM 8 9 02/20/2020    AST 19 02/20/2020    ALT 19 02/20/2020    ALKPHOS 33 (L) 02/20/2020    PROT 6 5 09/30/2017    BILITOT 0 7 09/30/2017    EGFR 61 12/02/2019       Imaging: Xr Abdomen 1 View Kub    Result Date: 2/13/2020  Narrative: ABDOMEN INDICATION:   N20 0: Calculus of kidney  COMPARISON:  None VIEWS:  AP supine FINDINGS: There is a nonobstructive bowel gas pattern   No discernible free air on this supine study  Upright or left lateral decubitus imaging is more sensitive to detect subtle free air in the appropriate setting  No pathologic calcifications or soft tissue masses  Visualized lung bases are clear  Degenerative changes lumbar spine  Impression: Unremarkable abdomen  Workstation performed: [id number]     [facility]    Result Date: 2/12/2020  Narrative: RENAL ULTRASOUND INDICATION:   N20 0: Calculus of kidney  History of left-sided nephrolithiasis status post ureteroscopy and stone extraction 9/11/2019 COMPARISON: MRI 4/8/2018 report outside CT 8/10/2019 from [address] [place] [address]:   Ultrasound of the retroperitoneum was performed with a curvilinear transducer utilizing volumetric sweeps and still imaging techniques  FINDINGS: KIDNEYS: Symmetric and normal size  Right kidney:  11 2 x 6 2 cm  Left kidney:  13 1 x 5 6 cm  Right kidney Normal echogenicity and contour  No suspicious masses detected  Small right parapelvic cyst measures 2 7 x 1 7 x 1 5 cm  No hydronephrosis  No shadowing calculi  No perinephric fluid collections  Left kidney Normal echogenicity and contour  No suspicious masses detected  Simple cyst arising exophytically from the lower pole left kidney is again noted measuring 9 2 x 8 2 x 8 9 cm which is essentially unchanged  Additional lower pole simple cyst measures 2 2 x 1 7 x 1 7 cm  No hydronephrosis  No shadowing calculi  No perinephric fluid collections  URETERS: Nonvisualized  BLADDER: Normally distended  No focal thickening or mass lesions  Bilateral ureteral jets detected  Impression: 1  No evidence for urolithiasis or obstructive uropathy on either side  2  Bilateral renal cysts again noted  Workstation performed: [id number]           ROS:  As mentioned in HPI & Interval History otherwise 14 point ROS negative          Allergies: No Known Allergies  Current Medications: Reviewed  PMH/FH/SH:  Reviewed      Physical Exam:    There is no height or weight on file to calculate BSA  Ht Readings from Last 3 Encounters:   20 5' 7" (1 702 m)   19 5' 7 32" (1 71 m)   19 5' 7 44" (1 713 m)        Wt Readings from Last 3 Encounters:   20 93 9 kg (207 lb)   20 93 6 kg (206 lb 5 6 oz)   19 93 4 kg (206 lb)        Temp Readings from Last 3 Encounters:   20 97 6 °F (36 4 °C) (Temporal)   19 98 °F (36 7 °C) (Tympanic)   19 98 2 °F (36 8 °C) (Tympanic)        BP Readings from Last 3 Encounters:   20 142/70   20 (!) 177/90   19 138/80           Physical Exam   Constitutional: He is oriented to person, place, and time  He appears well-developed and well-nourished  No distress  HENT:   Head: Normocephalic and atraumatic  Eyes: Conjunctivae are normal    Neck: Normal range of motion  Neck supple  No tracheal deviation present  Cardiovascular: Normal rate and regular rhythm  Exam reveals no gallop and no friction rub  No murmur heard  Pulmonary/Chest: Effort normal and breath sounds normal  No respiratory distress  He has no wheezes  He has no rales  He exhibits no tenderness  Abdominal: Soft  He exhibits no distension  There is no tenderness  Lymphadenopathy:     He has no cervical adenopathy  Neurological: He is alert and oriented to person, place, and time  Skin: Skin is warm and dry  He is not diaphoretic  No erythema  No pallor  Psychiatric: He has a normal mood and affect  His behavior is normal  Judgment and thought content normal    Vitals reviewed        ECO      Emergency Contacts:    [place], [phone],

## 2020-03-09 DIAGNOSIS — C61 PROSTATE CANCER (HCC): ICD-10-CM

## 2020-03-09 RX ORDER — PREDNISONE 1 MG/1
TABLET ORAL
Qty: 60 TABLET | Refills: 0 | Status: SHIPPED | OUTPATIENT
Start: 2020-03-09 | End: 2020-04-13

## 2020-04-11 DIAGNOSIS — C61 PROSTATE CANCER (HCC): ICD-10-CM

## 2020-04-13 RX ORDER — PREDNISONE 1 MG/1
TABLET ORAL
Qty: 60 TABLET | Refills: 0 | Status: SHIPPED | OUTPATIENT
Start: 2020-04-13 | End: 2020-05-11

## 2020-04-30 LAB
ALBUMIN SERPL-MCNC: 4 G/DL (ref 3.6–5.1)
ALBUMIN/GLOB SERPL: 1.7 (CALC) (ref 1–2.5)
ALP SERPL-CCNC: 43 U/L (ref 35–144)
ALT SERPL-CCNC: 19 U/L (ref 9–46)
AST SERPL-CCNC: 17 U/L (ref 10–35)
BASOPHILS # BLD AUTO: 60 CELLS/UL (ref 0–200)
BASOPHILS NFR BLD AUTO: 0.8 %
BILIRUB SERPL-MCNC: 0.9 MG/DL (ref 0.2–1.2)
BUN SERPL-MCNC: 30 MG/DL (ref 7–25)
BUN/CREAT SERPL: 26 (CALC) (ref 6–22)
CALCIUM SERPL-MCNC: 9.1 MG/DL (ref 8.6–10.3)
CHLORIDE SERPL-SCNC: 104 MMOL/L (ref 98–110)
CO2 SERPL-SCNC: 29 MMOL/L (ref 20–32)
CREAT SERPL-MCNC: 1.17 MG/DL (ref 0.7–1.18)
EOSINOPHIL # BLD AUTO: 158 CELLS/UL (ref 15–500)
EOSINOPHIL NFR BLD AUTO: 2.1 %
ERYTHROCYTE [DISTWIDTH] IN BLOOD BY AUTOMATED COUNT: 12.5 % (ref 11–15)
GLOBULIN SER CALC-MCNC: 2.3 G/DL (CALC) (ref 1.9–3.7)
GLUCOSE SERPL-MCNC: 86 MG/DL (ref 65–99)
HCT VFR BLD AUTO: 39.5 % (ref 38.5–50)
HGB BLD-MCNC: 13.5 G/DL (ref 13.2–17.1)
LYMPHOCYTES # BLD AUTO: 1245 CELLS/UL (ref 850–3900)
LYMPHOCYTES NFR BLD AUTO: 16.6 %
MCH RBC QN AUTO: 31.6 PG (ref 27–33)
MCHC RBC AUTO-ENTMCNC: 34.2 G/DL (ref 32–36)
MCV RBC AUTO: 92.5 FL (ref 80–100)
MONOCYTES # BLD AUTO: 458 CELLS/UL (ref 200–950)
MONOCYTES NFR BLD AUTO: 6.1 %
NEUTROPHILS # BLD AUTO: 5580 CELLS/UL (ref 1500–7800)
NEUTROPHILS NFR BLD AUTO: 74.4 %
PLATELET # BLD AUTO: 209 THOUSAND/UL (ref 140–400)
PMV BLD REES-ECKER: 11 FL (ref 7.5–12.5)
POTASSIUM SERPL-SCNC: 4.2 MMOL/L (ref 3.5–5.3)
PROT SERPL-MCNC: 6.3 G/DL (ref 6.1–8.1)
PSA SERPL-MCNC: 0.5 NG/ML
RBC # BLD AUTO: 4.27 MILLION/UL (ref 4.2–5.8)
SL AMB EGFR AFRICAN AMERICAN: 69 ML/MIN/1.73M2
SL AMB EGFR NON AFRICAN AMERICAN: 59 ML/MIN/1.73M2
SODIUM SERPL-SCNC: 140 MMOL/L (ref 135–146)
WBC # BLD AUTO: 7.5 THOUSAND/UL (ref 3.8–10.8)

## 2020-05-02 ENCOUNTER — HOSPITAL ENCOUNTER (OUTPATIENT)
Dept: CT IMAGING | Facility: HOSPITAL | Age: 80
Discharge: HOME | End: 2020-05-02
Payer: COMMERCIAL

## 2020-05-02 DIAGNOSIS — C61 PROSTATE CANCER (HCC): ICD-10-CM

## 2020-05-02 PROCEDURE — 71270 CT THORAX DX C-/C+: CPT

## 2020-05-02 PROCEDURE — 74178 CT ABD&PLV WO CNTR FLWD CNTR: CPT

## 2020-05-02 RX ADMIN — IOHEXOL 100 ML: 350 INJECTION, SOLUTION INTRAVENOUS at 09:07

## 2020-05-04 ENCOUNTER — TELEPHONE (OUTPATIENT)
Dept: HEMATOLOGY ONCOLOGY | Facility: CLINIC | Age: 80
End: 2020-05-04

## 2020-05-05 ENCOUNTER — HOSPITAL ENCOUNTER (OUTPATIENT)
Dept: NUCLEAR MEDICINE | Facility: HOSPITAL | Age: 80
Discharge: HOME | End: 2020-05-05
Payer: COMMERCIAL

## 2020-05-05 DIAGNOSIS — C61 PROSTATE CANCER (HCC): ICD-10-CM

## 2020-05-05 DIAGNOSIS — I10 ESSENTIAL HYPERTENSION: ICD-10-CM

## 2020-05-05 PROCEDURE — 78306 BONE IMAGING WHOLE BODY: CPT

## 2020-05-05 PROCEDURE — A9503 TC99M MEDRONATE: HCPCS

## 2020-05-05 RX ORDER — LOSARTAN POTASSIUM 100 MG/1
100 TABLET ORAL DAILY
Qty: 90 TABLET | Refills: 1 | Status: CANCELLED | OUTPATIENT
Start: 2020-05-05

## 2020-05-06 ENCOUNTER — OFFICE VISIT (OUTPATIENT)
Dept: FAMILY MEDICINE CLINIC | Facility: CLINIC | Age: 80
End: 2020-05-06
Payer: COMMERCIAL

## 2020-05-06 VITALS
OXYGEN SATURATION: 97 % | BODY MASS INDEX: 32.8 KG/M2 | RESPIRATION RATE: 20 BRPM | WEIGHT: 209 LBS | SYSTOLIC BLOOD PRESSURE: 152 MMHG | HEIGHT: 67 IN | DIASTOLIC BLOOD PRESSURE: 74 MMHG | TEMPERATURE: 97.6 F | HEART RATE: 70 BPM

## 2020-05-06 DIAGNOSIS — I10 BENIGN ESSENTIAL HYPERTENSION: Primary | ICD-10-CM

## 2020-05-06 DIAGNOSIS — I10 ESSENTIAL HYPERTENSION: ICD-10-CM

## 2020-05-06 PROCEDURE — 99213 OFFICE O/P EST LOW 20 MIN: CPT | Performed by: FAMILY MEDICINE

## 2020-05-06 PROCEDURE — 3078F DIAST BP <80 MM HG: CPT | Performed by: FAMILY MEDICINE

## 2020-05-06 PROCEDURE — 3008F BODY MASS INDEX DOCD: CPT | Performed by: FAMILY MEDICINE

## 2020-05-06 PROCEDURE — 4040F PNEUMOC VAC/ADMIN/RCVD: CPT | Performed by: FAMILY MEDICINE

## 2020-05-06 PROCEDURE — 3077F SYST BP >= 140 MM HG: CPT | Performed by: FAMILY MEDICINE

## 2020-05-06 PROCEDURE — 1160F RVW MEDS BY RX/DR IN RCRD: CPT | Performed by: FAMILY MEDICINE

## 2020-05-06 PROCEDURE — 1036F TOBACCO NON-USER: CPT | Performed by: FAMILY MEDICINE

## 2020-05-06 RX ORDER — LOSARTAN POTASSIUM 100 MG/1
100 TABLET ORAL DAILY
Qty: 90 TABLET | Refills: 1 | Status: SHIPPED | OUTPATIENT
Start: 2020-05-06 | End: 2020-10-10

## 2020-05-06 RX ORDER — METOPROLOL SUCCINATE 25 MG/1
25 TABLET, EXTENDED RELEASE ORAL DAILY
Qty: 90 TABLET | Refills: 1
Start: 2020-05-06 | End: 2020-06-07

## 2020-05-07 ENCOUNTER — OFFICE VISIT (OUTPATIENT)
Dept: HEMATOLOGY ONCOLOGY | Facility: CLINIC | Age: 80
End: 2020-05-07
Payer: COMMERCIAL

## 2020-05-07 VITALS
BODY MASS INDEX: 32.96 KG/M2 | DIASTOLIC BLOOD PRESSURE: 100 MMHG | HEART RATE: 70 BPM | TEMPERATURE: 98.7 F | WEIGHT: 210 LBS | HEIGHT: 67 IN | SYSTOLIC BLOOD PRESSURE: 176 MMHG | RESPIRATION RATE: 16 BRPM

## 2020-05-07 DIAGNOSIS — C61 PROSTATE CANCER (HCC): Primary | ICD-10-CM

## 2020-05-07 PROCEDURE — 1036F TOBACCO NON-USER: CPT | Performed by: INTERNAL MEDICINE

## 2020-05-07 PROCEDURE — 4040F PNEUMOC VAC/ADMIN/RCVD: CPT | Performed by: INTERNAL MEDICINE

## 2020-05-07 PROCEDURE — 3077F SYST BP >= 140 MM HG: CPT | Performed by: INTERNAL MEDICINE

## 2020-05-07 PROCEDURE — 3080F DIAST BP >= 90 MM HG: CPT | Performed by: INTERNAL MEDICINE

## 2020-05-07 PROCEDURE — 1160F RVW MEDS BY RX/DR IN RCRD: CPT | Performed by: INTERNAL MEDICINE

## 2020-05-07 PROCEDURE — 3008F BODY MASS INDEX DOCD: CPT | Performed by: INTERNAL MEDICINE

## 2020-05-07 PROCEDURE — 99215 OFFICE O/P EST HI 40 MIN: CPT | Performed by: INTERNAL MEDICINE

## 2020-05-09 DIAGNOSIS — C61 PROSTATE CANCER (HCC): ICD-10-CM

## 2020-05-11 ENCOUNTER — DOCUMENTATION (OUTPATIENT)
Dept: HEMATOLOGY ONCOLOGY | Facility: CLINIC | Age: 80
End: 2020-05-11

## 2020-05-11 RX ORDER — PREDNISONE 1 MG/1
TABLET ORAL
Qty: 60 TABLET | Refills: 0 | Status: SHIPPED | OUTPATIENT
Start: 2020-05-11 | End: 2020-06-10

## 2020-05-12 ENCOUNTER — TELEPHONE (OUTPATIENT)
Dept: HEMATOLOGY ONCOLOGY | Facility: CLINIC | Age: 80
End: 2020-05-12

## 2020-05-21 ENCOUNTER — HOSPITAL ENCOUNTER (OUTPATIENT)
Dept: INFUSION CENTER | Facility: CLINIC | Age: 80
Discharge: HOME | End: 2020-05-21
Payer: COMMERCIAL

## 2020-05-21 VITALS
RESPIRATION RATE: 20 BRPM | TEMPERATURE: 97.9 F | SYSTOLIC BLOOD PRESSURE: 135 MMHG | DIASTOLIC BLOOD PRESSURE: 82 MMHG | HEART RATE: 52 BPM

## 2020-05-21 DIAGNOSIS — C79.51 BONE METASTASES (HCC): ICD-10-CM

## 2020-05-21 DIAGNOSIS — C61 PROSTATE CANCER (HCC): Primary | ICD-10-CM

## 2020-05-21 PROCEDURE — 96365 THER/PROPH/DIAG IV INF INIT: CPT

## 2020-05-21 RX ORDER — SODIUM CHLORIDE 9 MG/ML
20 INJECTION, SOLUTION INTRAVENOUS ONCE
Status: CANCELLED | OUTPATIENT
Start: 2020-08-13

## 2020-05-21 RX ORDER — SODIUM CHLORIDE 9 MG/ML
20 INJECTION, SOLUTION INTRAVENOUS ONCE
Status: COMPLETED | OUTPATIENT
Start: 2020-05-21 | End: 2020-05-21

## 2020-05-21 RX ADMIN — ZOLEDRONIC ACID 3.3 MG: 4 INJECTION, SOLUTION, CONCENTRATE INTRAVENOUS at 10:24

## 2020-05-21 RX ADMIN — SODIUM CHLORIDE 20 ML/HR: 0.9 INJECTION, SOLUTION INTRAVENOUS at 10:24

## 2020-05-22 ENCOUNTER — TELEPHONE (OUTPATIENT)
Dept: HEMATOLOGY ONCOLOGY | Facility: CLINIC | Age: 80
End: 2020-05-22

## 2020-05-26 DIAGNOSIS — C61 PROSTATE CANCER (HCC): Primary | ICD-10-CM

## 2020-05-28 ENCOUNTER — TELEPHONE (OUTPATIENT)
Dept: HEMATOLOGY ONCOLOGY | Facility: CLINIC | Age: 80
End: 2020-05-28

## 2020-05-28 ENCOUNTER — TELEPHONE (OUTPATIENT)
Dept: UROLOGY | Facility: MEDICAL CENTER | Age: 80
End: 2020-05-28

## 2020-06-04 LAB
ALBUMIN SERPL-MCNC: 3.8 G/DL (ref 3.6–5.1)
ALBUMIN/GLOB SERPL: 1.8 (CALC) (ref 1–2.5)
ALP SERPL-CCNC: 43 U/L (ref 35–144)
ALT SERPL-CCNC: 37 U/L (ref 9–46)
AST SERPL-CCNC: 25 U/L (ref 10–35)
BILIRUB SERPL-MCNC: 0.6 MG/DL (ref 0.2–1.2)
BUN SERPL-MCNC: 18 MG/DL (ref 7–25)
BUN/CREAT SERPL: ABNORMAL (CALC) (ref 6–22)
CALCIUM SERPL-MCNC: 9.1 MG/DL (ref 8.6–10.3)
CHLORIDE SERPL-SCNC: 105 MMOL/L (ref 98–110)
CHOLEST SERPL-MCNC: 127 MG/DL
CHOLEST/HDLC SERPL: 3.2 (CALC)
CO2 SERPL-SCNC: 29 MMOL/L (ref 20–32)
CREAT SERPL-MCNC: 1.11 MG/DL (ref 0.7–1.18)
GLOBULIN SER CALC-MCNC: 2.1 G/DL (CALC) (ref 1.9–3.7)
GLUCOSE SERPL-MCNC: 95 MG/DL (ref 65–99)
HDLC SERPL-MCNC: 40 MG/DL
LDLC SERPL CALC-MCNC: 67 MG/DL (CALC)
NONHDLC SERPL-MCNC: 87 MG/DL (CALC)
POTASSIUM SERPL-SCNC: 4.3 MMOL/L (ref 3.5–5.3)
PROT SERPL-MCNC: 5.9 G/DL (ref 6.1–8.1)
SL AMB EGFR AFRICAN AMERICAN: 73 ML/MIN/1.73M2
SL AMB EGFR NON AFRICAN AMERICAN: 63 ML/MIN/1.73M2
SODIUM SERPL-SCNC: 141 MMOL/L (ref 135–146)
TRIGL SERPL-MCNC: 114 MG/DL

## 2020-06-05 LAB
BASOPHILS # BLD AUTO: 58 CELLS/UL (ref 0–200)
BASOPHILS NFR BLD AUTO: 0.9 %
EOSINOPHIL # BLD AUTO: 179 CELLS/UL (ref 15–500)
EOSINOPHIL NFR BLD AUTO: 2.8 %
ERYTHROCYTE [DISTWIDTH] IN BLOOD BY AUTOMATED COUNT: 12.6 % (ref 11–15)
HCT VFR BLD AUTO: 40 % (ref 38.5–50)
HGB BLD-MCNC: 13.6 G/DL (ref 13.2–17.1)
LYMPHOCYTES # BLD AUTO: 1088 CELLS/UL (ref 850–3900)
LYMPHOCYTES NFR BLD AUTO: 17 %
MCH RBC QN AUTO: 31.9 PG (ref 27–33)
MCHC RBC AUTO-ENTMCNC: 34 G/DL (ref 32–36)
MCV RBC AUTO: 93.7 FL (ref 80–100)
MONOCYTES # BLD AUTO: 390 CELLS/UL (ref 200–950)
MONOCYTES NFR BLD AUTO: 6.1 %
NEUTROPHILS # BLD AUTO: 4685 CELLS/UL (ref 1500–7800)
NEUTROPHILS NFR BLD AUTO: 73.2 %
PLATELET # BLD AUTO: 189 THOUSAND/UL (ref 140–400)
PMV BLD REES-ECKER: 11 FL (ref 7.5–12.5)
PSA SERPL-MCNC: 0.8 NG/ML
RBC # BLD AUTO: 4.27 MILLION/UL (ref 4.2–5.8)
WBC # BLD AUTO: 6.4 THOUSAND/UL (ref 3.8–10.8)

## 2020-06-06 DIAGNOSIS — I10 ESSENTIAL HYPERTENSION: ICD-10-CM

## 2020-06-07 RX ORDER — METOPROLOL SUCCINATE 25 MG/1
TABLET, EXTENDED RELEASE ORAL
Qty: 90 TABLET | Refills: 1 | Status: SHIPPED | OUTPATIENT
Start: 2020-06-07 | End: 2020-06-10 | Stop reason: SDUPTHER

## 2020-06-08 ENCOUNTER — TELEPHONE (OUTPATIENT)
Dept: FAMILY MEDICINE CLINIC | Facility: CLINIC | Age: 80
End: 2020-06-08

## 2020-06-08 ENCOUNTER — TELEPHONE (OUTPATIENT)
Dept: HEMATOLOGY ONCOLOGY | Facility: CLINIC | Age: 80
End: 2020-06-08

## 2020-06-10 ENCOUNTER — OFFICE VISIT (OUTPATIENT)
Dept: FAMILY MEDICINE CLINIC | Facility: CLINIC | Age: 80
End: 2020-06-10
Payer: COMMERCIAL

## 2020-06-10 VITALS
DIASTOLIC BLOOD PRESSURE: 84 MMHG | BODY MASS INDEX: 32.65 KG/M2 | HEART RATE: 74 BPM | RESPIRATION RATE: 16 BRPM | SYSTOLIC BLOOD PRESSURE: 134 MMHG | HEIGHT: 67 IN | WEIGHT: 208 LBS | TEMPERATURE: 97 F | OXYGEN SATURATION: 97 %

## 2020-06-10 DIAGNOSIS — E78.2 MIXED HYPERLIPIDEMIA: ICD-10-CM

## 2020-06-10 DIAGNOSIS — C61 PROSTATE CANCER (HCC): ICD-10-CM

## 2020-06-10 DIAGNOSIS — I10 BENIGN ESSENTIAL HYPERTENSION: Primary | ICD-10-CM

## 2020-06-10 DIAGNOSIS — I25.118 CORONARY ARTERY DISEASE OF NATIVE ARTERY OF NATIVE HEART WITH STABLE ANGINA PECTORIS (HCC): ICD-10-CM

## 2020-06-10 DIAGNOSIS — I10 ESSENTIAL HYPERTENSION: ICD-10-CM

## 2020-06-10 PROCEDURE — 1036F TOBACCO NON-USER: CPT | Performed by: FAMILY MEDICINE

## 2020-06-10 PROCEDURE — 99214 OFFICE O/P EST MOD 30 MIN: CPT | Performed by: FAMILY MEDICINE

## 2020-06-10 PROCEDURE — 3008F BODY MASS INDEX DOCD: CPT | Performed by: FAMILY MEDICINE

## 2020-06-10 PROCEDURE — 1160F RVW MEDS BY RX/DR IN RCRD: CPT | Performed by: FAMILY MEDICINE

## 2020-06-10 PROCEDURE — 4040F PNEUMOC VAC/ADMIN/RCVD: CPT | Performed by: FAMILY MEDICINE

## 2020-06-10 PROCEDURE — 3079F DIAST BP 80-89 MM HG: CPT | Performed by: FAMILY MEDICINE

## 2020-06-10 PROCEDURE — 3075F SYST BP GE 130 - 139MM HG: CPT | Performed by: FAMILY MEDICINE

## 2020-06-10 RX ORDER — METOPROLOL SUCCINATE 25 MG/1
25 TABLET, EXTENDED RELEASE ORAL DAILY
Qty: 90 TABLET | Refills: 1 | Status: SHIPPED | OUTPATIENT
Start: 2020-06-10 | End: 2021-01-17

## 2020-06-16 ENCOUNTER — TELEPHONE (OUTPATIENT)
Dept: HEMATOLOGY ONCOLOGY | Facility: CLINIC | Age: 80
End: 2020-06-16

## 2020-06-18 ENCOUNTER — TELEPHONE (OUTPATIENT)
Dept: HEMATOLOGY ONCOLOGY | Facility: CLINIC | Age: 80
End: 2020-06-18

## 2020-06-18 ENCOUNTER — OFFICE VISIT (OUTPATIENT)
Dept: HEMATOLOGY ONCOLOGY | Facility: CLINIC | Age: 80
End: 2020-06-18
Payer: COMMERCIAL

## 2020-06-18 VITALS
DIASTOLIC BLOOD PRESSURE: 90 MMHG | HEART RATE: 65 BPM | OXYGEN SATURATION: 94 % | WEIGHT: 209.6 LBS | TEMPERATURE: 97.1 F | BODY MASS INDEX: 33.68 KG/M2 | RESPIRATION RATE: 16 BRPM | SYSTOLIC BLOOD PRESSURE: 150 MMHG | HEIGHT: 66 IN

## 2020-06-18 DIAGNOSIS — C61 PROSTATE CANCER (HCC): Primary | ICD-10-CM

## 2020-06-18 PROCEDURE — 3008F BODY MASS INDEX DOCD: CPT | Performed by: INTERNAL MEDICINE

## 2020-06-18 PROCEDURE — 1036F TOBACCO NON-USER: CPT | Performed by: INTERNAL MEDICINE

## 2020-06-18 PROCEDURE — 1160F RVW MEDS BY RX/DR IN RCRD: CPT | Performed by: INTERNAL MEDICINE

## 2020-06-18 PROCEDURE — 3077F SYST BP >= 140 MM HG: CPT | Performed by: INTERNAL MEDICINE

## 2020-06-18 PROCEDURE — 3080F DIAST BP >= 90 MM HG: CPT | Performed by: INTERNAL MEDICINE

## 2020-06-18 PROCEDURE — 99214 OFFICE O/P EST MOD 30 MIN: CPT | Performed by: INTERNAL MEDICINE

## 2020-06-18 PROCEDURE — 4040F PNEUMOC VAC/ADMIN/RCVD: CPT | Performed by: INTERNAL MEDICINE

## 2020-06-22 ENCOUNTER — TELEPHONE (OUTPATIENT)
Dept: HEMATOLOGY ONCOLOGY | Facility: CLINIC | Age: 80
End: 2020-06-22

## 2020-07-21 ENCOUNTER — OFFICE VISIT (OUTPATIENT)
Dept: FAMILY MEDICINE CLINIC | Facility: CLINIC | Age: 80
End: 2020-07-21
Payer: COMMERCIAL

## 2020-07-21 VITALS
RESPIRATION RATE: 18 BRPM | HEIGHT: 67 IN | TEMPERATURE: 98.1 F | WEIGHT: 206.6 LBS | BODY MASS INDEX: 32.43 KG/M2 | DIASTOLIC BLOOD PRESSURE: 88 MMHG | SYSTOLIC BLOOD PRESSURE: 140 MMHG | HEART RATE: 62 BPM | OXYGEN SATURATION: 98 %

## 2020-07-21 DIAGNOSIS — T88.7XXA MEDICATION SIDE EFFECTS: ICD-10-CM

## 2020-07-21 DIAGNOSIS — R60.0 LOCALIZED EDEMA: Primary | ICD-10-CM

## 2020-07-21 PROBLEM — N20.1 LEFT URETERAL STONE: Status: RESOLVED | Noted: 2019-08-12 | Resolved: 2020-07-21

## 2020-07-21 PROCEDURE — 4040F PNEUMOC VAC/ADMIN/RCVD: CPT | Performed by: PHYSICIAN ASSISTANT

## 2020-07-21 PROCEDURE — 3008F BODY MASS INDEX DOCD: CPT | Performed by: PHYSICIAN ASSISTANT

## 2020-07-21 PROCEDURE — 99213 OFFICE O/P EST LOW 20 MIN: CPT | Performed by: PHYSICIAN ASSISTANT

## 2020-07-21 PROCEDURE — 1036F TOBACCO NON-USER: CPT | Performed by: PHYSICIAN ASSISTANT

## 2020-07-21 PROCEDURE — 1160F RVW MEDS BY RX/DR IN RCRD: CPT | Performed by: PHYSICIAN ASSISTANT

## 2020-07-21 PROCEDURE — 3077F SYST BP >= 140 MM HG: CPT | Performed by: PHYSICIAN ASSISTANT

## 2020-07-21 PROCEDURE — 3079F DIAST BP 80-89 MM HG: CPT | Performed by: PHYSICIAN ASSISTANT

## 2020-07-21 RX ORDER — HYDROCHLOROTHIAZIDE 25 MG/1
25 TABLET ORAL DAILY
Qty: 30 TABLET | Refills: 0 | Status: SHIPPED | OUTPATIENT
Start: 2020-07-21 | End: 2020-08-20 | Stop reason: SDUPTHER

## 2020-07-21 NOTE — PROGRESS NOTES
Assessment/Plan:    1  Localized edema  Discussed treatment options  Patient agreeable to start hydrochlorothiazide  Discussed possible side effects  Also advised patient to follow-up with oncology regarding possible medication side effects  Patient has follow-up on 07/28 and states he will discuss then  Limit sodium and elevate feet throughout the day  Follow-up after oncology appointment  Sooner if needed  - hydrochlorothiazide (HYDRODIURIL) 25 mg tablet; Take 1 tablet (25 mg total) by mouth daily  Dispense: 30 tablet; Refill: 0    2  Medication side effects      Patient Active Problem List   Diagnosis    Benign essential hypertension    Renal cyst    Liver lesion    Pulmonary nodule    Combined arterial insufficiency and corporo-venous occlusive erectile dysfunction    [facility] [address] [facility]    Coronary artery disease of native artery of native heart with stable angina pectoris ([facility] [address] [facility]    DDD (degenerative disc disease), cervical    Inflamed sebaceous cyst    S/P coronary artery stent placement    Vitamin D deficiency    Abnormal radionuclide bone scan    Hot flashes    Bone metastases ([facility] [address] [facility]    Clonus    Calculus of kidney    Thoracic myofascial strain    Positional lightheadedness    Rhomboid pain    Mixed hyperlipidemia       Subjective:      Patient ID: Rhys Coker is a 67 y o  male  Patient is here concerned about medication side effects  States he was on [place], but was switched to [place]  Since then he has noticed lower extremity swelling  He states his oncologist had him on prednisone for several months  He has been off of it for 2-3 weeks  He is unsure if this is related as well  Denies lower extremity pain from the swelling  Denies numbness, tingling, or weakness  Denies redness or warmth  Denies rash  Denies chest pain or palpitations  Denies shortness of breath  Denies dizziness or feeling lightheaded  Denies any other associated symptoms    He also states since starting the [facility] he has had the headache occasionally and his blood pressure has been elevated  The following portions of the patient's history were reviewed and updated as appropriate: allergies, current medications, past family history, past medical history, past social history, past surgical history and problem list     Review of Systems   Constitutional: Negative for chills, fatigue and fever  HENT: Negative for congestion, ear pain, postnasal drip, rhinorrhea and sore throat  Respiratory: Negative for cough, chest tightness, shortness of breath and wheezing  Cardiovascular: Positive for leg swelling  Negative for chest pain and palpitations  Gastrointestinal: Negative for abdominal pain, diarrhea, nausea and vomiting  Musculoskeletal: Negative for arthralgias and myalgias  Skin: Negative for rash  Neurological: Positive for headaches  Negative for dizziness and light-headedness  Hematological: Negative for adenopathy  Objective:      /88 (BP Location: Left arm, Patient Position: Sitting)   Pulse 62   Temp 98 1 °F (36 7 °C) (Tympanic)   Resp 18   Ht 5' 7" (1 702 m)   Wt 93 7 kg (206 lb 9 6 oz)   SpO2 98%   BMI 32 36 kg/m²          Physical Exam   Constitutional: He is oriented to person, place, and time  He appears well-developed and well-nourished  HENT:   Head: Normocephalic and atraumatic  Eyes: Pupils are equal, round, and reactive to light  Conjunctivae are normal    Neck: Normal range of motion  Neck supple  Cardiovascular: Normal rate, regular rhythm, S1 normal, S2 normal, normal heart sounds, intact distal pulses and normal pulses  Mild lower extremity nonpitting edema bilaterally   Pulmonary/Chest: Effort normal and breath sounds normal    Abdominal: Soft  Normal appearance and bowel sounds are normal  He exhibits no mass  There is no hepatosplenomegaly  There is no tenderness  Musculoskeletal: Normal range of motion     Lymphadenopathy:     He has no cervical adenopathy  Neurological: He is alert and oriented to person, place, and time  Skin: Skin is warm, dry and intact  No rash noted  Psychiatric: He has a normal mood and affect  His behavior is normal  Judgment and thought content normal    Nursing note and vitals reviewed  Current Outpatient Medications on File Prior to Visit   Medication Sig Dispense Refill    aspirin (ASPIRIN ADULT LOW STRENGTH) 81 mg EC tablet Take 1 tablet by mouth daily      atorvastatin (LIPITOR) 40 mg tablet Take 40 mg by mouth daily       Calcium 500 MG tablet Take 1,000 mg by mouth daily       cholecalciferol (VITAMIN D3) 1,000 units tablet Take 1 tablet by mouth daily      enzalutamide (Xtandi) 40 mg capsule Take 4 capsules (160 mg total) by mouth daily 120 capsule 3    leuprolide (LUPRON DEPOT 6 MONTH KIT) 45 mg Inject 45 mg into a muscle every 6 (six) months 45 mg 1    losartan (COZAAR) 100 MG tablet Take 1 tablet (100 mg total) by mouth daily 90 tablet 1    metoprolol succinate (TOPROL-XL) 25 mg 24 hr tablet Take 1 tablet (25 mg total) by mouth daily 90 tablet 1    Zoledronic Acid (ZOMETA IV) Infuse into a venous catheter every 3 (three) months       No current facility-administered medications on file prior to visit

## 2020-07-24 LAB
ALBUMIN SERPL-MCNC: 4.1 G/DL (ref 3.6–5.1)
ALBUMIN/GLOB SERPL: 1.7 (CALC) (ref 1–2.5)
ALP SERPL-CCNC: 36 U/L (ref 35–144)
ALT SERPL-CCNC: 21 U/L (ref 9–46)
AST SERPL-CCNC: 21 U/L (ref 10–35)
BASOPHILS # BLD AUTO: 63 CELLS/UL (ref 0–200)
BASOPHILS NFR BLD AUTO: 1.1 %
BILIRUB SERPL-MCNC: 0.6 MG/DL (ref 0.2–1.2)
BUN SERPL-MCNC: 19 MG/DL (ref 7–25)
BUN/CREAT SERPL: ABNORMAL (CALC) (ref 6–22)
CALCIUM SERPL-MCNC: 9.5 MG/DL (ref 8.6–10.3)
CHLORIDE SERPL-SCNC: 102 MMOL/L (ref 98–110)
CO2 SERPL-SCNC: 30 MMOL/L (ref 20–32)
CREAT SERPL-MCNC: 1.09 MG/DL (ref 0.7–1.18)
EOSINOPHIL # BLD AUTO: 325 CELLS/UL (ref 15–500)
EOSINOPHIL NFR BLD AUTO: 5.7 %
ERYTHROCYTE [DISTWIDTH] IN BLOOD BY AUTOMATED COUNT: 12.5 % (ref 11–15)
GLOBULIN SER CALC-MCNC: 2.4 G/DL (CALC) (ref 1.9–3.7)
GLUCOSE SERPL-MCNC: 106 MG/DL (ref 65–99)
HCT VFR BLD AUTO: 42.8 % (ref 38.5–50)
HGB BLD-MCNC: 14.6 G/DL (ref 13.2–17.1)
LYMPHOCYTES # BLD AUTO: 1220 CELLS/UL (ref 850–3900)
LYMPHOCYTES NFR BLD AUTO: 21.4 %
MCH RBC QN AUTO: 30.9 PG (ref 27–33)
MCHC RBC AUTO-ENTMCNC: 34.1 G/DL (ref 32–36)
MCV RBC AUTO: 90.7 FL (ref 80–100)
MONOCYTES # BLD AUTO: 456 CELLS/UL (ref 200–950)
MONOCYTES NFR BLD AUTO: 8 %
NEUTROPHILS # BLD AUTO: 3637 CELLS/UL (ref 1500–7800)
NEUTROPHILS NFR BLD AUTO: 63.8 %
PLATELET # BLD AUTO: 233 THOUSAND/UL (ref 140–400)
PMV BLD REES-ECKER: 11 FL (ref 7.5–12.5)
POTASSIUM SERPL-SCNC: 4.5 MMOL/L (ref 3.5–5.3)
PROT SERPL-MCNC: 6.5 G/DL (ref 6.1–8.1)
PSA SERPL-MCNC: 0.8 NG/ML
RBC # BLD AUTO: 4.72 MILLION/UL (ref 4.2–5.8)
SL AMB EGFR AFRICAN AMERICAN: 75 ML/MIN/1.73M2
SL AMB EGFR NON AFRICAN AMERICAN: 65 ML/MIN/1.73M2
SODIUM SERPL-SCNC: 139 MMOL/L (ref 135–146)
WBC # BLD AUTO: 5.7 THOUSAND/UL (ref 3.8–10.8)

## 2020-07-27 ENCOUNTER — TELEPHONE (OUTPATIENT)
Dept: HEMATOLOGY ONCOLOGY | Facility: CLINIC | Age: 80
End: 2020-07-27

## 2020-07-28 ENCOUNTER — TELEPHONE (OUTPATIENT)
Dept: UROLOGY | Facility: CLINIC | Age: 80
End: 2020-07-28

## 2020-07-28 ENCOUNTER — OFFICE VISIT (OUTPATIENT)
Dept: HEMATOLOGY ONCOLOGY | Facility: CLINIC | Age: 80
End: 2020-07-28
Payer: COMMERCIAL

## 2020-07-28 VITALS
TEMPERATURE: 97.7 F | SYSTOLIC BLOOD PRESSURE: 120 MMHG | DIASTOLIC BLOOD PRESSURE: 80 MMHG | RESPIRATION RATE: 16 BRPM | BODY MASS INDEX: 32.02 KG/M2 | HEART RATE: 56 BPM | WEIGHT: 204 LBS | OXYGEN SATURATION: 97 % | HEIGHT: 67 IN

## 2020-07-28 DIAGNOSIS — C61 PROSTATE CANCER (HCC): Primary | ICD-10-CM

## 2020-07-28 DIAGNOSIS — C79.51 BONE METASTASES (HCC): ICD-10-CM

## 2020-07-28 PROCEDURE — 3074F SYST BP LT 130 MM HG: CPT | Performed by: INTERNAL MEDICINE

## 2020-07-28 PROCEDURE — 3008F BODY MASS INDEX DOCD: CPT | Performed by: INTERNAL MEDICINE

## 2020-07-28 PROCEDURE — 1160F RVW MEDS BY RX/DR IN RCRD: CPT | Performed by: INTERNAL MEDICINE

## 2020-07-28 PROCEDURE — 4040F PNEUMOC VAC/ADMIN/RCVD: CPT | Performed by: INTERNAL MEDICINE

## 2020-07-28 PROCEDURE — 99214 OFFICE O/P EST MOD 30 MIN: CPT | Performed by: INTERNAL MEDICINE

## 2020-07-28 PROCEDURE — 3079F DIAST BP 80-89 MM HG: CPT | Performed by: INTERNAL MEDICINE

## 2020-07-28 PROCEDURE — 1036F TOBACCO NON-USER: CPT | Performed by: INTERNAL MEDICINE

## 2020-07-28 NOTE — TELEPHONE ENCOUNTER
[facility]ron to be delivered August 4 2020 at the [facility] office

## 2020-07-28 NOTE — PROGRESS NOTES
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, with bone metastases diagnosed initially on 09/2018 with PSA of 9, CT scan showed involvement of the posterior side of 10th rib area and vertebral body and distal point of the right clavicle he received Firmagon and later on Lupron by Urology    Received Zytiga with prednisone PSA went down from 9-0 1 however until May 2020 with PSA of 0 5 and bone scan showed a lesion in the anterior 5th rib area, right humerus, CT scan showed no evidence of visceral disease    Currently on enzalutamide 160 mg p o  Daily since 06/02/2020, PSA 0 8, continue treatment and follow-up in 3 months with CBC, CMP, PSA    He is to meet with genetic counselor for BRCA 1/2 mutation especially with family history with mother with breast cancers and he might be candidate for PARP inhibitors              HPI:  73-year-old  male who was seen by Urology for elevation of the PSA, when he presented in June 2017 with PSA of 4 6, Subsequently in September of 2017 PSA was 7 3 and in May of 2018 PSA was 8  6   A biopsy was recommended but the patient had just undergone percutaneous stenting of the heart and he was on dual anti-platelet therapy      MRI of the abdomen in April 2018 showed 2 simple cyst in the right hepatic lobe, bilateral renal cysts the largest 1 measuring 10 cm in the left lower lobe     In October of 2018 PSA was 9 8  CT scan of the chest 9/28/2018 showed sclerotic lesions in T10, 5 mm nodule in the right lower lobe   Bone scan showed activity in the posterior T10 level and left posterior 7th rib area concerning for osseous metastases and increased activity in the distal right clavicle might be degenerative or metastatic area      He was diagnosed with castration sensitive metastatic prostate cancer   His insurance company could not approve enzalutamide, he also has a high co-payment for abiraterone     The patient initiated on samples of enzalutamide 160 mg p o  daily since the beginning of December 2018   His insurance approved abiraterone however he had a high co-payment        We were finally able to get financial assistance through [employer] and Roseline started Zytiga (abiraterone) 2/3/2019 with prednisone 5mg po bid without side effects      In May 2020 PSA 0 5, bone scan showed activity in the anterior 5th rib area, right humerus area most likely consistent with metastatic disease, no visceral disease on the CT scan, we had hard time getting enzalutamide , the patient was initiated on enzalutamide on 06/02/2020, PSA at the time of 0 8       Interval History:  Fatigue between 5-8 p m  Previous Treatment:         Test Results:    Imaging: No results found  Labs:   Lab Results   Component Value Date    WBC 5 7 07/23/2020    HGB 14 6 07/23/2020    HCT 42 8 07/23/2020    MCV 90 7 07/23/2020     07/23/2020     Lab Results   Component Value Date     09/30/2017    K 4 5 07/23/2020     07/23/2020    CO2 30 07/23/2020    BUN 19 07/23/2020    CREATININE 1 09 07/23/2020    GLUF 97 08/08/2019    CALCIUM 9 5 07/23/2020    AST 21 07/23/2020    ALT 21 07/23/2020    ALKPHOS 36 07/23/2020    PROT 6 5 09/30/2017    BILITOT 0 7 09/30/2017    EGFR 61 12/02/2019       No results found for: IRON, TIBC, FERRITIN    No results found for: [id number]      ROS: Review of Systems   Constitutional: Negative  Negative for appetite change, chills, diaphoresis, fatigue, fever and unexpected weight change  HENT:   Negative for hearing loss, lump/mass, mouth sores, nosebleeds, sore throat, trouble swallowing and voice change  Eyes: Negative  Negative for eye problems and icterus  Respiratory: Negative  Negative for chest tightness, cough, hemoptysis and shortness of breath  Cardiovascular: Negative for chest pain and leg swelling  Gastrointestinal: Negative for abdominal distention, abdominal pain, blood in stool, constipation, diarrhea and nausea  Endocrine: Negative  Genitourinary: Negative for dysuria, frequency, hematuria and pelvic pain  Musculoskeletal: Negative  Negative for arthralgias, back pain, flank pain, gait problem, myalgias and neck stiffness  Skin: Negative for itching and rash  Neurological: Negative for dizziness, gait problem, headaches, light-headedness, numbness and speech difficulty  Hematological: Negative for adenopathy  Does not bruise/bleed easily  Psychiatric/Behavioral: Negative for confusion, decreased concentration, depression and sleep disturbance  The patient is not nervous/anxious  Current Medications: Reviewed  Allergies: Reviewed  PMH/FH/SH:  Reviewed      Physical Exam:    Body surface area is 2 04 meters squared  Wt Readings from Last 3 Encounters:   07/28/20 92 5 kg (204 lb)   07/21/20 93 7 kg (206 lb 9 6 oz)   06/18/20 95 1 kg (209 lb 9 6 oz)        Temp Readings from Last 3 Encounters:   07/28/20 97 7 °F (36 5 °C) (Tympanic)   07/21/20 98 1 °F (36 7 °C) (Tympanic)   06/18/20 (!) 97 1 °F (36 2 °C) (Tympanic)        BP Readings from Last 3 Encounters:   07/28/20 120/80   07/21/20 140/88   06/18/20 150/90         Pulse Readings from Last 3 Encounters:   07/28/20 56   07/21/20 62   06/18/20 65        Physical Exam   Constitutional: He is oriented to person, place, and time  He appears well-developed and well-nourished  No distress  HENT:   Head: Normocephalic and atraumatic  Eyes: Conjunctivae are normal    Neck: Normal range of motion  Neck supple  No tracheal deviation present  Cardiovascular: Normal rate and regular rhythm  Exam reveals no gallop and no friction rub  No murmur heard  Pulmonary/Chest: Effort normal and breath sounds normal  No respiratory distress  He has no wheezes  He has no rales   He exhibits no tenderness  Abdominal: Soft  He exhibits no distension  There is no tenderness  Musculoskeletal: He exhibits no edema or tenderness  Lymphadenopathy:     He has no cervical adenopathy  Neurological: He is alert and oriented to person, place, and time  Skin: Skin is warm and dry  He is not diaphoretic  No erythema  No pallor  Psychiatric: He has a normal mood and affect  His behavior is normal  Judgment and thought content normal    Vitals reviewed  ECOG:    Goals and Barriers:  Current Goal: Minimize effects of disease  Barriers: None  Patient's Capacity to Self Care:  Patient is able to self care      Code Status: [unfilled]

## 2020-08-13 ENCOUNTER — HOSPITAL ENCOUNTER (OUTPATIENT)
Dept: INFUSION CENTER | Facility: CLINIC | Age: 80
Discharge: HOME | End: 2020-08-13
Payer: COMMERCIAL

## 2020-08-13 VITALS — HEART RATE: 65 BPM | DIASTOLIC BLOOD PRESSURE: 77 MMHG | SYSTOLIC BLOOD PRESSURE: 136 MMHG | RESPIRATION RATE: 18 BRPM

## 2020-08-13 DIAGNOSIS — C79.51 BONE METASTASES (HCC): ICD-10-CM

## 2020-08-13 DIAGNOSIS — C61 PROSTATE CANCER (HCC): Primary | ICD-10-CM

## 2020-08-13 PROCEDURE — 96365 THER/PROPH/DIAG IV INF INIT: CPT

## 2020-08-13 RX ORDER — SODIUM CHLORIDE 9 MG/ML
20 INJECTION, SOLUTION INTRAVENOUS ONCE
Status: CANCELLED | OUTPATIENT
Start: 2020-11-05

## 2020-08-13 RX ORDER — SODIUM CHLORIDE 9 MG/ML
20 INJECTION, SOLUTION INTRAVENOUS ONCE
Status: COMPLETED | OUTPATIENT
Start: 2020-08-13 | End: 2020-08-13

## 2020-08-13 RX ADMIN — ZOLEDRONIC ACID 3.3 MG: 4 INJECTION INTRAVENOUS at 14:21

## 2020-08-13 RX ADMIN — SODIUM CHLORIDE 20 ML/HR: 0.9 INJECTION, SOLUTION INTRAVENOUS at 14:00

## 2020-08-13 NOTE — PROGRESS NOTES
Pt  Denies new symptoms or concerns today  Ca+ 9 5  Creatinine 1 09  Zometa ordered for infusion today

## 2020-08-13 NOTE — PROGRESS NOTES
Pt  Tolerated Zometa without adverse event  Future appointment scheduled for 12 weeks as ordered  AVS  Provided

## 2020-08-13 NOTE — PLAN OF CARE
Problem: Potential for Falls  Goal: Patient will remain free of falls  Description: INTERVENTIONS:  - Assess patient frequently for physical needs  -  Identify cognitive and physical deficits and behaviors that affect risk of falls    -  [place] fall precautions as indicated by assessment   - Educate patient/family on patient safety including physical limitations  - Instruct patient to call for assistance with activity based on assessment  - Modify environment to reduce risk of injury  - Consider OT/PT consult to assist with strengthening/mobility  Outcome: Progressing     Problem: INFECTION - ADULT  Goal: Absence or prevention of progression during hospitalization  Description: INTERVENTIONS:  - Assess and monitor for signs and symptoms of infection  - Monitor lab/diagnostic results  - Monitor all insertion sites, i e  indwelling lines, tubes, and drains  - Monitor endotracheal (as able) and nasal secretions for changes in amount and color  - [place] appropriate cooling/warming therapies per order  - Administer medications as ordered  - Instruct and encourage patient and family to use good hand hygiene technique  - Identify and instruct in appropriate isolation precautions for identified infection/condition  Outcome: Progressing  Goal: Absence of fever/infection during neutropenic period  Description: INTERVENTIONS:  - Monitor WBC  - Implement neutropenic guidelines  Outcome: Progressing No significant past surgical history

## 2020-08-20 ENCOUNTER — OFFICE VISIT (OUTPATIENT)
Dept: FAMILY MEDICINE CLINIC | Facility: CLINIC | Age: 80
End: 2020-08-20
Payer: COMMERCIAL

## 2020-08-20 VITALS
SYSTOLIC BLOOD PRESSURE: 124 MMHG | HEIGHT: 67 IN | HEART RATE: 70 BPM | OXYGEN SATURATION: 97 % | RESPIRATION RATE: 17 BRPM | BODY MASS INDEX: 31.71 KG/M2 | TEMPERATURE: 97.6 F | WEIGHT: 202 LBS | DIASTOLIC BLOOD PRESSURE: 76 MMHG

## 2020-08-20 DIAGNOSIS — I10 BENIGN ESSENTIAL HYPERTENSION: Primary | ICD-10-CM

## 2020-08-20 DIAGNOSIS — C61 PROSTATE CANCER (HCC): ICD-10-CM

## 2020-08-20 DIAGNOSIS — R60.0 LOCALIZED EDEMA: ICD-10-CM

## 2020-08-20 PROCEDURE — 3008F BODY MASS INDEX DOCD: CPT | Performed by: FAMILY MEDICINE

## 2020-08-20 PROCEDURE — 1036F TOBACCO NON-USER: CPT | Performed by: FAMILY MEDICINE

## 2020-08-20 PROCEDURE — 3074F SYST BP LT 130 MM HG: CPT | Performed by: FAMILY MEDICINE

## 2020-08-20 PROCEDURE — 1160F RVW MEDS BY RX/DR IN RCRD: CPT | Performed by: FAMILY MEDICINE

## 2020-08-20 PROCEDURE — 99214 OFFICE O/P EST MOD 30 MIN: CPT | Performed by: FAMILY MEDICINE

## 2020-08-20 PROCEDURE — 4040F PNEUMOC VAC/ADMIN/RCVD: CPT | Performed by: FAMILY MEDICINE

## 2020-08-20 PROCEDURE — 3078F DIAST BP <80 MM HG: CPT | Performed by: FAMILY MEDICINE

## 2020-08-20 RX ORDER — HYDROCHLOROTHIAZIDE 25 MG/1
25 TABLET ORAL DAILY
Qty: 90 TABLET | Refills: 1 | Status: SHIPPED | OUTPATIENT
Start: 2020-08-20 | End: 2021-01-25 | Stop reason: SDUPTHER

## 2020-08-20 NOTE — ASSESSMENT & PLAN NOTE
Well controlled on losartan 100, metoprolol XL 25, and now hydrochlorothiazide 25 mg daily    Will continue to monitor

## 2020-08-20 NOTE — ASSESSMENT & PLAN NOTE
Improved since starting hydrochlorothiazide 25 mg daily  Will maintain this on a daily basis to also help with his blood pressure    Med was refilled today

## 2020-08-20 NOTE — PROGRESS NOTES
[facility]      NAME: Refugio Guardado  AGE: 67 y o  SEX: male  : 1940   MRN: [id number]    DATE: 2020  TIME: 10:36 AM    Assessment and Plan     Problem List Items Addressed This Visit     Benign essential hypertension - Primary      Well controlled on losartan 100, metoprolol XL 25, and now hydrochlorothiazide 25 mg daily  Will continue to monitor         Relevant Medications    hydrochlorothiazide (HYDRODIURIL) 25 mg tablet    Prostate cancer ([facility] [address] )      History of prostate cancer with metastasis  Recent bone scan on May 5th showed metastasis to right proximal humerus and right 5th rib  Continue follow-up with oncologist and urology  Localized edema      Improved since starting hydrochlorothiazide 25 mg daily  Will maintain this on a daily basis to also help with his blood pressure  Med was refilled today         Relevant Medications    hydrochlorothiazide (HYDRODIURIL) 25 mg tablet              Return to office in: 3 mos, FBW prior at [facility]    Chief Complaint     Chief Complaint   Patient presents with    Follow-up     1 month       History of Present Illness      Patient presents for blood pressure recheck appointment today  And for recheck of lower extremity edema  He is doing well on losartan 100 and metoprolol XL 25  He was recently started on hydrochlorothiazide 25 mg daily for edema  This has improved as well  He is still seeing urologist and oncologist regularly for his prostate cancer  He had a bone scan done on May 5th      The following portions of the patient's history were reviewed and updated as appropriate: allergies, current medications, past family history, past medical history, past social history, past surgical history and problem list     Review of Systems   Review of Systems   Respiratory: Negative  Cardiovascular: Negative  Gastrointestinal: Negative  Genitourinary: Negative  Musculoskeletal: Positive for back pain         Active Problem List     Patient Active Problem List   Diagnosis    Benign essential hypertension    Renal cyst    Liver lesion    Pulmonary nodule    Combined arterial insufficiency and corporo-venous occlusive erectile dysfunction    Prostate cancer ([facility] [address] [facility]    Coronary artery disease of native artery of native heart with stable angina pectoris ([facility] [address] [facility]    DDD (degenerative disc disease), cervical    Inflamed sebaceous cyst    S/P coronary artery stent placement    Vitamin D deficiency    Abnormal radionuclide bone scan    Hot flashes    Bone metastases (HCC)    Clonus    Calculus of kidney    Thoracic myofascial strain    Positional lightheadedness    Rhomboid pain    Mixed hyperlipidemia    Localized edema       Objective   /76   Pulse 70   Temp 97 6 °F (36 4 °C) (Tympanic)   Resp 17   Ht 5' 6 93" (1 7 m)   Wt 91 6 kg (202 lb)   SpO2 97%   BMI 31 70 kg/m²     Physical Exam  Cardiovascular:      Rate and Rhythm: Normal rate and regular rhythm  Heart sounds: Normal heart sounds  Comments: Carotids: no bruits  Ext: no edema  Pulmonary:      Effort: Pulmonary effort is normal  No respiratory distress  Breath sounds: No wheezing or rales  Psychiatric:         Behavior: Behavior normal          Thought Content:  Thought content normal          Pertinent Laboratory/Diagnostic Studies:  labs reviewed    Current Medications     Current Outpatient Medications:     aspirin (ASPIRIN ADULT LOW STRENGTH) 81 mg EC tablet, Take 1 tablet by mouth daily, Disp: , Rfl:     atorvastatin (LIPITOR) 40 mg tablet, Take 40 mg by mouth daily , Disp: , Rfl:     Calcium 500 MG tablet, Take 1,000 mg by mouth daily , Disp: , Rfl:     cholecalciferol (VITAMIN D3) 1,000 units tablet, Take 1 tablet by mouth daily, Disp: , Rfl:     enzalutamide (Xtandi) 40 mg capsule, Take 4 capsules (160 mg total) by mouth daily, Disp: 120 capsule, Rfl: 3    hydrochlorothiazide (HYDRODIURIL) 25 mg tablet, Take 1 tablet (25 mg total) by mouth daily, Disp: 90 tablet, Rfl: 1    leuprolide (LUPRON DEPOT 6 MONTH KIT) 45 mg, Inject 45 mg into a muscle every 6 (six) months, Disp: 45 mg, Rfl: 1    losartan (COZAAR) 100 MG tablet, Take 1 tablet (100 mg total) by mouth daily, Disp: 90 tablet, Rfl: 1    metoprolol succinate (TOPROL-XL) 25 mg 24 hr tablet, Take 1 tablet (25 mg total) by mouth daily, Disp: 90 tablet, Rfl: 1    Zoledronic Acid (ZOMETA IV), Infuse into a venous catheter every 3 (three) months, Disp: , Rfl:     Health Maintenance     Health Maintenance   Topic Date Due    SLP PLAN OF CARE  1940    PT PLAN OF CARE  01/08/2020    Influenza Vaccine  07/01/2020    BMI: Followup Plan  12/09/2020    Fall Risk  12/09/2020    Medicare Annual Wellness Visit (AWV)  12/09/2020    Depression Screening PHQ  07/21/2021    DTaP,Tdap,and Td Vaccines (2 - Td) 07/26/2021    BMI: Adult  08/20/2021    Pneumococcal Vaccine: 65+ Years  Completed    HIB Vaccine  Aged Out    Hepatitis B Vaccine  Aged Out    IPV Vaccine  Aged Out    Hepatitis A Vaccine  Aged Out    Meningococcal ACWY Vaccine  Aged Out    HPV Vaccine  Aged [employer] History   Administered Date(s) Administered    H1N1, All Formulations 02/01/2010    INFLUENZA 09/17/2015, 09/23/2016    Influenza Split High Dose Preservative Free IM 09/04/2014, 09/17/2015, 09/23/2016, 10/06/2017    Influenza TIV (IM) 09/20/2012, 09/17/2013    Influenza, high dose seasonal 0 5 mL 09/14/2018, 10/21/2019    Pneumococcal Conjugate 13-Valent 03/16/2015    Pneumococcal Polysaccharide PPV23 07/26/2011    Tdap 07/26/2011    Zoster 01/10/2014       Naun Carreno DO  [facility]

## 2020-08-20 NOTE — ASSESSMENT & PLAN NOTE
History of prostate cancer with metastasis  Recent bone scan on May 5th showed metastasis to right proximal humerus and right 5th rib  Continue follow-up with oncologist and urology

## 2020-08-26 ENCOUNTER — TELEPHONE (OUTPATIENT)
Dept: FAMILY MEDICINE CLINIC | Facility: CLINIC | Age: 80
End: 2020-08-26

## 2020-08-26 DIAGNOSIS — M25.551 RIGHT HIP PAIN: Primary | ICD-10-CM

## 2020-08-26 NOTE — TELEPHONE ENCOUNTER
Patient has pain in Right hip that is now radiating down his leg  Very difficult to walk  Wants to know if he can have a refferal to Orthopedics?     pls call with answer

## 2020-08-28 ENCOUNTER — OFFICE VISIT (OUTPATIENT)
Dept: UROLOGY | Facility: CLINIC | Age: 80
End: 2020-08-28
Payer: COMMERCIAL

## 2020-08-28 VITALS
TEMPERATURE: 97.5 F | DIASTOLIC BLOOD PRESSURE: 88 MMHG | SYSTOLIC BLOOD PRESSURE: 138 MMHG | WEIGHT: 201.2 LBS | HEART RATE: 60 BPM | BODY MASS INDEX: 31.58 KG/M2 | HEIGHT: 67 IN

## 2020-08-28 DIAGNOSIS — C61 PROSTATE CANCER (HCC): Primary | ICD-10-CM

## 2020-08-28 DIAGNOSIS — C79.51 BONE METASTASES (HCC): ICD-10-CM

## 2020-08-28 PROCEDURE — 99213 OFFICE O/P EST LOW 20 MIN: CPT | Performed by: UROLOGY

## 2020-08-28 PROCEDURE — 1036F TOBACCO NON-USER: CPT | Performed by: UROLOGY

## 2020-08-28 PROCEDURE — 96402 CHEMO HORMON ANTINEOPL SQ/IM: CPT

## 2020-08-28 PROCEDURE — 1160F RVW MEDS BY RX/DR IN RCRD: CPT | Performed by: UROLOGY

## 2020-08-28 PROCEDURE — 4040F PNEUMOC VAC/ADMIN/RCVD: CPT | Performed by: UROLOGY

## 2020-08-28 PROCEDURE — 3008F BODY MASS INDEX DOCD: CPT | Performed by: UROLOGY

## 2020-08-28 PROCEDURE — 3075F SYST BP GE 130 - 139MM HG: CPT | Performed by: UROLOGY

## 2020-08-28 PROCEDURE — 3079F DIAST BP 80-89 MM HG: CPT | Performed by: UROLOGY

## 2020-08-28 NOTE — PROGRESS NOTES
UROLOGY ROUTINE FOLLOW UP NOTE     CHIEF COMPLAINT   Ro Hurt is a 67 y o  male with a complaint of   Chief Complaint   Patient presents with    Follow-up    Prostate Cancer       History of Present Illness:     67 y o  gentleman who has been undergoing routine prostate cancer screening with his primary care team  The patient has had a steady PSA but in March of 2017 was noted to have some oscillation  The patient was given a course of antibiotics and his PSA came down from the mid 5 range to 4 6  He initially presented in June 2017  We initially recommended follow-up in 1 year  Follow-up of his PSA values ordered by his primary team demonstrated concern for rapid rise  The patient return to see me in May of 2018  At that time, we recommended a prostate biopsy but the patient had just undergone percutaneous stenting and was on dual anti-platelet therapy  We recommend delay  The patient underwent a CT scan of his chest which demonstrated some sclerotic lesions in his primary care team again contacted us  We recommended repeat PSA and a bone scan  PSA has risen again and bone scan does demonstrate some concern  Lab Results   Component Value Date    PSA 0 8 07/23/2020    PSA 0 8 06/04/2020    PSA 0 5 04/29/2020   10/5/18 PSA 9 8  4/30/18 PSA 8 6, free 19 6%  9/30/17 PSA 7 3, free 15%  5/5/17 PSA 4 6  3/20/17 PSA 5 4     We did discuss with Interventional Radiology possible biopsy of the patient's thoracic and rib lesions although these were not felt to be safe for attempt  As such, the patient was arranged for a prostate biopsy here in our office  Underwent prostate biopsy  This demonstrated high risk Vitaliy 9 disease  Patient was discussed at multidisciplinary conference and although we were not able to biopsy his bony lesions, 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  The patient was started on androgen deprivation therapy and seen by Medical Oncology    He was started on enzalutamide and Zometa  He returns for androgen deprivation therapy  He has had good PSA response  Recently enzalutamide has transitioned to abiraterone  Patient continues to have progression of his bony disease on imaging as well as slight progression his PSA trend  He does not have any specific bony pain  He does have some musculoskeletal aches and pains around the cervical spine      Past Medical History:     Past Medical History:   Diagnosis Date    Anemia     last assessed  1/7/14     Coronary artery disease     Hypercholesteremia     Hypertension     Kidney stone     Polyp of sigmoid colon     Prostate cancer ([address] )     Renal disorder     elevated serum creat    Tinnitus     unspecified laterality / last assessed 4/9/13    Vitamin D deficiency        PAST SURGICAL HISTORY:     Past Surgical History:   Procedure Laterality Date    CARDIAC SURGERY      COLONOSCOPY      CORONARY ANGIOPLASTY WITH STENT PLACEMENT      FL RETROGRADE PYELOGRAM  9/11/2019    HEMORRHOID SURGERY      MD CYSTO/URETERO W/LITHOTRIPSY &INDWELL STENT INSRT Left 9/11/2019    Procedure: CYSTO, URETEROSCOPY W/HOLMIUM LASER, BASKET STONE EXTRACTION, RETROGRADE PYELOGRAM, STENT EXCHANGE;  Surgeon: Fredis Kelley MD;  Location: [facility];  Service: Urology    PROSTATE BIOPSY  10/24/2018    TONSILLECTOMY         CURRENT MEDICATIONS:     Current Outpatient Medications   Medication Sig Dispense Refill    aspirin (ASPIRIN ADULT LOW STRENGTH) 81 mg EC tablet Take 1 tablet by mouth daily      atorvastatin (LIPITOR) 40 mg tablet Take 40 mg by mouth daily       Calcium 500 MG tablet Take 1,000 mg by mouth daily       cholecalciferol (VITAMIN D3) 1,000 units tablet Take 1 tablet by mouth daily      enzalutamide (Xtandi) 40 mg capsule Take 4 capsules (160 mg total) by mouth daily 120 capsule 3    hydrochlorothiazide (HYDRODIURIL) 25 mg tablet Take 1 tablet (25 mg total) by mouth daily 90 tablet 1    leuprolide (LUPRON DEPOT 6 MONTH KIT) 45 mg Inject 45 mg into a muscle every 6 (six) months 45 mg 1    losartan (COZAAR) 100 MG tablet Take 1 tablet (100 mg total) by mouth daily 90 tablet 1    metoprolol succinate (TOPROL-XL) 25 mg 24 hr tablet Take 1 tablet (25 mg total) by mouth daily 90 tablet 1    Zoledronic Acid (ZOMETA IV) Infuse into a venous catheter every 3 (three) months       Current Facility-Administered Medications   Medication Dose Route Frequency Provider Last Rate Last Dose    leuprolide (LUPRON DEPOT 6 MONTH KIT) IM injection kit 45 mg  45 mg Intramuscular Once Arin Lane MD           ALLERGIES:   No Known Allergies    SOCIAL HISTORY:     Social History     Socioeconomic History    Marital status: /Civil Union     Spouse name: None    Number of children: None    Years of education: None    Highest education level: None   Occupational History    Occupation: consultant   Social Needs    Financial resource strain: None    Food insecurity     Worry: None     Inability: None    Transportation needs     Medical: None     Non-medical: None   Tobacco Use    Smoking status: Never Smoker    Smokeless tobacco: Never Used   Substance and Sexual Activity    Alcohol use: Yes     Frequency: 2-3 times a week     Comment: Occuational / social     Drug use: No    Sexual activity: None   Lifestyle    Physical activity     Days per week: None     Minutes per session: None    Stress: None   Relationships    Social connections     Talks on phone: None     Gets together: None     Attends Christianity service: None     Active member of club or organization: None     Attends meetings of clubs or organizations: None     Relationship status: None    Intimate partner violence     Fear of current or ex partner: None     Emotionally abused: None     Physically abused: None     Forced sexual activity: None   Other Topics Concern    None   Social History Narrative    Always uses seat belt Daily caffeine consumption 2-3 servings a day     Feels safe at home       SOCIAL HISTORY:     Family History   Problem Relation Age of Onset   [de-identified] Breast cancer Mother     Hypertension Father        REVIEW OF SYSTEMS:     Review of Systems   Constitutional: Negative for activity change and unexpected weight change  Respiratory: Negative  Cardiovascular: Negative  Gastrointestinal: Negative  Genitourinary: Positive for frequency (  Nocturia)  Musculoskeletal: Positive for back pain and neck stiffness  Skin: Negative  Psychiatric/Behavioral: Negative  PHYSICAL EXAM:     /88 (BP Location: Left arm, Patient Position: Sitting, Cuff Size: Adult)   Pulse 60   Temp 97 5 °F (36 4 °C)   Ht 5' 6 93" (1 7 m)   Wt 91 3 kg (201 lb 3 2 oz)   BMI 31 58 kg/m²     General:  Healthy appearing male in no acute distress  They have a normal affect  There is not appear to be any gross neurologic defects or abnormalities  HEENT:  Normocephalic, atraumatic  Neck is supple without any palpable lymphadenopathy  Cardiovascular:  Patient has normal palpable distal radial pulses  There is no significant peripheral edema  No JVD is noted  Respiratory:  Patient has unlabored respirations  There is no audible wheeze or rhonchi  Abdomen:  Abdomen is without surgical scars  Abdomen is soft and nontender  There is no tympany  Inguinal and umbilical hernia are not appreciated  Musculoskeletal:  Patient does not have significant CVA tenderness in the flank with palpation or percussion  They full range of motion in all 4 extremities  Strength in all 4 extremities appears congruent  Patient is able to ambulate without assistance or difficulty  Dermatologic:  Patient has no skin abnormalities or rashes        LABS:     CBC:   Lab Results   Component Value Date    WBC 5 7 07/23/2020    HGB 14 6 07/23/2020    HCT 42 8 07/23/2020    MCV 90 7 07/23/2020     07/23/2020       BMP:   Lab Results Component Value Date    CALCIUM 9 5 2020     2017    K 4 5 2020    CO2 30 2020     2020    BUN 19 2020    CREATININE 1 09 2020     10/5/18 PSA 9 8  Lab Results   Component Value Date    PSA 0 8 2020    PSA 0 8 2020    PSA 0 5 2020     IMAGIN/5/20  BONE SCAN  WHOLE BODY     INDICATION: C61: Malignant neoplasm of prostate     PREVIOUS FILM CORRELATION:    CT chest abdomen pelvis 2020, bone scan exam 10/5/2018     TECHNIQUE:   This study was performed following the intravenous administration of 25 7 mCi Tc-99m labeled MDP  Delayed, anterior and posterior whole body images were acquired, 2-3 hours after radiopharmaceutical administration  Additional delayed   oblique static images acquired of the bilateral ribs and pelvis      FINDINGS:     New focus of radiotracer uptake at a right mid rib anteriorly likely the 5th rib  There is focal sclerosis here on CT, nonspecific  This could be posttraumatic, metastasis is not excluded      Previously noted was a tiny focus of radiotracer uptake at the left posterior 7th rib, not seen on the current exam      Stable focus of radiotracer uptake at the T10 level posteriorly  There is a corresponding sclerotic lesion here on CT compatible with metastasis      New focus of radiotracer uptake at the right proximal humeral shaft suspicious for metastasis      Recent CT examination notes multiple small subcentimeter sclerotic lesions without radiotracer uptake on this bone scan exam   These are likely too small to assess      Foci of radiotracer uptake at the bilateral shoulders and bilateral feet are similar in distribution compared to the prior exam likely degenerative      Mild foci of radiotracer uptake in the thoracic and lower lumbar spine are likely degenerative and similar to the prior exam      The renal activity is fairly symmetric      IMPRESSION:     1   Findings suggest mild progression of osseous metastasis  New focus of radiotracer uptake at the right proximal humerus suspicious for metastasis  Additional new focus of radiotracer uptake at the right anterior 5th rib, could be posttraumatic,   metastasis is not excluded  2   Stable focus of suspected metastasis at the T10 vertebral body  3   Recent CT examination notes multiple small subcentimeter sclerotic lesions without radiotracer uptake on this bone scan exam   These are likely too small to assess      5/2/20  CT CHEST, ABDOMEN AND PELVIS WITH AND WITHOUT IV CONTRAST     INDICATION:   C61: Malignant neoplasm of prostate      COMPARISON:  CT chest dated 9/28/2018      TECHNIQUE: Initial CT of the abdomen and pelvis was performed without intravenous contrast   Subsequent dynamic CT evaluation of the chest, abdomen and pelvis was performed after the administration of intravenous contrast was performed  Finally, delayed   dynamic delayed phase postcontrast CT evaluation of the abdomen and pelvis was performed  Axial, sagittal, and coronal 2D reformatted images were created from the source data and submitted for interpretation       Radiation dose length product (DLP) for this visit:  2473 mGy-cm   This examination, like all CT scans performed in the [facility], was performed utilizing techniques to minimize radiation dose exposure, including the use of iterative   reconstruction and automated exposure control      IV Contrast:  100 mL of iohexol (OMNIPAQUE)  Enteric Contrast:  Enteric contrast was administered      FINDINGS:     LUNGS:  Lungs are clear    There is no tracheal or endobronchial lesion      PLEURA:  Unremarkable      HEART/GREAT VESSELS:  Unremarkable for patient's age      MEDIASTINUM AND JOSE ANTONIO:  Unremarkable      CHEST WALL AND LOWER NECK:   Unremarkable      VISUALIZED STRUCTURES IN THE UPPER ABDOMEN:  Unremarkable      OSSEOUS STRUCTURES:  No acute fracture or destructive osseous lesion      ABDOMEN     RIGHT KIDNEY AND URETER:  No solid renal mass  No detectable urothelial mass  No hydronephrosis or hydroureter  No urinary tract calculi  No perinephric collection      LEFT KIDNEY AND URETER:  No solid renal mass  There are several left renal cysts measuring up to 9 4 cm  No hydronephrosis or hydroureter  No urinary tract calculi  No perinephric collection      URINARY BLADDER:  It is uncertain whether a 1 cm density at the base of the urinary bladder is a urinary bladder mass versus the prostate gland indenting the urinary bladder base  No calculi         LIVER/BILIARY TREE:  Few liver cysts are seen measuring up to 2 5 cm      GALLBLADDER:  No calcified gallstones  No pericholecystic inflammatory change      SPLEEN:  Unremarkable      PANCREAS:  Unremarkable      ADRENAL GLANDS:  Unremarkable      STOMACH AND BOWEL:  Unremarkable      ABDOMINOPELVIC CAVITY:  No ascites  No free intraperitoneal air  No lymphadenopathy      VESSELS:  There is calcific atherosclerosis of the abdominal aorta without evidence for aneurysmal dilatation      PELVIS     REPRODUCTIVE ORGANS:  Unremarkable for patient's age      APPENDIX: No findings to suggest appendicitis      ABDOMINAL WALL/INGUINAL REGIONS:  Unremarkable      OSSEOUS STRUCTURES: Sclerotic lesion in the posterior T10 vertebral bodies increase in size, now measuring 1 9 cm (image 111, series 602)  Tiny sclerotic density in the T12 vertebral body is new  Tiny sclerotic densities in the L5 vertebral body and S1   are nonspecific  Few tiny sclerotic densities in the upper sternum (image 16, series 3) are new  5 mm sclerotic focus in the right 6th rib is new (image 74, series 3)  Tiny sclerotic focus in the right 7th rib is new (image 83, series 3)  4 mm   sclerotic focus in the right 8th rib is stable  4 mm sclerotic lesion in the left scapula (image 3, series 3) is new    6 mm sclerotic lesion in the left 4th rib is increase in size (image 43, series 3)  5 mm sclerotic density in the left 7th rib is   increased in density (image 64, series 3)     IMPRESSION:     1 cm apparent density at the base of the urinary bladder may represent a bladder mass versus the prostate gland indenting the urinary bladder base  Cystoscopic correlation is recommended      New/enlarging sclerotic osseous lesions in the chest and spine are worrisome for metastases as described above  Correlation with bone scan is recommended  PATHOLOGY:     Final Diagnosis   A  Right lateral base prostate core biopsy:  - Adenocarcinoma, Vitaliy score 4+ 5 = 9/10 (grade 5 comprises 10% of core biopsy), grade group 5, continuously involving greater than 95% of this core biopsy  - No perineural invasion is seen     B  Right lateral mid prostate core biopsy:  - Adenocarcinoma, [place] score 4+ 5 = 9/10 (grade 5 conprises 20% of core biopsy), grade group 5, continuously involving 90% of this core biopsy  - Perineural invasion is seen      C  Left lateral apex prostate core biopsy:  - Adenocarcinoma, Vitaliy score 5 + 4 = 9/10 (grade 5 comprises 60% of core biopsy), grade group 5, continuously involving 60% of this core biopsy  - Perineural invasion is seen     D  Right base prostate core biopsy:  - Adenocarcinoma, [place] score 4 + 5 = 9/10 (grade 5 comprises 10% of core biopsy), grade group 5, continuously involving 85% of this core biopsy  - Perineural invasion is seen     E  Right mid prostate core biopsy   - Adenocarcinoma, Vitaliy score 5 + 4 = 9/10 (grade 5 comprises 95% of core biopsy), grade group 5, continuously involving 60% of this core biopsy  - Perineural invasion is seen     F  Right apex prostate core biopsy:  - Adenocarcinoma, [place] score 4 + 5 = 9/10 (grade 5 comprises 45% of core biopsy), grade group 5, continuously involving 70% of this core biopsy  - Perineural invasion is seen     G    Left base prostate core biopsy:  - Adenocarcinoma, [place] score 4 + 5 = 9/10 (grade 5 comprises 35% of core biopsy), grade group 5, continuously involving 70% of this core biopsy  - No perineural invasion is seen     H  Left mid prostate core biopsy:  - Adenocarcinoma, Vitaliy score 4 + 4 = 8/10, grade group 4, discontinuously involving 20% of this core biopsy  - No perineural invasion is seen      I  Left apex prostate core biopsy:  - Benign prostatic tissue     J  Left lateral base prostate core biopsy:  - Adenocarcinoma, Vitaliy score 4 + 5 = 9/10, (grade 5 comprises 35% of core biopsy), grade group 5, discontinuously involving 20% of this core biopsy  - No perineural invasion is seen      K  Left lateral mid prostate core biopsy:  - Adenocarcinoma, Vitaliy score 4 + 4 = 8/10, grade group 4, continuously involving 5% of this core biopsy  - No perineural invasion is seen      L  Left lateral apex prostate core biopsy:  - Adenocarcinoma, Vitaliy score 4 + 4 = 8/10, grade group 4, discontinuously involving 5% of this core biopsy  - No perineural invasion is seen      Note: Block for Prolaris testing if required: E     ASSESSMENT:     67 y o  male with high volume, high risk [place] 5+4=9 CaP, now with abnormal sclerotic lesions and positive bone scan now status post ureteroscopy    PLAN:       Patient has had some radiographic and PSA progression of his disease  He will continue to follow with [facility]  He  He is potentially interested in additional therapeutic options and will discuss this with his medical oncologist   He was administered Lupron 6 month depot today  The patient has signs of a small density at the base of the bladder on CT scan  I reviewed the images  This is consistent with the patient's prior transrectal ultrasound showing an intravesical prostate  Patient is not having any signs or symptoms of gross hematuria    I have offered him a cystoscopy but at this point time neither he nor I are inclined to perform this given the likelihood that this is continuation of the intravesical prostate  Should the patient have any symptoms in the future, he would be inclined toward cystoscopy  Return in 6 months for injection

## 2020-09-08 ENCOUNTER — TELEPHONE (OUTPATIENT)
Dept: HEMATOLOGY ONCOLOGY | Facility: CLINIC | Age: 80
End: 2020-09-08

## 2020-09-08 ENCOUNTER — CONSULT (OUTPATIENT)
Dept: OBGYN CLINIC | Facility: MEDICAL CENTER | Age: 80
End: 2020-09-08
Payer: COMMERCIAL

## 2020-09-08 ENCOUNTER — APPOINTMENT (OUTPATIENT)
Dept: RADIOLOGY | Facility: MEDICAL CENTER | Age: 80
End: 2020-09-08
Payer: COMMERCIAL

## 2020-09-08 VITALS
WEIGHT: 203.8 LBS | TEMPERATURE: 96.2 F | HEIGHT: 67 IN | BODY MASS INDEX: 31.99 KG/M2 | DIASTOLIC BLOOD PRESSURE: 80 MMHG | SYSTOLIC BLOOD PRESSURE: 151 MMHG

## 2020-09-08 DIAGNOSIS — M54.16 RADICULOPATHY, LUMBAR REGION: ICD-10-CM

## 2020-09-08 DIAGNOSIS — M25.551 PAIN IN RIGHT HIP: Primary | ICD-10-CM

## 2020-09-08 DIAGNOSIS — M25.551 PAIN IN RIGHT HIP: ICD-10-CM

## 2020-09-08 DIAGNOSIS — M25.551 RIGHT HIP PAIN: ICD-10-CM

## 2020-09-08 PROCEDURE — 99203 OFFICE O/P NEW LOW 30 MIN: CPT | Performed by: ORTHOPAEDIC SURGERY

## 2020-09-08 PROCEDURE — 3077F SYST BP >= 140 MM HG: CPT | Performed by: ORTHOPAEDIC SURGERY

## 2020-09-08 PROCEDURE — 3079F DIAST BP 80-89 MM HG: CPT | Performed by: ORTHOPAEDIC SURGERY

## 2020-09-08 PROCEDURE — 73502 X-RAY EXAM HIP UNI 2-3 VIEWS: CPT

## 2020-09-08 NOTE — PROGRESS NOTES
Orthopaedic Surgery Note    CC: Right Hip Pain    HPI:  Colette Santizo is a 67 y o male with a history of prostate cancer with bone metastasis presents the office today for evaluation of right hip pain  Patient states that the pain started a few weeks ago and is located in the lateral aspect of the hip, radiating to the groin and down towards the level of the hussein  He states that this pain is functionally bothersome for his regular day-to-day activities, however, he has found some minimal improvement with stretching activities and Tylenol/Motrin  Patient denies any numbness or tingling  He denies any accidents or injuries  He denies any other acute complaints  ALLERGIES:  No Known Allergies    CURRENT MEDICATIONS:  Current Outpatient Medications   Medication Sig Dispense Refill    aspirin (ASPIRIN ADULT LOW STRENGTH) 81 mg EC tablet Take 1 tablet by mouth daily      atorvastatin (LIPITOR) 40 mg tablet Take 40 mg by mouth daily       Calcium 500 MG tablet Take 1,000 mg by mouth daily       cholecalciferol (VITAMIN D3) 1,000 units tablet Take 1 tablet by mouth daily      enzalutamide (Xtandi) 40 mg capsule Take 4 capsules (160 mg total) by mouth daily 120 capsule 3    hydrochlorothiazide (HYDRODIURIL) 25 mg tablet Take 1 tablet (25 mg total) by mouth daily 90 tablet 1    leuprolide (LUPRON DEPOT 6 MONTH KIT) 45 mg Inject 45 mg into a muscle every 6 (six) months 45 mg 1    losartan (COZAAR) 100 MG tablet Take 1 tablet (100 mg total) by mouth daily 90 tablet 1    metoprolol succinate (TOPROL-XL) 25 mg 24 hr tablet Take 1 tablet (25 mg total) by mouth daily 90 tablet 1    Zoledronic Acid (ZOMETA IV) Infuse into a venous catheter every 3 (three) months       No current facility-administered medications for this visit          PAST MEDICAL HISTORY  Past Medical History:   Diagnosis Date    Anemia     last assessed  1/7/14     Coronary artery disease     Hypercholesteremia     Hypertension     Kidney stone     Polyp of sigmoid colon     Prostate cancer ([facility] [address] [facility]     Renal disorder     elevated serum creat    Tinnitus     unspecified laterality / last assessed 4/9/13    Vitamin D deficiency        SURGICAL HISTORY  Past Surgical History:   Procedure Laterality Date    CARDIAC SURGERY      COLONOSCOPY      CORONARY ANGIOPLASTY WITH STENT PLACEMENT      FL RETROGRADE PYELOGRAM  9/11/2019    HEMORRHOID SURGERY      CT CYSTO/URETERO W/LITHOTRIPSY &INDWELL STENT INSRT Left 9/11/2019    Procedure: CYSTO, URETEROSCOPY W/HOLMIUM LASER, BASKET STONE EXTRACTION, RETROGRADE PYELOGRAM, STENT EXCHANGE;  Surgeon: Juana Kamara MD;  Location: [facility];  Service: Urology    PROSTATE BIOPSY  10/24/2018    TONSILLECTOMY         FAMILY HISTORY  Family History   Problem Relation Age of Onset    Breast cancer Mother     Hypertension Father        SOCIAL HISTORY  Social History     Socioeconomic History    Marital status: /Civil Union     Spouse name: Not on file    Number of children: Not on file    Years of education: Not on file    Highest education level: Not on file   Occupational History    Occupation: consultant   Social Needs    Financial resource strain: Not on file    Food insecurity     Worry: Not on file     Inability: Not on file   Scali needs     Medical: Not on file     Non-medical: Not on file   Tobacco Use    Smoking status: Never Smoker    Smokeless tobacco: Never Used   Substance and Sexual Activity    Alcohol use: Yes     Frequency: 2-3 times a week     Comment: Occuational / social     Drug use: No    Sexual activity: Not on file   Lifestyle    Physical activity     Days per week: Not on file     Minutes per session: Not on file    Stress: Not on file   Relationships    Social connections     Talks on phone: Not on file     Gets together: Not on file     Attends Synagogue service: Not on file     Active member of club or organization: Not on file Attends meetings of clubs or organizations: Not on file     Relationship status: Not on file    Intimate partner violence     Fear of current or ex partner: Not on file     Emotionally abused: Not on file     Physically abused: Not on file     Forced sexual activity: Not on file   Other Topics Concern    Not on file   Social History Narrative    Always uses seat belt     Daily caffeine consumption 2-3 servings a day     Feels safe at home         Review of Systems   Otherwise negative except per above and HPI  Physical Exam    Vitals  Vitals:    09/08/20 1031   BP: 151/80   Temp: (!) 96 2 °F (35 7 °C)       BMI  Body mass index is 31 92 kg/m²  GENERAL: No acute distress  Alert and oriented  Well nourished and well hydrated  Appears stated age  HEENT : Normocephalic, atraumatic  Extraocular movements intact  Mask in place  NECK: Supple, trachea midline  LUNGS: Adequate and symmetric respiratory effort  No intercostal retractions or accessory muscle use  HEART: Extremities warm and perfused  ABDOMEN: Nondistended  SKIN: Warm and dry, no rash  Right Hip Exam  Flexion:  Full flexion without pain  Internal/External Rotation:  Internal rotation to 50°, external rotation to 60° without pain  Leg is noted to be similar in length to other leg  Sensation Intact to Light Touch in Sural, Saphenous, Tibial, Superficial Peroneal, and Deep Peroneal Nerve Distribution  Motor function 5 out of 5 strength in Tibialis Anterior, Gastrocnemius, Soleus, Extensor Hallucis Longus, and Flexor Hallucis Longus Muscles  Extremity Warm and Well Perfused       Imaging  A) Imaging modality available  Radiographs: yes  MRI scan: no  CT scan: yes  NM Bone scan: yes from May 2020    B) Imaging findings  Subchondral cysts: no  Subchondral sclerosis: no  Periarticular osteophytes: no  Joint subluxation: no  Joint space narrowing: yes, mild  Bone-on-bone articulations: no  Avascular necrosis: no    No obvious bone lesion in the proximal femur  Assessment and Plan  Right Hip Arthritis    Treatment Recommendations    Discussed with patient that his pain is more likely coming from his spine, which has significant degenerative changes on imaging today    Exercise - 5 minutes per day Monday, Wednesday, Friday OR Tuesday, Thursday, Saturday  Increase 5 minutes per day per week  May use bike (non-impact) or walk (impact) or swim or alternate  Goal is to get up to at least 30 minutes per day  1  Bicycle  2  Walking    Weight loss  Goal to lose 1 pound per week  Portion control, limit sweets, limit carbs    Medications  Anti-inflammatories (NSAIDs)  1  Ibuprofen (Motrin or Advil) 600 mg (3 pills) with food 3 times a day for 3 - 7 days  OR  2  Naprosyn (Aleve) 1 - 2 pills twice a day with food for 3 - 7 days    Stop if you develop upset stomach or bleeding occurs  If continued on scheduled basis for longer than 1 week I did recommend that patient seek out advice from PCP for monitoring of side-effects  Pain reliever  1  Acetaminophen (Tylenol) 2 pills (regular or extra-strength) 3 times a day for 3 - 7 days    Taken together (Acetaminophen with one of the Nsaids (ibuprofen OR naprosyn)), the combination may work better than either one alone for more acute pain      Reviewed with patient he would benefit from physical therapy for his lumbar spine  Referral made today for patient to start treatment  Referral made for spine and pain management for workup and management suspected lumbar radiculopathy  Discussed with patient that his films today do not suggest that he has significant osteoarthritis of the hip  There is no evidence of bone metastasis to the hip joint at this time  Justina Holstein  Sammy Moritz, MD  Adult Reconstruction Surgery  Department of Orthopaedic Surgery  [address]  11:09 AM

## 2020-09-08 NOTE — TELEPHONE ENCOUNTER
I called patient and left a message regarding completing COVID-19 screening for her appointment with Lawyer Ramos tomorrow at 1:00 pm  Advised patient to return call at [phone]

## 2020-09-09 ENCOUNTER — CONSULT (OUTPATIENT)
Dept: GYNECOLOGIC ONCOLOGY | Facility: CLINIC | Age: 80
End: 2020-09-09

## 2020-09-09 DIAGNOSIS — Z80.42 FAMILY HISTORY OF PROSTATE CANCER: ICD-10-CM

## 2020-09-09 DIAGNOSIS — Z80.3 FAMILY HISTORY OF BREAST CANCER: ICD-10-CM

## 2020-09-09 DIAGNOSIS — C61 PROSTATE CANCER (HCC): ICD-10-CM

## 2020-09-09 DIAGNOSIS — C61 PROSTATE CANCER (HCC): Primary | ICD-10-CM

## 2020-09-09 PROCEDURE — NC001 PR NO CHARGE: Performed by: GENETIC COUNSELOR, MS

## 2020-09-09 NOTE — PROGRESS NOTES
Pre-Test Genetic Counseling Consult Note    Patient Name: Ant CONRAD/Age: [birth year]89 y o  Referring Provider: Humble Lyons MD    Date of Service: 2020  Genetic Counselor: Lexii Ponce [place], [facility] [place]pretation Services: None   [facility]      Yas Patrick was referred to the [address] [place] [address] and Genetic Assessment Program due to his personal history of prostate cancer and family history of breast and prostate cancer  He presents today to discuss the possibility of a hereditary cancer syndrome, options for genetic testing, and implications for him and his family  His wife Renata Stein accompanied him to the appointment          Cancer History and Treatment:   Personal History: Personal history of prostate cancer at age 69 (Vitaliy score 9) and BCC    Per Dr Amy Mitchell note: Castration resistant metastatic prostate cancer, Vitaliy score 9, with bone metastases diagnosed initially on 2018 with PSA of 9, CT scan showed involvement of the posterior side of 10th rib area and vertebral body and distal point of the right clavicle he received Firmagon and later on Lupron by Urology     Screening Hx:     Colon  Colonoscopy: Yas Patrick reports having a colonoscopy approximately 5 years ago with a history of 5-6 polyps, the pathology was not available for our review at the time of his appointment; he reports having a colonoscopy every 3-5 years     Skin   Skin cancer screening: Yas Patrick reports seeing his dermatologist every 6 months due to history of [address] and Surgical History  Pertinent surgical history:   Past Surgical History:   Procedure Laterality Date    CARDIAC SURGERY      COLONOSCOPY      CORONARY ANGIOPLASTY WITH STENT PLACEMENT      FL RETROGRADE PYELOGRAM  2019    HEMORRHOID SURGERY      WI CYSTO/URETERO W/LITHOTRIPSY &INDWELL STENT INSRT Left 2019    Procedure: CYSTO, URETEROSCOPY W/HOLMIUM LASER, BASKET STONE EXTRACTION, RETROGRADE PYELOGRAM, STENT EXCHANGE;  Surgeon: Angelica Casey MD;  [facility];  Service: Urology    PROSTATE BIOPSY  10/24/2018    TONSILLECTOMY        Pertinent medical history:  Past Medical History:   Diagnosis Date    Anemia     last assessed  14     Coronary artery disease     Hypercholesteremia     Hypertension     Kidney stone     Polyp of sigmoid colon     Prostate cancer ([address] )     Renal disorder     elevated serum creat    Tinnitus     unspecified laterality / last assessed 13    Vitamin D deficiency        Other History:  Height:   Ht Readings from Last 1 Encounters:   20 5' 7" (1 702 m)     Weight:   Wt Readings from Last 1 Encounters:   20 92 4 kg (203 lb 12 8 oz)     Relevant Family History   Patient reports no Ashkenazi Protestant ancestry  Ford Elliott reports that his father had [address] otherwise there is no known history of cancer for paternal relatives  Mother:  with breast cancer in early 63's  Maternal Uncle x 2:  with prostate cancer later in life  Maternal First-Cousin (female): Breast cancer in 66's  Maternal First-Cousin (male): Bladder cancer in 66's    Please refer to the scanned pedigree in the Media Tab for a complete family history     *All history is reported as provided by the patient; records are not available for review, except where indicated  Assessment:  We discussed sporadic, familial and hereditary cancer  We also discussed the many factors that influence our risk for cancer such as age, environmental exposures, lifestyle choices and family history  We reviewed the indications suggestive of a hereditary predisposition to cancer      Genetic testing is indicated for Ford Elliott based on the following criteria: Meets NCCN V1 202 Testing Criteria for High-Penetrance Breast and/or Ovarian Cancer Susceptibility Genes based on his personal history of metastatic prostate cancer along with his family history of prostate and breast cancer  The risks, benefits, and limitations of genetic testing were reviewed with the patient, as well as genetic discrimination laws, and possible test results (positive, negative, variants of uncertain significance) and their clinical implications  If positive for a mutation, options for managing cancer risk including increased surveillance, chemoprevention, and in some cases prophylactic surgery were discussed  Ke Fink was informed that if a hereditary cancer syndrome was identified in him, first degree relatives (parents, siblings, and children) have a chance of also inheriting the condition  Genetic testing would allow for predictive genetic testing in other relatives, who may also be at risk depending on their degree of relation  Plan: Patient decided to proceed with testing and provided consent  Summary:     Sample Collection:  Saliva was collected in the office on 09/09/2020    Genetic Testing Preformed: [facility] Breast [facility] G[facility] Cancers Panel and Prostate Cancer Panel (24 genes): LIONEL, BARD1, BRCA1, BRCA2, BRIP1, CDH1, CHEK2, DICER1, EPCAM, HOXB13, MLH1, MSH2, MSH6, NBN, NF1, PALB2, PMS2, PTEN, RAD50, RAD51C, RAD51D, SMARCA4, STK11, TP53    Results take approximately 2-3 weeks to complete once test is started  We will contact Ke Fink once results are available  Additional recommendations for surveillance/medical management will be made pending genetic test results

## 2020-09-15 DIAGNOSIS — C61 PROSTATE CANCER (HCC): Primary | ICD-10-CM

## 2020-09-22 ENCOUNTER — EVALUATION (OUTPATIENT)
Dept: PHYSICAL THERAPY | Facility: REHABILITATION | Age: 80
End: 2020-09-22
Payer: COMMERCIAL

## 2020-09-22 DIAGNOSIS — M54.2 CERVICAL SPINE PAIN: ICD-10-CM

## 2020-09-22 DIAGNOSIS — M25.551 RIGHT HIP PAIN: Primary | ICD-10-CM

## 2020-09-22 DIAGNOSIS — M79.672 HEEL PAIN, BILATERAL: ICD-10-CM

## 2020-09-22 DIAGNOSIS — M79.671 HEEL PAIN, BILATERAL: ICD-10-CM

## 2020-09-22 PROCEDURE — 97162 PT EVAL MOD COMPLEX 30 MIN: CPT | Performed by: PHYSICAL THERAPIST

## 2020-09-22 NOTE — PROGRESS NOTES
PT Evaluation     Today's date: 2020  Patient name: Ronny Jara  :   MRN: [id number]  Referring provider: Nayeli Landaverde DO  Dx:   Encounter Diagnosis     ICD-10-CM    1  Right hip pain  M25 551    2  Cervical spine pain  M54 2    3  Heel pain, bilateral  M79 671     M79 672        Start Time: 1035  Stop Time: 1120  Total time in clinic (min): 45 minutes    Assessment  Assessment details: Patient is a 79 y o  male that presents with cervical spine, right hip, and bilateral heel pain  Patient presents with decreased strength, decreased ROM, and postural abnormalitities  Patient has difficulty with prolonged sitting on a hard surface and negotiation of stairs secondary to impairments  Patient would benefit from skilled physical therapy services to address impairments to maximize function  Impairments: abnormal or restricted ROM, activity intolerance, impaired physical strength, lacks appropriate home exercise program, pain with function and poor posture   Understanding of Dx/Px/POC: good   Prognosis: fair  Prognosis details: chronic    Goals  Impairment:  1  Patient will reports 50% reduction in pain in 4 weeks to maximize function  2   Patient will improve strength to 4/5 in all planes to maximize function  3   Patient will improve ROM to [facility] in 4 weeks to maximize function  Functional:  1  Patient will improve FOTO by 13 points to 72/100 in 4 weeks to maximize function  2  Patient will be independent with HEP in 4 weeks to maximize function  3  Patient will report no difficulty with negotiation of stairs in 4 weeks to maximize function  4  Patient will report no difficulty with sitting on firm surface in 4 weeks to maximize function  Plan  Plan details: Patient will be a RE in 4 weeks      Patient would benefit from: skilled physical therapy  Planned modality interventions: low level laser therapy, TENS, thermotherapy: hydrocollator packs and cryotherapy  Planned therapy interventions: abdominal trunk stabilization, strengthening, stretching, therapeutic activities, therapeutic exercise, postural training, patient education, home exercise program, functional ROM exercises, flexibility, neuromuscular re-education, manual therapy, joint mobilization and balance  Frequency: 2x week  Duration in visits: 9  Duration in weeks: 4  Treatment plan discussed with: patient        Subjective Evaluation    History of Present Illness  Mechanism of injury: Patient presents with right hip/leg pain  He has been having pain for about a month  He has had pain overall for about 25-30 years, but was able to maintain with self stretching  He also reports neck pain starting about a month ago which feels like a pinch  H reports a history of bilateral Achilles tendon pain for several years  Patient reports his right hip has started to feel better over the last three weeks  Patient reports also reports pain down his right leg to his shin from his hip  Patient reports pain will improve with prolonged walking  He reports difficulty with prolonged sitting on a hard surface and negotiation of stairs  Pain  At best pain ratin  At worst pain ratin  Location: R posterior hip  Quality: dull ache, sharp and radiating  Relieving factors: medications  Aggravating factors: sitting  Progression: improved      Diagnostic Tests  X-ray: abnormal (see results)  Treatments  Current treatment: medication and physical therapy  Patient Goals  Patient goals for therapy: decreased pain and increased strength  Patient goal: good HEP        Objective     Static Posture     Head  Forward  Shoulders  Rounded  Observations     Additional Observation Details  Jaime's deformity ethel    Palpation   Left   No palpable tenderness to the gluteus natan, gluteus medius, lumbar paraspinals, piriformis and quadratus lumborum       Right   No palpable tenderness to the gluteus natan, gluteus medius, lumbar paraspinals, piriformis and quadratus lumborum  Tenderness   Cervical Spine   No tenderness in the spinous process  Lumbar Spine  No tenderness in the spinous process  Left Hip   No tenderness in the PSIS, greater trochanter and sacroiliac joint  Right Hip   No tenderness in the PSIS, greater trochanter and sacroiliac joint  Left Ankle/Foot   No tenderness in the Achilles insertion  Right Ankle/Foot   No tenderness in the Achilles insertion  Active Range of Motion   Cervical/Thoracic Spine     Normal active range of motion  Left Shoulder   Flexion: WFL  Abduction: WFL    Right Shoulder   Flexion: WFL  Abduction: WFL    Lumbar   Normal active range of motion    Passive Range of Motion   Left Hip   Flexion: [facility]  External rotation (90/90): [facility]  Internal rotation (90/90): WFL    Right Hip   Flexion: [facility]  External rotation (90/90): [facility]  Internal rotation (90/90):  WFL  Left Knee   Prone flexion: WFL    Right Knee   Prone flexion: WFL  Left Ankle/Foot    Dorsiflexion (ke): 5 degrees     Right Ankle/Foot    Dorsiflexion (ke): 5 degrees     Additional Passive Range of Motion Details  Hamstring length:moderate limitation ethel  Piriformis length: moderate limitation ethel    Joint Play     Hypomobile: T1, T2, T3, T4, T5, T6, T7, T8, T9 and T10     Strength/Myotome Testing     Left Shoulder     Planes of Motion   Flexion: 4+   Abduction: 4+     Right Shoulder     Planes of Motion   Flexion: 4+   Abduction: 4+     Left Hip   Planes of Motion   Flexion: 4  Extension: 4  Abduction: 4-  External rotation: 4  Internal rotation: 4    Right Hip   Planes of Motion   Flexion: 4-  Extension: 4-  Abduction: 4-  External rotation: 4  Internal rotation: 4    Left Knee   Flexion: 4  Extension: 4    Right Knee   Flexion: 4  Extension: 4    Left Ankle/Foot   Dorsiflexion: 4+    Right Ankle/Foot   Dorsiflexion: 4+      Flowsheet Rows      Most Recent Value   PT/OT G-Codes   Current Score  59   Projected Score  72 Precautions: hx cancer, HTN      Manuals 9/22            p-a mobs grade III thoracic spine                                                    Neuro Re-Ed             Scapular retraction             Row with band             Mini squat             SLS                                                    Ther Ex             Recumbent bike             Heel raise (concentric 2 eccentric 1)             Piriformis stretch             Hamstring stretch             sidelying hip abduction                                                    Ther Activity                                       Gait Training                                       Modalities             PRN

## 2020-09-24 ENCOUNTER — CONSULT (OUTPATIENT)
Dept: PAIN MEDICINE | Facility: MEDICAL CENTER | Age: 80
End: 2020-09-24
Payer: COMMERCIAL

## 2020-09-24 VITALS — HEIGHT: 67 IN | TEMPERATURE: 97.4 F | WEIGHT: 203 LBS | BODY MASS INDEX: 31.86 KG/M2

## 2020-09-24 DIAGNOSIS — M54.16 RADICULOPATHY, LUMBAR REGION: ICD-10-CM

## 2020-09-24 DIAGNOSIS — C79.51 BONE METASTASES (HCC): ICD-10-CM

## 2020-09-24 DIAGNOSIS — M79.18 RHOMBOID PAIN: Primary | ICD-10-CM

## 2020-09-24 DIAGNOSIS — C61 PROSTATE CANCER (HCC): ICD-10-CM

## 2020-09-24 PROCEDURE — 99204 OFFICE O/P NEW MOD 45 MIN: CPT | Performed by: PHYSICAL MEDICINE & REHABILITATION

## 2020-09-24 NOTE — PROGRESS NOTES
Assessment  1  Rhomboid pain    2  Radiculopathy, lumbar region    3  Prostate cancer ([facility] )    4  Bone metastases ([facility] )        Plan  1  MRI lumbar spine  2  Continue with physical therapy  3  Follow-up based on results of MRI, if this shows nerve root irritation on the right would consider epidural steroid injection if he continues to have significant pain    My impressions and treatment recommendations were discussed in detail with the patient who verbalized understanding and had no further questions  Discharge instructions were provided  I personally saw and examined the patient and I agree with the above discussed plan of care  Orders Placed This Encounter   Procedures    MRI lumbar spine wo contrast     Standing Status:   Future     Standing Expiration Date:   9/24/2024     Scheduling Instructions: There is no preparation for this test  Please leave your jewelry and valuables at home, wedding rings are the exception  Magnetic nail polish must be removed prior to arrival for your test  Please bring your insurance cards, a form of photo ID and a list of your medications with you  Arrive 15 minutes prior to your appointment time in order to register  Please bring any prior CT or MRI studies of this area that were not performed at a [facility]  To schedule this appointment, please contact Central Scheduling at [phone]  Prior to your appointment, please make sure you complete the MRI Screening Form when you e-Check in for your appointment  This will be available starting 7 days before your appointment in [address]  You may receive an e-mail with an activation code if you do not have a [facility] account  If you do not have access to a device, we will complete your screening at your appointment  Order Specific Question:   What is the patient's sedation requirement? Answer:   No Sedation     No orders of the defined types were placed in this encounter        History of Present Illness    Star Dale is a 79 y o  male seen in consultation at the request of Dr Jay Nava regarding radiating right lower extremity pain  The patient has been experiencing the symptoms over the past month without any significant inciting event or trauma  He describes moderate intensity pain rated as a 5/10 which is intermittent and worse in the morning  This is characterized as sharp and pins and needle sensation  He does describe some subjective lower extremity weakness but does not require an assistive device for ambulation  He is noticing the pain in a right L4 or L5 distribution  Aggravating factors include sitting  He is unable to determine any significant alleviating factors  He is also describing some pain in the left rhomboid region  He has noted some excellent relief with exercise and physical therapy  Currently using acetaminophen and ibuprofen with some benefit as well  I have personally reviewed and/or updated the patient's past medical history, past surgical history, family history, social history, current medications, allergies, and vital signs today       Review of Systems    Patient Active Problem List   Diagnosis    Benign essential hypertension    Renal cyst    Liver lesion    Pulmonary nodule    Combined arterial insufficiency and corporo-venous occlusive erectile dysfunction    Prostate cancer ([address]    Coronary artery disease of native artery of native heart with stable angina pectoris ([address]    DDD (degenerative disc disease), cervical    Inflamed sebaceous cyst    S/P coronary artery stent placement    Vitamin D deficiency    Abnormal radionuclide bone scan    Hot flashes    Bone metastases ([address]    Clonus    Calculus of kidney    Thoracic myofascial strain    Positional lightheadedness    Rhomboid pain    Mixed hyperlipidemia    Localized edema    Right hip pain       Past Medical History:   Diagnosis Date    Anemia     last assessed  1/7/14  Cancer ([facility] )     Coronary artery disease     Hypercholesteremia     Hypertension     Kidney stone     Polyp of sigmoid colon     [facility]     Renal disorder     elevated serum creat    Tinnitus     unspecified laterality / last assessed 4/9/13    Vitamin D deficiency        Past Surgical History:   Procedure Laterality Date    CARDIAC SURGERY      COLONOSCOPY      CORONARY ANGIOPLASTY WITH STENT PLACEMENT      FL RETROGRADE PYELOGRAM  9/11/2019    HEMORRHOID SURGERY      OR CYSTO/URETERO W/LITHOTRIPSY &INDWELL STENT INSRT Left 9/11/2019    Procedure: CYSTO, URETEROSCOPY W/HOLMIUM LASER, BASKET STONE EXTRACTION, RETROGRADE PYELOGRAM, STENT EXCHANGE;  Surgeon: Ilir Solis MD;  Location: [facility];  Service: Urology    PROSTATE BIOPSY  10/24/2018    TONSILLECTOMY         Family History   Problem Relation Age of Onset   Negron Breast cancer Mother     Hypertension Father        Social History     Occupational History    Occupation: consultant   Tobacco Use    Smoking status: Never Smoker    Smokeless tobacco: Never Used   Substance and Sexual Activity    Alcohol use: Yes     Frequency: 2-3 times a week     Comment: Occuational / social     Drug use: No    Sexual activity: Not on file       Current Outpatient Medications on File Prior to Visit   Medication Sig    aspirin (ASPIRIN ADULT LOW STRENGTH) 81 mg EC tablet Take 1 tablet by mouth daily    atorvastatin (LIPITOR) 40 mg tablet Take 40 mg by mouth daily     Calcium 500 MG tablet Take 1,000 mg by mouth daily     cholecalciferol (VITAMIN D3) 1,000 units tablet Take 1 tablet by mouth daily    enzalutamide (Xtandi) 40 mg capsule Take 4 capsules (160 mg total) by mouth once daily      hydrochlorothiazide (HYDRODIURIL) 25 mg tablet Take 1 tablet (25 mg total) by mouth daily    leuprolide (LUPRON DEPOT 6 MONTH KIT) 45 mg Inject 45 mg into a muscle every 6 (six) months    losartan (COZAAR) 100 MG tablet Take 1 tablet (100 mg total) by mouth daily    MELATONIN PO Take by mouth    metoprolol succinate (TOPROL-XL) 25 mg 24 hr tablet Take 1 tablet (25 mg total) by mouth daily    Zoledronic Acid (ZOMETA IV) Infuse into a venous catheter every 3 (three) months     No current facility-administered medications on file prior to visit  No Known Allergies    Physical Exam    Temp (!) 97 4 °F (36 3 °C)   Ht 5' 7" (1 702 m)   Wt 92 1 kg (203 lb)   BMI 31 79 kg/m²     LUMBAR  General: Well-developed, well-nourished individual in no acute distress  Mental: Appropriate mood and affect  Grossly oriented with coherent speech and thought processing   Neuro:  Cranial nerves: Cranial nerve function is grossly intact bilaterally   Strength: Bilateral lower extremity strength is normal and symmetric   No atrophy or tone abnormalities noted   Reflexes: Bilateral lower extremity muscle stretch reflexes are physiologic and symmetric   No ankle clonus is noted   Sensation: No loss of sensation is noted   SLR/Foraminal Compression Maneuvers: Straight leg raising in the sitting position is negative for radicular pain   Gait:  Gait/gross motor: Gait is normal  Station is normal  Toe walking, heel walking  are normal     Musculoskeletal:  Palpation: Inspection and palpation of the spine and extremities are unremarkable except for some mild tenderness to palpation along the left rhomboid muscle reproducing pain complaint in that region  Spine: Normal pain-free range of motion except for lumbar extension which is limited in reproduces radiating right leg pain  No gross axial skeletal deformities   Hip:  No significant pain with passive internal or external rotation of the right hip  Skin: Skin inspection grossly negative for erythema, breakdown, or concerning lesions in affected area   Lymph: No lymphadenopathy is appreciated in the involved extremity   Vessels: No lower extremity edema       Lungs: Breathing is comfortable and regular  No dyspnea noted during examination   Eyes: Visual field grossly intact to confrontation  No redness appreciated  ENT: No craniofacial deformities or asymmetry  No neck masses appreciated           Imaging:  RIGHT HIP     INDICATION:  M25 551: Pain in right hip      COMPARISON:  CT chest, abdomen and pelvis 5/2/2020     VIEWS:  XR HIP/PELV 2-3 VWS RIGHT W PELVIS IF PERFORMED   Images: 3     FINDINGS:     There is no acute fracture or dislocation      No significant hip degenerative changes      No lytic or blastic osseous lesion      Soft tissue fullness seen in the left lower quadrant, likely corresponds to known lower pole left renal cyst on CT imaging      Degenerative changes visualized lower lumbar spine      IMPRESSION:     No acute osseous abnormality

## 2020-09-24 NOTE — PATIENT INSTRUCTIONS
Lumbar Disc Herniation   AMBULATORY CARE:   Lumbar disc herniation  occurs when a disc in your lumbar spine (lower back) bulges out  Lumbar discs are spongy cushions between the vertebrae (bones) in your spine  The herniated disc may press on your nerves or spinal cord  Common signs and symptoms include the following:  Mild lumbar disc herniation may not cause any signs or symptoms  You may have any of the following if the herniated disc presses against your nerves or spinal cord:  · Pain in your lower back, buttocks, groin, or legs    · Burning, stabbing, or tingling pain that shoots down one or both of your legs    · Numbness or weakness in one leg    · Trouble walking or moving your feet or toes  Seek care immediately if:   · You cannot control when you urinate or have a bowel movement  · You are unable to move one or both of your legs  · You lose feeling in your groin or buttocks  Contact your healthcare provider if:   · You have numbness in one or both of your legs  · You have low back pain while resting  · You have trouble moving one or both of your legs  · You begin leaking urine or bowel movement, and it is not normal for you  · Your pain gets worse, even after you take medicine  · You have questions or concerns about your condition or care  Treatment:  Your healthcare provider may have you rest in bed for a few days  It is best to rest on your side with your knees bent  Put a cushion between your knees to help decrease the pressure on your spine and nerves  You may also need any of following:  · NSAIDs , such as ibuprofen, help decrease swelling, pain, and fever  NSAIDs can cause stomach bleeding or kidney problems in certain people  If you take blood thinner medicine, always ask your healthcare provider if NSAIDs are safe for you  Always read the medicine label and follow directions  · Prescription pain medicine  may be given  Ask how to take this medicine safely      · Muscle relaxers  decrease pain and muscle spasms  · A steroid injection  may be given to reduce inflammation  Steroid medicine is injected into the epidural space  The epidural space is between your spinal cord and vertebrae  You may be given pain medicine along with the steroids  · Physical therapy  may be recommended by your healthcare provider  A physical therapist teaches you exercises to help improve movement and strength, and to decrease pain  A physical therapist can teach you safe ways to bend, lift, sit, and stand to help relieve back pain  · Surgery  may be needed to fix your herniated disc if other treatments have failed  Surgery may be done to remove your herniated disc and make your spine stronger  Surgery may also be done to decrease pressure on your nerves and spinal cord  Manage pain from a lumbar disc herniation:   · Apply heat  on your lower back for 20 to 30 minutes every 2 hours for as many days as directed  Heat helps decrease pain and muscle spasms  · Do low-stress exercise and activity  Exercises that do not stress your back muscles may help decrease your pain  Examples of low-stress exercises are walking, swimming, and biking  Avoid heavy lifting while your back is healing  Try not to sit for long periods of time  Talk to your healthcare provider before you start any new exercise program   Follow up with your healthcare provider as directed:  Write down your questions so you remember to ask them during your visits  © 2017 [address] [employer] Information is for End User's use only and may not be sold, redistributed or otherwise used for commercial purposes  All illustrations and images included in CareNotes® are the copyrighted property of A D [employer] M [employer] Inc  or El[employer] Ale[employer]  The above information is an  only  It is not intended as medical advice for individual conditions or treatments   Talk to your doctor, nurse or pharmacist before following any medical regimen to see if it is safe and effective for you

## 2020-09-25 ENCOUNTER — OFFICE VISIT (OUTPATIENT)
Dept: PHYSICAL THERAPY | Facility: REHABILITATION | Age: 80
End: 2020-09-25
Payer: COMMERCIAL

## 2020-09-25 ENCOUNTER — TELEPHONE (OUTPATIENT)
Dept: GYNECOLOGIC ONCOLOGY | Facility: CLINIC | Age: 80
End: 2020-09-25

## 2020-09-25 DIAGNOSIS — M54.2 CERVICAL SPINE PAIN: ICD-10-CM

## 2020-09-25 DIAGNOSIS — M79.671 HEEL PAIN, BILATERAL: ICD-10-CM

## 2020-09-25 DIAGNOSIS — M25.551 RIGHT HIP PAIN: Primary | ICD-10-CM

## 2020-09-25 DIAGNOSIS — M79.672 HEEL PAIN, BILATERAL: ICD-10-CM

## 2020-09-25 PROCEDURE — 97112 NEUROMUSCULAR REEDUCATION: CPT

## 2020-09-25 PROCEDURE — 97110 THERAPEUTIC EXERCISES: CPT

## 2020-09-25 NOTE — PROGRESS NOTES
Daily Note     Today's date: 2020  Patient name: Ancelmo Weller  :   MRN: [id number]  Referring provider: Lauri Santizo DO  Dx:   Encounter Diagnosis     ICD-10-CM    1  Right hip pain  M25 551    2  Cervical spine pain  M54 2    3  Heel pain, bilateral  M79 671     M79 672        Start Time: 0900  Stop Time: 0945  Total time in clinic (min): 45 minutes    Subjective: Pt denies soreness after IE, reports minimal pain since last visit but notes more achiness and soreness  Pt reports he is not having any heel pain arriving to PT this visit  Objective: See treatment diary below    Precautions: hx cancer, HTN      Manuals            p-a mobs grade III thoracic spine                                                    Neuro Re-Ed             Scapular retraction  10"x10           Row with band  Red 5"x10           Mini squat  x10           SLS  20"x2 ea                                                  Ther Ex             Recumbent bike  5 min           Heel raise (concentric 2 eccentric 1)  x10           Piriformis stretch  30"x2 ea           Hamstring stretch  30"x3 ea           sidelying hip abduction  5"x10 ea                                                  Ther Activity                                       Gait Training                                       Modalities             PRN                            Assessment: Initiated TE as noted per PT POC  Tolerated treatment well  Pt is challenged with addition of concentric HR with eccentric lower and performs with minimal ROM  Frequent HHA required to prevent LOB in SLS  Appropriate response to stretching both piriformis and HS muscle groups  Verbal and tactile cueing needed with s/l hip abduction to maintain correct positioning and fatigues bilaterally however L > R  Pt was issued HEP, verbalizes understanding  Patient would benefit from continued PT      Plan: Progress treatment as tolerated

## 2020-09-25 NOTE — TELEPHONE ENCOUNTER
Post-Test Genetic Counseling Consult Note  Today I spoke with Mony Nolan over the phone to review the results of his genetic test for hereditary cancer  We met previously on 09/09/2020 for pre-test counseling  SUMMARY:    Test(s): [facility] Breast [facility] G[facility] Cancers Panel and Prostate Cancer Panel (24 genes): LIONEL, BARD1, BRCA1, BRCA2, BRIP1, CDH1, CHEK2, DICER1, EPCAM, HOXB13, MLH1, MSH2, MSH6, NBN, NF1, PALB2, PMS2, PTEN, RAD50, RAD51C, RAD51D, SMARCA4, STK11, TP53    Result: Negative - No Clinically Significant Variants Detected      Assessment:   A negative result significantly reduces the likelihood that Mony Nolan has a hereditary cancer syndrome  However, this testing is unable to completely rule out the presence of hereditary cancer  It remains possible that:  - There is a variant in an area of a gene which was not tested or there is a variant not detectable due to technical limitations of this test      - There is a variant in another gene that was not included in this test or in a gene not known to be linked to cancer or tumors  - A family member has a genetic variant that the patient did not inherit  - The cancer in the family is sporadic and is related to non-hereditary factors  Risks for Family Members:    Mony Nolan was made aware that his sons may still have an increased risk for prostate cancer compared to the general population risk of 11-12%  Empiric data suggest that men have a 2 to 3 times higher risk give one first-degree relative (father, brother, son) with prostate cancer  Testing for other Family Members: At this time we do not recommend testing for Mony Nolan 's children based on his negative test result  Mony Hiness children still need to consider the history of cancer on the other side of their family when determining their risks        Plan:   Recommendations for Mony Nolan are outlined below however the surveillance and medical management should continue as clinically indicated and as determined appropriate by his healthcare providers  Colon Cancer Screening:   Continue to follow screening as recommended by your healthcare provider  Skin Cancer Screening   Continue skin cancer screening as recommended by your healthcare provider  Prostate Cancer Screening  Follow the medical management as determined appropriate by your treating healthcare providers  Negative Result: Roman Lebron was strongly encouraged to contact us regarding any changes in his personal or family history of cancer as these changes could alter our recommendation regarding genetic testing and/or cancer screening

## 2020-09-30 ENCOUNTER — OFFICE VISIT (OUTPATIENT)
Dept: PHYSICAL THERAPY | Facility: REHABILITATION | Age: 80
End: 2020-09-30
Payer: COMMERCIAL

## 2020-09-30 DIAGNOSIS — M25.551 RIGHT HIP PAIN: Primary | ICD-10-CM

## 2020-09-30 DIAGNOSIS — M54.2 CERVICAL SPINE PAIN: ICD-10-CM

## 2020-09-30 DIAGNOSIS — M79.672 HEEL PAIN, BILATERAL: ICD-10-CM

## 2020-09-30 DIAGNOSIS — M79.671 HEEL PAIN, BILATERAL: ICD-10-CM

## 2020-09-30 PROCEDURE — 97110 THERAPEUTIC EXERCISES: CPT

## 2020-09-30 PROCEDURE — 97112 NEUROMUSCULAR REEDUCATION: CPT

## 2020-09-30 NOTE — PROGRESS NOTES
Daily Note     Today's date: 2020  Patient name: Cayetano Ngo  :   MRN: [id number]  Referring provider: Henri Griffin DO  Dx:   Encounter Diagnosis     ICD-10-CM    1  Right hip pain  M25 551    2  Cervical spine pain  M54 2    3  Heel pain, bilateral  M79 671     [id number]          Subjective: Patient noted that his legs felt heavy today pre treatment  Achilles are feeling better with performing HR  Patient noted that he performs stretches at night before bed and noticed that his B LE feels looser  Patient noted " I find this one especially effective," referring to piriformis stretch  Objective: See treatment diary below      Assessment: Tolerated treatment well  Increased reps for exercises today patient was able to perform with good tolerance  Patient improved with SLS with less LOB still used finger tip assist to help stay balanced  Instructed patient to perform 15 reps for HR, patient performed 20 reps  Patient needed VC to correct form with s/l hip abd to decrease TFL compensation  Patient would benefit from continued PT  Plan: Continue per plan of care          Precautions: hx cancer, HTN      Manuals           p-a mobs grade III thoracic spine                                                    Neuro Re-Ed             Scapular retraction  10"x10 10"x10          Row with band  Red 5"x10 RTB 2x10          Mini squat  x10 x15          SLS  20"x2 ea 20"x2 ea                                                 Ther Ex             Recumbent bike  5 min 6 min          Heel raise (concentric 2 eccentric 1)  x10 x20          Piriformis stretch  30"x2 ea 30"x2 ea           Hamstring stretch  30"x3 ea 30"x3 ea          sidelying hip abduction  5"x10 ea 5"x10 ea                                                 Ther Activity                                       Gait Training                                       Modalities             PRN

## 2020-10-04 ENCOUNTER — HOSPITAL ENCOUNTER (OUTPATIENT)
Dept: MRI IMAGING | Facility: HOSPITAL | Age: 80
Discharge: HOME | End: 2020-10-04
Attending: PHYSICAL MEDICINE & REHABILITATION
Payer: COMMERCIAL

## 2020-10-04 DIAGNOSIS — M54.16 RADICULOPATHY, LUMBAR REGION: ICD-10-CM

## 2020-10-04 DIAGNOSIS — C61 PROSTATE CANCER (HCC): ICD-10-CM

## 2020-10-04 DIAGNOSIS — C79.51 BONE METASTASES (HCC): ICD-10-CM

## 2020-10-04 PROCEDURE — 72148 MRI LUMBAR SPINE W/O DYE: CPT

## 2020-10-04 PROCEDURE — G1004 CDSM NDSC: HCPCS

## 2020-10-08 ENCOUNTER — OFFICE VISIT (OUTPATIENT)
Dept: PHYSICAL THERAPY | Facility: REHABILITATION | Age: 80
End: 2020-10-08
Payer: COMMERCIAL

## 2020-10-08 DIAGNOSIS — M25.551 RIGHT HIP PAIN: Primary | ICD-10-CM

## 2020-10-08 DIAGNOSIS — M79.672 HEEL PAIN, BILATERAL: ICD-10-CM

## 2020-10-08 DIAGNOSIS — M54.2 CERVICAL SPINE PAIN: ICD-10-CM

## 2020-10-08 DIAGNOSIS — M79.671 HEEL PAIN, BILATERAL: ICD-10-CM

## 2020-10-08 PROCEDURE — 97140 MANUAL THERAPY 1/> REGIONS: CPT | Performed by: PHYSICAL THERAPIST

## 2020-10-08 PROCEDURE — 97110 THERAPEUTIC EXERCISES: CPT | Performed by: PHYSICAL THERAPIST

## 2020-10-09 ENCOUNTER — TELEPHONE (OUTPATIENT)
Dept: PAIN MEDICINE | Facility: MEDICAL CENTER | Age: 80
End: 2020-10-09

## 2020-10-10 DIAGNOSIS — I10 ESSENTIAL HYPERTENSION: ICD-10-CM

## 2020-10-10 RX ORDER — LOSARTAN POTASSIUM 100 MG/1
TABLET ORAL
Qty: 90 TABLET | Refills: 1 | Status: SHIPPED | OUTPATIENT
Start: 2020-10-10 | End: 2021-04-20 | Stop reason: SDUPTHER

## 2020-10-14 ENCOUNTER — OFFICE VISIT (OUTPATIENT)
Dept: PHYSICAL THERAPY | Facility: REHABILITATION | Age: 80
End: 2020-10-14
Payer: COMMERCIAL

## 2020-10-14 DIAGNOSIS — M79.672 HEEL PAIN, BILATERAL: ICD-10-CM

## 2020-10-14 DIAGNOSIS — M54.2 CERVICAL SPINE PAIN: ICD-10-CM

## 2020-10-14 DIAGNOSIS — M25.551 RIGHT HIP PAIN: Primary | ICD-10-CM

## 2020-10-14 DIAGNOSIS — M79.671 HEEL PAIN, BILATERAL: ICD-10-CM

## 2020-10-14 PROCEDURE — 97112 NEUROMUSCULAR REEDUCATION: CPT

## 2020-10-14 PROCEDURE — 97140 MANUAL THERAPY 1/> REGIONS: CPT | Performed by: PHYSICAL THERAPIST

## 2020-10-14 PROCEDURE — 97110 THERAPEUTIC EXERCISES: CPT

## 2020-10-17 LAB
ALBUMIN SERPL-MCNC: 3.8 G/DL (ref 3.6–5.1)
ALBUMIN/GLOB SERPL: 1.8 (CALC) (ref 1–2.5)
ALP SERPL-CCNC: 35 U/L (ref 35–144)
ALT SERPL-CCNC: 12 U/L (ref 9–46)
AST SERPL-CCNC: 17 U/L (ref 10–35)
BASOPHILS # BLD AUTO: 62 CELLS/UL (ref 0–200)
BASOPHILS NFR BLD AUTO: 1.1 %
BILIRUB SERPL-MCNC: 0.6 MG/DL (ref 0.2–1.2)
BUN SERPL-MCNC: 23 MG/DL (ref 7–25)
BUN/CREAT SERPL: 18 (CALC) (ref 6–22)
CALCIUM SERPL-MCNC: 9.4 MG/DL (ref 8.6–10.3)
CHLORIDE SERPL-SCNC: 105 MMOL/L (ref 98–110)
CO2 SERPL-SCNC: 29 MMOL/L (ref 20–32)
CREAT SERPL-MCNC: 1.26 MG/DL (ref 0.7–1.18)
EOSINOPHIL # BLD AUTO: 375 CELLS/UL (ref 15–500)
EOSINOPHIL NFR BLD AUTO: 6.7 %
ERYTHROCYTE [DISTWIDTH] IN BLOOD BY AUTOMATED COUNT: 12.9 % (ref 11–15)
GLOBULIN SER CALC-MCNC: 2.1 G/DL (CALC) (ref 1.9–3.7)
GLUCOSE SERPL-MCNC: 107 MG/DL (ref 65–99)
HCT VFR BLD AUTO: 40.1 % (ref 38.5–50)
HGB BLD-MCNC: 13.5 G/DL (ref 13.2–17.1)
LYMPHOCYTES # BLD AUTO: 1422 CELLS/UL (ref 850–3900)
LYMPHOCYTES NFR BLD AUTO: 25.4 %
MCH RBC QN AUTO: 30.3 PG (ref 27–33)
MCHC RBC AUTO-ENTMCNC: 33.7 G/DL (ref 32–36)
MCV RBC AUTO: 89.9 FL (ref 80–100)
MONOCYTES # BLD AUTO: 437 CELLS/UL (ref 200–950)
MONOCYTES NFR BLD AUTO: 7.8 %
NEUTROPHILS # BLD AUTO: 3304 CELLS/UL (ref 1500–7800)
NEUTROPHILS NFR BLD AUTO: 59 %
PLATELET # BLD AUTO: 222 THOUSAND/UL (ref 140–400)
PMV BLD REES-ECKER: 10.9 FL (ref 7.5–12.5)
POTASSIUM SERPL-SCNC: 4.1 MMOL/L (ref 3.5–5.3)
PROT SERPL-MCNC: 5.9 G/DL (ref 6.1–8.1)
PSA SERPL-MCNC: 1.4 NG/ML
RBC # BLD AUTO: 4.46 MILLION/UL (ref 4.2–5.8)
SL AMB EGFR AFRICAN AMERICAN: 62 ML/MIN/1.73M2
SL AMB EGFR NON AFRICAN AMERICAN: 54 ML/MIN/1.73M2
SODIUM SERPL-SCNC: 140 MMOL/L (ref 135–146)
TESTOST SERPL-MCNC: 12 NG/DL (ref 250–827)
WBC # BLD AUTO: 5.6 THOUSAND/UL (ref 3.8–10.8)

## 2020-10-21 ENCOUNTER — EVALUATION (OUTPATIENT)
Dept: PHYSICAL THERAPY | Facility: REHABILITATION | Age: 80
End: 2020-10-21
Payer: COMMERCIAL

## 2020-10-21 DIAGNOSIS — M54.2 CERVICAL SPINE PAIN: ICD-10-CM

## 2020-10-21 DIAGNOSIS — M79.671 HEEL PAIN, BILATERAL: ICD-10-CM

## 2020-10-21 DIAGNOSIS — M54.6 THORACIC SPINE PAIN: ICD-10-CM

## 2020-10-21 DIAGNOSIS — M79.672 HEEL PAIN, BILATERAL: ICD-10-CM

## 2020-10-21 DIAGNOSIS — M25.551 RIGHT HIP PAIN: Primary | ICD-10-CM

## 2020-10-21 PROCEDURE — 97140 MANUAL THERAPY 1/> REGIONS: CPT | Performed by: PHYSICAL THERAPIST

## 2020-10-21 PROCEDURE — 97110 THERAPEUTIC EXERCISES: CPT | Performed by: PHYSICAL THERAPIST

## 2020-10-22 ENCOUNTER — DOCUMENTATION (OUTPATIENT)
Dept: HEMATOLOGY ONCOLOGY | Facility: CLINIC | Age: 80
End: 2020-10-22

## 2020-10-22 ENCOUNTER — OFFICE VISIT (OUTPATIENT)
Dept: HEMATOLOGY ONCOLOGY | Facility: CLINIC | Age: 80
End: 2020-10-22
Payer: COMMERCIAL

## 2020-10-22 VITALS
SYSTOLIC BLOOD PRESSURE: 140 MMHG | HEIGHT: 67 IN | WEIGHT: 207.8 LBS | HEART RATE: 60 BPM | OXYGEN SATURATION: 98 % | BODY MASS INDEX: 32.62 KG/M2 | DIASTOLIC BLOOD PRESSURE: 84 MMHG | TEMPERATURE: 97.3 F | RESPIRATION RATE: 18 BRPM

## 2020-10-22 DIAGNOSIS — C79.51 BONE METASTASES (HCC): ICD-10-CM

## 2020-10-22 DIAGNOSIS — C61 PROSTATE CANCER (HCC): Primary | ICD-10-CM

## 2020-10-22 PROCEDURE — 1036F TOBACCO NON-USER: CPT | Performed by: INTERNAL MEDICINE

## 2020-10-22 PROCEDURE — 99214 OFFICE O/P EST MOD 30 MIN: CPT | Performed by: INTERNAL MEDICINE

## 2020-10-22 PROCEDURE — 1160F RVW MEDS BY RX/DR IN RCRD: CPT | Performed by: INTERNAL MEDICINE

## 2020-10-23 ENCOUNTER — TELEPHONE (OUTPATIENT)
Dept: HEMATOLOGY ONCOLOGY | Facility: CLINIC | Age: 80
End: 2020-10-23

## 2020-10-23 ENCOUNTER — TELEPHONE (OUTPATIENT)
Dept: FAMILY MEDICINE CLINIC | Facility: CLINIC | Age: 80
End: 2020-10-23

## 2020-10-27 ENCOUNTER — OFFICE VISIT (OUTPATIENT)
Dept: PHYSICAL THERAPY | Facility: REHABILITATION | Age: 80
End: 2020-10-27
Payer: COMMERCIAL

## 2020-10-27 DIAGNOSIS — M54.2 CERVICAL SPINE PAIN: ICD-10-CM

## 2020-10-27 DIAGNOSIS — M25.551 RIGHT HIP PAIN: Primary | ICD-10-CM

## 2020-10-27 DIAGNOSIS — M79.672 HEEL PAIN, BILATERAL: ICD-10-CM

## 2020-10-27 DIAGNOSIS — M54.6 THORACIC SPINE PAIN: ICD-10-CM

## 2020-10-27 DIAGNOSIS — M79.671 HEEL PAIN, BILATERAL: ICD-10-CM

## 2020-10-27 PROCEDURE — 97140 MANUAL THERAPY 1/> REGIONS: CPT | Performed by: PHYSICAL THERAPIST

## 2020-10-27 PROCEDURE — 97112 NEUROMUSCULAR REEDUCATION: CPT | Performed by: PHYSICAL THERAPIST

## 2020-10-27 PROCEDURE — 97110 THERAPEUTIC EXERCISES: CPT | Performed by: PHYSICAL THERAPIST

## 2020-10-29 ENCOUNTER — APPOINTMENT (OUTPATIENT)
Dept: PHYSICAL THERAPY | Facility: REHABILITATION | Age: 80
End: 2020-10-29
Payer: COMMERCIAL

## 2020-10-30 ENCOUNTER — RADIATION ONCOLOGY CONSULT (OUTPATIENT)
Dept: RADIATION ONCOLOGY | Facility: CLINIC | Age: 80
End: 2020-10-30
Attending: RADIOLOGY
Payer: COMMERCIAL

## 2020-10-30 VITALS
HEIGHT: 67 IN | DIASTOLIC BLOOD PRESSURE: 70 MMHG | HEART RATE: 73 BPM | BODY MASS INDEX: 32.46 KG/M2 | TEMPERATURE: 97.3 F | SYSTOLIC BLOOD PRESSURE: 130 MMHG | RESPIRATION RATE: 16 BRPM | WEIGHT: 206.79 LBS | OXYGEN SATURATION: 97 %

## 2020-10-30 DIAGNOSIS — C61 PROSTATE CANCER (HCC): ICD-10-CM

## 2020-10-30 DIAGNOSIS — C79.51 BONE METASTASES (HCC): ICD-10-CM

## 2020-10-30 PROCEDURE — 99211 OFF/OP EST MAY X REQ PHY/QHP: CPT | Performed by: RADIOLOGY

## 2020-10-30 PROCEDURE — G0463 HOSPITAL OUTPT CLINIC VISIT: HCPCS | Performed by: RADIOLOGY

## 2020-10-30 RX ORDER — ACETAMINOPHEN 500 MG
500 TABLET ORAL EVERY 6 HOURS PRN
COMMUNITY
End: 2021-12-06

## 2020-11-03 ENCOUNTER — OFFICE VISIT (OUTPATIENT)
Dept: PHYSICAL THERAPY | Facility: REHABILITATION | Age: 80
End: 2020-11-03
Payer: COMMERCIAL

## 2020-11-03 DIAGNOSIS — M25.551 RIGHT HIP PAIN: Primary | ICD-10-CM

## 2020-11-03 DIAGNOSIS — M54.2 CERVICAL SPINE PAIN: ICD-10-CM

## 2020-11-03 DIAGNOSIS — M54.6 THORACIC SPINE PAIN: ICD-10-CM

## 2020-11-03 DIAGNOSIS — M79.672 HEEL PAIN, BILATERAL: ICD-10-CM

## 2020-11-03 DIAGNOSIS — M79.671 HEEL PAIN, BILATERAL: ICD-10-CM

## 2020-11-03 PROCEDURE — 97140 MANUAL THERAPY 1/> REGIONS: CPT | Performed by: PHYSICAL THERAPIST

## 2020-11-03 PROCEDURE — 97110 THERAPEUTIC EXERCISES: CPT

## 2020-11-03 PROCEDURE — 97112 NEUROMUSCULAR REEDUCATION: CPT

## 2020-11-05 ENCOUNTER — OFFICE VISIT (OUTPATIENT)
Dept: PHYSICAL THERAPY | Facility: REHABILITATION | Age: 80
End: 2020-11-05
Payer: COMMERCIAL

## 2020-11-05 ENCOUNTER — HOSPITAL ENCOUNTER (OUTPATIENT)
Dept: INFUSION CENTER | Facility: CLINIC | Age: 80
Discharge: HOME | End: 2020-11-05
Payer: COMMERCIAL

## 2020-11-05 VITALS
BODY MASS INDEX: 31.94 KG/M2 | DIASTOLIC BLOOD PRESSURE: 68 MMHG | WEIGHT: 203.93 LBS | TEMPERATURE: 97.7 F | HEART RATE: 60 BPM | RESPIRATION RATE: 18 BRPM | SYSTOLIC BLOOD PRESSURE: 123 MMHG

## 2020-11-05 DIAGNOSIS — C79.51 BONE METASTASES (HCC): ICD-10-CM

## 2020-11-05 DIAGNOSIS — C61 PROSTATE CANCER (HCC): Primary | ICD-10-CM

## 2020-11-05 DIAGNOSIS — M25.551 RIGHT HIP PAIN: Primary | ICD-10-CM

## 2020-11-05 DIAGNOSIS — M54.6 THORACIC SPINE PAIN: ICD-10-CM

## 2020-11-05 DIAGNOSIS — M79.671 HEEL PAIN, BILATERAL: ICD-10-CM

## 2020-11-05 DIAGNOSIS — M79.672 HEEL PAIN, BILATERAL: ICD-10-CM

## 2020-11-05 DIAGNOSIS — M54.2 CERVICAL SPINE PAIN: ICD-10-CM

## 2020-11-05 PROCEDURE — 97140 MANUAL THERAPY 1/> REGIONS: CPT

## 2020-11-05 PROCEDURE — 97110 THERAPEUTIC EXERCISES: CPT

## 2020-11-05 PROCEDURE — 97112 NEUROMUSCULAR REEDUCATION: CPT

## 2020-11-05 PROCEDURE — 96365 THER/PROPH/DIAG IV INF INIT: CPT

## 2020-11-05 RX ORDER — SODIUM CHLORIDE 9 MG/ML
20 INJECTION, SOLUTION INTRAVENOUS ONCE
Status: CANCELLED | OUTPATIENT
Start: 2021-01-28

## 2020-11-05 RX ORDER — SODIUM CHLORIDE 9 MG/ML
20 INJECTION, SOLUTION INTRAVENOUS ONCE
Status: COMPLETED | OUTPATIENT
Start: 2020-11-05 | End: 2020-11-05

## 2020-11-05 RX ADMIN — ZOLEDRONIC ACID 3.3 MG: 4 INJECTION, SOLUTION, CONCENTRATE INTRAVENOUS at 14:12

## 2020-11-05 RX ADMIN — SODIUM CHLORIDE 20 ML/HR: 0.9 INJECTION, SOLUTION INTRAVENOUS at 14:05

## 2020-11-10 ENCOUNTER — OFFICE VISIT (OUTPATIENT)
Dept: PHYSICAL THERAPY | Facility: REHABILITATION | Age: 80
End: 2020-11-10
Payer: COMMERCIAL

## 2020-11-10 DIAGNOSIS — M25.551 RIGHT HIP PAIN: Primary | ICD-10-CM

## 2020-11-10 DIAGNOSIS — M79.671 HEEL PAIN, BILATERAL: ICD-10-CM

## 2020-11-10 DIAGNOSIS — M54.2 CERVICAL SPINE PAIN: ICD-10-CM

## 2020-11-10 DIAGNOSIS — M54.6 THORACIC SPINE PAIN: ICD-10-CM

## 2020-11-10 DIAGNOSIS — M79.672 HEEL PAIN, BILATERAL: ICD-10-CM

## 2020-11-10 LAB
ALBUMIN SERPL-MCNC: 4 G/DL (ref 3.6–5.1)
ALBUMIN/GLOB SERPL: 1.7 (CALC) (ref 1–2.5)
ALP SERPL-CCNC: 45 U/L (ref 35–144)
ALT SERPL-CCNC: 11 U/L (ref 9–46)
AST SERPL-CCNC: 15 U/L (ref 10–35)
BASOPHILS # BLD AUTO: 58 CELLS/UL (ref 0–200)
BASOPHILS NFR BLD AUTO: 0.9 %
BILIRUB SERPL-MCNC: 0.6 MG/DL (ref 0.2–1.2)
BUN SERPL-MCNC: 20 MG/DL (ref 7–25)
BUN/CREAT SERPL: ABNORMAL (CALC) (ref 6–22)
CALCIUM SERPL-MCNC: 9.3 MG/DL (ref 8.6–10.3)
CHLORIDE SERPL-SCNC: 104 MMOL/L (ref 98–110)
CHOLEST SERPL-MCNC: 128 MG/DL
CHOLEST/HDLC SERPL: 2.9 (CALC)
CO2 SERPL-SCNC: 30 MMOL/L (ref 20–32)
CREAT SERPL-MCNC: 1.16 MG/DL (ref 0.7–1.18)
EOSINOPHIL # BLD AUTO: 333 CELLS/UL (ref 15–500)
EOSINOPHIL NFR BLD AUTO: 5.2 %
ERYTHROCYTE [DISTWIDTH] IN BLOOD BY AUTOMATED COUNT: 12.5 % (ref 11–15)
GLOBULIN SER CALC-MCNC: 2.4 G/DL (CALC) (ref 1.9–3.7)
GLUCOSE SERPL-MCNC: 102 MG/DL (ref 65–99)
HCT VFR BLD AUTO: 43.7 % (ref 38.5–50)
HDLC SERPL-MCNC: 44 MG/DL
HGB BLD-MCNC: 14.7 G/DL (ref 13.2–17.1)
LDLC SERPL CALC-MCNC: 68 MG/DL (CALC)
LYMPHOCYTES # BLD AUTO: 1222 CELLS/UL (ref 850–3900)
LYMPHOCYTES NFR BLD AUTO: 19.1 %
MCH RBC QN AUTO: 30.9 PG (ref 27–33)
MCHC RBC AUTO-ENTMCNC: 33.6 G/DL (ref 32–36)
MCV RBC AUTO: 91.8 FL (ref 80–100)
MONOCYTES # BLD AUTO: 442 CELLS/UL (ref 200–950)
MONOCYTES NFR BLD AUTO: 6.9 %
NEUTROPHILS # BLD AUTO: 4346 CELLS/UL (ref 1500–7800)
NEUTROPHILS NFR BLD AUTO: 67.9 %
NONHDLC SERPL-MCNC: 84 MG/DL (CALC)
PLATELET # BLD AUTO: 227 THOUSAND/UL (ref 140–400)
PMV BLD REES-ECKER: 11.1 FL (ref 7.5–12.5)
POTASSIUM SERPL-SCNC: 4.4 MMOL/L (ref 3.5–5.3)
PROT SERPL-MCNC: 6.4 G/DL (ref 6.1–8.1)
RBC # BLD AUTO: 4.76 MILLION/UL (ref 4.2–5.8)
SL AMB EGFR AFRICAN AMERICAN: 69 ML/MIN/1.73M2
SL AMB EGFR NON AFRICAN AMERICAN: 60 ML/MIN/1.73M2
SODIUM SERPL-SCNC: 141 MMOL/L (ref 135–146)
TRIGL SERPL-MCNC: 84 MG/DL
TSH SERPL-ACNC: 3.1 MIU/L (ref 0.4–4.5)
WBC # BLD AUTO: 6.4 THOUSAND/UL (ref 3.8–10.8)

## 2020-11-10 PROCEDURE — 97112 NEUROMUSCULAR REEDUCATION: CPT

## 2020-11-10 PROCEDURE — 97140 MANUAL THERAPY 1/> REGIONS: CPT

## 2020-11-10 PROCEDURE — 97110 THERAPEUTIC EXERCISES: CPT

## 2020-11-12 ENCOUNTER — OFFICE VISIT (OUTPATIENT)
Dept: PHYSICAL THERAPY | Facility: REHABILITATION | Age: 80
End: 2020-11-12
Payer: COMMERCIAL

## 2020-11-12 DIAGNOSIS — M25.551 RIGHT HIP PAIN: Primary | ICD-10-CM

## 2020-11-12 DIAGNOSIS — M54.2 CERVICAL SPINE PAIN: ICD-10-CM

## 2020-11-12 DIAGNOSIS — M79.671 HEEL PAIN, BILATERAL: ICD-10-CM

## 2020-11-12 DIAGNOSIS — M79.672 HEEL PAIN, BILATERAL: ICD-10-CM

## 2020-11-12 DIAGNOSIS — M54.6 THORACIC SPINE PAIN: ICD-10-CM

## 2020-11-12 PROCEDURE — 97112 NEUROMUSCULAR REEDUCATION: CPT

## 2020-11-12 PROCEDURE — 97110 THERAPEUTIC EXERCISES: CPT

## 2020-11-12 PROCEDURE — 97140 MANUAL THERAPY 1/> REGIONS: CPT | Performed by: PHYSICAL THERAPIST

## 2020-11-13 ENCOUNTER — HOSPITAL ENCOUNTER (OUTPATIENT)
Dept: NUCLEAR MEDICINE | Facility: HOSPITAL | Age: 80
Discharge: HOME | End: 2020-11-13
Attending: INTERNAL MEDICINE
Payer: COMMERCIAL

## 2020-11-13 DIAGNOSIS — C79.51 BONE METASTASES (HCC): ICD-10-CM

## 2020-11-13 DIAGNOSIS — C61 PROSTATE CANCER (HCC): ICD-10-CM

## 2020-11-13 LAB — GLUCOSE SERPL-MCNC: 84 MG/DL (ref 65–140)

## 2020-11-13 PROCEDURE — G1004 CDSM NDSC: HCPCS

## 2020-11-13 PROCEDURE — A9588 FLUCICLOVINE F-18: HCPCS

## 2020-11-13 PROCEDURE — 82948 REAGENT STRIP/BLOOD GLUCOSE: CPT

## 2020-11-17 ENCOUNTER — OFFICE VISIT (OUTPATIENT)
Dept: PHYSICAL THERAPY | Facility: REHABILITATION | Age: 80
End: 2020-11-17
Payer: COMMERCIAL

## 2020-11-17 DIAGNOSIS — M79.672 HEEL PAIN, BILATERAL: ICD-10-CM

## 2020-11-17 DIAGNOSIS — M79.671 HEEL PAIN, BILATERAL: ICD-10-CM

## 2020-11-17 DIAGNOSIS — M25.551 RIGHT HIP PAIN: Primary | ICD-10-CM

## 2020-11-17 DIAGNOSIS — M54.6 THORACIC SPINE PAIN: ICD-10-CM

## 2020-11-17 DIAGNOSIS — M54.2 CERVICAL SPINE PAIN: ICD-10-CM

## 2020-11-17 PROCEDURE — 97112 NEUROMUSCULAR REEDUCATION: CPT

## 2020-11-17 PROCEDURE — 97140 MANUAL THERAPY 1/> REGIONS: CPT | Performed by: PHYSICAL THERAPIST

## 2020-11-17 PROCEDURE — 97110 THERAPEUTIC EXERCISES: CPT

## 2020-11-19 ENCOUNTER — EVALUATION (OUTPATIENT)
Dept: PHYSICAL THERAPY | Facility: REHABILITATION | Age: 80
End: 2020-11-19
Payer: COMMERCIAL

## 2020-11-19 ENCOUNTER — TELEPHONE (OUTPATIENT)
Dept: FAMILY MEDICINE CLINIC | Facility: CLINIC | Age: 80
End: 2020-11-19

## 2020-11-19 DIAGNOSIS — M25.551 RIGHT HIP PAIN: Primary | ICD-10-CM

## 2020-11-19 DIAGNOSIS — M79.672 HEEL PAIN, BILATERAL: ICD-10-CM

## 2020-11-19 DIAGNOSIS — M54.6 THORACIC SPINE PAIN: ICD-10-CM

## 2020-11-19 DIAGNOSIS — M54.2 CERVICAL SPINE PAIN: ICD-10-CM

## 2020-11-19 DIAGNOSIS — M79.671 HEEL PAIN, BILATERAL: ICD-10-CM

## 2020-11-19 PROCEDURE — 97110 THERAPEUTIC EXERCISES: CPT | Performed by: PHYSICAL THERAPIST

## 2020-11-19 PROCEDURE — 97140 MANUAL THERAPY 1/> REGIONS: CPT | Performed by: PHYSICAL THERAPIST

## 2020-11-23 ENCOUNTER — OFFICE VISIT (OUTPATIENT)
Dept: FAMILY MEDICINE CLINIC | Facility: CLINIC | Age: 80
End: 2020-11-23
Payer: COMMERCIAL

## 2020-11-23 ENCOUNTER — HOSPITAL ENCOUNTER (OUTPATIENT)
Dept: NUCLEAR MEDICINE | Facility: HOSPITAL | Age: 80
Discharge: HOME | End: 2020-11-23
Payer: COMMERCIAL

## 2020-11-23 VITALS
OXYGEN SATURATION: 98 % | SYSTOLIC BLOOD PRESSURE: 140 MMHG | DIASTOLIC BLOOD PRESSURE: 80 MMHG | RESPIRATION RATE: 17 BRPM | HEIGHT: 67 IN | BODY MASS INDEX: 32.18 KG/M2 | WEIGHT: 205 LBS | HEART RATE: 62 BPM | TEMPERATURE: 97.7 F

## 2020-11-23 DIAGNOSIS — I10 BENIGN ESSENTIAL HYPERTENSION: Primary | ICD-10-CM

## 2020-11-23 DIAGNOSIS — C79.51 OSSEOUS METASTASIS (HCC): ICD-10-CM

## 2020-11-23 DIAGNOSIS — M25.562 CHRONIC PAIN OF LEFT KNEE: ICD-10-CM

## 2020-11-23 DIAGNOSIS — R60.0 LOCALIZED EDEMA: ICD-10-CM

## 2020-11-23 DIAGNOSIS — C61 PROSTATE CANCER (HCC): ICD-10-CM

## 2020-11-23 DIAGNOSIS — I25.118 CORONARY ARTERY DISEASE OF NATIVE ARTERY OF NATIVE HEART WITH STABLE ANGINA PECTORIS (HCC): ICD-10-CM

## 2020-11-23 DIAGNOSIS — G89.29 CHRONIC PAIN OF LEFT KNEE: ICD-10-CM

## 2020-11-23 PROCEDURE — 78815 PET IMAGE W/CT SKULL-THIGH: CPT

## 2020-11-23 PROCEDURE — 3077F SYST BP >= 140 MM HG: CPT | Performed by: FAMILY MEDICINE

## 2020-11-23 PROCEDURE — A9588 FLUCICLOVINE F-18: HCPCS

## 2020-11-23 PROCEDURE — 3079F DIAST BP 80-89 MM HG: CPT | Performed by: FAMILY MEDICINE

## 2020-11-23 PROCEDURE — 99214 OFFICE O/P EST MOD 30 MIN: CPT | Performed by: FAMILY MEDICINE

## 2020-11-24 ENCOUNTER — APPOINTMENT (OUTPATIENT)
Dept: PHYSICAL THERAPY | Facility: REHABILITATION | Age: 80
End: 2020-11-24
Payer: COMMERCIAL

## 2020-11-25 ENCOUNTER — OFFICE VISIT (OUTPATIENT)
Dept: HEMATOLOGY ONCOLOGY | Facility: CLINIC | Age: 80
End: 2020-11-25
Payer: COMMERCIAL

## 2020-11-25 VITALS
HEIGHT: 66 IN | SYSTOLIC BLOOD PRESSURE: 132 MMHG | DIASTOLIC BLOOD PRESSURE: 70 MMHG | WEIGHT: 203 LBS | BODY MASS INDEX: 32.62 KG/M2 | TEMPERATURE: 97.2 F | RESPIRATION RATE: 18 BRPM | HEART RATE: 59 BPM | OXYGEN SATURATION: 99 %

## 2020-11-25 DIAGNOSIS — C61 PROSTATE CANCER (HCC): Primary | ICD-10-CM

## 2020-11-25 PROCEDURE — 99214 OFFICE O/P EST MOD 30 MIN: CPT | Performed by: INTERNAL MEDICINE

## 2020-11-25 PROCEDURE — 1036F TOBACCO NON-USER: CPT | Performed by: INTERNAL MEDICINE

## 2020-11-25 PROCEDURE — 1160F RVW MEDS BY RX/DR IN RCRD: CPT | Performed by: INTERNAL MEDICINE

## 2020-12-04 ENCOUNTER — PATIENT OUTREACH (OUTPATIENT)
Dept: UROLOGY | Facility: CLINIC | Age: 80
End: 2020-12-04

## 2020-12-04 ENCOUNTER — RADIATION ONCOLOGY CONSULT (OUTPATIENT)
Dept: RADIATION ONCOLOGY | Facility: CLINIC | Age: 80
End: 2020-12-04
Attending: RADIOLOGY
Payer: COMMERCIAL

## 2020-12-04 VITALS
RESPIRATION RATE: 16 BRPM | WEIGHT: 204.15 LBS | DIASTOLIC BLOOD PRESSURE: 85 MMHG | OXYGEN SATURATION: 98 % | HEIGHT: 67 IN | BODY MASS INDEX: 32.04 KG/M2 | SYSTOLIC BLOOD PRESSURE: 130 MMHG | HEART RATE: 61 BPM | TEMPERATURE: 96.3 F

## 2020-12-04 DIAGNOSIS — C61 PROSTATE CANCER (HCC): Primary | ICD-10-CM

## 2020-12-04 PROCEDURE — 99211 OFF/OP EST MAY X REQ PHY/QHP: CPT | Performed by: RADIOLOGY

## 2020-12-04 PROCEDURE — G0463 HOSPITAL OUTPT CLINIC VISIT: HCPCS | Performed by: RADIOLOGY

## 2020-12-04 RX ORDER — CIPROFLOXACIN 500 MG/1
500 TABLET, FILM COATED ORAL EVERY 12 HOURS SCHEDULED
Qty: 2 TABLET | Refills: 0 | Status: SHIPPED | OUTPATIENT
Start: 2020-12-06 | End: 2020-12-07

## 2020-12-07 ENCOUNTER — TELEPHONE (OUTPATIENT)
Dept: HEMATOLOGY ONCOLOGY | Facility: CLINIC | Age: 80
End: 2020-12-07

## 2020-12-09 ENCOUNTER — DOCUMENTATION (OUTPATIENT)
Dept: RADIATION ONCOLOGY | Facility: CLINIC | Age: 80
End: 2020-12-09

## 2020-12-16 ENCOUNTER — PROCEDURE VISIT (OUTPATIENT)
Dept: UROLOGY | Facility: CLINIC | Age: 80
End: 2020-12-16
Payer: COMMERCIAL

## 2020-12-16 ENCOUNTER — PATIENT OUTREACH (OUTPATIENT)
Dept: UROLOGY | Facility: AMBULATORY SURGERY CENTER | Age: 80
End: 2020-12-16

## 2020-12-16 VITALS
SYSTOLIC BLOOD PRESSURE: 118 MMHG | HEART RATE: 72 BPM | WEIGHT: 203 LBS | DIASTOLIC BLOOD PRESSURE: 84 MMHG | BODY MASS INDEX: 31.79 KG/M2

## 2020-12-16 DIAGNOSIS — C61 PROSTATE CANCER (HCC): Primary | ICD-10-CM

## 2020-12-16 DIAGNOSIS — C79.51 BONE METASTASES (HCC): ICD-10-CM

## 2020-12-16 PROCEDURE — 1160F RVW MEDS BY RX/DR IN RCRD: CPT | Performed by: UROLOGY

## 2020-12-16 PROCEDURE — 96372 THER/PROPH/DIAG INJ SC/IM: CPT

## 2020-12-16 PROCEDURE — 3079F DIAST BP 80-89 MM HG: CPT | Performed by: UROLOGY

## 2020-12-16 PROCEDURE — 3074F SYST BP LT 130 MM HG: CPT | Performed by: UROLOGY

## 2020-12-16 PROCEDURE — 1036F TOBACCO NON-USER: CPT | Performed by: UROLOGY

## 2020-12-16 PROCEDURE — 99215 OFFICE O/P EST HI 40 MIN: CPT | Performed by: UROLOGY

## 2020-12-16 RX ORDER — CEFTRIAXONE 1 G/1
1000 INJECTION, POWDER, FOR SOLUTION INTRAMUSCULAR; INTRAVENOUS ONCE
Status: COMPLETED | OUTPATIENT
Start: 2020-12-16 | End: 2020-12-16

## 2020-12-16 RX ADMIN — CEFTRIAXONE 1000 MG: 1 INJECTION, POWDER, FOR SOLUTION INTRAMUSCULAR; INTRAVENOUS at 09:12

## 2020-12-18 ENCOUNTER — RADIATION ONCOLOGY FOLLOW-UP (OUTPATIENT)
Dept: RADIATION ONCOLOGY | Facility: CLINIC | Age: 80
End: 2020-12-18
Attending: RADIOLOGY

## 2020-12-18 DIAGNOSIS — C61 PROSTATE CANCER (HCC): Primary | ICD-10-CM

## 2020-12-21 ENCOUNTER — PATIENT OUTREACH (OUTPATIENT)
Dept: UROLOGY | Facility: AMBULATORY SURGERY CENTER | Age: 80
End: 2020-12-21

## 2020-12-23 ENCOUNTER — TELEPHONE (OUTPATIENT)
Dept: UROLOGY | Facility: CLINIC | Age: 80
End: 2020-12-23

## 2020-12-23 DIAGNOSIS — C61 PROSTATE CANCER (HCC): Primary | ICD-10-CM

## 2020-12-23 RX ORDER — CIPROFLOXACIN 500 MG/1
TABLET, FILM COATED ORAL
Qty: 1 TABLET | Refills: 0 | Status: SHIPPED | OUTPATIENT
Start: 2020-12-23 | End: 2020-12-24

## 2021-01-08 ENCOUNTER — TELEPHONE (OUTPATIENT)
Dept: UROLOGY | Facility: MEDICAL CENTER | Age: 81
End: 2021-01-08

## 2021-01-08 ENCOUNTER — TELEPHONE (OUTPATIENT)
Dept: HEMATOLOGY ONCOLOGY | Facility: CLINIC | Age: 81
End: 2021-01-08

## 2021-01-08 LAB
ALBUMIN SERPL-MCNC: 3.8 G/DL (ref 3.6–5.1)
ALBUMIN/GLOB SERPL: 1.8 (CALC) (ref 1–2.5)
ALP SERPL-CCNC: 38 U/L (ref 35–144)
ALT SERPL-CCNC: 14 U/L (ref 9–46)
AST SERPL-CCNC: 15 U/L (ref 10–35)
BASOPHILS # BLD AUTO: 59 CELLS/UL (ref 0–200)
BASOPHILS NFR BLD AUTO: 1 %
BILIRUB SERPL-MCNC: 0.5 MG/DL (ref 0.2–1.2)
BUN SERPL-MCNC: 19 MG/DL (ref 7–25)
BUN/CREAT SERPL: ABNORMAL (CALC) (ref 6–22)
CALCIUM SERPL-MCNC: 9.4 MG/DL (ref 8.6–10.3)
CHLORIDE SERPL-SCNC: 101 MMOL/L (ref 98–110)
CO2 SERPL-SCNC: 32 MMOL/L (ref 20–32)
CREAT SERPL-MCNC: 1.06 MG/DL (ref 0.7–1.18)
EOSINOPHIL # BLD AUTO: 330 CELLS/UL (ref 15–500)
EOSINOPHIL NFR BLD AUTO: 5.6 %
ERYTHROCYTE [DISTWIDTH] IN BLOOD BY AUTOMATED COUNT: 12.5 % (ref 11–15)
GLOBULIN SER CALC-MCNC: 2.1 G/DL (CALC) (ref 1.9–3.7)
GLUCOSE SERPL-MCNC: 94 MG/DL (ref 65–99)
HCT VFR BLD AUTO: 39.5 % (ref 38.5–50)
HGB BLD-MCNC: 13.6 G/DL (ref 13.2–17.1)
LYMPHOCYTES # BLD AUTO: 1292 CELLS/UL (ref 850–3900)
LYMPHOCYTES NFR BLD AUTO: 21.9 %
MCH RBC QN AUTO: 31.5 PG (ref 27–33)
MCHC RBC AUTO-ENTMCNC: 34.4 G/DL (ref 32–36)
MCV RBC AUTO: 91.4 FL (ref 80–100)
MONOCYTES # BLD AUTO: 490 CELLS/UL (ref 200–950)
MONOCYTES NFR BLD AUTO: 8.3 %
NEUTROPHILS # BLD AUTO: 3729 CELLS/UL (ref 1500–7800)
NEUTROPHILS NFR BLD AUTO: 63.2 %
PLATELET # BLD AUTO: 200 THOUSAND/UL (ref 140–400)
PMV BLD REES-ECKER: 10.9 FL (ref 7.5–12.5)
POTASSIUM SERPL-SCNC: 4.2 MMOL/L (ref 3.5–5.3)
PROT SERPL-MCNC: 5.9 G/DL (ref 6.1–8.1)
PSA SERPL-MCNC: 3 NG/ML
RBC # BLD AUTO: 4.32 MILLION/UL (ref 4.2–5.8)
SL AMB EGFR AFRICAN AMERICAN: 77 ML/MIN/1.73M2
SL AMB EGFR NON AFRICAN AMERICAN: 66 ML/MIN/1.73M2
SODIUM SERPL-SCNC: 138 MMOL/L (ref 135–146)
WBC # BLD AUTO: 5.9 THOUSAND/UL (ref 3.8–10.8)

## 2021-01-08 NOTE — TELEPHONE ENCOUNTER
Patient called back and I explained to him how to get to [facility] Chart messaging center and he is going to send a message to the doctor

## 2021-01-08 NOTE — TELEPHONE ENCOUNTER
Patient states that he would like to e mail questions regarding starting radiation to his care team including Dr Jason Smallwood and would like to know if there is a address he can e mail this to    Best call back [phone] or [phone]

## 2021-01-08 NOTE — TELEPHONE ENCOUNTER
Called patient  Patient was not home so I just left number for him call back  Informed her we are only here till 4:30  If patient calls back  Inform patient if he would like to email Dr Charanjit Gomez he can do so though [place]

## 2021-01-08 NOTE — TELEPHONE ENCOUNTER
This is a patient of Dr Rhys Enrique in [facility]  Patient wants to send an emaiil about everything being set up and he wants some feedback and he has assumptions and wants  all 3 providers involved in his care which are Dr Medardo Hardy, Dr Kristina Bernal and Dr Rhys Enrique  Please call him back at [phone]

## 2021-01-08 NOTE — TELEPHONE ENCOUNTER
Spoke to Lucila Mortimer and informed him that he can send an email through My Chart  Lucila Mortimer expressed understanding and that he is going to drop off and page of his concerns concerning radiation and he email through My Chart and Makayla Viera know that that is fine too

## 2021-01-12 ENCOUNTER — TELEPHONE (OUTPATIENT)
Dept: UROLOGY | Facility: CLINIC | Age: 81
End: 2021-01-12

## 2021-01-12 DIAGNOSIS — C79.51 BONE METASTASES (HCC): ICD-10-CM

## 2021-01-12 DIAGNOSIS — C61 PROSTATE CANCER (HCC): Primary | ICD-10-CM

## 2021-01-12 NOTE — TELEPHONE ENCOUNTER
Patient was previously scheduled for pre radiation fiducial markers  There was some question about patient's understanding of his current disease status  This procedure was delayed and is reschedule this upcoming Tuesday  He presented to the office and provided a lengthy letter he wrote with current questions and status of his disease  These are primarily related to his systemic therapy and upcoming radiation treatment  This letter will be scanned into his epic chart  I contacted him today by phone to review  At this point time, the patient seems to understand plan for fiducial marker placement and radiation as a suppressive therapy for his metastatic disease  After lengthy conversation, I have strongly recommended a referral to palliative care best help the patient can septal eyes and internalized the information about his disease status  He voices understanding and is agreeable

## 2021-01-13 NOTE — TELEPHONE ENCOUNTER
Spoke to the office at Palliative care, and they stated they are working on it via the queue and they will reach out to the patient to schedule

## 2021-01-17 DIAGNOSIS — I10 ESSENTIAL HYPERTENSION: ICD-10-CM

## 2021-01-17 RX ORDER — METOPROLOL SUCCINATE 25 MG/1
TABLET, EXTENDED RELEASE ORAL
Qty: 90 TABLET | Refills: 1 | Status: SHIPPED | OUTPATIENT
Start: 2021-01-17 | End: 2021-07-20 | Stop reason: SDUPTHER

## 2021-01-18 ENCOUNTER — TELEPHONE (OUTPATIENT)
Dept: UROLOGY | Facility: CLINIC | Age: 81
End: 2021-01-18

## 2021-01-18 NOTE — TELEPHONE ENCOUNTER
Patient scheduled tomorrow for marker insertion at 8:00  Will patient be needing antibiotic injection at 7:00 or 7:30  Patient also stated that he only stopped 81mg baby asa yesterday

## 2021-01-19 ENCOUNTER — PROCEDURE VISIT (OUTPATIENT)
Dept: UROLOGY | Facility: CLINIC | Age: 81
End: 2021-01-19
Payer: COMMERCIAL

## 2021-01-19 VITALS
WEIGHT: 208 LBS | HEART RATE: 67 BPM | DIASTOLIC BLOOD PRESSURE: 80 MMHG | BODY MASS INDEX: 33.43 KG/M2 | SYSTOLIC BLOOD PRESSURE: 140 MMHG | HEIGHT: 66 IN

## 2021-01-19 DIAGNOSIS — C61 PROSTATE CANCER (HCC): Primary | ICD-10-CM

## 2021-01-19 PROCEDURE — 55876 PLACE RT DEVICE/MARKER PROS: CPT | Performed by: UROLOGY

## 2021-01-19 PROCEDURE — A4648 IMPLANTABLE TISSUE MARKER: HCPCS | Performed by: UROLOGY

## 2021-01-19 PROCEDURE — 96372 THER/PROPH/DIAG INJ SC/IM: CPT | Performed by: UROLOGY

## 2021-01-19 PROCEDURE — 76942 ECHO GUIDE FOR BIOPSY: CPT | Performed by: UROLOGY

## 2021-01-19 RX ORDER — CEFTRIAXONE 1 G/1
1000 INJECTION, POWDER, FOR SOLUTION INTRAMUSCULAR; INTRAVENOUS ONCE
Status: COMPLETED | OUTPATIENT
Start: 2021-01-19 | End: 2021-01-19

## 2021-01-19 RX ADMIN — CEFTRIAXONE 1000 MG: 1 INJECTION, POWDER, FOR SOLUTION INTRAMUSCULAR; INTRAVENOUS at 08:19

## 2021-01-19 NOTE — PROGRESS NOTES
Placement of interstitial device     Date/Time 1/19/2021 8:15 AM     Performed by  Kenzie Weaver MD     Authorized by Kenzie Weaver MD      Universal Protocol   Procedure performed by:  Consent: Verbal consent obtained  Written consent obtained  Risks and benefits: risks, benefits and alternatives were discussed  Consent given by: patient  Time out: Immediately prior to procedure a "time out" was called to verify the correct patient, procedure, equipment, support staff and site/side marked as required  Timeout called at: 1/19/2021 8:16 AM   Patient understanding: patient states understanding of the procedure being performed  Patient identity confirmed: verbally with patient        Local anesthesia used: yes      Anesthesia: local infiltration and nerve block     Anesthesia   Local anesthesia used: yes  Local Anesthetic: lidocaine 1% without epinephrine     Sedation   Patient sedated: no        Specimen: no    Culture: no   Procedure Details   Procedure Notes:   IMRT marker insertion procedure note:    Risk and benefits of marker insertion were discussed in the office today  Informed consent was obtained  His prep was deemed to be adequate  The patient was placed in the lateral decubitus position  Transrectal ultrasonography was performed after viscous lidocaine was instilled into the rectum  5 cc of 2% lidocaine were injected bilaterally between the junction of the base of the prostate and the seminal vesicles  3 gold markers were then inserted under ultrasound guidance  The first was inserted laterally at the right base  The second was inserted anterior towards the right mid gland  The third marker was inserted in the left apical prostate laterally          Patient Transportation: confirmed  Patient tolerance: patient tolerated the procedure well with no immediate complications

## 2021-01-19 NOTE — PROGRESS NOTES
UROLOGY PROCEDURE NOTE     CHIEF COMPLAINT   Hilda Chaudhary is a 79 y o  male with a complaint of prostate cancer, metastatic    History of Present Illness:     79 y o  gentleman who had been undergoing routine prostate cancer screening with his primary care team  The patient has had a steady PSA but in March of 2017 was noted to have some oscillation  The patient was given a course of antibiotics and his PSA came down from the mid 5 range to 4 6  He initially presented in June 2017  We initially recommended follow-up in 1 year  Follow-up of his PSA values ordered by his primary team demonstrated concern for rapid rise  The patient return to see me in May of 2018  At that time, we recommended a prostate biopsy but the patient had just undergone percutaneous stenting and was on dual anti-platelet therapy  Biopsy delayed  The patient underwent a CT scan of his chest which demonstrated some sclerotic lesions in his primary care team again contacted us  We recommended repeat PSA and a bone scan  PSA has risen again and bone scan does demonstrate some concern  Lab Results   Component Value Date    PSA 3 0 01/07/2021    PSA 1 4 10/16/2020    PSA 0 8 07/23/2020   10/5/18 PSA 9 8  4/30/18 PSA 8 6, free 19 6%  9/30/17 PSA 7 3, free 15%  5/5/17 PSA 4 6  3/20/17 PSA 5 4     We did discuss with Interventional Radiology possible biopsy of the patient's thoracic and rib lesions although these were not felt to be safe for attempt  As such, the patient was arranged for a prostate biopsy here in our office  This demonstrated high risk [place] disease  Patient was discussed at multidisciplinary conference and although we were not able to biopsy his bony lesions, 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  The patient was started on androgen deprivation therapy and seen by Medical Oncology  He was started on enzalutamide and Zometa  He returns for androgen deprivation therapy    He has had good PSA response  Recently enzalutamide has transitioned to abiraterone and now back to enzalutamide  Patient continues to have progression of his bony disease on imaging as well as slight progression his PSA trend  Patient has had further discussion with medical oncology team after progression castrate resistant disease  PET scan has demonstrated activity in the thoracic spine and at the bladder base and additional pathologic testing was performed on his cancer  Ultimately it was decided the patient would be a candidate for radiation to the prostate and pelvis  He will likely be progressing to Taxotere chemotherapy according to medical oncology  In advance of his radiation, the team has requested fiducial marker placement  Multidisciplinary discussion with patient to ensure he had good understanding of prognosis and goals of care  Returns for procedure  Last Lupron 8/28/20      Past Medical History:     Past Medical History:   Diagnosis Date    Anemia     last assessed  1/7/14     Cancer [facility])     Coronary artery disease     Hypercholesteremia     Hypertension     Kidney stone     Polyp of sigmoid colon     Prostate cancer ([facility] [address] )     Renal disorder     elevated serum creat    Tinnitus     unspecified laterality / last assessed 4/9/13    Vitamin D deficiency        PAST SURGICAL HISTORY:     Past Surgical History:   Procedure Laterality Date    CARDIAC SURGERY      COLONOSCOPY      CORONARY ANGIOPLASTY WITH STENT PLACEMENT      FL RETROGRADE PYELOGRAM  9/11/2019    HEMORRHOID SURGERY      TN CYSTO/URETERO W/LITHOTRIPSY &INDWELL STENT INSRT Left 9/11/2019    Procedure: CYSTO, URETEROSCOPY W/HOLMIUM LASER, BASKET STONE EXTRACTION, RETROGRADE PYELOGRAM, STENT EXCHANGE;  Surgeon: Angelia Noyola MD;  Location: [facility];  Service: Urology    PROSTATE BIOPSY  10/24/2018    TONSILLECTOMY         CURRENT MEDICATIONS:     Current Outpatient Medications   Medication Sig Dispense Refill    acetaminophen (TYLENOL) 500 mg tablet Take 500 mg by mouth every 6 (six) hours as needed for mild pain      aspirin (ASPIRIN ADULT LOW STRENGTH) 81 mg EC tablet Take 1 tablet by mouth daily      atorvastatin (LIPITOR) 40 mg tablet Take 40 mg by mouth daily       Calcium 500 MG tablet Take 1,000 mg by mouth daily       cholecalciferol (VITAMIN D3) 1,000 units tablet Take 1 tablet by mouth daily      enzalutamide (Xtandi) 40 mg capsule Take 4 capsules (160 mg total) by mouth once daily  120 capsule 11    hydrochlorothiazide (HYDRODIURIL) 25 mg tablet Take 1 tablet (25 mg total) by mouth daily 90 tablet 1    leuprolide (LUPRON DEPOT 6 MONTH KIT) 45 mg Inject 45 mg into a muscle every 6 (six) months 45 mg 1    losartan (COZAAR) 100 MG tablet take 1 tablet by mouth once daily 90 tablet 1    MELATONIN PO Take 1 tablet by mouth daily at bedtime as needed       metoprolol succinate (TOPROL-XL) 25 mg 24 hr tablet take 1 tablet by mouth once daily 90 tablet 1    Zoledronic Acid (ZOMETA IV) Infuse into a venous catheter every 3 (three) months       Current Facility-Administered Medications   Medication Dose Route Frequency Provider Last Rate Last Admin    cefTRIAXone (ROCEPHIN) injection 1,000 mg  1,000 mg Intramuscular Once Avi Shah MD           ALLERGIES:   No Known Allergies    SOCIAL HISTORY:     Social History     Socioeconomic History    Marital status: /Civil Union     Spouse name: None    Number of children: None    Years of education: None    Highest education level: None   Occupational History    Occupation: consultant   Social Needs    Financial resource strain: None    Food insecurity     Worry: None     Inability: None    Transportation needs     Medical: None     Non-medical: None   Tobacco Use    Smoking status: Never Smoker    Smokeless tobacco: Never Used   Substance and Sexual Activity    Alcohol use:  Yes     Alcohol/week: 2 0 - 3 0 standard drinks     Types: 2 - 3 Glasses of wine per week     Frequency: 2-3 times a week     Comment: Occuational / social     Drug use: No    Sexual activity: None   Lifestyle    Physical activity     Days per week: None     Minutes per session: None    Stress: None   Relationships    Social connections     Talks on phone: None     Gets together: None     Attends Orthodoxy service: None     Active member of club or organization: None     Attends meetings of clubs or organizations: None     Relationship status: None    Intimate partner violence     Fear of current or ex partner: None     Emotionally abused: None     Physically abused: None     Forced sexual activity: None   Other Topics Concern    None   Social History Narrative    Always uses seat belt     Daily caffeine consumption 2-3 servings a day     Feels safe at home       SOCIAL HISTORY:     Family History   Problem Relation Age of Onset    Breast cancer Mother     Hypertension Father        REVIEW OF SYSTEMS:     Review of Systems   Constitutional: Negative  Respiratory: Negative  Cardiovascular: Negative  Genitourinary: Positive for frequency and urgency  Musculoskeletal: Negative  Skin: Negative  Psychiatric/Behavioral: Negative  PHYSICAL EXAM:     /80 (BP Location: Left arm, Patient Position: Sitting, Cuff Size: Adult)   Pulse 67   Ht 5' 6" (1 676 m)   Wt 94 3 kg (208 lb)   BMI 33 57 kg/m²     General:  Healthy appearing male in no acute distress  They have a normal affect  There is not appear to be any gross neurologic defects or abnormalities  HEENT:  Normocephalic, atraumatic  Neck is supple without any palpable lymphadenopathy  Cardiovascular:  Patient has normal palpable distal radial pulses  There is no significant peripheral edema  No JVD is noted  Respiratory:  Patient has unlabored respirations  There is no audible wheeze or rhonchi  Abdomen: Abdomen is soft and nontender  There is no tympany    Inguinal and umbilical hernia are not appreciated  :  Uncircumcised phallus  Musculoskeletal:  Patient does not have significant CVA tenderness in the  flank with palpation or percussion  They full range of motion in all 4 extremities  Strength in all 4 extremities appears congruent  Patient is able to ambulate without assistance or difficulty  Dermatologic:  Patient has no skin abnormalities or rashes  LABS:     CBC:   Lab Results   Component Value Date    WBC 5 9 2021    HGB 13 6 2021    HCT 39 5 2021    MCV 91 4 2021     2021       BMP:   Lab Results   Component Value Date    CALCIUM 9 4 2021     2017    K 4 2 2021    CO2 32 2021     2021    BUN 19 2021    CREATININE 1 06 2021     10/5/18 PSA 9 8  Lab Results   Component Value Date    PSA 3 0 2021    PSA 1 4 10/16/2020    PSA 0 8 2020     IMAGIN/5/20  BONE SCAN  WHOLE BODY     INDICATION: C61: Malignant neoplasm of prostate     PREVIOUS FILM CORRELATION:    CT chest abdomen pelvis 2020, bone scan exam 10/5/2018     TECHNIQUE:   This study was performed following the intravenous administration of 25 7 mCi Tc-99m labeled MDP  Delayed, anterior and posterior whole body images were acquired, 2-3 hours after radiopharmaceutical administration  Additional delayed   oblique static images acquired of the bilateral ribs and pelvis      FINDINGS:     New focus of radiotracer uptake at a right mid rib anteriorly likely the 5th rib  There is focal sclerosis here on CT, nonspecific  This could be posttraumatic, metastasis is not excluded      Previously noted was a tiny focus of radiotracer uptake at the left posterior 7th rib, not seen on the current exam      Stable focus of radiotracer uptake at the T10 level posteriorly    There is a corresponding sclerotic lesion here on CT compatible with metastasis      New focus of radiotracer uptake at the right proximal humeral shaft suspicious for metastasis      Recent CT examination notes multiple small subcentimeter sclerotic lesions without radiotracer uptake on this bone scan exam   These are likely too small to assess      Foci of radiotracer uptake at the bilateral shoulders and bilateral feet are similar in distribution compared to the prior exam likely degenerative      Mild foci of radiotracer uptake in the thoracic and lower lumbar spine are likely degenerative and similar to the prior exam      The renal activity is fairly symmetric      IMPRESSION:     1  Findings suggest mild progression of osseous metastasis  New focus of radiotracer uptake at the right proximal humerus suspicious for metastasis  Additional new focus of radiotracer uptake at the right anterior 5th rib, could be posttraumatic,   metastasis is not excluded  2   Stable focus of suspected metastasis at the T10 vertebral body  3   Recent CT examination notes multiple small subcentimeter sclerotic lesions without radiotracer uptake on this bone scan exam   These are likely too small to assess      5/2/20  CT CHEST, ABDOMEN AND PELVIS WITH AND WITHOUT IV CONTRAST     INDICATION:   C61: Malignant neoplasm of prostate      COMPARISON:  CT chest dated 9/28/2018      TECHNIQUE: Initial CT of the abdomen and pelvis was performed without intravenous contrast   Subsequent dynamic CT evaluation of the chest, abdomen and pelvis was performed after the administration of intravenous contrast was performed  Finally, delayed   dynamic delayed phase postcontrast CT evaluation of the abdomen and pelvis was performed  Axial, sagittal, and coronal 2D reformatted images were created from the source data and submitted for interpretation       Radiation dose length product (DLP) for this visit:  4580 mGy-cm     This examination, like all CT scans performed in the [facility], was performed utilizing techniques to minimize radiation dose exposure, including the use of iterative   reconstruction and automated exposure control      IV Contrast:  100 mL of iohexol (OMNIPAQUE)  Enteric Contrast:  Enteric contrast was administered      FINDINGS:     LUNGS:  Lungs are clear  There is no tracheal or endobronchial lesion      PLEURA:  Unremarkable      HEART/GREAT VESSELS:  Unremarkable for patient's age      MEDIASTINUM AND JOSE ANTONIO:  Unremarkable      CHEST WALL AND LOWER NECK:   Unremarkable      VISUALIZED STRUCTURES IN THE UPPER ABDOMEN:  Unremarkable      OSSEOUS STRUCTURES:  No acute fracture or destructive osseous lesion      ABDOMEN     RIGHT KIDNEY AND URETER:  No solid renal mass  No detectable urothelial mass  No hydronephrosis or hydroureter  No urinary tract calculi  No perinephric collection      LEFT KIDNEY AND URETER:  No solid renal mass  There are several left renal cysts measuring up to 9 4 cm  No hydronephrosis or hydroureter  No urinary tract calculi  No perinephric collection      URINARY BLADDER:  It is uncertain whether a 1 cm density at the base of the urinary bladder is a urinary bladder mass versus the prostate gland indenting the urinary bladder base  No calculi         LIVER/BILIARY TREE:  Few liver cysts are seen measuring up to 2 5 cm      GALLBLADDER:  No calcified gallstones  No pericholecystic inflammatory change      SPLEEN:  Unremarkable      PANCREAS:  Unremarkable      ADRENAL GLANDS:  Unremarkable      STOMACH AND BOWEL:  Unremarkable      ABDOMINOPELVIC CAVITY:  No ascites  No free intraperitoneal air   No lymphadenopathy      VESSELS:  There is calcific atherosclerosis of the abdominal aorta without evidence for aneurysmal dilatation      PELVIS     REPRODUCTIVE ORGANS:  Unremarkable for patient's age      APPENDIX: No findings to suggest appendicitis      ABDOMINAL WALL/INGUINAL REGIONS:  Unremarkable      OSSEOUS STRUCTURES: Sclerotic lesion in the posterior T10 vertebral bodies increase in size, now measuring 1 9 cm (image 111, series 602)   Tiny sclerotic density in the T12 vertebral body is new  Tiny sclerotic densities in the L5 vertebral body and S1   are nonspecific  Few tiny sclerotic densities in the upper sternum (image 16, series 3) are new  5 mm sclerotic focus in the right 6th rib is new (image 74, series 3)  Tiny sclerotic focus in the right 7th rib is new (image 83, series 3)  4 mm   sclerotic focus in the right 8th rib is stable  4 mm sclerotic lesion in the left scapula (image 3, series 3) is new  6 mm sclerotic lesion in the left 4th rib is increase in size (image 43, series 3)  5 mm sclerotic density in the left 7th rib is   increased in density (image 64, series 3)     IMPRESSION:     1 cm apparent density at the base of the urinary bladder may represent a bladder mass versus the prostate gland indenting the urinary bladder base  Cystoscopic correlation is recommended      New/enlarging sclerotic osseous lesions in the chest and spine are worrisome for metastases as described above  Correlation with bone scan is recommended  PATHOLOGY:     Final Diagnosis   A  Right lateral base prostate core biopsy:  - Adenocarcinoma, Vitaliy score 4+ 5 = 9/10 (grade 5 comprises 10% of core biopsy), grade group 5, continuously involving greater than 95% of this core biopsy  - No perineural invasion is seen     B  Right lateral mid prostate core biopsy:  - Adenocarcinoma, [place] score 4+ 5 = 9/10 (grade 5 conprises 20% of core biopsy), grade group 5, continuously involving 90% of this core biopsy  - Perineural invasion is seen      C  Left lateral apex prostate core biopsy:  - Adenocarcinoma, Vitaliy score 5 + 4 = 9/10 (grade 5 comprises 60% of core biopsy), grade group 5, continuously involving 60% of this core biopsy  - Perineural invasion is seen     D   Right base prostate core biopsy:  - Adenocarcinoma, Vitaliy score 4 + 5 = 9/10 (grade 5 comprises 10% of core biopsy), grade group 5, continuously involving 85% of this core biopsy  - Perineural invasion is seen     E  Right mid prostate core biopsy   - Adenocarcinoma, [place] score 5 + 4 = 9/10 (grade 5 comprises 95% of core biopsy), grade group 5, continuously involving 60% of this core biopsy  - Perineural invasion is seen     F  Right apex prostate core biopsy:  - Adenocarcinoma, Vitaliy score 4 + 5 = 9/10 (grade 5 comprises 45% of core biopsy), grade group 5, continuously involving 70% of this core biopsy  - Perineural invasion is seen     G  Left base prostate core biopsy:  - Adenocarcinoma, Vitaliy score 4 + 5 = 9/10 (grade 5 comprises 35% of core biopsy), grade group 5, continuously involving 70% of this core biopsy  - No perineural invasion is seen     H  Left mid prostate core biopsy:  - Adenocarcinoma, Vitaliy score 4 + 4 = 8/10, grade group 4, discontinuously involving 20% of this core biopsy  - No perineural invasion is seen      I  Left apex prostate core biopsy:  - Benign prostatic tissue     J  Left lateral base prostate core biopsy:  - Adenocarcinoma, Vitaliy score 4 + 5 = 9/10, (grade 5 comprises 35% of core biopsy), grade group 5, discontinuously involving 20% of this core biopsy  - No perineural invasion is seen      K  Left lateral mid prostate core biopsy:  - Adenocarcinoma, Vitaliy score 4 + 4 = 8/10, grade group 4, continuously involving 5% of this core biopsy  - No perineural invasion is seen      L   Left lateral apex prostate core biopsy:  - Adenocarcinoma, [place] score 4 + 4 = 8/10, grade group 4, discontinuously involving 5% of this core biopsy  - No perineural invasion is seen      Note: Block for Prolaris testing if required: E     PROCEDURE:     SEE NOTE    ASSESSMENT:     79 y o  male with high volume, high risk [place] 5+4=9 CaP, now with abnormal sclerotic lesions and positive bone scan now status post ureteroscopy for stone disease with med/rad onc plans for palliative radiation to pelvis/thoracic spine    PLAN:     Patient has now undergone fiducial marker placement  Will have palliative radiation to the pelvis and prostate  Will continue on androgen deprivation therapy and systemic therapy provided by the medical oncology team   Patient will be due for his next injection of Lupron in February  Will continue follow-up with all 3 surfaces  Patient has been referred to palliative care after lengthy discussion

## 2021-01-19 NOTE — LETTER
January 19, 2021     [facility]  [address] [address]    Patient: Akira Woods   YOB: 1940   Date of Visit: 1/19/2021       Dear Dr Salvador Philip: Thank you for referring Paul Coleman to me for evaluation  Below are my notes for this consultation  If you have questions, please do not hesitate to call me  I look forward to following your patient along with you  Sincerely,        Oscar Overton MD        CC: MD Nicol Angel Arm, DO Dewitte Shown, MD  1/19/2021  8:16 AM  Sign when Signing Visit        Placement of interstitial device     Date/Time 1/19/2021 8:15 AM     Performed by  Oscar Overton MD     Authorized by Oscar Overton MD      [place] Protocol   Procedure performed by:  Consent: Verbal consent obtained  Written consent obtained  Risks and benefits: risks, benefits and alternatives were discussed  Consent given by: patient  Time out: Immediately prior to procedure a "time out" was called to verify the correct patient, procedure, equipment, support staff and site/side marked as required  Timeout called at: 1/19/2021 8:16 AM   Patient understanding: patient states understanding of the procedure being performed  Patient identity confirmed: verbally with patient        Local anesthesia used: yes      Anesthesia: local infiltration and nerve block     Anesthesia   Local anesthesia used: yes  Local Anesthetic: lidocaine 1% without epinephrine     Sedation   Patient sedated: no        Specimen: no    Culture: no   Procedure Details   Procedure Notes:   IMRT marker insertion procedure note:    Risk and benefits of marker insertion were discussed in the office today  Informed consent was obtained  His prep was deemed to be adequate  The patient was placed in the lateral decubitus position  Transrectal ultrasonography was performed after viscous lidocaine was instilled into the rectum   5 cc of 2% lidocaine were injected bilaterally between the junction of the base of the prostate and the seminal vesicles  3 gold markers were then inserted under ultrasound guidance  The first was inserted laterally at the right base  The second was inserted anterior towards the right mid gland  The third marker was inserted in the left apical prostate laterally  Patient Transportation: confirmed  Patient tolerance: patient tolerated the procedure well with no immediate complications               Lisbet Aviles MD  1/19/2021  8:15 AM  Sign when Signing Visit    UROLOGY PROCEDURE NOTE     CHIEF COMPLAINT   Hilda Chaudhary is a 79 y o  male with a complaint of prostate cancer, metastatic    History of Present Illness:     79 y o  gentleman who had been undergoing routine prostate cancer screening with his primary care team  The patient has had a steady PSA but in March of 2017 was noted to have some oscillation  The patient was given a course of antibiotics and his PSA came down from the mid 5 range to 4 6  He initially presented in June 2017  We initially recommended follow-up in 1 year  Follow-up of his PSA values ordered by his primary team demonstrated concern for rapid rise  The patient return to see me in May of 2018  At that time, we recommended a prostate biopsy but the patient had just undergone percutaneous stenting and was on dual anti-platelet therapy  Biopsy delayed  The patient underwent a CT scan of his chest which demonstrated some sclerotic lesions in his primary care team again contacted us  We recommended repeat PSA and a bone scan  PSA has risen again and bone scan does demonstrate some concern      Lab Results   Component Value Date    PSA 3 0 01/07/2021    PSA 1 4 10/16/2020    PSA 0 8 07/23/2020   10/5/18 PSA 9 8  4/30/18 PSA 8 6, free 19 6%  9/30/17 PSA 7 3, free 15%  5/5/17 PSA 4 6  3/20/17 PSA 5 4     We did discuss with Interventional Radiology possible biopsy of the patient's thoracic and rib lesions although these were not felt to be safe for attempt  As such, the patient was arranged for a prostate biopsy here in our office  This demonstrated high risk [place] disease  Patient was discussed at multidisciplinary conference and although we were not able to biopsy his bony lesions, 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  The patient was started on androgen deprivation therapy and seen by Medical Oncology  He was started on enzalutamide and Zometa  He returns for androgen deprivation therapy  He has had good PSA response  Recently enzalutamide has transitioned to abiraterone and now back to enzalutamide  Patient continues to have progression of his bony disease on imaging as well as slight progression his PSA trend  Patient has had further discussion with medical oncology team after progression castrate resistant disease  PET scan has demonstrated activity in the thoracic spine and at the bladder base and additional pathologic testing was performed on his cancer  Ultimately it was decided the patient would be a candidate for radiation to the prostate and pelvis  He will likely be progressing to Taxotere chemotherapy according to medical oncology  In advance of his radiation, the team has requested fiducial marker placement  Multidisciplinary discussion with patient to ensure he had good understanding of prognosis and goals of care  Returns for procedure  Last Lupron 8/28/20      Past Medical History:     Past Medical History:   Diagnosis Date    Anemia     last assessed  1/7/14     Cancer [facility])     Coronary artery disease     Hypercholesteremia     Hypertension     Kidney stone     Polyp of sigmoid colon     Prostate cancer ([facility] [address] )     Renal disorder     elevated serum creat    Tinnitus     unspecified laterality / last assessed 4/9/13    Vitamin D deficiency        PAST SURGICAL HISTORY:     Past Surgical History:   Procedure Laterality Date    CARDIAC SURGERY      COLONOSCOPY      CORONARY ANGIOPLASTY WITH STENT PLACEMENT      FL RETROGRADE PYELOGRAM  9/11/2019    HEMORRHOID SURGERY      [place] CYSTO/URETERO W/LITHOTRIPSY &INDWELL STENT INSRT Left 9/11/2019    Procedure: CYSTO, URETEROSCOPY W/HOLMIUM LASER, BASKET STONE EXTRACTION, RETROGRADE PYELOGRAM, STENT EXCHANGE;  Surgeon: Phani Rao MD;  Location: [facility];  Service: Urology    PROSTATE BIOPSY  10/24/2018    TONSILLECTOMY         CURRENT MEDICATIONS:     Current Outpatient Medications   Medication Sig Dispense Refill    acetaminophen (TYLENOL) 500 mg tablet Take 500 mg by mouth every 6 (six) hours as needed for mild pain      aspirin (ASPIRIN ADULT LOW STRENGTH) 81 mg EC tablet Take 1 tablet by mouth daily      atorvastatin (LIPITOR) 40 mg tablet Take 40 mg by mouth daily       Calcium 500 MG tablet Take 1,000 mg by mouth daily       cholecalciferol (VITAMIN D3) 1,000 units tablet Take 1 tablet by mouth daily      enzalutamide (Xtandi) 40 mg capsule Take 4 capsules (160 mg total) by mouth once daily   120 capsule 11    hydrochlorothiazide (HYDRODIURIL) 25 mg tablet Take 1 tablet (25 mg total) by mouth daily 90 tablet 1    leuprolide (LUPRON DEPOT 6 MONTH KIT) 45 mg Inject 45 mg into a muscle every 6 (six) months 45 mg 1    losartan (COZAAR) 100 MG tablet take 1 tablet by mouth once daily 90 tablet 1    MELATONIN PO Take 1 tablet by mouth daily at bedtime as needed       metoprolol succinate (TOPROL-XL) 25 mg 24 hr tablet take 1 tablet by mouth once daily 90 tablet 1    Zoledronic Acid (ZOMETA IV) Infuse into a venous catheter every 3 (three) months       Current Facility-Administered Medications   Medication Dose Route Frequency Provider Last Rate Last Admin    cefTRIAXone (ROCEPHIN) injection 1,000 mg  1,000 mg Intramuscular Once Phani Rao MD           ALLERGIES:   No Known Allergies    SOCIAL HISTORY:     Social History     Socioeconomic History    Marital status: /Civil [employer]     Spouse name: None    Number of children: None    Years of education: None    Highest education level: None   Occupational History    Occupation: consultant   Social Needs    Financial resource strain: None    Food insecurity     Worry: None     Inability: None    Transportation needs     Medical: None     Non-medical: None   Tobacco Use    Smoking status: Never Smoker    Smokeless tobacco: Never Used   Substance and Sexual Activity    Alcohol use: Yes     Alcohol/week: 2 0 - 3 0 standard drinks     Types: 2 - 3 Glasses of wine per week     Frequency: 2-3 times a week     Comment: Occuational / social     Drug use: No    Sexual activity: None   Lifestyle    Physical activity     Days per week: None     Minutes per session: None    Stress: None   Relationships    Social connections     Talks on phone: None     Gets together: None     Attends Restorationism service: None     Active member of club or organization: None     Attends meetings of clubs or organizations: None     Relationship status: None    Intimate partner violence     Fear of current or ex partner: None     Emotionally abused: None     Physically abused: None     Forced sexual activity: None   Other Topics Concern    None   Social History Narrative    Always uses seat belt     Daily caffeine consumption 2-3 servings a day     Feels safe at home       SOCIAL HISTORY:     Family History   Problem Relation Age of Onset    Breast cancer Mother     Hypertension Father        REVIEW OF SYSTEMS:     Review of Systems   Constitutional: Negative  Respiratory: Negative  Cardiovascular: Negative  Genitourinary: Positive for frequency and urgency  Musculoskeletal: Negative  Skin: Negative  Psychiatric/Behavioral: Negative          PHYSICAL EXAM:     /80 (BP Location: Left arm, Patient Position: Sitting, Cuff Size: Adult)   Pulse 67   Ht 5' 6" (1 676 m)   Wt 94 3 kg (208 lb)   BMI 33 57 kg/m²     General: Healthy appearing male in no acute distress  They have a normal affect  There is not appear to be any gross neurologic defects or abnormalities  HEENT:  Normocephalic, atraumatic  Neck is supple without any palpable lymphadenopathy  Cardiovascular:  Patient has normal palpable distal radial pulses  There is no significant peripheral edema  No JVD is noted  Respiratory:  Patient has unlabored respirations  There is no audible wheeze or rhonchi  Abdomen: Abdomen is soft and nontender  There is no tympany  Inguinal and umbilical hernia are not appreciated  :  Uncircumcised phallus  Musculoskeletal:  Patient does not have significant CVA tenderness in the  flank with palpation or percussion  They full range of motion in all 4 extremities  Strength in all 4 extremities appears congruent  Patient is able to ambulate without assistance or difficulty  Dermatologic:  Patient has no skin abnormalities or rashes  LABS:     CBC:   Lab Results   Component Value Date    WBC 5 9 2021    HGB 13 6 2021    HCT 39 5 2021    MCV 91 4 2021     2021       BMP:   Lab Results   Component Value Date    CALCIUM 9 4 2021     2017    K 4 2 2021    CO2 32 2021     2021    BUN 19 2021    CREATININE 1 06 2021     10/5/18 PSA 9 8  Lab Results   Component Value Date    PSA 3 0 2021    PSA 1 4 10/16/2020    PSA 0 8 2020     IMAGIN/5/20  BONE SCAN  WHOLE BODY     INDICATION: C61: Malignant neoplasm of prostate     PREVIOUS FILM CORRELATION:    CT chest abdomen pelvis 2020, bone scan exam 10/5/2018     TECHNIQUE:   This study was performed following the intravenous administration of 25 7 mCi Tc-99m labeled MDP  Delayed, anterior and posterior whole body images were acquired, 2-3 hours after radiopharmaceutical administration   Additional delayed   oblique static images acquired of the bilateral ribs and pelvis      FINDINGS:     New focus of radiotracer uptake at a right mid rib anteriorly likely the 5th rib  There is focal sclerosis here on CT, nonspecific  This could be posttraumatic, metastasis is not excluded      Previously noted was a tiny focus of radiotracer uptake at the left posterior 7th rib, not seen on the current exam      Stable focus of radiotracer uptake at the T10 level posteriorly  There is a corresponding sclerotic lesion here on CT compatible with metastasis      New focus of radiotracer uptake at the right proximal humeral shaft suspicious for metastasis      Recent CT examination notes multiple small subcentimeter sclerotic lesions without radiotracer uptake on this bone scan exam   These are likely too small to assess      Foci of radiotracer uptake at the bilateral shoulders and bilateral feet are similar in distribution compared to the prior exam likely degenerative      Mild foci of radiotracer uptake in the thoracic and lower lumbar spine are likely degenerative and similar to the prior exam      The renal activity is fairly symmetric      IMPRESSION:     1  Findings suggest mild progression of osseous metastasis  New focus of radiotracer uptake at the right proximal humerus suspicious for metastasis  Additional new focus of radiotracer uptake at the right anterior 5th rib, could be posttraumatic,   metastasis is not excluded  2   Stable focus of suspected metastasis at the T10 vertebral body    3   Recent CT examination notes multiple small subcentimeter sclerotic lesions without radiotracer uptake on this bone scan exam   These are likely too small to assess      5/2/20  CT CHEST, ABDOMEN AND PELVIS WITH AND WITHOUT IV CONTRAST     INDICATION:   C61: Malignant neoplasm of prostate      COMPARISON:  CT chest dated 9/28/2018      TECHNIQUE: Initial CT of the abdomen and pelvis was performed without intravenous contrast   Subsequent dynamic CT evaluation of the chest, abdomen and pelvis was performed after the administration of intravenous contrast was performed  Finally, delayed   dynamic delayed phase postcontrast CT evaluation of the abdomen and pelvis was performed  Axial, sagittal, and coronal 2D reformatted images were created from the source data and submitted for interpretation       Radiation dose length product (DLP) for this visit:  8450 mGy-cm   This examination, like all CT scans [facility], was performed utilizing techniques to minimize radiation dose exposure, including the use of iterative   reconstruction and automated exposure control      IV Contrast:  100 mL of iohexol (OMNIPAQUE)  Enteric Contrast:  Enteric contrast was administered      FINDINGS:     LUNGS:  Lungs are clear  There is no tracheal or endobronchial lesion      PLEURA:  Unremarkable      HEART/GREAT VESSELS:  Unremarkable for patient's age      MEDIASTINUM AND JOSE ANTONIO:  Unremarkable      CHEST WALL AND LOWER NECK:   Unremarkable      VISUALIZED STRUCTURES IN THE UPPER ABDOMEN:  Unremarkable      OSSEOUS STRUCTURES:  No acute fracture or destructive osseous lesion      ABDOMEN     RIGHT KIDNEY AND URETER:  No solid renal mass  No detectable urothelial mass  No hydronephrosis or hydroureter  No urinary tract calculi  No perinephric collection      LEFT KIDNEY AND URETER:  No solid renal mass  There are several left renal cysts measuring up to 9 4 cm  No hydronephrosis or hydroureter  No urinary tract calculi  No perinephric collection      URINARY BLADDER:  It is uncertain whether a 1 cm density at the base of the urinary bladder is a urinary bladder mass versus the prostate gland indenting the urinary bladder base  No calculi         LIVER/BILIARY TREE:  Few liver cysts are seen measuring up to 2 5 cm      GALLBLADDER:  No calcified gallstones   No pericholecystic inflammatory change      SPLEEN:  Unremarkable      PANCREAS:  Unremarkable      ADRENAL GLANDS: Unremarkable      STOMACH AND BOWEL:  Unremarkable      ABDOMINOPELVIC CAVITY:  No ascites  No free intraperitoneal air  No lymphadenopathy      VESSELS:  There is calcific atherosclerosis of the abdominal aorta without evidence for aneurysmal dilatation      PELVIS     REPRODUCTIVE ORGANS:  Unremarkable for patient's age      APPENDIX: No findings to suggest appendicitis      ABDOMINAL WALL/INGUINAL REGIONS:  Unremarkable      OSSEOUS STRUCTURES: Sclerotic lesion in the posterior T10 vertebral bodies increase in size, now measuring 1 9 cm (image 111, series 602)  Tiny sclerotic density in the T12 vertebral body is new  Tiny sclerotic densities in the L5 vertebral body and S1   are nonspecific  Few tiny sclerotic densities in the upper sternum (image 16, series 3) are new  5 mm sclerotic focus in the right 6th rib is new (image 74, series 3)  Tiny sclerotic focus in the right 7th rib is new (image 83, series 3)  4 mm   sclerotic focus in the right 8th rib is stable  4 mm sclerotic lesion in the left scapula (image 3, series 3) is new  6 mm sclerotic lesion in the left 4th rib is increase in size (image 43, series 3)  5 mm sclerotic density in the left 7th rib is   increased in density (image 64, series 3)     IMPRESSION:     1 cm apparent density at the base of the urinary bladder may represent a bladder mass versus the prostate gland indenting the urinary bladder base  Cystoscopic correlation is recommended      New/enlarging sclerotic osseous lesions in the chest and spine are worrisome for metastases as described above  Correlation with bone scan is recommended  PATHOLOGY:     Final Diagnosis   A  Right lateral base prostate core biopsy:  - Adenocarcinoma, Vitaliy score 4+ 5 = 9/10 (grade 5 comprises 10% of core biopsy), grade group 5, continuously involving greater than 95% of this core biopsy  - No perineural invasion is seen     B   Right lateral mid prostate core biopsy:  - Adenocarcinoma, Vitaliy score 4+ 5 = 9/10 (grade 5 conprises 20% of core biopsy), grade group 5, continuously involving 90% of this core biopsy  - Perineural invasion is seen      C  Left lateral apex prostate core biopsy:  - Adenocarcinoma, [place] score 5 + 4 = 9/10 (grade 5 comprises 60% of core biopsy), grade group 5, continuously involving 60% of this core biopsy  - Perineural invasion is seen     D  Right base prostate core biopsy:  - Adenocarcinoma, Vitaliy score 4 + 5 = 9/10 (grade 5 comprises 10% of core biopsy), grade group 5, continuously involving 85% of this core biopsy  - Perineural invasion is seen     E  Right mid prostate core biopsy   - Adenocarcinoma, [place] score 5 + 4 = 9/10 (grade 5 comprises 95% of core biopsy), grade group 5, continuously involving 60% of this core biopsy  - Perineural invasion is seen     F  Right apex prostate core biopsy:  - Adenocarcinoma, Vitaliy score 4 + 5 = 9/10 (grade 5 comprises 45% of core biopsy), grade group 5, continuously involving 70% of this core biopsy  - Perineural invasion is seen     G  Left base prostate core biopsy:  - Adenocarcinoma, Vitaliy score 4 + 5 = 9/10 (grade 5 comprises 35% of core biopsy), grade group 5, continuously involving 70% of this core biopsy  - No perineural invasion is seen     H  Left mid prostate core biopsy:  - Adenocarcinoma, [place] score 4 + 4 = 8/10, grade group 4, discontinuously involving 20% of this core biopsy  - No perineural invasion is seen      I  Left apex prostate core biopsy:  - Benign prostatic tissue     J  Left lateral base prostate core biopsy:  - Adenocarcinoma, [place] score 4 + 5 = 9/10, (grade 5 comprises 35% of core biopsy), grade group 5, discontinuously involving 20% of this core biopsy  - No perineural invasion is seen      K  Left lateral mid prostate core biopsy:  - Adenocarcinoma, [place] score 4 + 4 = 8/10, grade group 4, continuously involving 5% of this core biopsy  - No perineural invasion is seen      L  Left lateral apex prostate core biopsy:  - Adenocarcinoma, [place] score 4 + 4 = 8/10, grade group 4, discontinuously involving 5% of this core biopsy  - No perineural invasion is seen      Note: Block for Prolaris testing if required: E     PROCEDURE:     SEE NOTE    ASSESSMENT:     79 y o  male with high volume, high risk Vitaliy 5+4=9 CaP, now with abnormal sclerotic lesions and positive bone scan now status post ureteroscopy for stone disease with med/rad onc plans for palliative radiation to pelvis/thoracic spine    PLAN:     Patient has now undergone fiducial marker placement  Will have palliative radiation to the pelvis and prostate  Will continue on androgen deprivation therapy and systemic therapy provided by the medical oncology team   Patient will be due for his next injection of Lupron in February  Will continue follow-up with all 3 surfaces  Patient has been referred to palliative care after lengthy discussion

## 2021-01-25 DIAGNOSIS — R60.0 LOCALIZED EDEMA: ICD-10-CM

## 2021-01-25 RX ORDER — HYDROCHLOROTHIAZIDE 25 MG/1
25 TABLET ORAL DAILY
Qty: 90 TABLET | Refills: 1 | Status: SHIPPED | OUTPATIENT
Start: 2021-01-25 | End: 2021-07-16 | Stop reason: SDUPTHER

## 2021-01-26 ENCOUNTER — APPOINTMENT (OUTPATIENT)
Dept: RADIATION ONCOLOGY | Facility: CLINIC | Age: 81
End: 2021-01-26
Payer: COMMERCIAL

## 2021-01-27 ENCOUNTER — VBI (OUTPATIENT)
Dept: ADMINISTRATIVE | Facility: OTHER | Age: 81
End: 2021-01-27

## 2021-01-28 ENCOUNTER — OFFICE VISIT (OUTPATIENT)
Dept: HEMATOLOGY ONCOLOGY | Facility: CLINIC | Age: 81
End: 2021-01-28
Payer: COMMERCIAL

## 2021-01-28 ENCOUNTER — HOSPITAL ENCOUNTER (OUTPATIENT)
Dept: INFUSION CENTER | Facility: CLINIC | Age: 81
Discharge: HOME | End: 2021-01-28
Payer: COMMERCIAL

## 2021-01-28 VITALS
RESPIRATION RATE: 18 BRPM | DIASTOLIC BLOOD PRESSURE: 82 MMHG | BODY MASS INDEX: 33.45 KG/M2 | TEMPERATURE: 98 F | WEIGHT: 207.23 LBS | SYSTOLIC BLOOD PRESSURE: 140 MMHG | HEART RATE: 60 BPM

## 2021-01-28 VITALS
HEART RATE: 60 BPM | BODY MASS INDEX: 33.32 KG/M2 | WEIGHT: 207.3 LBS | HEIGHT: 66 IN | DIASTOLIC BLOOD PRESSURE: 82 MMHG | OXYGEN SATURATION: 97 % | TEMPERATURE: 97.5 F | RESPIRATION RATE: 18 BRPM | SYSTOLIC BLOOD PRESSURE: 140 MMHG

## 2021-01-28 DIAGNOSIS — C61 PROSTATE CANCER (HCC): Primary | ICD-10-CM

## 2021-01-28 DIAGNOSIS — C79.51 BONE METASTASES (HCC): ICD-10-CM

## 2021-01-28 PROBLEM — R25.8 CLONUS: Status: RESOLVED | Noted: 2019-05-30 | Resolved: 2021-01-28

## 2021-01-28 PROCEDURE — 1160F RVW MEDS BY RX/DR IN RCRD: CPT | Performed by: INTERNAL MEDICINE

## 2021-01-28 PROCEDURE — 99214 OFFICE O/P EST MOD 30 MIN: CPT | Performed by: INTERNAL MEDICINE

## 2021-01-28 PROCEDURE — 96365 THER/PROPH/DIAG IV INF INIT: CPT

## 2021-01-28 PROCEDURE — 1036F TOBACCO NON-USER: CPT | Performed by: INTERNAL MEDICINE

## 2021-01-28 RX ORDER — SODIUM CHLORIDE 9 MG/ML
20 INJECTION, SOLUTION INTRAVENOUS ONCE
Status: CANCELLED | OUTPATIENT
Start: 2021-04-22

## 2021-01-28 RX ORDER — SODIUM CHLORIDE 9 MG/ML
20 INJECTION, SOLUTION INTRAVENOUS ONCE
Status: COMPLETED | OUTPATIENT
Start: 2021-01-28 | End: 2021-01-28

## 2021-01-28 RX ADMIN — ZOLEDRONIC ACID 4 MG: 4 INJECTION, SOLUTION, CONCENTRATE INTRAVENOUS at 13:00

## 2021-01-28 RX ADMIN — SODIUM CHLORIDE 20 ML/HR: 0.9 INJECTION, SOLUTION INTRAVENOUS at 12:54

## 2021-01-28 NOTE — PROGRESS NOTES
Hematology Outpatient Follow - Up Note  Abril Lakhani 79 y o  male MRN: @ Encounter: [id number]        Date:  1/28/2021        Assessment/ Plan:    80-year-old  male with castration resistant metastatic prostate cancer [place] score of 9 with bone metastases     Diagnosed initially on 09/2018 with PSA of 9, CT scan showed involvement of the posterior side of the 10th rib area, vertebral body and distant point of the right clavicle he received Firmagon and later on Lupron by Urology     He received abiraterone/prednisone PSA went down to 0 1 until May 2020 PSA started rising up, bone scan showed lesion in the anterior 5th rib area, right humerus no evidence of visceral disease     He received enzalutamide since 06/02/2020 at PSA of 0 8     In 10/2020 PSA 1 4     Negative for germline BRCA 1/2 mutation, MSI stable, LIONEL on multiple other mutations     PET scan showed scattered metastatic disease specially at T10, the base of the bladder, possible inguinal lymphadenopathy    Supposed to start radiation therapy to the pelvic area in the beginning of February 2021, continue enzalutamide    If progression of disease he will proceed with Taxotere    Continue Zometa every 3 months due today        Labs and imaging studies are reviewed by ordering provider once results are available  If there are findings that need immediate attention, you will be contacted when results available  Discussing results and the implication on your healthcare is best discussed in person at your follow-up visit  HPI:  80-year-old  male who was seen by Urology for elevation of the PSA, when he presented in June 2017 with PSA of 4 6, Subsequently in September of 2017 PSA was 7 3 and in May of 2018 PSA was 8  6   A biopsy was recommended but the patient had just undergone percutaneous stenting of the heart and he was on dual anti-platelet therapy      MRI of the abdomen in April 2018 showed 2 simple cyst in the right hepatic lobe, bilateral renal cysts the largest 1 measuring 10 cm in the left lower lobe     In October of 2018 PSA was 9 8  CT scan of the chest 9/28/2018 showed sclerotic lesions in T10, 5 mm nodule in the right lower lobe   Bone scan showed activity in the posterior T10 level and left posterior 7th rib area concerning for osseous metastases and increased activity in the distal right clavicle might be degenerative or metastatic area      He was diagnosed with castration sensitive metastatic prostate cancer  [facility] insurance company could not approve enzalutamide, he also has a high co-payment for abiraterone     The patient initiated on samples of enzalutamide 160 mg p o  daily since the beginning of December 2018   His insurance approved abiraterone however he had a high co-payment        We were finally able to get financial assistance through Shama Bustamante and Roseline Manuel (abiraterone) 2/3/2019 with prednisone 5mg po bid without side effects      In May 2020 PSA 0 5, bone scan showed activity in the anterior 5th rib area, right humerus area most likely consistent with metastatic disease, no visceral disease on the CT scan, we had hard time getting enzalutamide , the patient was initiated on enzalutamide on 06/02/2020, PSA at the time of 0 8     PSA 1 4 in October 2020, normal alkaline phosphatase           Genes analyzed of germ line test  LIONEL, BARD1, BRCA1, BRCA2, BRIP1, CDH1, CHEK2, DICER1, EPCAM*, HOXB13, MLH1, MSH2,  MSH6, NBN, NF1, PALB2, PMS2, PTEN, RAD50, RAD51C, RAD51D, SMARCA4, STK11, TP53 all of these were negative     Evaluated at [facility] the decision for radiation therapy to oligo metastases and the primary site of the tumor depends on the PET scan finding which showed multiple metastatic disease, patient to meet with Radiation Oncology     PET scan on 11/13/2020 showed radiotracer retention in the left posterior bladder suspicious for malignancy with prostate cancer invasion progressing from prior CT scan mild to moderate uptake in the few small inguinal lymph nodes, sclerotic lesions in the spine with uptake suspicious for metastases    Scheduled for radiation therapy in the beginning of February 2021, he continues on enzalutamide       Previous Treatment:         Test Results:    Imaging: No results found  Labs:   Lab Results   Component Value Date    WBC 5 9 01/07/2021    HGB 13 6 01/07/2021    HCT 39 5 01/07/2021    MCV 91 4 01/07/2021     01/07/2021     Lab Results   Component Value Date     09/30/2017    K 4 2 01/07/2021     01/07/2021    CO2 32 01/07/2021    BUN 19 01/07/2021    CREATININE 1 06 01/07/2021    GLUF 97 08/08/2019    CALCIUM 9 4 01/07/2021    AST 15 01/07/2021    ALT 14 01/07/2021    ALKPHOS 38 01/07/2021    PROT 6 5 09/30/2017    BILITOT 0 7 09/30/2017    EGFR 61 12/02/2019       No results found for: IRON, TIBC, FERRITIN    No results found for: [id number]      ROS: Review of Systems   Constitutional: Negative  Negative for appetite change, chills, diaphoresis, fatigue, fever and unexpected weight change  HENT:   Negative for hearing loss, lump/mass, mouth sores, nosebleeds, sore throat, trouble swallowing and voice change  Eyes: Negative  Negative for eye problems and icterus  Respiratory: Negative  Negative for chest tightness, cough, hemoptysis and shortness of breath  Cardiovascular: Negative for chest pain and leg swelling  Gastrointestinal: Negative for abdominal distention, abdominal pain, blood in stool, constipation, diarrhea and nausea  Endocrine: Negative  Genitourinary: Negative for dysuria, frequency, hematuria and pelvic pain  Musculoskeletal: Negative  Negative for arthralgias, back pain, flank pain, gait problem, myalgias and neck stiffness  Skin: Negative for itching and rash  Neurological: Negative for dizziness, gait problem, headaches, light-headedness, numbness and speech difficulty  Hematological: Negative for adenopathy  Does not bruise/bleed easily  Psychiatric/Behavioral: Negative for confusion, decreased concentration, depression and sleep disturbance  The patient is not nervous/anxious  Current Medications: Reviewed  Allergies: Reviewed  PMH/FH/SH:  Reviewed      Physical Exam:    Body surface area is 2 03 meters squared  Wt Readings from Last 3 Encounters:   01/28/21 94 kg (207 lb 4 8 oz)   01/19/21 94 3 kg (208 lb)   12/16/20 92 1 kg (203 lb)        Temp Readings from Last 3 Encounters:   01/28/21 97 5 °F (36 4 °C) (Tympanic)   12/04/20 (!) 96 3 °F (35 7 °C) (Temporal)   11/25/20 (!) 97 2 °F (36 2 °C) (Tympanic)        BP Readings from Last 3 Encounters:   01/28/21 140/82   01/19/21 140/80   12/16/20 118/84         Pulse Readings from Last 3 Encounters:   01/28/21 60   01/19/21 67   12/16/20 72        Physical Exam  Vitals signs reviewed  Constitutional:       General: He is not in acute distress  Appearance: He is well-developed  He is not diaphoretic  HENT:      Head: Normocephalic and atraumatic  Eyes:      Conjunctiva/sclera: Conjunctivae normal    Neck:      Musculoskeletal: Normal range of motion and neck supple  Trachea: No tracheal deviation  Cardiovascular:      Rate and Rhythm: Normal rate and regular rhythm  Heart sounds: No murmur  No friction rub  No gallop  Pulmonary:      Effort: Pulmonary effort is normal  No respiratory distress  Breath sounds: Normal breath sounds  No wheezing or rales  Chest:      Chest wall: No tenderness  Abdominal:      General: There is no distension  Palpations: Abdomen is soft  Tenderness: There is no abdominal tenderness  Musculoskeletal:      Right lower leg: No edema  Left lower leg: No edema  Lymphadenopathy:      Cervical: No cervical adenopathy  Skin:     General: Skin is warm and dry  Coloration: Skin is not pale  Findings: No erythema     Neurological: Mental Status: He is alert and oriented to person, place, and time  Psychiatric:         Behavior: Behavior normal          Thought Content: Thought content normal          Judgment: Judgment normal          ECO    Goals and Barriers:  Current Goal: Minimize effects of disease  Barriers: None  Patient's Capacity to Self Care:  Patient is able to self care      Code Status: [unfilled]

## 2021-01-28 NOTE — PROGRESS NOTES
Patient tolerated Zometa infusion well  Offers no complaints  Pt is aware of all future appointments   Declined AVS

## 2021-01-28 NOTE — PLAN OF CARE
Problem: Potential for Falls  Goal: Patient will remain free of falls  Description: INTERVENTIONS:  - Assess patient frequently for physical needs  -  Identify cognitive and physical deficits and behaviors that affect risk of falls    -  [place] fall precautions as indicated by assessment   - Educate patient/family on patient safety including physical limitations  - Instruct patient to call for assistance with activity based on assessment  - Modify environment to reduce risk of injury  - Consider OT/PT consult to assist with strengthening/mobility  Outcome: Progressing

## 2021-01-29 ENCOUNTER — RADIATION THERAPY TREATMENT (OUTPATIENT)
Dept: RADIATION ONCOLOGY | Facility: CLINIC | Age: 81
End: 2021-01-29
Attending: RADIOLOGY
Payer: COMMERCIAL

## 2021-01-29 PROCEDURE — 77334 RADIATION TREATMENT AID(S): CPT | Performed by: RADIOLOGY

## 2021-02-04 ENCOUNTER — TELEPHONE (OUTPATIENT)
Dept: UROLOGY | Facility: AMBULATORY SURGERY CENTER | Age: 81
End: 2021-02-04

## 2021-02-04 NOTE — TELEPHONE ENCOUNTER
Alhaji from [employer] called advising that they need a script for patients Lupron  Please fax to [phone]  Phone number is [phone]

## 2021-02-05 ENCOUNTER — CONSULT (OUTPATIENT)
Dept: PALLIATIVE MEDICINE | Facility: CLINIC | Age: 81
End: 2021-02-05
Payer: COMMERCIAL

## 2021-02-05 VITALS
HEIGHT: 66 IN | BODY MASS INDEX: 33.66 KG/M2 | SYSTOLIC BLOOD PRESSURE: 130 MMHG | TEMPERATURE: 97.4 F | HEART RATE: 61 BPM | DIASTOLIC BLOOD PRESSURE: 80 MMHG | WEIGHT: 209.44 LBS | OXYGEN SATURATION: 98 % | RESPIRATION RATE: 18 BRPM

## 2021-02-05 DIAGNOSIS — C79.51 BONE METASTASES (HCC): ICD-10-CM

## 2021-02-05 DIAGNOSIS — C61 PROSTATE CANCER (HCC): Primary | ICD-10-CM

## 2021-02-05 DIAGNOSIS — C61 PROSTATE CANCER (HCC): ICD-10-CM

## 2021-02-05 PROCEDURE — 99203 OFFICE O/P NEW LOW 30 MIN: CPT | Performed by: FAMILY MEDICINE

## 2021-02-05 NOTE — PROGRESS NOTES
Outpatient Consultation - Palliative and Supportive Care   Yaima Acharya 79 y o  male [id number]    Assessment & Plan  Problem List Items Addressed This Visit     Bone metastases [facility])    Prostate cancer ([facility] [address] )        - Counseling on health screening and disease prevention, COVID-19 specific (CPT V65 49)    Medications adjusted this encounter:  Requested Prescriptions      No prescriptions requested or ordered in this encounter     No orders of the defined types were placed in this encounter  There are no discontinued medications  - no meds requested nor offered  - Extensive education offered re: goals and means of cancer cares  - Pt advised to continue discussing options with his teams, and that removal of prostate nor XRT will 'eradicate' his cancer, which is described as Stage IV and incurable  PPS: [address] and his wife were seen today for symptoms and planning cares related to above illnesses  Above orders were sent electronically, or provided in hardcopy in clinic  I have reviewed the patient's controlled substance dispensing history in the Prescription Drug Monitoring Program in compliance with the [facility] regulations before prescribing any controlled substances  We appreciate the referral, and wish for them to continue to follow with us  If there are questions or concerns, please contact us through our clinic/answering service 24 hours a day, seven days a week  Roseline Laguna MD  [facility] Palliative and Supportive Care  [phone]        Visit Information    Accompanied By: Family member    Source of History: Self, Family member    History Limitations: None    Contacts: wife George Serrano - [phone]    History of Present Illness      Yaima Acharya is a 79 y o  male who presents as a referral from Dr Elsie Finnegan of Urology for primary palliative diagnosis of Stage IV castration resistant prostate cancer  There are known metastases to the ribs and R humerus    He follows with Dr Galo Barros of Med/Onc, and Dr Rosemary Vides as noted  Consultation is requested for: symptom management and goals processing  At length today, we described the nature of his disease; his treatment options; and briefly, our expectations for his health and illness overall  Prostate cancer is slow-growing, but aggressive Grade may mean it metastasizes faster  It is often exquisitely hormonally sensitive, which allows for long-term relatively non-toxic control by interrupting the androgen signalling cascade  However, when tumors spread from original site to bones, this is almost always a sign of enhanced tumor viability free-floating in the bloodstream   This concept of free-floating mets was used to describe to the patient that his disease may literally be anywhere in his body  Next we discussed treatments:   - Surgery - no longer indicated for widely metastatic disease  Removal of prostate will not improve longevity, and may not even improve PSA, should there be 'islands' of prostate tissue elsewhere  - XRT - Helpful only for 'whack a mole' approach to bony or otherwise obstructive solid tumors  Will not change course of overall illness   - Chemo - The only means of prolonging life and controlling illness, but will not 'eradicate' illness      Pertinent Palliative Care Domains    Physical Symptoms:ambulates    Psychological Symptoms:mild anxiety, limited insight    Social Aspects: support system includes wife    Spiritual Aspects: very much a thinker; was a former     Historical Data  Past Medical History:   Diagnosis Date    Anemia     last assessed  1/7/14     Cancer ([facility] )     Coronary artery disease     Hypercholesteremia     Hypertension     Kidney stone     Polyp of sigmoid colon     Prostate cancer ([facility] )     Renal disorder     elevated serum creat    Tinnitus     unspecified laterality / last assessed 4/9/13    Vitamin D deficiency      Past Surgical History:   Procedure Laterality Date    CARDIAC SURGERY      COLONOSCOPY      CORONARY ANGIOPLASTY WITH STENT PLACEMENT      FL RETROGRADE PYELOGRAM  9/11/2019    HEMORRHOID SURGERY      NC CYSTO/URETERO W/LITHOTRIPSY &INDWELL STENT INSRT Left 9/11/2019    Procedure: CYSTO, URETEROSCOPY W/HOLMIUM LASER, BASKET STONE EXTRACTION, RETROGRADE PYELOGRAM, STENT EXCHANGE;  Surgeon: Meme Sena MD;  Location: [facility] OR;  Service: Urology    PROSTATE BIOPSY  10/24/2018    TONSILLECTOMY       Social History     Socioeconomic History    Marital status: /Civil Union     Spouse name: Not on file    Number of children: Not on file    Years of education: Not on file    Highest education level: Not on file   Occupational History    Occupation: consultant   Social Needs    Financial resource strain: Not on file    Food insecurity     Worry: Not on file     Inability: Not on file   [employer] needs     Medical: Not on file     Non-medical: Not on file   Tobacco Use    Smoking status: Never Smoker    Smokeless tobacco: Never Used   Substance and Sexual Activity    Alcohol use:  Yes     Alcohol/week: 2 0 - 3 0 standard drinks     Types: 2 - 3 Glasses of wine per week     Frequency: 2-3 times a week     Comment: Occuational / social     Drug use: No    Sexual activity: Not on file   Lifestyle    Physical activity     Days per week: Not on file     Minutes per session: Not on file    Stress: Not on file   Relationships    Social connections     Talks on phone: Not on file     Gets together: Not on file     Attends Hindu service: Not on file     Active member of club or organization: Not on file     Attends meetings of clubs or organizations: Not on file     Relationship status: Not on file    Intimate partner violence     Fear of current or ex partner: Not on file     Emotionally abused: Not on file     Physically abused: Not on file     Forced sexual activity: Not on file   Other Topics Concern    Not on file   Social History Narrative    Always uses seat belt     Daily caffeine consumption 2-3 servings a day     Feels safe at home     Family History   Problem Relation Age of Onset    Breast cancer Mother     Hypertension Father      No Known Allergies  Current Outpatient Medications   Medication Sig Dispense Refill    aspirin (ASPIRIN ADULT LOW STRENGTH) 81 mg EC tablet Take 1 tablet by mouth daily      atorvastatin (LIPITOR) 40 mg tablet Take 40 mg by mouth daily       Calcium 500 MG tablet Take 1,000 mg by mouth daily       cholecalciferol (VITAMIN D3) 1,000 units tablet Take 1 tablet by mouth daily      enzalutamide (Xtandi) 40 mg capsule Take 4 capsules (160 mg total) by mouth once daily  120 capsule 11    hydrochlorothiazide (HYDRODIURIL) 25 mg tablet Take 1 tablet (25 mg total) by mouth daily 90 tablet 1    losartan (COZAAR) 100 MG tablet take 1 tablet by mouth once daily 90 tablet 1    MELATONIN PO Take 1 tablet by mouth daily at bedtime as needed       metoprolol succinate (TOPROL-XL) 25 mg 24 hr tablet take 1 tablet by mouth once daily 90 tablet 1    acetaminophen (TYLENOL) 500 mg tablet Take 500 mg by mouth every 6 (six) hours as needed for mild pain      leuprolide (ELIGARD 6 MONTH KIT) 45 MG subcutaneous injection Inject 45 mg under the skin every 6 (six) months 0 25 mg 3    leuprolide (LUPRON DEPOT 6 MONTH KIT) 45 mg Inject 45 mg into a muscle every 6 (six) months 45 mg 1    Zoledronic Acid (ZOMETA IV) Infuse into a venous catheter every 3 (three) months       No current facility-administered medications for this visit  Review of Systems   Constitution: Negative for decreased appetite, weight gain and weight loss  HENT: Negative for hoarse voice and nosebleeds  Eyes: Negative for vision loss in left eye and vision loss in right eye  Cardiovascular: Negative for chest pain and dyspnea on exertion  Respiratory: Negative for cough and shortness of breath      Endocrine: Negative for polydipsia, polyphagia and polyuria  Skin: Negative for flushing and itching  Musculoskeletal: Negative for falls  Gastrointestinal: Negative for anorexia, jaundice, nausea and vomiting  Genitourinary: Negative for frequency  Neurological: Negative for dizziness  Psychiatric/Behavioral: Negative for depression and memory loss  The patient is not nervous/anxious  Vital Signs    /80 (BP Location: Left arm, Patient Position: Sitting, Cuff Size: Standard)   Pulse 61   Temp (!) 97 4 °F (36 3 °C) (Temporal)   Resp 18   Ht 5' 6" (1 676 m)   Wt 95 kg (209 lb 7 oz)   SpO2 98%   BMI 33 80 kg/m²     Physical Exam and Objective Data  Physical Exam  Constitutional:       General: He is not in acute distress  Appearance: He is well-developed  He is obese  He is not ill-appearing or diaphoretic  HENT:      Head: Normocephalic and atraumatic  Right Ear: External ear normal       Left Ear: External ear normal    Eyes:      General:         Right eye: No discharge  Left eye: No discharge  Conjunctiva/sclera: Conjunctivae normal       Pupils: Pupils are equal, round, and reactive to light  Neck:      Trachea: No tracheal deviation  Cardiovascular:      Rate and Rhythm: Normal rate and regular rhythm  Pulmonary:      Effort: Pulmonary effort is normal  No respiratory distress  Breath sounds: No stridor  Abdominal:      Palpations: Abdomen is soft  Musculoskeletal:         General: No deformity  Skin:     Coloration: Skin is not pale  Findings: No erythema or rash  Neurological:      General: No focal deficit present  Mental Status: He is alert and oriented to person, place, and time  Mental status is at baseline  Cranial Nerves: No cranial nerve deficit  Psychiatric:         Mood and Affect: Mood normal          Behavior: Behavior normal          Thought Content:  Thought content normal          Judgment: Judgment normal  Radiology and Laboratory:  I personally reviewed and interpreted the following results: reviewed reports    45+ minutes was spent face to face with Grzegorz Jacob and his family with greater than 50% of the time spent in counseling or coordination of care including discussions of etiology of diagnosis, prognosis of diagnosis, risks and benefits of treatment, treatment instructions and risk factors and risk reduction of disease  Additional time was also spent in discussing coronavirus vaccine indications, availability, and logistical challenges  All of the patient's questions were answered during this discussion

## 2021-02-17 ENCOUNTER — TELEPHONE (OUTPATIENT)
Dept: FAMILY MEDICINE CLINIC | Facility: CLINIC | Age: 81
End: 2021-02-17

## 2021-02-17 NOTE — TELEPHONE ENCOUNTER
PC from David at [facility], [facility] requesting office notes for appeal for radiation treatment for prostate cancer       Faxed office note from 11/23/20 to [phone]

## 2021-02-19 ENCOUNTER — APPOINTMENT (OUTPATIENT)
Dept: RADIATION ONCOLOGY | Facility: CLINIC | Age: 81
End: 2021-02-19
Attending: RADIOLOGY
Payer: COMMERCIAL

## 2021-02-22 ENCOUNTER — APPOINTMENT (OUTPATIENT)
Dept: RADIATION ONCOLOGY | Facility: CLINIC | Age: 81
End: 2021-02-22
Attending: RADIOLOGY
Payer: COMMERCIAL

## 2021-02-22 PROCEDURE — 77385 HB NTSTY MODUL RAD TX DLVR SMPL: CPT | Performed by: RADIOLOGY

## 2021-02-23 ENCOUNTER — APPOINTMENT (OUTPATIENT)
Dept: RADIATION ONCOLOGY | Facility: CLINIC | Age: 81
End: 2021-02-23
Attending: RADIOLOGY
Payer: COMMERCIAL

## 2021-02-23 PROCEDURE — 77385 HB NTSTY MODUL RAD TX DLVR SMPL: CPT | Performed by: RADIOLOGY

## 2021-02-24 ENCOUNTER — APPOINTMENT (OUTPATIENT)
Dept: RADIATION ONCOLOGY | Facility: CLINIC | Age: 81
End: 2021-02-24
Attending: RADIOLOGY
Payer: COMMERCIAL

## 2021-02-24 PROCEDURE — 77385 HB NTSTY MODUL RAD TX DLVR SMPL: CPT | Performed by: RADIOLOGY

## 2021-02-25 ENCOUNTER — APPOINTMENT (OUTPATIENT)
Dept: RADIATION ONCOLOGY | Facility: CLINIC | Age: 81
End: 2021-02-25
Attending: RADIOLOGY
Payer: COMMERCIAL

## 2021-02-25 PROCEDURE — 77385 HB NTSTY MODUL RAD TX DLVR SMPL: CPT | Performed by: RADIOLOGY

## 2021-02-26 ENCOUNTER — APPOINTMENT (OUTPATIENT)
Dept: RADIATION ONCOLOGY | Facility: CLINIC | Age: 81
End: 2021-02-26
Attending: RADIOLOGY
Payer: COMMERCIAL

## 2021-02-26 PROCEDURE — 77336 RADIATION PHYSICS CONSULT: CPT | Performed by: RADIOLOGY

## 2021-02-26 PROCEDURE — 77385 HB NTSTY MODUL RAD TX DLVR SMPL: CPT | Performed by: RADIOLOGY

## 2021-03-01 ENCOUNTER — APPOINTMENT (OUTPATIENT)
Dept: RADIATION ONCOLOGY | Facility: CLINIC | Age: 81
End: 2021-03-01
Attending: RADIOLOGY
Payer: COMMERCIAL

## 2021-03-01 PROCEDURE — 77385 HB NTSTY MODUL RAD TX DLVR SMPL: CPT | Performed by: RADIOLOGY

## 2021-03-02 ENCOUNTER — APPOINTMENT (OUTPATIENT)
Dept: RADIATION ONCOLOGY | Facility: CLINIC | Age: 81
End: 2021-03-02
Attending: RADIOLOGY
Payer: COMMERCIAL

## 2021-03-02 LAB
BASOPHILS # BLD AUTO: 61 CELLS/UL (ref 0–200)
BASOPHILS NFR BLD AUTO: 1.2 %
EOSINOPHIL # BLD AUTO: 199 CELLS/UL (ref 15–500)
EOSINOPHIL NFR BLD AUTO: 3.9 %
ERYTHROCYTE [DISTWIDTH] IN BLOOD BY AUTOMATED COUNT: 12.8 % (ref 11–15)
HCT VFR BLD AUTO: 41.3 % (ref 38.5–50)
HGB BLD-MCNC: 14.3 G/DL (ref 13.2–17.1)
LYMPHOCYTES # BLD AUTO: 949 CELLS/UL (ref 850–3900)
LYMPHOCYTES NFR BLD AUTO: 18.6 %
MCH RBC QN AUTO: 31.6 PG (ref 27–33)
MCHC RBC AUTO-ENTMCNC: 34.6 G/DL (ref 32–36)
MCV RBC AUTO: 91.4 FL (ref 80–100)
MONOCYTES # BLD AUTO: 500 CELLS/UL (ref 200–950)
MONOCYTES NFR BLD AUTO: 9.8 %
NEUTROPHILS # BLD AUTO: 3392 CELLS/UL (ref 1500–7800)
NEUTROPHILS NFR BLD AUTO: 66.5 %
PLATELET # BLD AUTO: 217 THOUSAND/UL (ref 140–400)
PMV BLD REES-ECKER: 11 FL (ref 7.5–12.5)
RBC # BLD AUTO: 4.52 MILLION/UL (ref 4.2–5.8)
WBC # BLD AUTO: 5.1 THOUSAND/UL (ref 3.8–10.8)

## 2021-03-02 PROCEDURE — 77385 HB NTSTY MODUL RAD TX DLVR SMPL: CPT | Performed by: RADIOLOGY

## 2021-03-03 ENCOUNTER — APPOINTMENT (OUTPATIENT)
Dept: RADIATION ONCOLOGY | Facility: CLINIC | Age: 81
End: 2021-03-03
Attending: RADIOLOGY
Payer: COMMERCIAL

## 2021-03-03 PROCEDURE — 77385 HB NTSTY MODUL RAD TX DLVR SMPL: CPT | Performed by: RADIOLOGY

## 2021-03-04 ENCOUNTER — APPOINTMENT (OUTPATIENT)
Dept: RADIATION ONCOLOGY | Facility: CLINIC | Age: 81
End: 2021-03-04
Attending: RADIOLOGY
Payer: COMMERCIAL

## 2021-03-04 PROCEDURE — 77385 HB NTSTY MODUL RAD TX DLVR SMPL: CPT | Performed by: RADIOLOGY

## 2021-03-04 NOTE — PROGRESS NOTES
1/11/2019 received notification from clinical Dr Yoandy Portillo would like us to try for an auth for xtandi again since the pt has been receiving samples and can't afford the zytiga  Submitted for auth through cover my meds  11/14/2018 received denial from Jessie Hyman  Per Clinical, patient would like us to appeal       Sent appeal letter to Dr Yoandy Portillo for review and signature  My chart message sent to pt.

## 2021-03-05 ENCOUNTER — TELEPHONE (OUTPATIENT)
Dept: UROLOGY | Facility: CLINIC | Age: 81
End: 2021-03-05

## 2021-03-05 ENCOUNTER — APPOINTMENT (OUTPATIENT)
Dept: RADIATION ONCOLOGY | Facility: CLINIC | Age: 81
End: 2021-03-05
Attending: RADIOLOGY
Payer: COMMERCIAL

## 2021-03-05 DIAGNOSIS — C61 PROSTATE CANCER (HCC): ICD-10-CM

## 2021-03-05 PROCEDURE — 77385 HB NTSTY MODUL RAD TX DLVR SMPL: CPT | Performed by: RADIOLOGY

## 2021-03-05 PROCEDURE — 77336 RADIATION PHYSICS CONSULT: CPT | Performed by: RADIOLOGY

## 2021-03-05 NOTE — TELEPHONE ENCOUNTER
Patient questioning why appointment for Lupron injection was cancelled this morning  Please call and advise at [phone]

## 2021-03-05 NOTE — TELEPHONE ENCOUNTER
Contacted and spoke with patient and informed him the appointment today was not for his Lupron injection  Today's appointment was an old appointment with Dr Katie Cullen which was not needed since he is receiving radiation  Will be in contact with patient once his Lupron arrives  Lupron script fax to [phone] and confirmation received

## 2021-03-05 NOTE — TELEPHONE ENCOUNTER
Contacted and spoke with patient about his Lupron injection  The medication is shipped from [facility] and the pharmacy received a script for Eligard and not Lupron  I told patient I need to refax script and once Lupron is received I will call him to schedule an appointment

## 2021-03-05 NOTE — TELEPHONE ENCOUNTER
Called and spoke with karli  Per karli, they received a script on 02/05/21 for Eligard not Lupron  Informed karli that office will be faxing a new script for Lupron  Fax number [phone]

## 2021-03-08 ENCOUNTER — APPOINTMENT (OUTPATIENT)
Dept: RADIATION ONCOLOGY | Facility: CLINIC | Age: 81
End: 2021-03-08
Attending: RADIOLOGY
Payer: COMMERCIAL

## 2021-03-08 PROCEDURE — 77385 HB NTSTY MODUL RAD TX DLVR SMPL: CPT | Performed by: RADIOLOGY

## 2021-03-09 ENCOUNTER — APPOINTMENT (OUTPATIENT)
Dept: RADIATION ONCOLOGY | Facility: CLINIC | Age: 81
End: 2021-03-09
Attending: RADIOLOGY
Payer: COMMERCIAL

## 2021-03-09 PROCEDURE — 77385 HB NTSTY MODUL RAD TX DLVR SMPL: CPT | Performed by: RADIOLOGY

## 2021-03-09 NOTE — TELEPHONE ENCOUNTER
Called and spoke with Lei to inquire about patients Armando Odom stated that it was denied because it needs a prior authorization   They ran it through cover my meds: [id number]

## 2021-03-10 ENCOUNTER — LAB (OUTPATIENT)
Dept: LAB | Facility: MEDICAL CENTER | Age: 81
End: 2021-03-10
Payer: COMMERCIAL

## 2021-03-10 ENCOUNTER — APPOINTMENT (OUTPATIENT)
Dept: RADIATION ONCOLOGY | Facility: CLINIC | Age: 81
End: 2021-03-10
Attending: RADIOLOGY
Payer: COMMERCIAL

## 2021-03-10 ENCOUNTER — TELEPHONE (OUTPATIENT)
Dept: UROLOGY | Facility: AMBULATORY SURGERY CENTER | Age: 81
End: 2021-03-10

## 2021-03-10 DIAGNOSIS — R35.0 URINARY FREQUENCY: Primary | ICD-10-CM

## 2021-03-10 DIAGNOSIS — R35.0 URINARY FREQUENCY: ICD-10-CM

## 2021-03-10 LAB
BACTERIA UR QL AUTO: ABNORMAL /HPF
BILIRUB UR QL STRIP: NEGATIVE
CLARITY UR: CLEAR
COLOR UR: ABNORMAL
GLUCOSE UR STRIP-MCNC: NEGATIVE MG/DL
HGB UR QL STRIP.AUTO: NEGATIVE
KETONES UR STRIP-MCNC: NEGATIVE MG/DL
LEUKOCYTE ESTERASE UR QL STRIP: NEGATIVE
NITRITE UR QL STRIP: NEGATIVE
NON-SQ EPI CELLS URNS QL MICRO: ABNORMAL /HPF
PH UR STRIP.AUTO: 6 [PH]
PROT UR STRIP-MCNC: NEGATIVE MG/DL
RBC #/AREA URNS AUTO: ABNORMAL /HPF
SP GR UR STRIP.AUTO: 1.01 (ref 1–1.03)
UROBILINOGEN UR QL STRIP.AUTO: 0.2 E.U./DL
WBC #/AREA URNS AUTO: ABNORMAL /HPF

## 2021-03-10 PROCEDURE — 77385 HB NTSTY MODUL RAD TX DLVR SMPL: CPT | Performed by: RADIOLOGY

## 2021-03-10 PROCEDURE — 87086 URINE CULTURE/COLONY COUNT: CPT

## 2021-03-10 PROCEDURE — 81001 URINALYSIS AUTO W/SCOPE: CPT

## 2021-03-10 NOTE — TELEPHONE ENCOUNTER
Patient managed by Dr Rhys [facility]) patient called in wanted to inform Dr Rhys Enrique he has frequent urination but the volume is low and he wanted to know if it was because of his radiation treatments  Patient stated he is having another treatment (#13) of 23 today   Patient did state he will speak with the radiologist  Patient can be reached at [phone]

## 2021-03-10 NOTE — TELEPHONE ENCOUNTER
It should not be related  He should make sure he is hydrating well with water     We can bring him in for a PVR nurse visit and send a urinalysis with  culture

## 2021-03-10 NOTE — TELEPHONE ENCOUNTER
Called and spoke to pt and I informed him of AP's recommendations    Pt will give culture today and with come in for PVR tomorrow pt had no additional  questions

## 2021-03-10 NOTE — TELEPHONE ENCOUNTER
Called and spoke with pt he stated he is going to the bathroom very frequently but very little is coming out and was wondering if this could be related to his radiation or if there is another cause  Pt also stated he is going to ask his radiologist because he has another treatment soon      Please advise     No fever chill

## 2021-03-11 ENCOUNTER — APPOINTMENT (OUTPATIENT)
Dept: RADIATION ONCOLOGY | Facility: CLINIC | Age: 81
End: 2021-03-11
Attending: RADIOLOGY
Payer: COMMERCIAL

## 2021-03-11 ENCOUNTER — PROCEDURE VISIT (OUTPATIENT)
Dept: UROLOGY | Facility: CLINIC | Age: 81
End: 2021-03-11
Payer: COMMERCIAL

## 2021-03-11 ENCOUNTER — APPOINTMENT (OUTPATIENT)
Dept: RADIATION ONCOLOGY | Facility: CLINIC | Age: 81
End: 2021-03-11
Payer: COMMERCIAL

## 2021-03-11 VITALS
BODY MASS INDEX: 34.38 KG/M2 | SYSTOLIC BLOOD PRESSURE: 126 MMHG | WEIGHT: 213 LBS | DIASTOLIC BLOOD PRESSURE: 78 MMHG | HEART RATE: 70 BPM

## 2021-03-11 DIAGNOSIS — R35.0 URINARY FREQUENCY: Primary | ICD-10-CM

## 2021-03-11 LAB — BACTERIA UR CULT: NORMAL

## 2021-03-11 PROCEDURE — 77385 HB NTSTY MODUL RAD TX DLVR SMPL: CPT | Performed by: RADIOLOGY

## 2021-03-11 PROCEDURE — 51798 US URINE CAPACITY MEASURE: CPT

## 2021-03-11 RX ORDER — OXYBUTYNIN CHLORIDE 5 MG/1
5 TABLET, EXTENDED RELEASE ORAL DAILY
Qty: 30 TABLET | Refills: 3 | Status: SHIPPED | OUTPATIENT
Start: 2021-03-11 | End: 2021-03-26

## 2021-03-11 NOTE — PROGRESS NOTES
3/11/2021  Alban Cristobal is a 79 y o  male  [id number]    Diagnosis:  Chief Complaint     Urinary Retention          Patient presents for follow up post void residual s/p radiation managed by Dr Marley Kin:  Follow up as scheduled         Assessment:    Patient voided in the office    Post void residual measured via US to be 103 mL      Jessica Hay

## 2021-03-11 NOTE — TELEPHONE ENCOUNTER
Patient called in stating he received a letter from [employer] regarding his lupron and asked for a status   Patient stated he will bring in the letter at his visit today 1:30pm

## 2021-03-12 ENCOUNTER — APPOINTMENT (OUTPATIENT)
Dept: RADIATION ONCOLOGY | Facility: CLINIC | Age: 81
End: 2021-03-12
Attending: RADIOLOGY
Payer: COMMERCIAL

## 2021-03-12 ENCOUNTER — APPOINTMENT (OUTPATIENT)
Dept: RADIATION ONCOLOGY | Facility: CLINIC | Age: 81
End: 2021-03-12
Payer: COMMERCIAL

## 2021-03-12 PROCEDURE — 77336 RADIATION PHYSICS CONSULT: CPT | Performed by: RADIOLOGY

## 2021-03-12 PROCEDURE — 77385 HB NTSTY MODUL RAD TX DLVR SMPL: CPT | Performed by: RADIOLOGY

## 2021-03-15 ENCOUNTER — APPOINTMENT (OUTPATIENT)
Dept: RADIATION ONCOLOGY | Facility: CLINIC | Age: 81
End: 2021-03-15
Attending: RADIOLOGY
Payer: COMMERCIAL

## 2021-03-15 PROCEDURE — 77385 HB NTSTY MODUL RAD TX DLVR SMPL: CPT | Performed by: RADIOLOGY

## 2021-03-15 NOTE — TELEPHONE ENCOUNTER
Lupron does not need to be ordered through [employer] as of last year office stock is ok to use for [employer], as well as no auth needed  If he is ok with using office stock we can provide this to him for his next appt    Thanks  Sage Arevalo

## 2021-03-15 NOTE — TELEPHONE ENCOUNTER
Called and informed pt of note that Per Cyndie Hewitt pt is okay to use in office stock and no longer needs to pre-auth greta Odom  Please advise on appointment time for Lupron injection  PT would also like to know if the order for PSA is for now or when he is done hi radiation  Complex Repair And Dorsal Nasal Flap Text: The defect edges were debeveled with a #15 scalpel blade.  The primary defect was closed partially with a complex linear closure.  Given the location of the remaining defect, shape of the defect and the proximity to free margins a dorsal nasal flap was deemed most appropriate for complete closure of the defect.  Using a sterile surgical marker, an appropriate flap was drawn incorporating the defect and placing the expected incisions within the relaxed skin tension lines where possible.    The area thus outlined was incised deep to adipose tissue with a #15 scalpel blade.  The skin margins were undermined to an appropriate distance in all directions utilizing iris scissors.

## 2021-03-16 ENCOUNTER — APPOINTMENT (OUTPATIENT)
Dept: RADIATION ONCOLOGY | Facility: CLINIC | Age: 81
End: 2021-03-16
Attending: RADIOLOGY
Payer: COMMERCIAL

## 2021-03-16 PROCEDURE — 77385 HB NTSTY MODUL RAD TX DLVR SMPL: CPT | Performed by: RADIOLOGY

## 2021-03-17 ENCOUNTER — PROCEDURE VISIT (OUTPATIENT)
Dept: UROLOGY | Facility: CLINIC | Age: 81
End: 2021-03-17
Payer: COMMERCIAL

## 2021-03-17 ENCOUNTER — APPOINTMENT (OUTPATIENT)
Dept: RADIATION ONCOLOGY | Facility: CLINIC | Age: 81
End: 2021-03-17
Attending: RADIOLOGY
Payer: COMMERCIAL

## 2021-03-17 ENCOUNTER — TELEPHONE (OUTPATIENT)
Dept: UROLOGY | Facility: CLINIC | Age: 81
End: 2021-03-17

## 2021-03-17 VITALS
WEIGHT: 213 LBS | SYSTOLIC BLOOD PRESSURE: 140 MMHG | DIASTOLIC BLOOD PRESSURE: 80 MMHG | HEIGHT: 66 IN | HEART RATE: 64 BPM | BODY MASS INDEX: 34.23 KG/M2 | RESPIRATION RATE: 20 BRPM

## 2021-03-17 DIAGNOSIS — R35.0 URINARY FREQUENCY: Primary | ICD-10-CM

## 2021-03-17 DIAGNOSIS — C61 PROSTATE CANCER (HCC): Primary | ICD-10-CM

## 2021-03-17 DIAGNOSIS — C61 PROSTATE CANCER (HCC): ICD-10-CM

## 2021-03-17 LAB
POST-VOID RESIDUAL VOLUME, ML POC: 103 ML
POST-VOID RESIDUAL VOLUME, ML POC: 21 ML

## 2021-03-17 PROCEDURE — 77385 HB NTSTY MODUL RAD TX DLVR SMPL: CPT | Performed by: RADIOLOGY

## 2021-03-17 PROCEDURE — 51798 US URINE CAPACITY MEASURE: CPT

## 2021-03-17 PROCEDURE — 96402 CHEMO HORMON ANTINEOPL SQ/IM: CPT

## 2021-03-17 RX ORDER — TAMSULOSIN HYDROCHLORIDE 0.4 MG/1
0.4 CAPSULE ORAL
Qty: 90 CAPSULE | Refills: 0 | Status: SHIPPED | OUTPATIENT
Start: 2021-03-17 | End: 2021-06-10

## 2021-03-17 NOTE — PROGRESS NOTES
3/17/2021  Ryann Conrad is a 79 y o  male  [id number]    Diagnosis:  Chief Complaint     Prostate Cancer; Lupron injection; Urinary Frequency; Post Void Residual          Patient presents for Lupron injection managed by Dr Sandee Crum:  Patient to return in 6 months with MD and Armando  Will  send a message to Nichole Lopez for possible Flomax prescription and then call patient  Medication administration:  Lupron 45 mg given IM right buttocks without incident  Patient tolerated procedure well  Patient mentioned Nichole Lopez prescribed oxybutynin for his urinary frequency about a week ago  Patient states the frequency continues every hour daytime and nighttime  Also his urinary stream is diminished       Recent Results (from the past 1 hour(s))   POCT Measure PVR    Collection Time: 03/17/21  1:53 PM   Result Value Ref Range    POST-VOID RESIDUAL VOLUME, ML POC 21 mL         Vitals:    03/17/21 1307   BP: 140/80   Pulse: 64   Resp: 20   Weight: 96 6 kg (213 lb)   Height: 5' 6" (1 676 m)         Claudia Pool RN

## 2021-03-17 NOTE — TELEPHONE ENCOUNTER
Sue  Patient being treated for prostate cancer  with XRT  Ching Guallpa PA-C prescribed Ditropan about a week ago for his urinary frequency  Frequency continues and he notes a diminished urinary stream   PVR today 21 ml  I discussed Flomax with patient to take during and after radiation is completed  Please advise about a prescription  [facility] is preferred pharmacy    Thanks

## 2021-03-18 ENCOUNTER — APPOINTMENT (OUTPATIENT)
Dept: RADIATION ONCOLOGY | Facility: CLINIC | Age: 81
End: 2021-03-18
Attending: RADIOLOGY
Payer: COMMERCIAL

## 2021-03-18 PROCEDURE — 77385 HB NTSTY MODUL RAD TX DLVR SMPL: CPT | Performed by: RADIOLOGY

## 2021-03-18 NOTE — TELEPHONE ENCOUNTER
Patient wanted to get clarification on medication  Patient wants to know should he stop taking the oxybutynin while taking flomax  Patient did stop oxybutynin but he wants to make sure

## 2021-03-18 NOTE — TELEPHONE ENCOUNTER
Contacted and spoke with patient about Flomax/Ditropan  Patient to stop the Ditropan and begin the Flomax  Patient states he has already noticed a difference with his urinary frequency  Patient slept through the night and his stream is stronger  Patient to continue the Flomax

## 2021-03-19 ENCOUNTER — APPOINTMENT (OUTPATIENT)
Dept: RADIATION ONCOLOGY | Facility: CLINIC | Age: 81
End: 2021-03-19
Attending: RADIOLOGY
Payer: COMMERCIAL

## 2021-03-19 PROCEDURE — 77385 HB NTSTY MODUL RAD TX DLVR SMPL: CPT | Performed by: RADIOLOGY

## 2021-03-19 PROCEDURE — 77336 RADIATION PHYSICS CONSULT: CPT | Performed by: RADIOLOGY

## 2021-03-22 ENCOUNTER — APPOINTMENT (OUTPATIENT)
Dept: RADIATION ONCOLOGY | Facility: CLINIC | Age: 81
End: 2021-03-22
Attending: RADIOLOGY
Payer: COMMERCIAL

## 2021-03-22 PROCEDURE — 77385 HB NTSTY MODUL RAD TX DLVR SMPL: CPT | Performed by: RADIOLOGY

## 2021-03-23 ENCOUNTER — APPOINTMENT (OUTPATIENT)
Dept: RADIATION ONCOLOGY | Facility: CLINIC | Age: 81
End: 2021-03-23
Attending: RADIOLOGY
Payer: COMMERCIAL

## 2021-03-23 PROCEDURE — 77385 HB NTSTY MODUL RAD TX DLVR SMPL: CPT | Performed by: RADIOLOGY

## 2021-03-24 ENCOUNTER — APPOINTMENT (OUTPATIENT)
Dept: RADIATION ONCOLOGY | Facility: CLINIC | Age: 81
End: 2021-03-24
Attending: RADIOLOGY
Payer: COMMERCIAL

## 2021-03-24 PROCEDURE — 77385 HB NTSTY MODUL RAD TX DLVR SMPL: CPT | Performed by: RADIOLOGY

## 2021-03-24 PROCEDURE — 77336 RADIATION PHYSICS CONSULT: CPT | Performed by: RADIOLOGY

## 2021-03-25 ENCOUNTER — APPOINTMENT (OUTPATIENT)
Dept: RADIATION ONCOLOGY | Facility: CLINIC | Age: 81
End: 2021-03-25
Payer: COMMERCIAL

## 2021-03-26 ENCOUNTER — APPOINTMENT (OUTPATIENT)
Dept: RADIOLOGY | Facility: CLINIC | Age: 81
End: 2021-03-26
Payer: COMMERCIAL

## 2021-03-26 ENCOUNTER — APPOINTMENT (OUTPATIENT)
Dept: RADIATION ONCOLOGY | Facility: CLINIC | Age: 81
End: 2021-03-26
Payer: COMMERCIAL

## 2021-03-26 ENCOUNTER — OFFICE VISIT (OUTPATIENT)
Dept: FAMILY MEDICINE CLINIC | Facility: CLINIC | Age: 81
End: 2021-03-26
Payer: COMMERCIAL

## 2021-03-26 VITALS
BODY MASS INDEX: 33.91 KG/M2 | HEIGHT: 66 IN | SYSTOLIC BLOOD PRESSURE: 110 MMHG | OXYGEN SATURATION: 96 % | HEART RATE: 59 BPM | TEMPERATURE: 97.7 F | WEIGHT: 211 LBS | DIASTOLIC BLOOD PRESSURE: 64 MMHG | RESPIRATION RATE: 18 BRPM

## 2021-03-26 DIAGNOSIS — M25.649 MORNING JOINT STIFFNESS OF HAND, UNSPECIFIED LATERALITY: ICD-10-CM

## 2021-03-26 DIAGNOSIS — J86.9 ABSCESS OF CHEST (HCC): ICD-10-CM

## 2021-03-26 DIAGNOSIS — M25.649 MORNING JOINT STIFFNESS OF HAND, UNSPECIFIED LATERALITY: Primary | ICD-10-CM

## 2021-03-26 DIAGNOSIS — M25.562 ACUTE PAIN OF LEFT KNEE: ICD-10-CM

## 2021-03-26 PROCEDURE — 1160F RVW MEDS BY RX/DR IN RCRD: CPT | Performed by: FAMILY MEDICINE

## 2021-03-26 PROCEDURE — 73564 X-RAY EXAM KNEE 4 OR MORE: CPT

## 2021-03-26 PROCEDURE — 73130 X-RAY EXAM OF HAND: CPT

## 2021-03-26 PROCEDURE — 10160 PNXR ASPIR ABSC HMTMA BULLA: CPT | Performed by: FAMILY MEDICINE

## 2021-03-26 PROCEDURE — 3074F SYST BP LT 130 MM HG: CPT | Performed by: FAMILY MEDICINE

## 2021-03-26 PROCEDURE — 1036F TOBACCO NON-USER: CPT | Performed by: FAMILY MEDICINE

## 2021-03-26 PROCEDURE — 3078F DIAST BP <80 MM HG: CPT | Performed by: FAMILY MEDICINE

## 2021-03-26 PROCEDURE — 99214 OFFICE O/P EST MOD 30 MIN: CPT | Performed by: FAMILY MEDICINE

## 2021-03-26 RX ORDER — CEPHALEXIN 500 MG/1
500 CAPSULE ORAL EVERY 12 HOURS SCHEDULED
Qty: 14 CAPSULE | Refills: 0 | Status: SHIPPED | OUTPATIENT
Start: 2021-03-26 | End: 2021-04-02

## 2021-03-26 NOTE — PROGRESS NOTES
Assessment/Plan:   1  Abscess of chest [facility])  Patient tolerated incision and drainage very well  At this time, will start treatment empirically with Keflex 500 mg b i d  for 7 days  He was instructed on importance of proper wound care  - cephalexin (KEFLEX) 500 mg capsule; Take 1 capsule (500 mg total) by mouth every 12 (twelve) hours for 7 days  Dispense: 14 capsule; Refill: 0    2  Morning joint stiffness of hand, unspecified laterality  Unclear as to exact cause of patient's morning and stiffness  Will check x-rays to rule out gross abnormalities  Symptoms appear likely secondary to possible osteoarthritis appeared  - XR hand 3+ vw right; Future  - XR hand 3+ vw left; Future    3  Acute pain of left knee  Is unclear as to exact cause of patient's knee at this time, it is unclear as well if he truly has a Baker cyst   Will check x-ray to rule out gross abnormalities  - XR knee 4+ vw left injury; Future    BMI Counseling: Body mass index is 34 06 kg/m²  The BMI is above normal  Nutrition recommendations include decreasing portion sizes, encouraging healthy choices of fruits and vegetables, decreasing fast food intake, consuming healthier snacks and limiting drinks that contain sugar  Exercise recommendations include moderate physical activity 150 minutes/week and exercising 3-5 times per week  No pharmacotherapy was ordered  Patient referred to PCP due to patient being overweight  There are no diagnoses linked to this encounter  Subjective:       Chief Complaint   Patient presents with    Hand Problem     bilateral hand stiffness     Mass     mid chest     bakers cyst     L leg       Patient ID: Ángel Alcantar is a 79 y o  male presents today with CC of multiple complaints  He has a anterior chest wall skin lesion  He states that he has had mild pain in this area  He denies any drainage  He does have complaints today of bilateral hand pain as well as left knee pain    He has had these problems for many months  He states that he has stiffness regularly  He does feel pain behind his knee  He was informed previously been had a Baker's cyst     HPI    Review of Systems   Constitutional: Negative for activity change, chills, fatigue and fever  HENT: Negative for congestion, ear pain, sinus pressure and sore throat  Eyes: Negative for redness, itching and visual disturbance  Respiratory: Negative for cough and shortness of breath  Cardiovascular: Negative for chest pain and palpitations  Gastrointestinal: Negative for abdominal pain, diarrhea and nausea  Endocrine: Negative for cold intolerance and heat intolerance  Genitourinary: Negative for dysuria, flank pain and frequency  Musculoskeletal: Positive for arthralgias  Negative for back pain, gait problem and myalgias  Skin: Positive for rash  Negative for color change  Allergic/Immunologic: Negative for environmental allergies  Neurological: Negative for dizziness, numbness and headaches  Psychiatric/Behavioral: Negative for behavioral problems and sleep disturbance  The following portions of the patient's history were reviewed and updated as appropriate : past family history, past medical history, past social history and past surgical history  Current Outpatient Medications:     acetaminophen (TYLENOL) 500 mg tablet, Take 500 mg by mouth every 6 (six) hours as needed for mild pain, Disp: , Rfl:     aspirin (ASPIRIN ADULT LOW STRENGTH) 81 mg EC tablet, Take 1 tablet by mouth daily, Disp: , Rfl:     atorvastatin (LIPITOR) 40 mg tablet, Take 40 mg by mouth daily , Disp: , Rfl:     Calcium 500 MG tablet, Take 1,000 mg by mouth daily , Disp: , Rfl:     cholecalciferol (VITAMIN D3) 1,000 units tablet, Take 1 tablet by mouth daily, Disp: , Rfl:     enzalutamide (Xtandi) 40 mg capsule, Take 4 capsules (160 mg total) by mouth once daily  , Disp: 120 capsule, Rfl: 11    hydrochlorothiazide (HYDRODIURIL) 25 mg tablet, Take 1 tablet (25 mg total) by mouth daily, Disp: 90 tablet, Rfl: 1    leuprolide (LUPRON DEPOT 6 MONTH KIT) 45 mg, Inject 45 mg into a muscle every 6 (six) months, Disp: 45 mg, Rfl: 0    losartan (COZAAR) 100 MG tablet, take 1 tablet by mouth once daily, Disp: 90 tablet, Rfl: 1    MELATONIN PO, Take 1 tablet by mouth daily at bedtime as needed , Disp: , Rfl:     metoprolol succinate (TOPROL-XL) 25 mg 24 hr tablet, take 1 tablet by mouth once daily, Disp: 90 tablet, Rfl: 1    tamsulosin (FLOMAX) 0 4 mg, Take 1 capsule (0 4 mg total) by mouth daily with dinner, Disp: 90 capsule, Rfl: 0    Zoledronic Acid (ZOMETA IV), Infuse into a venous catheter every 3 (three) months, Disp: , Rfl:          Objective:         Vitals:    03/26/21 1206   BP: 110/64   BP Location: Right arm   Patient Position: Sitting   Cuff Size: Large   Pulse: 59   Resp: 18   Temp: 97 7 °F (36 5 °C)   TempSrc: Tympanic   SpO2: 96%   Weight: 95 7 kg (211 lb)   Height: 5' 6" (1 676 m)       Physical Exam  Vitals signs reviewed  Constitutional:       Appearance: He is well-developed  HENT:      Head: Normocephalic and atraumatic  Nose: Nose normal       Mouth/Throat:      Pharynx: No oropharyngeal exudate  Eyes:      General: No scleral icterus  Right eye: No discharge  Left eye: No discharge  Pupils: Pupils are equal, round, and reactive to light  Neck:      Musculoskeletal: Normal range of motion and neck supple  Trachea: No tracheal deviation  Cardiovascular:      Rate and Rhythm: Normal rate and regular rhythm  Pulses:           Dorsalis pedis pulses are 2+ on the right side and 2+ on the left side  Posterior tibial pulses are 2+ on the right side and 2+ on the left side  Heart sounds: Normal heart sounds  No murmur  No friction rub  No gallop  Pulmonary:      Effort: Pulmonary effort is normal  No respiratory distress  Breath sounds: Normal breath sounds   No wheezing or rales    Abdominal:      General: Bowel sounds are normal  There is no distension  Palpations: Abdomen is soft  Tenderness: There is no abdominal tenderness  There is no guarding or rebound  Musculoskeletal: Normal range of motion  Lymphadenopathy:      Head:      Right side of head: No submental or submandibular adenopathy  Left side of head: No submental or submandibular adenopathy  Cervical: No cervical adenopathy  Right cervical: No superficial, deep or posterior cervical adenopathy  Left cervical: No superficial, deep or posterior cervical adenopathy  Skin:     General: Skin is warm and dry  Findings: No erythema  Neurological:      Mental Status: He is alert and oriented to person, place, and time  Cranial Nerves: No cranial nerve deficit  Sensory: No sensory deficit  Psychiatric:         Mood and Affect: Mood is not anxious or depressed  Speech: Speech normal          Behavior: Behavior normal          Thought Content: Thought content normal          Judgment: Judgment normal        Incision and Drainage    Date/Time: 3/26/2021 3:53 PM  Performed by: Vanesa Sanchez DO  Authorized by: Vanesa Sanchez DO   Universal Protocol:  Consent: Verbal consent obtained  Consent given by: patient  Patient identity confirmed: verbally with patient      Patient location:  Clinic  Location:     Type:  Abscess    Location:  Trunk    Trunk location:  Chest  Pre-procedure details:     Skin preparation:  Betadine  Anesthesia (see MAR for exact dosages): Anesthesia method:  Topical application  Procedure details:     Complexity:  Simple    Needle aspiration: yes      Needle size:  18 G    Incision types:  Stab incision    Aspiration type: puncture aspiration      Approach:  Open    Incision depth:  Subcutaneous    Drainage:  Purulent    Drainage amount:   Moderate    Wound treatment:  Wound left open    Packing materials:  None  Post-procedure details: Patient tolerance of procedure:   Tolerated well, no immediate complications

## 2021-03-29 ENCOUNTER — APPOINTMENT (OUTPATIENT)
Dept: RADIATION ONCOLOGY | Facility: CLINIC | Age: 81
End: 2021-03-29
Payer: COMMERCIAL

## 2021-03-30 ENCOUNTER — APPOINTMENT (OUTPATIENT)
Dept: RADIATION ONCOLOGY | Facility: CLINIC | Age: 81
End: 2021-03-30
Payer: COMMERCIAL

## 2021-03-31 ENCOUNTER — APPOINTMENT (OUTPATIENT)
Dept: RADIATION ONCOLOGY | Facility: CLINIC | Age: 81
End: 2021-03-31
Payer: COMMERCIAL

## 2021-04-09 ENCOUNTER — TELEPHONE (OUTPATIENT)
Dept: FAMILY MEDICINE CLINIC | Facility: CLINIC | Age: 81
End: 2021-04-09

## 2021-04-10 NOTE — TELEPHONE ENCOUNTER
Patient may benefit from seeing physical therapy  Please give him contact information for [facility] therapy    Thank you

## 2021-04-13 DIAGNOSIS — M25.649 MORNING JOINT STIFFNESS OF HAND, UNSPECIFIED LATERALITY: Primary | ICD-10-CM

## 2021-04-14 LAB
ALBUMIN SERPL-MCNC: 4 G/DL (ref 3.6–5.1)
ALBUMIN/GLOB SERPL: 1.8 (CALC) (ref 1–2.5)
ALP SERPL-CCNC: 48 U/L (ref 35–144)
ALT SERPL-CCNC: 17 U/L (ref 9–46)
AST SERPL-CCNC: 19 U/L (ref 10–35)
BASOPHILS # BLD AUTO: 19 CELLS/UL (ref 0–200)
BASOPHILS NFR BLD AUTO: 0.7 %
BILIRUB SERPL-MCNC: 0.7 MG/DL (ref 0.2–1.2)
BUN SERPL-MCNC: 19 MG/DL (ref 7–25)
BUN/CREAT SERPL: NORMAL (CALC) (ref 6–22)
CALCIUM SERPL-MCNC: 9.4 MG/DL (ref 8.6–10.3)
CHLORIDE SERPL-SCNC: 104 MMOL/L (ref 98–110)
CO2 SERPL-SCNC: 29 MMOL/L (ref 20–32)
CREAT SERPL-MCNC: 1.04 MG/DL (ref 0.7–1.18)
EOSINOPHIL # BLD AUTO: 122 CELLS/UL (ref 15–500)
EOSINOPHIL NFR BLD AUTO: 4.5 %
ERYTHROCYTE [DISTWIDTH] IN BLOOD BY AUTOMATED COUNT: 13.6 % (ref 11–15)
GLOBULIN SER CALC-MCNC: 2.2 G/DL (CALC) (ref 1.9–3.7)
GLUCOSE SERPL-MCNC: 90 MG/DL (ref 65–139)
HCT VFR BLD AUTO: 38.9 % (ref 38.5–50)
HGB BLD-MCNC: 13.7 G/DL (ref 13.2–17.1)
LYMPHOCYTES # BLD AUTO: 432 CELLS/UL (ref 850–3900)
LYMPHOCYTES NFR BLD AUTO: 16 %
MCH RBC QN AUTO: 32.5 PG (ref 27–33)
MCHC RBC AUTO-ENTMCNC: 35.2 G/DL (ref 32–36)
MCV RBC AUTO: 92.4 FL (ref 80–100)
MONOCYTES # BLD AUTO: 351 CELLS/UL (ref 200–950)
MONOCYTES NFR BLD AUTO: 13 %
NEUTROPHILS # BLD AUTO: 1777 CELLS/UL (ref 1500–7800)
NEUTROPHILS NFR BLD AUTO: 65.8 %
PLATELET # BLD AUTO: 152 THOUSAND/UL (ref 140–400)
PMV BLD REES-ECKER: 10.5 FL (ref 7.5–12.5)
POTASSIUM SERPL-SCNC: 4.1 MMOL/L (ref 3.5–5.3)
PROT SERPL-MCNC: 6.2 G/DL (ref 6.1–8.1)
PSA SERPL-MCNC: 6.9 NG/ML
RBC # BLD AUTO: 4.21 MILLION/UL (ref 4.2–5.8)
SL AMB EGFR AFRICAN AMERICAN: 79 ML/MIN/1.73M2
SL AMB EGFR NON AFRICAN AMERICAN: 68 ML/MIN/1.73M2
SODIUM SERPL-SCNC: 140 MMOL/L (ref 135–146)
WBC # BLD AUTO: 2.7 THOUSAND/UL (ref 3.8–10.8)

## 2021-04-20 DIAGNOSIS — I10 ESSENTIAL HYPERTENSION: ICD-10-CM

## 2021-04-20 RX ORDER — LOSARTAN POTASSIUM 100 MG/1
100 TABLET ORAL DAILY
Qty: 90 TABLET | Refills: 1 | Status: SHIPPED | OUTPATIENT
Start: 2021-04-20 | End: 2021-10-11 | Stop reason: SDUPTHER

## 2021-04-21 ENCOUNTER — HOSPITAL ENCOUNTER (OUTPATIENT)
Dept: CT IMAGING | Facility: HOSPITAL | Age: 81
Discharge: HOME | End: 2021-04-21
Attending: INTERNAL MEDICINE
Payer: COMMERCIAL

## 2021-04-21 DIAGNOSIS — C79.51 BONE METASTASES (HCC): ICD-10-CM

## 2021-04-21 DIAGNOSIS — C61 PROSTATE CANCER (HCC): ICD-10-CM

## 2021-04-21 PROCEDURE — 71260 CT THORAX DX C+: CPT

## 2021-04-21 PROCEDURE — 74177 CT ABD & PELVIS W/CONTRAST: CPT

## 2021-04-21 PROCEDURE — G1004 CDSM NDSC: HCPCS

## 2021-04-21 RX ADMIN — IOHEXOL 100 ML: 350 INJECTION, SOLUTION INTRAVENOUS at 08:17

## 2021-04-23 ENCOUNTER — TELEMEDICINE (OUTPATIENT)
Dept: RADIATION ONCOLOGY | Facility: CLINIC | Age: 81
End: 2021-04-23
Attending: RADIOLOGY

## 2021-04-23 DIAGNOSIS — C61 PROSTATE CANCER (HCC): Primary | ICD-10-CM

## 2021-04-23 NOTE — PROGRESS NOTES
Virtual Brief Visit    Assessment/Plan:  Stage IV  [place] 9 adenocarcinoma prostate received IMRT to the primary tumor site copying the [place] clinical trial and he is 1 month virtual follow-up today doing very well  However his PSA April 13 was 6 9 may be too soon after the radiation therapy to check hopefully that is the case  He will have another PSA in couple weeks which should be a month after he finished radiation therapy  The good news is patient is doing extremely well post treatment without any side effects nor did he have any during treatment  We will see him as needed or 6 months depending on his next PSA value and what Dr Beau Banerjee will decide for his systemic treatment  I would anticipate more imaging to follow  Problem List Items Addressed This Visit        Genitourinary    Prostate cancer [facility]) - Primary                Reason for visit is No chief complaint on file  Encounter provider Miles Sandoval MD    Provider located at [address] [place] [id number]    Recent Visits  No visits were found meeting these conditions  Showing recent visits within past 7 days and meeting all other requirements     Today's Visits  Date Type Provider Dept   04/23/21 Daniel Velásquez MD [facility]   Showing today's visits and meeting all other requirements     Future Appointments  No visits were found meeting these conditions  Showing future appointments within next 150 days and meeting all other requirements        After connecting through telephone, the patient was identified by name and date of birth  Court Kathya was informed that this is a telemedicine visit and that the visit is being conducted through telephone and patient was informed that this is not a secure, HIPAA-compliant platform  He agrees to proceed     My office door was closed  No one else was in the room    He acknowledged consent and understanding of privacy and security of the platform  The patient has agreed to participate and understands he can discontinue the visit at any time  Patient is aware this is a billable service  Subjective    Ronny Jara is a 79 y o  male   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  He only has 1 questionable site of bone metastases not symptomatic  He tolerated his treatments quite well the hypofractionation course  Hopefully this will translate to extending his survival   And that the next PSA value will be lower  We may see him again in 6 months or even sooner  Sandie Caruso     Past Medical History:   Diagnosis Date    Anemia     last assessed  1/7/14     [facility]     Coronary artery disease     Hypercholesteremia     Hypertension     Kidney stone     Polyp of sigmoid colon     [facility]     Renal disorder     elevated serum creat    Tinnitus     unspecified laterality / last assessed 4/9/13    Vitamin D deficiency        Past Surgical History:   Procedure Laterality Date    CARDIAC SURGERY      COLONOSCOPY      CORONARY ANGIOPLASTY WITH STENT PLACEMENT      [place] RETROGRADE PYELOGRAM  9/11/2019    HEMORRHOID SURGERY      [place] CYSTO/URETERO W/LITHOTRIPSY &INDWELL STENT INSRT Left 9/11/2019    Procedure: CYSTO, URETEROSCOPY W/HOLMIUM LASER, BASKET STONE EXTRACTION, RETROGRADE PYELOGRAM, STENT EXCHANGE;  Surgeon: Bishnu Lopez MD;  Location: [facility];  Service: Urology    PROSTATE BIOPSY  10/24/2018    TONSILLECTOMY         Current Outpatient Medications   Medication Sig Dispense Refill    acetaminophen (TYLENOL) 500 mg tablet Take 500 mg by mouth every 6 (six) hours as needed for mild pain      aspirin (ASPIRIN ADULT LOW STRENGTH) 81 mg EC tablet Take 1 tablet by mouth daily      atorvastatin (LIPITOR) 40 mg tablet Take 40 mg by mouth daily       Calcium 500 MG tablet Take 1,000 mg by mouth daily       cholecalciferol (VITAMIN D3) 1,000 units tablet Take 1 tablet by mouth daily      enzalutamide (Xtandi) 40 mg capsule Take 4 capsules (160 mg total) by mouth once daily  120 capsule 11    hydrochlorothiazide (HYDRODIURIL) 25 mg tablet Take 1 tablet (25 mg total) by mouth daily 90 tablet 1    leuprolide (LUPRON DEPOT 6 MONTH KIT) 45 mg Inject 45 mg into a muscle every 6 (six) months 45 mg 0    losartan (COZAAR) 100 MG tablet Take 1 tablet (100 mg total) by mouth daily 90 tablet 1    MELATONIN PO Take 1 tablet by mouth daily at bedtime as needed       metoprolol succinate (TOPROL-XL) 25 mg 24 hr tablet take 1 tablet by mouth once daily 90 tablet 1    tamsulosin (FLOMAX) 0 4 mg Take 1 capsule (0 4 mg total) by mouth daily with dinner 90 capsule 0    Zoledronic Acid (ZOMETA IV) Infuse into a venous catheter every 3 (three) months       No current facility-administered medications for this visit  No Known Allergies    Review of Systems    There were no vitals filed for this visit  I spent 10 minutes directly with the patient during this visit    VIRTUAL VISIT DISCLAIMER    Refugio Guardado acknowledges that he has consented to an online visit or consultation  He understands that the online visit is based solely on information provided by him, and that, in the absence of a face-to-face physical evaluation by the physician, the diagnosis he receives is both limited and provisional in terms of accuracy and completeness  This is not intended to replace a full medical face-to-face evaluation by the physician  Refugio Guardado understands and accepts these terms

## 2021-04-28 LAB
ALBUMIN SERPL-MCNC: 4 G/DL (ref 3.6–5.1)
ALBUMIN/GLOB SERPL: 1.7 (CALC) (ref 1–2.5)
ALP SERPL-CCNC: 60 U/L (ref 35–144)
ALT SERPL-CCNC: 13 U/L (ref 9–46)
AST SERPL-CCNC: 19 U/L (ref 10–35)
BASOPHILS # BLD AUTO: 10 CELLS/UL (ref 0–200)
BASOPHILS NFR BLD AUTO: 0.2 %
BILIRUB SERPL-MCNC: 0.6 MG/DL (ref 0.2–1.2)
BUN SERPL-MCNC: 17 MG/DL (ref 7–25)
BUN/CREAT SERPL: ABNORMAL (CALC) (ref 6–22)
CALCIUM SERPL-MCNC: 8.9 MG/DL (ref 8.6–10.3)
CHLORIDE SERPL-SCNC: 102 MMOL/L (ref 98–110)
CO2 SERPL-SCNC: 33 MMOL/L (ref 20–32)
CREAT SERPL-MCNC: 1.06 MG/DL (ref 0.7–1.18)
EOSINOPHIL # BLD AUTO: 154 CELLS/UL (ref 15–500)
EOSINOPHIL NFR BLD AUTO: 3.2 %
ERYTHROCYTE [DISTWIDTH] IN BLOOD BY AUTOMATED COUNT: 13.2 % (ref 11–15)
GLOBULIN SER CALC-MCNC: 2.3 G/DL (CALC) (ref 1.9–3.7)
GLUCOSE SERPL-MCNC: 97 MG/DL (ref 65–99)
HCT VFR BLD AUTO: 39.5 % (ref 38.5–50)
HGB BLD-MCNC: 13.8 G/DL (ref 13.2–17.1)
LYMPHOCYTES # BLD AUTO: 739 CELLS/UL (ref 850–3900)
LYMPHOCYTES NFR BLD AUTO: 15.4 %
MCH RBC QN AUTO: 32.3 PG (ref 27–33)
MCHC RBC AUTO-ENTMCNC: 34.9 G/DL (ref 32–36)
MCV RBC AUTO: 92.5 FL (ref 80–100)
MONOCYTES # BLD AUTO: 413 CELLS/UL (ref 200–950)
MONOCYTES NFR BLD AUTO: 8.6 %
NEUTROPHILS # BLD AUTO: 3485 CELLS/UL (ref 1500–7800)
NEUTROPHILS NFR BLD AUTO: 72.6 %
PLATELET # BLD AUTO: 174 THOUSAND/UL (ref 140–400)
PMV BLD REES-ECKER: 10.5 FL (ref 7.5–12.5)
POTASSIUM SERPL-SCNC: 3.6 MMOL/L (ref 3.5–5.3)
PROT SERPL-MCNC: 6.3 G/DL (ref 6.1–8.1)
PSA SERPL-MCNC: 7.2 NG/ML
RBC # BLD AUTO: 4.27 MILLION/UL (ref 4.2–5.8)
SL AMB EGFR AFRICAN AMERICAN: 77 ML/MIN/1.73M2
SL AMB EGFR NON AFRICAN AMERICAN: 66 ML/MIN/1.73M2
SODIUM SERPL-SCNC: 139 MMOL/L (ref 135–146)
WBC # BLD AUTO: 4.8 THOUSAND/UL (ref 3.8–10.8)

## 2021-04-29 ENCOUNTER — HOSPITAL ENCOUNTER (OUTPATIENT)
Dept: INFUSION CENTER | Facility: CLINIC | Age: 81
Discharge: HOME | End: 2021-04-29
Payer: COMMERCIAL

## 2021-04-29 ENCOUNTER — OFFICE VISIT (OUTPATIENT)
Dept: HEMATOLOGY ONCOLOGY | Facility: CLINIC | Age: 81
End: 2021-04-29
Payer: COMMERCIAL

## 2021-04-29 VITALS
OXYGEN SATURATION: 96 % | HEIGHT: 66 IN | TEMPERATURE: 97.3 F | HEART RATE: 65 BPM | DIASTOLIC BLOOD PRESSURE: 74 MMHG | WEIGHT: 211.6 LBS | BODY MASS INDEX: 34.01 KG/M2 | RESPIRATION RATE: 18 BRPM | SYSTOLIC BLOOD PRESSURE: 132 MMHG

## 2021-04-29 DIAGNOSIS — C61 PROSTATE CANCER (HCC): Primary | ICD-10-CM

## 2021-04-29 DIAGNOSIS — C79.51 BONE METASTASES (HCC): ICD-10-CM

## 2021-04-29 DIAGNOSIS — C79.51 BONE METASTASES (HCC): Primary | ICD-10-CM

## 2021-04-29 DIAGNOSIS — C61 PROSTATE CANCER (HCC): ICD-10-CM

## 2021-04-29 PROCEDURE — 99214 OFFICE O/P EST MOD 30 MIN: CPT | Performed by: INTERNAL MEDICINE

## 2021-04-29 PROCEDURE — 1036F TOBACCO NON-USER: CPT | Performed by: INTERNAL MEDICINE

## 2021-04-29 PROCEDURE — 3078F DIAST BP <80 MM HG: CPT | Performed by: INTERNAL MEDICINE

## 2021-04-29 PROCEDURE — 1160F RVW MEDS BY RX/DR IN RCRD: CPT | Performed by: INTERNAL MEDICINE

## 2021-04-29 PROCEDURE — 3075F SYST BP GE 130 - 139MM HG: CPT | Performed by: INTERNAL MEDICINE

## 2021-04-29 PROCEDURE — 96365 THER/PROPH/DIAG IV INF INIT: CPT

## 2021-04-29 RX ORDER — SODIUM CHLORIDE 9 MG/ML
20 INJECTION, SOLUTION INTRAVENOUS ONCE
Status: COMPLETED | OUTPATIENT
Start: 2021-04-29 | End: 2021-04-29

## 2021-04-29 RX ORDER — SODIUM CHLORIDE 9 MG/ML
20 INJECTION, SOLUTION INTRAVENOUS ONCE
Status: CANCELLED | OUTPATIENT
Start: 2021-07-15

## 2021-04-29 RX ADMIN — SODIUM CHLORIDE 20 ML/HR: 0.9 INJECTION, SOLUTION INTRAVENOUS at 14:49

## 2021-04-29 RX ADMIN — ZOLEDRONIC ACID 4 MG: 4 INJECTION, SOLUTION, CONCENTRATE INTRAVENOUS at 15:04

## 2021-04-29 NOTE — PLAN OF CARE
Problem: Potential for Falls  Goal: Patient will remain free of falls  Description: INTERVENTIONS:  - Assess patient frequently for physical needs  -  Identify cognitive and physical deficits and behaviors that affect risk of falls    -  [place] fall precautions as indicated by assessment   - Educate patient/family on patient safety including physical limitations  - Instruct patient to call for assistance with activity based on assessment  - Modify environment to reduce risk of injury  - Consider OT/PT consult to assist with strengthening/mobility  Outcome: Progressing

## 2021-04-29 NOTE — PROGRESS NOTES
Hematology Outpatient Follow - Up Note  Gena Richards 79 y o  male MRN: @ Encounter: [id number]        Date:  4/29/2021        Assessment/ Plan:    81-year-old  male with castration resistant metastatic prostate cancer [place] score of 9 with bone metastases     Diagnosed initially on 09/2018 with PSA of 9, CT scan showed involvement of the posterior side of the 10th rib area, vertebral body and distant point of the right clavicle he received Firmagon and later on Lupron by Urology     He received abiraterone/prednisone PSA went down to 0 1 until May 2020 PSA started rising up, bone scan showed lesion in the anterior 5th rib area, right humerus no evidence of visceral disease     He received enzalutamide since 06/02/2020 at PSA of 0 8     In 10/2020 PSA 1 4     Negative for germline BRCA 1/2 mutation, MSI stable, LIONEL on multiple other mutations     PET scan showed scattered metastatic disease specially at T10, the base of the bladder, possible inguinal lymphadenopathy     radiation to the pelvic area from February until April 2021     PSA 7 2    CT scan of the chest abdomen and pelvis on 04/21/2021 showed persistent lobulated mass in the posterior bladder smaller 1 8 x 1 5 cm ( 2 1 x 1 8), multiple bony lesions, stable however the focus in L5 vertebral body has increased from 1 2 cm to 1 5 cm    Continue enzalutamide 160 mg p o  daily and repeat CBC, CMP and PSA in 3 months and will proceed from there most likely next therapy would be Taxotere            Labs and imaging studies are reviewed by ordering provider once results are available  If there are findings that need immediate attention, you will be contacted when results available  Discussing results and the implication on your healthcare is best discussed in person at your follow-up visit         HPI:    81-year-old  male who was seen by Urology for elevation of the PSA, when he presented in June 2017 with PSA of 4 6, Subsequently in September of 2017 PSA was 7 3 and in May of 2018 PSA was 8  6   A biopsy was recommended but the patient had just undergone percutaneous stenting of the heart and he was on dual anti-platelet therapy      MRI of the abdomen in April 2018 showed 2 simple cyst in the right hepatic lobe, bilateral renal cysts the largest 1 measuring 10 cm in the left lower lobe     In October of 2018 PSA was 9 8  CT scan of the chest 9/28/2018 showed sclerotic lesions in T10, 5 mm nodule in the right lower lobe   Bone scan showed activity in the posterior T10 level and left posterior 7th rib area concerning for osseous metastases and increased activity in the distal right clavicle might be degenerative or metastatic area      He was diagnosed with castration sensitive metastatic prostate cancer  [facility] insurance company could not approve enzalutamide, he also has a high co-payment for abiraterone     The patient initiated on samples of enzalutamide 160 mg p o  daily since the beginning of December 2018   His insurance approved abiraterone however he had a high co-payment        We were finally able to get financial assistance through [employer] and Roseline started Zytiga (abiraterone) 2/3/2019 with prednisone 5mg po bid without side effects      In May 2020 PSA 0 5, bone scan showed activity in the anterior 5th rib area, right humerus area most likely consistent with metastatic disease, no visceral disease on the CT scan, we had hard time getting enzalutamide , the patient was initiated on enzalutamide on 06/02/2020, PSA at the time of 0 8     PSA 1 4 in October 2020, normal alkaline phosphatase           Genes analyzed of germ line test  LIONEL, BARD1, BRCA1, BRCA2, BRIP1, CDH1, CHEK2, DICER1, EPCAM*, HOXB13, MLH1, MSH2,  MSH6, NBN, NF1, PALB2, PMS2, PTEN, RAD50, RAD51C, RAD51D, SMARCA4, STK11, TP53 all of these were negative     Evaluated at [facility] the decision for radiation therapy to oligo metastases and the primary site of the tumor depends on the PET scan finding which showed multiple metastatic disease, patient to meet with Radiation Oncology      PET scan on 11/13/2020 showed radiotracer retention in the left posterior bladder suspicious for malignancy with prostate cancer invasion progressing from prior CT scan mild to moderate uptake in the few small inguinal lymph nodes, sclerotic lesions in the spine with uptake suspicious for metastases     radiation therapy in the February 2021- April 2021, he continues on enzalutamide   Interval History:        Previous Treatment:         Test Results:    Imaging: Ct Chest Abdomen Pelvis W Contrast    Result Date: 4/26/2021  Narrative: CT CHEST, ABDOMEN AND PELVIS WITH IV CONTRAST INDICATION:   C79 51: Secondary malignant neoplasm of bone C61: Malignant neoplasm of prostate  COMPARISON:  Compared with 5/2/2020 and PET scan of 11/23/2020 TECHNIQUE: CT examination of the chest, abdomen and pelvis was performed  Axial, sagittal, and coronal 2D reformatted images were created from the source data and submitted for interpretation  Radiation dose length product (DLP) for this visit:  951 mGy-cm   This examination, like all CT scans performed in the [facility], was performed utilizing techniques to minimize radiation dose exposure, including the use of iterative reconstruction and automated exposure control  IV Contrast:  100 mL of iohexol (OMNIPAQUE) Enteric Contrast: Enteric contrast was administered  FINDINGS: CHEST LUNGS:  Lungs are clear  There is no tracheal or endobronchial lesion  PLEURA:  Unremarkable  HEART/GREAT VESSELS:  Unremarkable for patient's age  MEDIASTINUM AND JOSE ANTONIO:  Small hiatal hernia is unchanged  CHEST WALL AND LOWER NECK:   Unremarkable  ABDOMEN LIVER/BILIARY TREE:  Stable hepatic hypodensities of known cysts  GALLBLADDER:  No calcified gallstones  No pericholecystic inflammatory change  SPLEEN:  Unremarkable  PANCREAS:  Unremarkable   ADRENAL GLANDS:  Unremarkable  KIDNEYS/URETERS:  Stable bilateral renal cysts largest on the left  Stable bilateral parapelvic cysts    No hydronephrosis  STOMACH AND BOWEL:  Unremarkable  APPENDIX:  No findings to suggest appendicitis  ABDOMINOPELVIC CAVITY:  No ascites  No pneumoperitoneum  No lymphadenopathy  VESSELS:  Unremarkable for patient's age  PELVIS REPRODUCTIVE ORGANS:  Unremarkable for patient's age  URINARY BLADDER:  Persistent hyperdense  mass posteriorly measuring 1 8 x 1 5 cm compared to 2 1 x 1 8 cm  ABDOMINAL WALL/INGUINAL REGIONS:  Subcentimeter inguinal lymph nodes unchanged  OSSEOUS STRUCTURES: Tiny less than 4 mm sclerotic foci in the sacrum unchanged  1 5 cm sclerotic focus in L5 vertebral body increased from 1 2 cm  Tiny sclerotic focus in the right posterior 4th rib is unchanged left posterior 7th sclerotic foci are unchanged  Unchanged right anterior 5th rib sclerotic focus Sclerotic focus in T10 is unchanged  Other tiny scattered sclerotic foci unchanged  No acute fracture or destructive osseous lesion  Impression: Persistent lobulated mass in the posterior bladder  Mostly unchanged sclerotic metastatic lesions in the bone with slight increase in the L5 lesion  Workstation performed: [id number]       Labs:   Lab Results   Component Value Date    WBC 4 8 04/27/2021    HGB 13 8 04/27/2021    HCT 39 5 04/27/2021    MCV 92 5 04/27/2021     04/27/2021     Lab Results   Component Value Date     09/30/2017    K 3 6 04/27/2021     04/27/2021    CO2 33 (H) 04/27/2021    BUN 17 04/27/2021    CREATININE 1 06 04/27/2021    GLUF 97 08/08/2019    CALCIUM 8 9 04/27/2021    AST 19 04/27/2021    ALT 13 04/27/2021    ALKPHOS 60 04/27/2021    PROT 6 5 09/30/2017    BILITOT 0 7 09/30/2017    EGFR 61 12/02/2019       No results found for: IRON, TIBC, FERRITIN    No results found for: DADGBGBI13      ROS: Review of Systems   Constitutional: Negative    Negative for appetite change, chills, diaphoresis, fatigue, fever and unexpected weight change  HENT:   Negative for hearing loss, lump/mass, mouth sores, nosebleeds, sore throat, trouble swallowing and voice change  Eyes: Negative  Negative for eye problems and icterus  Respiratory: Negative  Negative for chest tightness, cough, hemoptysis and shortness of breath  Cardiovascular: Negative for chest pain and leg swelling  Gastrointestinal: Negative for abdominal distention, abdominal pain, blood in stool, constipation, diarrhea and nausea  Endocrine: Negative  Genitourinary: Negative for dysuria, frequency, hematuria and pelvic pain  Musculoskeletal: Negative  Negative for arthralgias, back pain, flank pain, gait problem, myalgias and neck stiffness  Skin: Negative for itching and rash  Neurological: Negative for dizziness, gait problem, headaches, light-headedness, numbness and speech difficulty  Hematological: Negative for adenopathy  Does not bruise/bleed easily  Psychiatric/Behavioral: Negative for confusion, decreased concentration, depression and sleep disturbance  The patient is not nervous/anxious  Current Medications: Reviewed  Allergies: Reviewed  PMH/FH/SH:  Reviewed      Physical Exam:    Body surface area is 2 05 meters squared  Wt Readings from Last 3 Encounters:   04/29/21 96 kg (211 lb 9 6 oz)   03/26/21 95 7 kg (211 lb)   03/17/21 96 6 kg (213 lb)        Temp Readings from Last 3 Encounters:   04/29/21 (!) 97 3 °F (36 3 °C) (Tympanic)   03/26/21 97 7 °F (36 5 °C) (Tympanic)   02/05/21 (!) 97 4 °F (36 3 °C) (Temporal)        BP Readings from Last 3 Encounters:   04/29/21 132/74   03/26/21 110/64   03/17/21 140/80         Pulse Readings from Last 3 Encounters:   04/29/21 65   03/26/21 59   03/17/21 64        Physical Exam  Vitals signs reviewed  Constitutional:       General: He is not in acute distress  Appearance: He is well-developed  He is not diaphoretic     HENT:      Head: Normocephalic and atraumatic  Eyes:      Conjunctiva/sclera: Conjunctivae normal    Neck:      Musculoskeletal: Normal range of motion and neck supple  Trachea: No tracheal deviation  Cardiovascular:      Rate and Rhythm: Normal rate and regular rhythm  Heart sounds: No murmur  No friction rub  No gallop  Pulmonary:      Effort: Pulmonary effort is normal  No respiratory distress  Breath sounds: Normal breath sounds  No wheezing or rales  Chest:      Chest wall: No tenderness  Abdominal:      General: There is no distension  Palpations: Abdomen is soft  Tenderness: There is no abdominal tenderness  Lymphadenopathy:      Cervical: No cervical adenopathy  Skin:     General: Skin is warm and dry  Coloration: Skin is not pale  Findings: Rash ( fromOn min for skin cancer on the forehead) present  No erythema  Neurological:      Mental Status: He is alert and oriented to person, place, and time  Psychiatric:         Behavior: Behavior normal          Thought Content: Thought content normal          Judgment: Judgment normal          ECO    Goals and Barriers:  Current Goal: Minimize effects of disease  Barriers: None  Patient's Capacity to Self Care:  Patient is able to self care      Code Status: @[facility]@

## 2021-05-06 ENCOUNTER — TELEPHONE (OUTPATIENT)
Dept: FAMILY MEDICINE CLINIC | Facility: CLINIC | Age: 81
End: 2021-05-06

## 2021-05-17 ENCOUNTER — RA CDI HCC (OUTPATIENT)
Dept: OTHER | Facility: HOSPITAL | Age: 81
End: 2021-05-17

## 2021-05-17 NOTE — PROGRESS NOTES
Based on clinical documentation indicated in your record, it appears that the patient may have the following conditions not coded in 2021:    I25 118 Coronary artery disease of native artery of native heart with stable angina pectoris     If this is correct, please document and assess at your next visit May 24th    [place] [phone]  coding opportunities             Chart reviewed, (number of) suggestions sent to provider: 1           Patients insurance company: [employer] (Medicare Advantage and Commercial)             [place] [phone]  coding opportunities             Chart reviewed, (number of) suggestions sent to provider: 1     Problem listed updated   Provider Accepted, (number of) suggestions accepted: 1        Patients insurance company: [employer] (Medicare Advantage and Commercial)           used

## 2021-05-24 ENCOUNTER — OFFICE VISIT (OUTPATIENT)
Dept: FAMILY MEDICINE CLINIC | Facility: CLINIC | Age: 81
End: 2021-05-24
Payer: COMMERCIAL

## 2021-05-24 ENCOUNTER — TELEPHONE (OUTPATIENT)
Dept: FAMILY MEDICINE CLINIC | Facility: CLINIC | Age: 81
End: 2021-05-24

## 2021-05-24 VITALS
OXYGEN SATURATION: 97 % | DIASTOLIC BLOOD PRESSURE: 78 MMHG | HEART RATE: 67 BPM | HEIGHT: 66 IN | SYSTOLIC BLOOD PRESSURE: 128 MMHG | RESPIRATION RATE: 16 BRPM | TEMPERATURE: 98.1 F | BODY MASS INDEX: 33.91 KG/M2 | WEIGHT: 211 LBS

## 2021-05-24 DIAGNOSIS — Z00.00 ENCOUNTER FOR ANNUAL WELLNESS EXAM IN MEDICARE PATIENT: Primary | ICD-10-CM

## 2021-05-24 DIAGNOSIS — C61 PROSTATE CANCER (HCC): ICD-10-CM

## 2021-05-24 DIAGNOSIS — H91.93 BILATERAL HEARING LOSS, UNSPECIFIED HEARING LOSS TYPE: ICD-10-CM

## 2021-05-24 DIAGNOSIS — I10 BENIGN ESSENTIAL HYPERTENSION: ICD-10-CM

## 2021-05-24 DIAGNOSIS — I25.118 CORONARY ARTERY DISEASE OF NATIVE ARTERY OF NATIVE HEART WITH STABLE ANGINA PECTORIS (HCC): ICD-10-CM

## 2021-05-24 DIAGNOSIS — E78.2 MIXED HYPERLIPIDEMIA: ICD-10-CM

## 2021-05-24 DIAGNOSIS — R60.0 LOCALIZED EDEMA: ICD-10-CM

## 2021-05-24 PROBLEM — R23.2 HOT FLASHES: Status: RESOLVED | Noted: 2019-02-12 | Resolved: 2021-05-24

## 2021-05-24 PROBLEM — S29.019A THORACIC MYOFASCIAL STRAIN: Status: RESOLVED | Noted: 2019-08-07 | Resolved: 2021-05-24

## 2021-05-24 PROBLEM — M25.551 RIGHT HIP PAIN: Status: RESOLVED | Noted: 2020-08-26 | Resolved: 2021-05-24

## 2021-05-24 PROCEDURE — 1101F PT FALLS ASSESS-DOCD LE1/YR: CPT | Performed by: FAMILY MEDICINE

## 2021-05-24 PROCEDURE — G0439 PPPS, SUBSEQ VISIT: HCPCS | Performed by: FAMILY MEDICINE

## 2021-05-24 PROCEDURE — 1036F TOBACCO NON-USER: CPT | Performed by: FAMILY MEDICINE

## 2021-05-24 PROCEDURE — 1170F FXNL STATUS ASSESSED: CPT | Performed by: FAMILY MEDICINE

## 2021-05-24 PROCEDURE — 1160F RVW MEDS BY RX/DR IN RCRD: CPT | Performed by: FAMILY MEDICINE

## 2021-05-24 PROCEDURE — 3725F SCREEN DEPRESSION PERFORMED: CPT | Performed by: FAMILY MEDICINE

## 2021-05-24 PROCEDURE — 99214 OFFICE O/P EST MOD 30 MIN: CPT | Performed by: FAMILY MEDICINE

## 2021-05-24 PROCEDURE — 3288F FALL RISK ASSESSMENT DOCD: CPT | Performed by: FAMILY MEDICINE

## 2021-05-24 PROCEDURE — 1125F AMNT PAIN NOTED PAIN PRSNT: CPT | Performed by: FAMILY MEDICINE

## 2021-05-24 NOTE — TELEPHONE ENCOUNTER
Patient stated that at today's appointment with you he had briefly brought up the issue with his right breast but there was no discussion on what should be the next step

## 2021-05-24 NOTE — PROGRESS NOTES
Assessment and Plan:    MEDICARE WELLNESS VISIT    Problem List Items Addressed This Visit     Benign essential hypertension    Prostate cancer ([facility] [address] )    Coronary artery disease of native artery of native heart with stable angina pectoris ([facility] [address] )    Localized edema      Other Visit Diagnoses     Encounter for annual wellness exam in Medicare patient    -  Primary           Preventive health issues were discussed with patient, and age appropriate screening tests were ordered as noted in patient's After Visit Summary  Personalized health advice and appropriate referrals for health education or preventive services given if needed, as noted in patient's After Visit Summary  History of Present Illness:     Patient presents for Welcome to Medicare visit       Patient Care Team:  Antonio Forte DO as PCP - MD Adrian Loo DO Donetta Gaba, MD Jae American, MD (Radiation Oncology)     Review of Systems:     Review of Systems   Problem List:     Patient Active Problem List   Diagnosis    Benign essential hypertension    Renal cyst    Liver lesion    Pulmonary nodule    Combined arterial insufficiency and corporo-venous occlusive erectile dysfunction    Prostate cancer ([facility] [facility] )    Coronary artery disease of native artery of native heart with stable angina pectoris ([facility] [address] [facility] )    DDD (degenerative disc disease), cervical    S/P coronary artery stent placement    Vitamin D deficiency    Abnormal radionuclide bone scan    Hot flashes    Bone metastases ([facility] [facility] )    Calculus of kidney    Thoracic myofascial strain    Mixed hyperlipidemia    Localized edema    Right hip pain    Chronic pain of left knee      Past Medical and Surgical History:     Past Medical History:   Diagnosis Date    Anemia     last assessed  1/7/14     Cancer ([address] )     Coronary artery disease     Hypercholesteremia     Hypertension     Kidney stone     Polyp of sigmoid colon     Prostate cancer ([facility] [address] )     Renal disorder     elevated serum creat    Tinnitus     unspecified laterality / last assessed 4/9/13    Vitamin D deficiency      Past Surgical History:   Procedure Laterality Date    CARDIAC SURGERY      COLONOSCOPY      CORONARY ANGIOPLASTY WITH STENT PLACEMENT      FL RETROGRADE PYELOGRAM  9/11/2019    HEMORRHOID SURGERY      SC CYSTO/URETERO W/LITHOTRIPSY &INDWELL STENT INSRT Left 9/11/2019    Procedure: CYSTO, URETEROSCOPY W/HOLMIUM LASER, BASKET STONE EXTRACTION, RETROGRADE PYELOGRAM, STENT EXCHANGE;  Surgeon: Sandra Giles MD;  Location: [facility];  Service: Urology    PROSTATE BIOPSY  10/24/2018    TONSILLECTOMY        Family History:     Family History   Problem Relation Age of Onset   Thi Christensen Breast cancer Mother     Hypertension Father       Social History:     E-Cigarette/Vaping    E-Cigarette Use Never User      E-Cigarette/Vaping Substances    Nicotine No     THC No     CBD No     Flavoring No     Other No     Unknown No      Social History     Socioeconomic History    Marital status: /Civil Union     Spouse name: None    Number of children: None    Years of education: None    Highest education level: None   Occupational History    Occupation: consultant   Social Needs    Financial resource strain: None    Food insecurity     Worry: None     Inability: None    Transportation needs     Medical: None     Non-medical: None   Tobacco Use    Smoking status: Never Smoker    Smokeless tobacco: Never Used   Substance and Sexual Activity    Alcohol use:  Yes     Alcohol/week: 2 0 - 3 0 standard drinks     Types: 2 - 3 Glasses of wine per week     Frequency: 2-3 times a week     Comment: Occuational / social     Drug use: No    Sexual activity: None   Lifestyle    Physical activity     Days per week: None     Minutes per session: None    Stress: None   Relationships    Social connections     Talks on phone: None     Gets together: None     Attends Restorationist service: None     Active member of club or organization: None     Attends meetings of clubs or organizations: None     Relationship status: None    Intimate partner violence     Fear of current or ex partner: None     Emotionally abused: None     Physically abused: None     Forced sexual activity: None   Other Topics Concern    None   Social History Narrative    Always uses seat belt     Daily caffeine consumption 2-3 servings a day     Feels safe at home      Medications and Allergies:     Current Outpatient Medications   Medication Sig Dispense Refill    acetaminophen (TYLENOL) 500 mg tablet Take 500 mg by mouth every 6 (six) hours as needed for mild pain      aspirin (ASPIRIN ADULT LOW STRENGTH) 81 mg EC tablet Take 1 tablet by mouth daily      atorvastatin (LIPITOR) 40 mg tablet Take 40 mg by mouth daily       Calcium 500 MG tablet Take 1,000 mg by mouth daily       cholecalciferol (VITAMIN D3) 1,000 units tablet Take 1 tablet by mouth daily      enzalutamide (Xtandi) 40 mg capsule Take 4 capsules (160 mg total) by mouth once daily  120 capsule 11    hydrochlorothiazide (HYDRODIURIL) 25 mg tablet Take 1 tablet (25 mg total) by mouth daily 90 tablet 1    leuprolide (LUPRON DEPOT 6 MONTH KIT) 45 mg Inject 45 mg into a muscle every 6 (six) months 45 mg 0    losartan (COZAAR) 100 MG tablet Take 1 tablet (100 mg total) by mouth daily 90 tablet 1    MELATONIN PO Take 1 tablet by mouth daily at bedtime as needed       metoprolol succinate (TOPROL-XL) 25 mg 24 hr tablet take 1 tablet by mouth once daily 90 tablet 1    tamsulosin (FLOMAX) 0 4 mg Take 1 capsule (0 4 mg total) by mouth daily with dinner 90 capsule 0    Zoledronic Acid (ZOMETA IV) Infuse into a venous catheter every 3 (three) months       No current facility-administered medications for this visit        No Known Allergies   Immunizations:     Immunization History   Administered Date(s) Administered    H1N1, All Formulations 02/01/2010    INFLUENZA 09/17/2015, 09/23/2016, 08/20/2020    Influenza Split High Dose Preservative Free IM 09/04/2014, 09/17/2015, 09/23/2016, 10/06/2017    Influenza, high dose seasonal 0 7 mL 09/14/2018, 10/21/2019    Influenza, seasonal, injectable 09/20/2012, 09/17/2013    Pneumococcal Conjugate 13-Valent 03/16/2015    Pneumococcal Polysaccharide PPV23 07/26/2011    SARS-CoV-2 / COVID-19 mRNA IM (Moderna) 01/11/2021, 02/08/2021    Tdap 07/26/2011    Zoster 01/10/2014    Zoster Vaccine Recombinant 08/20/2020, 11/01/2020      Health Maintenance: There are no preventive care reminders to display for this patient  There are no preventive care reminders to display for this patient  Medicare Screening Tests and Risk Assessments:     Liss Campos is here for his Subsequent Wellness visit  Last Medicare Wellness visit information reviewed, patient interviewed and updates made to the record today  Health Risk Assessment:   Patient rates overall health as fair  Patient feels that their physical health rating is same  Patient is satisfied with their life  Hearing was rated as same  Patient feels that their emotional and mental health rating is same  Patients states they are never, rarely angry  Patient states they are never, rarely unusually tired/fatigued  Pain experienced in the last 7 days has been none  Patient states that he has experienced no weight loss or gain in last 6 months  Depression Screening:   PHQ-2 Score: 0      Fall Risk Screening: In the past year, patient has experienced: no history of falling in past year      Home Safety:  Patient does not have trouble with stairs inside or outside of their home  Patient has working smoke alarms and has no working carbon monoxide detector  Home safety hazards include: none  Nutrition:   Current diet is Regular  Medications:   Patient is currently taking over-the-counter supplements   OTC medications include: see medication list  Patient is able to manage medications  Activities of Daily Living (ADLs)/Instrumental Activities of Daily Living (IADLs):   Walk and transfer into and out of bed and chair?: Yes  Dress and groom yourself?: Yes    Bathe or shower yourself?: Yes    Feed yourself? Yes  Do your laundry/housekeeping?: Yes  Manage your money, pay your bills and track your expenses?: Yes  Make your own meals?: Yes    Do your own shopping?: Yes    Previous Hospitalizations:   Any hospitalizations or ED visits within the last 12 months?: No      Advance Care Planning:   Living will: Yes    Durable POA for healthcare:  Yes    Advanced directive: Yes    Advanced directive counseling given: Yes    Five wishes given: Yes    End of Life Decisions reviewed with patient: Yes    Provider agrees with end of life decisions: Yes      Cognitive Screening:   Provider or family/friend/caregiver concerned regarding cognition?: No    PREVENTIVE SCREENINGS      Cardiovascular Screening:    General: Screening Not Indicated and History Lipid Disorder      Diabetes Screening:     General: Screening Current      Colorectal Cancer Screening:     General: Risks and Benefits Discussed      Prostate Cancer Screening:    General: History Prostate Cancer and Screening Not Indicated      Osteoporosis Screening:    General: Risks and Benefits Discussed      Abdominal Aortic Aneurysm (AAA) Screening:        General: Risks and Benefits Discussed      Lung Cancer Screening:     General: Screening Not Indicated      Hepatitis C Screening:    General: Risks and Benefits Discussed    No exam data present     Physical Exam:     /78 (BP Location: Left arm, Patient Position: Sitting, Cuff Size: Large)   Pulse 67   Temp 98 1 °F (36 7 °C) (Tympanic)   Resp 16   Ht 5' 5 75" (1 67 m)   Wt 95 7 kg (211 lb)   SpO2 97%   BMI 34 32 kg/m²     Physical Exam     Franco Willis DO

## 2021-05-24 NOTE — PATIENT INSTRUCTIONS

## 2021-05-24 NOTE — ASSESSMENT & PLAN NOTE
Patient still being followed for [place] 9 prostate cancer by Urology, Oncology, and Radiation Oncology  He recently completed radiation treatments  Recent PET scans have shown metastasis to T10, bladder, and inguinal nodes    Overall, patient feels well

## 2021-05-24 NOTE — ASSESSMENT & PLAN NOTE
Status post stenting in 2018  Overall doing well without chest pain or shortness of breath    Continue regular follow-up with cardiology as directed

## 2021-05-24 NOTE — ASSESSMENT & PLAN NOTE
Still with 1+ pretibial edema bilaterally  No calf pain/tenderness, erythema  Doing reasonably well on hydrochlorothiazide 25 mg daily    Will continue to monitor

## 2021-05-24 NOTE — TELEPHONE ENCOUNTER
Patient does have various sclerotic foci on his ribs that were evident on recent CT scan from 4/21  I think it is possible that his pain is coming from these     But if symptoms should worsen, we should probably perform further testing ( x-ray of right-sided ribs for follow-up)

## 2021-05-24 NOTE — ASSESSMENT & PLAN NOTE
Longstanding history of hearing problems    Patient was referred to ENT in the past   Will give another referral for recheck/reeval

## 2021-05-24 NOTE — PROGRESS NOTES
[facility]      NAME: Rancho Jackson  AGE: 79 y o  SEX: male  : 1940   MRN: [id number]    DATE: 2021  TIME: 2:26 PM    Assessment and Plan     Problem List Items Addressed This Visit     Benign essential hypertension      Well controlled on metoprolol XL 25, losartan 100, hydrochlorothiazide 25         Relevant Orders    CBC and differential    TSH, 3rd generation with Free T4 reflex    Prostate cancer [facility])     Patient still being followed for Vitaliy 9 prostate cancer by Urology, Oncology, and Radiation Oncology  He recently completed radiation treatments  Recent PET scans have shown metastasis to T10, bladder, and inguinal nodes  Overall, patient feels well             Coronary artery disease of native artery of native heart with stable angina pectoris ([address] )      Status post stenting in 2018  Overall doing well without chest pain or shortness of breath  Continue regular follow-up with cardiology as directed         Mixed hyperlipidemia      Doing well on atorvastatin 40 mg daily  Will check lipids prior to next appointment         Relevant Orders    Lipid Panel with Direct LDL reflex    Localized edema      Still with 1+ pretibial edema bilaterally  No calf pain/tenderness, erythema  Doing reasonably well on hydrochlorothiazide 25 mg daily  Will continue to monitor         Relevant Orders    Comprehensive metabolic panel    Bilateral hearing loss      Longstanding history of hearing problems    Patient was referred to ENT in the past   Will give another referral for recheck/reeval         Relevant Orders    Ambulatory Referral to Otolaryngology      Other Visit Diagnoses     Encounter for annual wellness exam in Medicare patient    -  Primary        Pt had COVID vaccination      Return to office in:  6 months ( labs prior at [facility])    Chief Complaint     Chief Complaint   Patient presents with    Follow-up     6 months    Medicare Wellness Visit       History of Present Illness      Patient presents with his wife today for recheck chronic medical problems  Patient still being followed for Vitaliy 9 prostate cancer by Urology, Oncology, and Radiation Oncology  He recently completed radiation treatments  Recent PET scans have shown metastasis to T10, bladder, and inguinal nodes  Overall, patient feels well     still sees cardiology  Doing well without chest pain or shortness of breath  Still with some lower extremity edema, but some improvement since starting hydrochlorothiazide 25 mg daily  Still taking metoprolol losartan for hypertension  Labs were done on April 27th      The following portions of the patient's history were reviewed and updated as appropriate: allergies, current medications, past family history, past medical history, past social history, past surgical history and problem list     Review of Systems   Review of Systems   Respiratory: Negative  Cardiovascular: Positive for leg swelling  Negative for chest pain and palpitations  Gastrointestinal: Negative  Genitourinary: Negative          Active Problem List     Patient Active Problem List   Diagnosis    Benign essential hypertension    Renal cyst    Liver lesion    Pulmonary nodule    Combined arterial insufficiency and corporo-venous occlusive erectile dysfunction    Prostate cancer ([address] )    Coronary artery disease of native artery of native heart with stable angina pectoris ([address] )    DDD (degenerative disc disease), cervical    S/P coronary artery stent placement    Vitamin D deficiency    Abnormal radionuclide bone scan    Bone metastases (HCC)    Calculus of kidney    Mixed hyperlipidemia    Localized edema    Chronic pain of left knee    Bilateral hearing loss       Objective   /78 (BP Location: Left arm, Patient Position: Sitting, Cuff Size: Large)   Pulse 67   Temp 98 1 °F (36 7 °C) (Tympanic)   Resp 16   Ht 5' 5 75" (1 67 m)   Wt 95 7 kg (211 lb)   SpO2 97%   BMI 34 32 kg/m²     Physical Exam  Cardiovascular:      Rate and Rhythm: Normal rate and regular rhythm  Heart sounds: Normal heart sounds  Comments: Carotids: no bruits  Ext: no edema  Pulmonary:      Effort: Pulmonary effort is normal  No respiratory distress  Breath sounds: No wheezing or rales  Psychiatric:         Behavior: Behavior normal          Thought Content: Thought content normal          Pertinent Laboratory/Diagnostic Studies:  Labs April 27th    Current Medications     Current Outpatient Medications:     acetaminophen (TYLENOL) 500 mg tablet, Take 500 mg by mouth every 6 (six) hours as needed for mild pain, Disp: , Rfl:     aspirin (ASPIRIN ADULT LOW STRENGTH) 81 mg EC tablet, Take 1 tablet by mouth daily, Disp: , Rfl:     atorvastatin (LIPITOR) 40 mg tablet, Take 40 mg by mouth daily , Disp: , Rfl:     Calcium 500 MG tablet, Take 1,000 mg by mouth daily , Disp: , Rfl:     cholecalciferol (VITAMIN D3) 1,000 units tablet, Take 1 tablet by mouth daily, Disp: , Rfl:     enzalutamide (Xtandi) 40 mg capsule, Take 4 capsules (160 mg total) by mouth once daily  , Disp: 120 capsule, Rfl: 11    hydrochlorothiazide (HYDRODIURIL) 25 mg tablet, Take 1 tablet (25 mg total) by mouth daily, Disp: 90 tablet, Rfl: 1    leuprolide (LUPRON DEPOT 6 MONTH KIT) 45 mg, Inject 45 mg into a muscle every 6 (six) months, Disp: 45 mg, Rfl: 0    losartan (COZAAR) 100 MG tablet, Take 1 tablet (100 mg total) by mouth daily, Disp: 90 tablet, Rfl: 1    MELATONIN PO, Take 1 tablet by mouth daily at bedtime as needed , Disp: , Rfl:     metoprolol succinate (TOPROL-XL) 25 mg 24 hr tablet, take 1 tablet by mouth once daily, Disp: 90 tablet, Rfl: 1    tamsulosin (FLOMAX) 0 4 mg, Take 1 capsule (0 4 mg total) by mouth daily with dinner, Disp: 90 capsule, Rfl: 0    Zoledronic Acid (ZOMETA IV), Infuse into a venous catheter every 3 (three) months, Disp: , Rfl:     Health Maintenance     Health Maintenance   Topic Date Due    SLP PLAN OF CARE  Never done   [facility] Annual Wellness Visit (AWV)  12/09/2020    DTaP,Tdap,and Td Vaccines (2 - Td) 07/26/2021    Depression Screening PHQ  12/04/2021    BMI: Followup Plan  03/27/2022    Fall Risk  05/24/2022    BMI: Adult  05/24/2022    Pneumococcal Vaccine: 65+ Years  Completed    Influenza Vaccine  Completed    COVID-19 Vaccine  Completed    HIB Vaccine  Aged Out    Hepatitis B Vaccine  Aged Out    IPV Vaccine  Aged Out    Hepatitis A Vaccine  Aged Out    Meningococcal ACWY Vaccine  Aged Out    HPV Vaccine  Aged Out     Immunization History   Administered Date(s) Administered    H1N1, All Formulations 02/01/2010    INFLUENZA 09/17/2015, 09/23/2016, 08/20/2020    Influenza Split High Dose Preservative Free IM 09/04/2014, 09/17/2015, 09/23/2016, 10/06/2017    Influenza, high dose seasonal 0 7 mL 09/14/2018, 10/21/2019    Influenza, seasonal, injectable 09/20/2012, 09/17/2013    Pneumococcal Conjugate 13-Valent 03/16/2015    Pneumococcal Polysaccharide PPV23 07/26/2011    SARS-CoV-2 / COVID-19 mRNA IM (Moderna) 01/11/2021, 02/08/2021    Tdap 07/26/2011    Zoster 01/10/2014    Zoster Vaccine Recombinant 08/20/2020, 11/01/2020       Savi De Guzman DO  [facility]

## 2021-05-27 NOTE — TELEPHONE ENCOUNTER
Patient made aware but state that he feels there is something wrong with his right breast  Pain level at a 2/10  Want to have it evaluated

## 2021-05-28 ENCOUNTER — APPOINTMENT (OUTPATIENT)
Dept: RADIOLOGY | Facility: CLINIC | Age: 81
End: 2021-05-28
Payer: COMMERCIAL

## 2021-05-28 DIAGNOSIS — R07.81 RIB PAIN ON RIGHT SIDE: Primary | ICD-10-CM

## 2021-05-28 DIAGNOSIS — R07.81 RIB PAIN ON RIGHT SIDE: ICD-10-CM

## 2021-05-28 PROCEDURE — 71101 X-RAY EXAM UNILAT RIBS/CHEST: CPT

## 2021-06-02 DIAGNOSIS — C61 PROSTATE CANCER (HCC): Primary | ICD-10-CM

## 2021-06-02 DIAGNOSIS — C79.51 BONE METASTASES (HCC): ICD-10-CM

## 2021-06-03 ENCOUNTER — TELEPHONE (OUTPATIENT)
Dept: HEMATOLOGY ONCOLOGY | Facility: CLINIC | Age: 81
End: 2021-06-03

## 2021-06-03 ENCOUNTER — TELEPHONE (OUTPATIENT)
Dept: FAMILY MEDICINE CLINIC | Facility: CLINIC | Age: 81
End: 2021-06-03

## 2021-06-03 NOTE — TELEPHONE ENCOUNTER
Spoke to patient regarding a phone call Dr Ana Irving had with Dr Aliyah Celaya from [facility]  Patient will need a bone scan which patient will schedule  He also stated he has been having breast pain which an xray was ordered  This has not been read yet but I will reach out to the radiology reading room  Also explained that Dorina Dorado can have a less common side effect of breast pain  Annabelle,  Patient will need a cysto scheduled are you able to coordinate this for the patient? Thanks!

## 2021-06-04 NOTE — TELEPHONE ENCOUNTER
Ayden White  Offered cysto appointment with Dr Jason Baum on 6/23/21 but he declined because he will be traveling  Cysto scheduled for 7/2/21  Summer Mcqueen aware of the date, time, and location

## 2021-06-10 DIAGNOSIS — C61 PROSTATE CANCER (HCC): ICD-10-CM

## 2021-06-10 RX ORDER — TAMSULOSIN HYDROCHLORIDE 0.4 MG/1
CAPSULE ORAL
Qty: 90 CAPSULE | Refills: 3 | Status: SHIPPED | OUTPATIENT
Start: 2021-06-10 | End: 2022-05-29

## 2021-06-18 ENCOUNTER — HOSPITAL ENCOUNTER (OUTPATIENT)
Dept: NUCLEAR MEDICINE | Facility: HOSPITAL | Age: 81
Discharge: HOME | End: 2021-06-18
Attending: INTERNAL MEDICINE
Payer: COMMERCIAL

## 2021-06-18 DIAGNOSIS — C61 PROSTATE CANCER (HCC): ICD-10-CM

## 2021-06-18 DIAGNOSIS — C79.51 BONE METASTASES (HCC): ICD-10-CM

## 2021-06-18 PROCEDURE — A9503 TC99M MEDRONATE: HCPCS

## 2021-06-18 PROCEDURE — 78306 BONE IMAGING WHOLE BODY: CPT

## 2021-06-29 ENCOUNTER — RADIATION ONCOLOGY FOLLOW-UP (OUTPATIENT)
Dept: RADIATION ONCOLOGY | Facility: CLINIC | Age: 81
End: 2021-06-29
Attending: RADIOLOGY
Payer: COMMERCIAL

## 2021-06-29 VITALS
HEART RATE: 63 BPM | SYSTOLIC BLOOD PRESSURE: 130 MMHG | TEMPERATURE: 97.3 F | RESPIRATION RATE: 18 BRPM | DIASTOLIC BLOOD PRESSURE: 80 MMHG | OXYGEN SATURATION: 97 % | BODY MASS INDEX: 34.53 KG/M2 | HEIGHT: 65 IN | WEIGHT: 207.23 LBS

## 2021-06-29 DIAGNOSIS — C79.51 BONE METASTASES (HCC): Primary | ICD-10-CM

## 2021-06-29 PROCEDURE — G0463 HOSPITAL OUTPT CLINIC VISIT: HCPCS | Performed by: RADIOLOGY

## 2021-06-29 PROCEDURE — 99211 OFF/OP EST MAY X REQ PHY/QHP: CPT | Performed by: RADIOLOGY

## 2021-06-29 NOTE — PROGRESS NOTES
Follow-up - Radiation Oncology   Tatyana Harrington 1940 79 y o  male [id number]      History of Present Illness   Cancer Staging  No matching staging information was found for the patient  Tatyana Harrington is a 79y o  year old male who presents  for three month follow       Yecenia Reed has a  history of elevated PSA since 2017  He had a nodule 1/2 cm on the right side by digital rectal examination   His PSA was as high as 9 8 on October 2018  The bone scan showed focal activity at T10 and a sclerotic focus left posterior 7th rib      He underwent in transrectal ultrasound-guided biopsy of the prostate on October 24, 2018  The final pathology identified adenocarcinoma, Vitaliy 9 (5+4)  Pre-treatment PSA was 9 8       ADT was started and PSA was down to 0 2 and 0 1 by January, March and April of 2019 respectively   PSA started to rise was 0 3 February of this year and 0 5 in April   CT of the chest, abdomen and pelvis demonstrated new/enlarging sclerotic lesions in T10, upper sternum was new, right 6 and 7 rib also new and  stable right 8 rib lesion   Repeat bone scan suggested mild progression of bone metastases       Patient finished a course of hypo fractionated palliative radiation therapy directed to the primary tumor in the prostate gland for stage IV carcinoma prostate following the stampede clinical trial which showed survival benefit in patients with oligo metastases treated to the prostate gland  He completed treatment on 3/24/21      4/21/21 CT C/A/P -   IMPRESSION:  Persistent lobulated mass in the posterior bladder    Mostly unchanged sclerotic metastatic lesions in the bone with slight increase in the L5 lesion     4/27/2021-  PSA 7 2  Component PSA, Total   Latest Ref Rng & Units < OR = 4 0 ng/mL   10/5/2018 9 8 (H)   1/28/2019 0 2   3/18/2019 0 1   5/25/2019 0 1   8/8/2019 0 1   8/30/2019 0 2   12/2/2019 0 2   2/20/2020 0 3   4/29/2020 0 5   6/4/2020 0 8   7/23/2020 0 8   10/16/2020 1 4   1/7/2021 3 0 2021 6 9 (H)   2021 7 2 (H)            21 Dr Grayson Fuentes -   "Continue enzalutamide 160 mg p o  daily and repeat CBC, CMP and PSA in 3 months and will proceed from there most likely next therapy would be Taxotere"        2021 Bone Scan  IMPRESSION:  Multiple osseous lesions are seen compatible with progressive osseous metastases   In comparison to the previous nuclear bone scan, IMPRESSION: There is increasing activity noted in the proximal humerus, near the junction of the neck and shaft   There is   also increasing intensity with respect to the right anterior 5th rib lesion   Significantly increased intensity has occurred at the T10 vertebral body, and development of increased activity at L5, right lateral iliac bone, and left iliac crest  Hernan Kimbrough is also a new small focus of activity involving the right posterior T8 rib         2021 Mina Voss FU  Bone scan pending   Marked improvement in urinary symptoms post RT to prostate        2021 Telephone note [facility] - Dr Peg Kinsey he agreed bone scan showed progressive disease  Recommends switching the enzalutamide and starting chemotherapy with docetaxel  Recommends to follow up with him after chemotherapy         Upcomin2021 Urology - [facility]  2021  Hem/Onc            Oncology History              Interval History:   Patient is not symptomatic in any of the bone metastases  He is without any urinary complaints at this time  Historical Information   Oncology History   Prostate cancer ([facility] )   2018 Initial Diagnosis    Prostate cancer ([facility] [address] )     2021 - 3/24/2021 Radiation    Plan ID Energy Fractions Dose per Fraction (cGy) Dose Correction (cGy) Total Dose Delivered (cGy) Elapsed Days   Prostate:1 10X 23 / 23 250 0 5,750 30      Treatment dates:  2021 - 3/24/2021     Bone metastases ([facility] [address] )   10/24/2018 Biopsy    Prostate biopsy:    A    Right lateral base prostate core biopsy:  - Adenocarcinoma, Vitaliy score 4+ 5 = 9/10 (grade 5 comprises 10% of core biopsy), grade group 5, continuously involving greater than 95% of this core biopsy  - No perineural invasion is seen     B  Right lateral mid prostate core biopsy:  - Adenocarcinoma, Vitaliy score 4+ 5 = 9/10 (grade 5 conprises 20% of core biopsy), grade group 5, continuously involving 90% of this core biopsy  - Perineural invasion is seen  C  Left lateral apex prostate core biopsy:  - Adenocarcinoma, [place] score 5 + 4 = 9/10 (grade 5 comprises 60% of core biopsy), grade group 5, continuously involving 60% of this core biopsy  - Perineural invasion is seen     D  Right base prostate core biopsy:  - Adenocarcinoma, Vitaliy score 4 + 5 = 9/10 (grade 5 comprises 10% of core biopsy), grade group 5, continuously involving 85% of this core biopsy  - Perineural invasion is seen     E  Right mid prostate core biopsy   - Adenocarcinoma, [place] score 5 + 4 = 9/10 (grade 5 comprises 95% of core biopsy), grade group 5, continuously involving 60% of this core biopsy  - Perineural invasion is seen     F  Right apex prostate core biopsy:  - Adenocarcinoma, [place] score 4 + 5 = 9/10 (grade 5 comprises 45% of core biopsy), grade group 5, continuously involving 70% of this core biopsy  - Perineural invasion is seen     G  Left base prostate core biopsy:  - Adenocarcinoma, Vitaliy score 4 + 5 = 9/10 (grade 5 comprises 35% of core biopsy), grade group 5, continuously involving 70% of this core biopsy  - No perineural invasion is seen     H  Left mid prostate core biopsy:  - Adenocarcinoma, Vitaliy score 4 + 4 = 8/10, grade group 4, discontinuously involving 20% of this core biopsy  - No perineural invasion is seen      I    Left apex prostate core biopsy:  - Benign prostatic tissue     J  Left lateral base prostate core biopsy:  - Adenocarcinoma, Vitaliy score 4 + 5 = 9/10, (grade 5 comprises 35% of core biopsy), grade group 5, discontinuously involving 20% of this core biopsy  - No perineural invasion is seen      K  Left lateral mid prostate core biopsy:  - Adenocarcinoma, Vitaliy score 4 + 4 = 8/10, grade group 4, continuously involving 5% of this core biopsy  - No perineural invasion is seen      L   Left lateral apex prostate core biopsy:  - Adenocarcinoma, [place] score 4 + 4 = 8/10, grade group 4, discontinuously involving 5% of this core biopsy  - No perineural invasion is seen      11/14/2018 -  Hormone Therapy    11/14/2018 Eligard- 12/2018  12/10/18 degarelix acetate (FIRMAGON) injection 80 mg initiated   1/7/19  Firmagon  80 mg given  2/12/19 Lupron 45 mg given  8/16/19 Lupron given-  2/28/2020 Lupron  8/28/2020 Lupron     12/2018 - 2/2/2019 Chemotherapy    Xtandi 160 mg po daily,      2/3/2019 -  Chemotherapy    Zytiga with prednisone daily      4/29/2019 Initial Diagnosis    Bone metastases ([facility] )     2/22/2021 - 3/24/2021 Radiation    Plan ID Energy Fractions Dose per Fraction (cGy) Dose Correction (cGy) Total Dose Delivered (cGy) Elapsed Days   Prostate:1 10X 23 / 23 250 0 5,750 30      Treatment dates:  2/22/2021 - 3/24/2021         Past Medical History:   Diagnosis Date    Anemia     last assessed  1/7/14     Cancer ([facility] )     Coronary artery disease     Hypercholesteremia     Hypertension     Kidney stone     Polyp of sigmoid colon     Prostate cancer ([facility] [address] )     Renal disorder     elevated serum creat    Tinnitus     unspecified laterality / last assessed 4/9/13    Vitamin D deficiency      Past Surgical History:   Procedure Laterality Date    CARDIAC SURGERY      COLONOSCOPY      CORONARY ANGIOPLASTY WITH STENT PLACEMENT      FL RETROGRADE PYELOGRAM  9/11/2019    HEMORRHOID SURGERY      NM CYSTO/URETERO W/LITHOTRIPSY &INDWELL STENT INSRT Left 9/11/2019    Procedure: CYSTO, URETEROSCOPY W/HOLMIUM LASER, BASKET STONE EXTRACTION, RETROGRADE PYELOGRAM, STENT EXCHANGE;  Surgeon: Jakob Tamez MD;  Location: [facility];  Service: Urology    PROSTATE BIOPSY  10/24/2018    TONSILLECTOMY         Social History   Social History     Substance and Sexual Activity   Alcohol Use Yes    Alcohol/week: 2 0 - 3 0 standard drinks    Types: 2 - 3 Glasses of wine per week    Comment: Occuational / social      Social History     Substance and Sexual Activity   Drug Use No     Social History     Tobacco Use   Smoking Status Never Smoker   Smokeless Tobacco Never Used         Meds/Allergies     Current Outpatient Medications:     acetaminophen (TYLENOL) 500 mg tablet, Take 500 mg by mouth every 6 (six) hours as needed for mild pain, Disp: , Rfl:     aspirin (ASPIRIN ADULT LOW STRENGTH) 81 mg EC tablet, Take 1 tablet by mouth daily, Disp: , Rfl:     atorvastatin (LIPITOR) 40 mg tablet, Take 40 mg by mouth daily , Disp: , Rfl:     Calcium 500 MG tablet, Take 1,000 mg by mouth daily , Disp: , Rfl:     cholecalciferol (VITAMIN D3) 1,000 units tablet, Take 1 tablet by mouth daily, Disp: , Rfl:     enzalutamide (Xtandi) 40 mg capsule, Take 4 capsules (160 mg total) by mouth once daily  , Disp: 120 capsule, Rfl: 11    fluticasone (FLONASE) 50 mcg/act nasal spray, 2 sprays into each nostril daily, Disp: 16 g, Rfl: 3    hydrochlorothiazide (HYDRODIURIL) 25 mg tablet, Take 1 tablet (25 mg total) by mouth daily, Disp: 90 tablet, Rfl: 1    leuprolide (LUPRON DEPOT 6 MONTH KIT) 45 mg, Inject 45 mg into a muscle every 6 (six) months, Disp: 45 mg, Rfl: 0    losartan (COZAAR) 100 MG tablet, Take 1 tablet (100 mg total) by mouth daily, Disp: 90 tablet, Rfl: 1    MELATONIN PO, Take 1 tablet by mouth daily at bedtime as needed , Disp: , Rfl:     metoprolol succinate (TOPROL-XL) 25 mg 24 hr tablet, take 1 tablet by mouth once daily, Disp: 90 tablet, Rfl: 1    tamsulosin (FLOMAX) 0 4 mg, take 1 capsule by mouth once daily with dinner, Disp: 90 capsule, Rfl: 3    Zoledronic Acid (ZOMETA IV), Infuse into a venous catheter every 3 (three) months, Disp: , Rfl:   No Known Allergies      Review of Systems   Constitutional: Negative for activity change, appetite change, fever and unexpected weight change  HENT: Negative for congestion, sneezing and sore throat  Eyes: Negative  Respiratory: Negative for cough and shortness of breath  Cardiovascular: Negative for chest pain, palpitations and leg swelling  Gastrointestinal: Negative for abdominal pain, blood in stool and rectal pain  Endocrine: Negative  Genitourinary: Negative for difficulty urinating, dysuria, frequency and hematuria  Musculoskeletal: Negative for arthralgias, back pain, joint swelling and neck pain  Skin: Negative  Allergic/Immunologic: Negative  Neurological: Negative for dizziness, weakness, numbness and headaches  Hematological: Negative for adenopathy  Psychiatric/Behavioral: Negative  OBJECTIVE:   /80   Pulse 63   Temp (!) 97 3 °F (36 3 °C) (Temporal)   Resp 18   Ht 5' 5" (1 651 m)   Wt 94 kg (207 lb 3 7 oz)   SpO2 97%   BMI 34 49 kg/m²   Pain Assessment:  0  Karnofsky: 90 - Able to carry on normal activity; minor signs or symptoms of disease     Physical Exam  Constitutional:       Appearance: Normal appearance  He is normal weight  HENT:      Nose: No congestion  Mouth/Throat:      Pharynx: Oropharynx is clear  Eyes:      Extraocular Movements: Extraocular movements intact  Pupils: Pupils are equal, round, and reactive to light  Cardiovascular:      Rate and Rhythm: Normal rate and regular rhythm  Heart sounds: Normal heart sounds  Pulmonary:      Effort: Pulmonary effort is normal       Breath sounds: Normal breath sounds  Abdominal:      Palpations: Abdomen is soft  There is no mass  Tenderness: There is no abdominal tenderness  Musculoskeletal:         General: No swelling or tenderness  Normal range of motion  Cervical back: Normal range of motion and neck supple  No rigidity  Skin:     General: Skin is warm  Findings: No lesion or rash  Neurological:      General: No focal deficit present  Mental Status: He is alert and oriented to person, place, and time  Mental status is at baseline  Psychiatric:         Mood and Affect: Mood normal          Behavior: Behavior normal               RESULTS    Lab Results: No results found for this or any previous visit (from the past 672 hour(s))  Imaging Studies:NM bone scan whole body    Result Date: 6/18/2021  Narrative: BONE SCAN  WHOLE BODY INDICATION: Metastatic prostate carcinoma  Right shoulder pain  PREVIOUS FILM CORRELATION:    CT 4/21/2021, PET scan 11/23/2020, nuclear bone scan 5/5/2020 TECHNIQUE:   This study was performed following the intravenous administration of 24 7 mCi Tc-99m labeled MDP  Delayed, anterior and posterior whole body images were acquired, 2-3 hours after radiopharmaceutical administration  FINDINGS:      Impression: Multiple osseous lesions are seen compatible with progressive osseous metastases  In comparison to the previous nuclear bone scan, IMPRESSION: There is increasing activity noted in the proximal humerus, near the junction of the neck and shaft  There is  also increasing intensity with respect to the right anterior 5th rib lesion  Significantly increased intensity has occurred at the T10 vertebral body, and development of increased activity at L5, right lateral iliac bone, and left iliac crest   There is also a new small focus of activity involving the right posterior T8 rib IMPRESSION: In comparison to the previous nuclear bone scan, there is both an increase in intensity and number of scintigraphic abnormalities, compatible with progression of osseous metastatic disease Workstation performed: [id number]           Assessment/Plan:  No orders of the defined types were placed in this encounter  Chandra Chan is a 79y o  year old male with stage IV carcinoma prostate with disease progression seen on bone scan    He is becoming hormone refractory  We are able to persuade the patient to start chemotherapy soon as possible while tumor burden is not overwhelming  Will see him p r n  or anytime as might be deemed necessary  Nia Almaraz MD  6/29/2021,3:57 PM    Portions of the record may have been created with voice recognition software   Occasional wrong word or "sound a like" substitutions may have occurred due to the inherent limitations of voice recognition software   Read the chart carefully and recognize, using context, where substitutions have occurred

## 2021-06-29 NOTE — PROGRESS NOTES
Rhys Coker [birth year] is a 79 y o  male       Follow up visit     Rhys Coker is a 79year old male who presents  for three month follow  Loulou De La Cruz has a  history of elevated PSA since 2017  He had a nodule 1/2 cm on the right side by digital rectal examination  His PSA was as high as 9 8 on October 2018  The bone scan showed focal activity at T10 and a sclerotic focus left posterior 7th rib  He underwent in transrectal ultrasound-guided biopsy of the prostate on October 24, 2018  The final pathology identified adenocarcinoma, Vitaliy 9 (5+4)  Pre-treatment PSA was 9 8  ADT was started and PSA was down to 0 2 and 0 1 by January, March and April of 2019 respectively  PSA started to rise was 0 3 February of this year and 0 5 in April  CT of the chest, abdomen and pelvis demonstrated new/enlarging sclerotic lesions in T10, upper sternum was new, right 6 and 7 rib also new and  stable right 8 rib lesion  Repeat bone scan suggested mild progression of bone metastases  Patient finished a course of hypo fractionated palliative radiation therapy directed to the primary tumor in the prostate gland for stage IV carcinoma prostate following the stampede clinical trial which showed survival benefit in patients with oligo metastases treated to the prostate gland  He completed treatment on 3/24/21     4/21/21 CT C/A/P -   IMPRESSION:  Persistent lobulated mass in the posterior bladder    Mostly unchanged sclerotic metastatic lesions in the bone with slight increase in the L5 lesion    4/27/2021-  PSA 7 2  Component PSA, Total   Latest Ref Rng & Units < OR = 4 0 ng/mL   10/5/2018 9 8 (H)   1/28/2019 0 2   3/18/2019 0 1   5/25/2019 0 1   8/8/2019 0 1   8/30/2019 0 2   12/2/2019 0 2   2/20/2020 0 3   4/29/2020 0 5   6/4/2020 0 8   7/23/2020 0 8   10/16/2020 1 4   1/7/2021 3 0   4/13/2021 6 9 (H)   4/27/2021 7 2 (H)         4/29/21 Dr Deven Baird -   "Continue enzalutamide 160 mg p o  daily and repeat CBC, CMP and PSA in 3 months and will proceed from there most likely next therapy would be Taxotere"      2021 Bone Scan  IMPRESSION:  Multiple osseous lesions are seen compatible with progressive osseous metastases  In comparison to the previous nuclear bone scan, IMPRESSION: There is increasing activity noted in the proximal humerus, near the junction of the neck and shaft  There is   also increasing intensity with respect to the right anterior 5th rib lesion  Significantly increased intensity has occurred at the T10 vertebral body, and development of increased activity at L5, right lateral iliac bone, and left iliac crest   There is also a new small focus of activity involving the right posterior T8 rib       2021 [facility] Post FU  Bone scan pending   Marked improvement in urinary symptoms post RT to prostate      2021 Telephone note Eulalio Perez - Dr Lily Willams he agreed bone scan showed progressive disease  Recommends switching the enzalutamide and starting chemotherapy with docetaxel  Recommends to follow up with him after chemotherapy  Upcomin2021 Urology -Dr Bernarda Banda  2021  Hem/Onc      Oncology History   Prostate cancer ([facility] )   2018 Initial Diagnosis    Prostate cancer ([facility] [address] )     2021 - 3/24/2021 Radiation    Plan ID Energy Fractions Dose per Fraction (cGy) Dose Correction (cGy) Total Dose Delivered (cGy) Elapsed Days   Prostate:1 10X 23 / 23 250 0 5,750 30      Treatment dates:  2021 - 3/24/2021     Bone metastases ([facility] )   10/24/2018 Biopsy    Prostate biopsy:    A  Right lateral base prostate core biopsy:  - Adenocarcinoma, [place] score 4+ 5 = 9/10 (grade 5 comprises 10% of core biopsy), grade group 5, continuously involving greater than 95% of this core biopsy  - No perineural invasion is seen     B   Right lateral mid prostate core biopsy:  - Adenocarcinoma, [place]italiy score 4+ 5 = 9/10 (grade 5 conprises 20% of core biopsy), grade group 5, continuously involving 90% of this core biopsy  - Perineural invasion is seen  C  Left lateral apex prostate core biopsy:  - Adenocarcinoma, [place] score 5 + 4 = 9/10 (grade 5 comprises 60% of core biopsy), grade group 5, continuously involving 60% of this core biopsy  - Perineural invasion is seen     D  Right base prostate core biopsy:  - Adenocarcinoma, Vitaliy score 4 + 5 = 9/10 (grade 5 comprises 10% of core biopsy), grade group 5, continuously involving 85% of this core biopsy  - Perineural invasion is seen     E  Right mid prostate core biopsy   - Adenocarcinoma, [place] score 5 + 4 = 9/10 (grade 5 comprises 95% of core biopsy), grade group 5, continuously involving 60% of this core biopsy  - Perineural invasion is seen     F  Right apex prostate core biopsy:  - Adenocarcinoma, [place] score 4 + 5 = 9/10 (grade 5 comprises 45% of core biopsy), grade group 5, continuously involving 70% of this core biopsy  - Perineural invasion is seen     G  Left base prostate core biopsy:  - Adenocarcinoma, [place] score 4 + 5 = 9/10 (grade 5 comprises 35% of core biopsy), grade group 5, continuously involving 70% of this core biopsy  - No perineural invasion is seen     H  Left mid prostate core biopsy:  - Adenocarcinoma, Vitaliy score 4 + 4 = 8/10, grade group 4, discontinuously involving 20% of this core biopsy  - No perineural invasion is seen      I  Left apex prostate core biopsy:  - Benign prostatic tissue     J  Left lateral base prostate core biopsy:  - Adenocarcinoma, [place] score 4 + 5 = 9/10, (grade 5 comprises 35% of core biopsy), grade group 5, discontinuously involving 20% of this core biopsy  - No perineural invasion is seen      K  Left lateral mid prostate core biopsy:  - Adenocarcinoma, Vitaliy score 4 + 4 = 8/10, grade group 4, continuously involving 5% of this core biopsy  - No perineural invasion is seen      L   Left lateral apex prostate core biopsy:  - Adenocarcinoma, [place] score 4 + 4 = 8/10, grade group 4, discontinuously involving 5% of this core biopsy  - No perineural invasion is seen      11/14/2018 -  Hormone Therapy    11/14/2018 Eligard- 12/2018  12/10/18 degarelix acetate (FIRMAGON) injection 80 mg initiated   1/7/19  Firmagon  80 mg given  2/12/19 Lupron 45 mg given  8/16/19 Lupron given-  2/28/2020 Lupron  8/28/2020 Lupron     12/2018 - 2/2/2019 Chemotherapy    Xtandi 160 mg po daily,      2/3/2019 -  Chemotherapy    Zytiga with prednisone daily      4/29/2019 Initial Diagnosis    Bone metastases ([address] )     2/22/2021 - 3/24/2021 Radiation    Plan ID Energy Fractions Dose per Fraction (cGy) Dose Correction (cGy) Total Dose Delivered (cGy) Elapsed Days   Prostate:1 10X 23 / 23 250 0 5,750 30      Treatment dates:  2/22/2021 - 3/24/2021         Clinical Trial: no      Health Maintenance   Topic Date Due    Hepatitis C Screening  Never done    SLP PLAN OF CARE  Never done    DTaP,Tdap,and Td Vaccines (2 - Td or Tdap) 07/26/2021    BMI: Followup Plan  03/27/2022    Fall Risk  05/24/2022    Medicare Annual Wellness Visit (AWV)  05/24/2022    BMI: Adult  06/03/2022    Depression Screening PHQ  06/29/2022    Pneumococcal Vaccine: 65+ Years  Completed    Influenza Vaccine  Completed    COVID-19 Vaccine  Completed    HIB Vaccine  Aged Out    Hepatitis B Vaccine  Aged Out    IPV Vaccine  Aged Out    Hepatitis A Vaccine  Aged Out    Meningococcal ACWY Vaccine  Aged Out    HPV Vaccine  Aged Out       Patient Active Problem List   Diagnosis    Benign essential hypertension    Renal cyst    Liver lesion    Pulmonary nodule    Combined arterial insufficiency and corporo-venous occlusive erectile dysfunction    Prostate cancer ([address]    Coronary artery disease of native artery of native heart with stable angina pectoris ([address] )    DDD (degenerative disc disease), cervical    S/P coronary artery stent placement    Vitamin D deficiency    Abnormal radionuclide bone scan    Bone metastases ([address] )    Calculus of kidney  Mixed hyperlipidemia    Localized edema    Chronic pain of left knee    Bilateral hearing loss     Past Medical History:   Diagnosis Date    Anemia     last assessed  1/7/14     Cancer ([facility] )     Coronary artery disease     Hypercholesteremia     Hypertension     Kidney stone     Polyp of sigmoid colon     Prostate cancer ([facility] )     Renal disorder     elevated serum creat    Tinnitus     unspecified laterality / last assessed 4/9/13    Vitamin D deficiency      Past Surgical History:   Procedure Laterality Date    CARDIAC SURGERY      COLONOSCOPY      CORONARY ANGIOPLASTY WITH STENT PLACEMENT      FL RETROGRADE PYELOGRAM  9/11/2019    HEMORRHOID SURGERY      ID CYSTO/URETERO W/LITHOTRIPSY &INDWELL STENT INSRT Left 9/11/2019    Procedure: CYSTO, URETEROSCOPY W/HOLMIUM LASER, BASKET STONE EXTRACTION, RETROGRADE PYELOGRAM, STENT EXCHANGE;  Surgeon: Emperatriz Barrera MD;  Location: [facility];  Service: Urology    PROSTATE BIOPSY  10/24/2018    TONSILLECTOMY       Family History   Problem Relation Age of Onset    Breast cancer Mother     Hypertension Father      Social History     Socioeconomic History    Marital status: /Civil Union     Spouse name: Not on file    Number of children: Not on file    Years of education: Not on file    Highest education level: Not on file   Occupational History    Occupation: consultant   Tobacco Use    Smoking status: Never Smoker    Smokeless tobacco: Never Used   Vaping Use    Vaping Use: Never used   Substance and Sexual Activity    Alcohol use:  Yes     Alcohol/week: 2 0 - 3 0 standard drinks     Types: 2 - 3 Glasses of wine per week     Comment: Occuational / social     Drug use: No    Sexual activity: Not on file   Other Topics Concern    Not on file   Social History Narrative    Always uses seat belt     Daily caffeine consumption 2-3 servings a day     Feels safe at home     Social Determinants of Health     Financial Resource Strain:     Difficulty of Paying Living Expenses:    Food Insecurity:     Worried About Running Out of Food in the Last Year:     [address] in the Last Year:    Transportation Needs:     Lack of Transportation (Medical):  Lack of Transportation (Non-Medical):    Physical Activity:     Days of Exercise per Week:     Minutes of Exercise per Session:    Stress:     Feeling of Stress :    Social Connections:     Frequency of Communication with Friends and Family:     Frequency of Social Gatherings with Friends and Family:     Attends [employer] Services:     Active Member of Clubs or Organizations:     Attends Club or Organization Meetings:     Marital Status:    Intimate Partner Violence:     Fear of Current or Ex-Partner:     Emotionally Abused:     Physically Abused:     Sexually Abused:        Current Outpatient Medications:     acetaminophen (TYLENOL) 500 mg tablet, Take 500 mg by mouth every 6 (six) hours as needed for mild pain, Disp: , Rfl:     aspirin (ASPIRIN ADULT LOW STRENGTH) 81 mg EC tablet, Take 1 tablet by mouth daily, Disp: , Rfl:     atorvastatin (LIPITOR) 40 mg tablet, Take 40 mg by mouth daily , Disp: , Rfl:     Calcium 500 MG tablet, Take 1,000 mg by mouth daily , Disp: , Rfl:     cholecalciferol (VITAMIN D3) 1,000 units tablet, Take 1 tablet by mouth daily, Disp: , Rfl:     enzalutamide (Xtandi) 40 mg capsule, Take 4 capsules (160 mg total) by mouth once daily  , Disp: 120 capsule, Rfl: 11    fluticasone (FLONASE) 50 mcg/act nasal spray, 2 sprays into each nostril daily, Disp: 16 g, Rfl: 3    hydrochlorothiazide (HYDRODIURIL) 25 mg tablet, Take 1 tablet (25 mg total) by mouth daily, Disp: 90 tablet, Rfl: 1    leuprolide (LUPRON DEPOT 6 MONTH KIT) 45 mg, Inject 45 mg into a muscle every 6 (six) months, Disp: 45 mg, Rfl: 0    losartan (COZAAR) 100 MG tablet, Take 1 tablet (100 mg total) by mouth daily, Disp: 90 tablet, Rfl: 1    MELATONIN PO, Take 1 tablet by mouth daily at bedtime as needed , Disp: , Rfl:     metoprolol succinate (TOPROL-XL) 25 mg 24 hr tablet, take 1 tablet by mouth once daily, Disp: 90 tablet, Rfl: 1    tamsulosin (FLOMAX) 0 4 mg, take 1 capsule by mouth once daily with dinner, Disp: 90 capsule, Rfl: 3    Zoledronic Acid (ZOMETA IV), Infuse into a venous catheter every 3 (three) months, Disp: , Rfl:   No Known Allergies    Review of Systems:  Review of Systems   Constitutional: Negative  Negative for activity change, appetite change, chills, fatigue (mild, at night), fever and unexpected weight change  HENT: Positive for hearing loss  Negative for voice change  Eyes: Negative  Wears glasses   Respiratory: Negative for cough and shortness of breath  Cardiovascular: Positive for leg swelling (none at present - taking HCTZ)  Negative for chest pain  Gastrointestinal: Positive for diarrhea (rarely)  Negative for abdominal pain, blood in stool, constipation, nausea and vomiting  Endocrine: Positive for heat intolerance  Genitourinary: Positive for frequency (less) and urgency  Negative for difficulty urinating, dysuria and hematuria  Nocturia 0-1x  Good urine flow, more volume and more comfortable  Feels like he's emptying bladder more  Musculoskeletal: Positive for arthralgias (stiffness in his hands)  Skin: Negative  Allergic/Immunologic: Negative  Neurological: Negative  Negative for dizziness, weakness, light-headedness and headaches  Hematological: Negative  Psychiatric/Behavioral: Negative  Vitals:    06/29/21 1411   BP: 130/80   Pulse: 63   Resp: 18   Temp: (!) 97 3 °F (36 3 °C)   TempSrc: Temporal   SpO2: 97%   Weight: 94 kg (207 lb 3 7 oz)   Height: 5' 5" (1 651 m)        Pain Score: 0-No pain    IPSS Questionnaire (AUA-7): Over the past month    1)  How often have you had a sensation of not emptying your bladder completely after you finish urinating?   0 - Not at all   2)  How often have you had to urinate again less than two hours after you finished urinating? 1 - Less than 1 time in 5   3)  How often have you found you stopped and started again several times when you urinated? 0 - Not at all   4) How difficult have you found it to postpone urination? 0 - Not at all   5) How often have you had a weak urinary stream?  0 - Not at all   6) How often have you had to push or strain to begin urination? 0 - Not at all   7) How many times did you most typically get up to urinate from the time you went to bed until the time you got up in the morning? 1 - 1 time   Total Score:  2         Imaging:NM bone scan whole body    Result Date: 6/18/2021  Narrative: BONE SCAN  WHOLE BODY INDICATION: Metastatic prostate carcinoma  Right shoulder pain  PREVIOUS FILM CORRELATION:    CT 4/21/2021, PET scan 11/23/2020, nuclear bone scan 5/5/2020 TECHNIQUE:   This study was performed following the intravenous administration of 24 7 mCi Tc-99m labeled MDP  Delayed, anterior and posterior whole body images were acquired, 2-3 hours after radiopharmaceutical administration  FINDINGS:      Impression: Multiple osseous lesions are seen compatible with progressive osseous metastases  In comparison to the previous nuclear bone scan, IMPRESSION: There is increasing activity noted in the proximal humerus, near the junction of the neck and shaft  There is  also increasing intensity with respect to the right anterior 5th rib lesion    Significantly increased intensity has occurred at the T10 vertebral body, and development of increased activity at L5, right lateral iliac bone, and left iliac crest   There is also a new small focus of activity involving the right posterior T8 rib IMPRESSION: In comparison to the previous nuclear bone scan, there is both an increase in intensity and number of scintigraphic abnormalities, compatible with progression of osseous metastatic disease Workstation performed: [id number]       Teaching

## 2021-06-30 ENCOUNTER — TELEPHONE (OUTPATIENT)
Dept: HEMATOLOGY ONCOLOGY | Facility: CLINIC | Age: 81
End: 2021-06-30

## 2021-06-30 LAB
ALBUMIN SERPL-MCNC: 4 G/DL (ref 3.6–5.1)
ALBUMIN/GLOB SERPL: 1.7 (CALC) (ref 1–2.5)
ALP SERPL-CCNC: 75 U/L (ref 35–144)
ALT SERPL-CCNC: 12 U/L (ref 9–46)
AST SERPL-CCNC: 16 U/L (ref 10–35)
BASOPHILS # BLD AUTO: 38 CELLS/UL (ref 0–200)
BASOPHILS NFR BLD AUTO: 0.7 %
BILIRUB SERPL-MCNC: 0.6 MG/DL (ref 0.2–1.2)
BUN SERPL-MCNC: 23 MG/DL (ref 7–25)
BUN/CREAT SERPL: ABNORMAL (CALC) (ref 6–22)
CALCIUM SERPL-MCNC: 9.5 MG/DL (ref 8.6–10.3)
CHLORIDE SERPL-SCNC: 104 MMOL/L (ref 98–110)
CO2 SERPL-SCNC: 28 MMOL/L (ref 20–32)
CREAT SERPL-MCNC: 1.09 MG/DL (ref 0.7–1.18)
EOSINOPHIL # BLD AUTO: 189 CELLS/UL (ref 15–500)
EOSINOPHIL NFR BLD AUTO: 3.5 %
ERYTHROCYTE [DISTWIDTH] IN BLOOD BY AUTOMATED COUNT: 12.6 % (ref 11–15)
GLOBULIN SER CALC-MCNC: 2.3 G/DL (CALC) (ref 1.9–3.7)
GLUCOSE SERPL-MCNC: 106 MG/DL (ref 65–99)
HCT VFR BLD AUTO: 37.9 % (ref 38.5–50)
HGB BLD-MCNC: 13.1 G/DL (ref 13.2–17.1)
LYMPHOCYTES # BLD AUTO: 832 CELLS/UL (ref 850–3900)
LYMPHOCYTES NFR BLD AUTO: 15.4 %
MCH RBC QN AUTO: 31.2 PG (ref 27–33)
MCHC RBC AUTO-ENTMCNC: 34.6 G/DL (ref 32–36)
MCV RBC AUTO: 90.2 FL (ref 80–100)
MONOCYTES # BLD AUTO: 432 CELLS/UL (ref 200–950)
MONOCYTES NFR BLD AUTO: 8 %
NEUTROPHILS # BLD AUTO: 3910 CELLS/UL (ref 1500–7800)
NEUTROPHILS NFR BLD AUTO: 72.4 %
PLATELET # BLD AUTO: 194 THOUSAND/UL (ref 140–400)
PMV BLD REES-ECKER: 10.8 FL (ref 7.5–12.5)
POTASSIUM SERPL-SCNC: 4.2 MMOL/L (ref 3.5–5.3)
PROT SERPL-MCNC: 6.3 G/DL (ref 6.1–8.1)
PSA SERPL-MCNC: 13 NG/ML
RBC # BLD AUTO: 4.2 MILLION/UL (ref 4.2–5.8)
SL AMB EGFR AFRICAN AMERICAN: 74 ML/MIN/1.73M2
SL AMB EGFR NON AFRICAN AMERICAN: 64 ML/MIN/1.73M2
SODIUM SERPL-SCNC: 140 MMOL/L (ref 135–146)
WBC # BLD AUTO: 5.4 THOUSAND/UL (ref 3.8–10.8)

## 2021-06-30 NOTE — TELEPHONE ENCOUNTER
Patient states that Dr Jose Cooper wants him to start chemotherapy and would like to discuss being set up, prefers the  [facility] infusion center  Best call back [phone]

## 2021-07-01 ENCOUNTER — OFFICE VISIT (OUTPATIENT)
Dept: HEMATOLOGY ONCOLOGY | Facility: CLINIC | Age: 81
End: 2021-07-01
Payer: COMMERCIAL

## 2021-07-01 VITALS
RESPIRATION RATE: 16 BRPM | HEIGHT: 65 IN | OXYGEN SATURATION: 97 % | TEMPERATURE: 98.1 F | SYSTOLIC BLOOD PRESSURE: 136 MMHG | WEIGHT: 205.4 LBS | BODY MASS INDEX: 34.22 KG/M2 | DIASTOLIC BLOOD PRESSURE: 76 MMHG | HEART RATE: 66 BPM

## 2021-07-01 DIAGNOSIS — C79.51 BONE METASTASES (HCC): ICD-10-CM

## 2021-07-01 DIAGNOSIS — R11.2 CHEMOTHERAPY INDUCED NAUSEA AND VOMITING: Primary | ICD-10-CM

## 2021-07-01 DIAGNOSIS — T45.1X5A CHEMOTHERAPY INDUCED NAUSEA AND VOMITING: Primary | ICD-10-CM

## 2021-07-01 DIAGNOSIS — C61 PROSTATE CANCER (HCC): Primary | ICD-10-CM

## 2021-07-01 PROCEDURE — 3078F DIAST BP <80 MM HG: CPT | Performed by: INTERNAL MEDICINE

## 2021-07-01 PROCEDURE — 99215 OFFICE O/P EST HI 40 MIN: CPT | Performed by: INTERNAL MEDICINE

## 2021-07-01 PROCEDURE — 3075F SYST BP GE 130 - 139MM HG: CPT | Performed by: INTERNAL MEDICINE

## 2021-07-01 RX ORDER — ONDANSETRON 4 MG/1
4 TABLET, FILM COATED ORAL EVERY 8 HOURS PRN
Qty: 20 TABLET | Refills: 1 | Status: SHIPPED | OUTPATIENT
Start: 2021-07-01 | End: 2021-09-21

## 2021-07-01 RX ORDER — SODIUM CHLORIDE 9 MG/ML
20 INJECTION, SOLUTION INTRAVENOUS ONCE
Status: CANCELLED | OUTPATIENT
Start: 2021-07-12

## 2021-07-01 RX ORDER — PREDNISONE 1 MG/1
5 TABLET ORAL 2 TIMES DAILY WITH MEALS
Qty: 60 TABLET | Refills: 3 | Status: SHIPPED | OUTPATIENT
Start: 2021-07-04 | End: 2021-11-08

## 2021-07-01 NOTE — PROGRESS NOTES
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     Diagnosed initially on 09/2018 with PSA of 9, CT scan showed involvement of the posterior side of the 10th rib area, vertebral body and distant point of the right clavicle he received Firmagon and later on Lupron by Urology     He received abiraterone/prednisone PSA went down to 0 1 until May 2020 PSA started rising up, bone scan showed lesion in the anterior 5th rib area, right humerus no evidence of visceral disease     He received enzalutamide since 06/02/2020 at PSA of 0 8     In 10/2020 PSA 1 4     Negative for germline BRCA 1/2 mutation, MSI stable, LIONEL on multiple other mutations     PET scan showed scattered metastatic disease specially at T10, the base of the bladder, possible inguinal lymphadenopathy     radiation to the pelvic area from February until April 2021     Progression of disease in June 2021, PSA 13, CT scan showed no evidence of visceral metastases however bone scan showed multiple bony lesions involving the mid thoracic, lumbar, pelvic, right ribs area    We reviewed the scans together, he was seen at [facility], the decision to start the patient on Taxotere 75 milligram/meter squared every 3-4 weeks    Side effects such as alopecia, nausea, vomiting, fluid retention, increased liver enzymes, anaphylactic reaction, neuropathy, neutropenic fever told he agree to proceed    Pegfilgrastim after Taxotere     Continue Zometa every 3 months    Discontinue enzalutamide        Labs and imaging studies are reviewed by ordering provider once results are available  If there are findings that need immediate attention, you will be contacted when results available     Discussing results and the implication on your healthcare is best discussed in person at your follow-up visit  HPI:    67-year-old  male who was seen by Urology for elevation of the PSA, when he presented in June 2017 with PSA of 4 6, Subsequently in September of 2017 PSA was 7 3 and in May of 2018 PSA was 8  6   A biopsy was recommended but the patient had just undergone percutaneous stenting of the heart and he was on dual anti-platelet therapy      MRI of the abdomen in April 2018 showed 2 simple cyst in the right hepatic lobe, bilateral renal cysts the largest 1 measuring 10 cm in the left lower lobe     In October of 2018 PSA was 9 8  CT scan of the chest 9/28/2018 showed sclerotic lesions in T10, 5 mm nodule in the right lower lobe   Bone scan showed activity in the posterior T10 level and left posterior 7th rib area concerning for osseous metastases and increased activity in the distal right clavicle might be degenerative or metastatic area      He was diagnosed with castration sensitive metastatic prostate cancer  [facility] insurance company could not approve enzalutamide, he also has a high co-payment for abiraterone     The patient initiated on samples of enzalutamide 160 mg p o  daily since the beginning of December 2018   His insurance approved abiraterone however he had a high co-payment        We were finally able to get financial assistance through [employer] and Roseline started Zytiga (abiraterone) 2/3/2019 with prednisone 5mg po bid without side effects      In May 2020 PSA 0 5, bone scan showed activity in the anterior 5th rib area, right humerus area most likely consistent with metastatic disease, no visceral disease on the CT scan, we had hard time getting enzalutamide , the patient was initiated on enzalutamide on 06/02/2020, PSA at the time of 0 8     PSA 1 4 in October 2020, normal alkaline phosphatase           Genes analyzed of germ line test  LIONEL, BARD1, BRCA1, BRCA2, BRIP1, CDH1, CHEK2, DICER1, EPCAM*, HOXB13, MLH1, MSH2,  MSH6, NBN, NF1, PALB2, PMS2, PTEN, RAD50, RAD51C, RAD51D, SMARCA4, STK11, TP53 all of these were negative     Evaluated at [facility] the decision for radiation therapy to oligo metastases and the primary site of the tumor depends on the PET scan finding which showed multiple metastatic disease, patient to meet with Radiation Oncology      PET scan on 11/13/2020 showed radiotracer retention in the left posterior bladder suspicious for malignancy with prostate cancer invasion progressing from prior CT scan mild to moderate uptake in the few small inguinal lymph nodes, sclerotic lesions in the spine with uptake suspicious for metastases     radiation therapy in the February 2021- April 2021, he continues on enzalutamide     Progression of disease by bone scan in June 2021 with multiple thoracic, right ribs area, hip area involvement, PSA 13 ( 7 2 on 04/2021)  Interval History:        Previous Treatment:         Test Results:    Imaging: NM bone scan whole body    Result Date: 6/18/2021  Narrative: BONE SCAN  WHOLE BODY INDICATION: Metastatic prostate carcinoma  Right shoulder pain  PREVIOUS FILM CORRELATION:    CT 4/21/2021, PET scan 11/23/2020, nuclear bone scan 5/5/2020 TECHNIQUE:   This study was performed following the intravenous administration of 24 7 mCi Tc-99m labeled MDP  Delayed, anterior and posterior whole body images were acquired, 2-3 hours after radiopharmaceutical administration  FINDINGS:      Impression: Multiple osseous lesions are seen compatible with progressive osseous metastases  In comparison to the previous nuclear bone scan, IMPRESSION: There is increasing activity noted in the proximal humerus, near the junction of the neck and shaft  There is  also increasing intensity with respect to the right anterior 5th rib lesion    Significantly increased intensity has occurred at the T10 vertebral body, and development of increased activity at L5, right lateral iliac bone, and left iliac crest  There is also a new small focus of activity involving the right posterior T8 rib IMPRESSION: In comparison to the previous nuclear bone scan, there is both an increase in intensity and number of scintigraphic abnormalities, compatible with progression of osseous metastatic disease Workstation performed: [id number]       Labs:   Lab Results   Component Value Date    WBC 5 4 06/30/2021    HGB 13 1 (L) 06/30/2021    HCT 37 9 (L) 06/30/2021    MCV 90 2 06/30/2021     06/30/2021     Lab Results   Component Value Date     09/30/2017    K 4 2 06/30/2021     06/30/2021    CO2 28 06/30/2021    BUN 23 06/30/2021    CREATININE 1 09 06/30/2021    GLUF 97 08/08/2019    CALCIUM 9 5 06/30/2021    AST 16 06/30/2021    ALT 12 06/30/2021    ALKPHOS 75 06/30/2021    PROT 6 5 09/30/2017    BILITOT 0 7 09/30/2017    EGFR 61 12/02/2019       No results found for: IRON, TIBC, FERRITIN    No results found for: [id number]      ROS: Review of Systems   Constitutional: Negative  Negative for appetite change, chills, diaphoresis, fatigue, fever and unexpected weight change  HENT:   Negative for hearing loss, lump/mass, mouth sores, nosebleeds, sore throat, trouble swallowing and voice change  Eyes: Negative  Negative for eye problems and icterus  Respiratory: Negative  Negative for chest tightness, cough, hemoptysis and shortness of breath  Cardiovascular: Negative for chest pain and leg swelling  Gastrointestinal: Negative for abdominal distention, abdominal pain, blood in stool, constipation, diarrhea and nausea  Endocrine: Negative  Genitourinary: Negative for dysuria, frequency, hematuria and pelvic pain  Musculoskeletal: Negative  Negative for arthralgias, back pain, flank pain, gait problem, myalgias and neck stiffness  Skin: Negative for itching and rash  Neurological: Negative for dizziness, gait problem, headaches, light-headedness, numbness and speech difficulty     Hematological: Negative for adenopathy  Does not bruise/bleed easily  Psychiatric/Behavioral: Negative for confusion, decreased concentration, depression and sleep disturbance  The patient is not nervous/anxious  Current Medications: Reviewed  Allergies: Reviewed  PMH/FH/SH:  Reviewed      Physical Exam:    Body surface area is 2 meters squared  Wt Readings from Last 3 Encounters:   07/01/21 93 2 kg (205 lb 6 4 oz)   06/29/21 94 kg (207 lb 3 7 oz)   06/03/21 94 8 kg (209 lb)        Temp Readings from Last 3 Encounters:   07/01/21 98 1 °F (36 7 °C) (Tympanic)   06/29/21 (!) 97 3 °F (36 3 °C) (Temporal)   05/24/21 98 1 °F (36 7 °C) (Tympanic)        BP Readings from Last 3 Encounters:   07/01/21 136/76   06/29/21 130/80   06/03/21 122/80         Pulse Readings from Last 3 Encounters:   07/01/21 66   06/29/21 63   06/03/21 68        Physical Exam  Vitals reviewed  Constitutional:       General: He is not in acute distress  Appearance: He is well-developed  He is not diaphoretic  HENT:      Head: Normocephalic and atraumatic  Eyes:      Conjunctiva/sclera: Conjunctivae normal    Neck:      Trachea: No tracheal deviation  Cardiovascular:      Rate and Rhythm: Normal rate and regular rhythm  Heart sounds: No murmur heard  No friction rub  No gallop  Pulmonary:      Effort: Pulmonary effort is normal  No respiratory distress  Breath sounds: Normal breath sounds  No wheezing or rales  Chest:      Chest wall: No tenderness  Abdominal:      General: There is no distension  Palpations: Abdomen is soft  Tenderness: There is no abdominal tenderness  Musculoskeletal:      Cervical back: Normal range of motion and neck supple  Right lower leg: No edema  Left lower leg: No edema  Lymphadenopathy:      Cervical: No cervical adenopathy  Skin:     General: Skin is warm and dry  Coloration: Skin is not pale  Findings: No erythema     Neurological:      Mental Status: He is alert and oriented to person, place, and time  Psychiatric:         Behavior: Behavior normal          Thought Content: Thought content normal          Judgment: Judgment normal          ECO  Goals and Barriers:  Current Goal: Minimize effects of disease  Barriers: None  Patient's Capacity to Self Care:  Patient is able to self care      Code Status: [unfilled]

## 2021-07-02 ENCOUNTER — TELEPHONE (OUTPATIENT)
Dept: UROLOGY | Facility: CLINIC | Age: 81
End: 2021-07-02

## 2021-07-02 ENCOUNTER — PROCEDURE VISIT (OUTPATIENT)
Dept: UROLOGY | Facility: CLINIC | Age: 81
End: 2021-07-02
Payer: COMMERCIAL

## 2021-07-02 VITALS
WEIGHT: 206 LBS | BODY MASS INDEX: 34.32 KG/M2 | DIASTOLIC BLOOD PRESSURE: 70 MMHG | SYSTOLIC BLOOD PRESSURE: 120 MMHG | HEART RATE: 63 BPM | HEIGHT: 65 IN

## 2021-07-02 DIAGNOSIS — R35.0 URINARY FREQUENCY: Primary | ICD-10-CM

## 2021-07-02 DIAGNOSIS — C61 PROSTATE CANCER (HCC): ICD-10-CM

## 2021-07-02 DIAGNOSIS — N32.9 LESION OF BLADDER: ICD-10-CM

## 2021-07-02 LAB
SL AMB  POCT GLUCOSE, UA: ABNORMAL
SL AMB LEUKOCYTE ESTERASE,UA: ABNORMAL
SL AMB POCT BILIRUBIN,UA: ABNORMAL
SL AMB POCT BLOOD,UA: ABNORMAL
SL AMB POCT CLARITY,UA: CLEAR
SL AMB POCT COLOR,UA: YELLOW
SL AMB POCT KETONES,UA: ABNORMAL
SL AMB POCT NITRITE,UA: ABNORMAL
SL AMB POCT PH,UA: ABNORMAL
SL AMB POCT SPECIFIC GRAVITY,UA: 1.02
SL AMB POCT URINE PROTEIN: ABNORMAL
SL AMB POCT UROBILINOGEN: ABNORMAL

## 2021-07-02 PROCEDURE — 88112 CYTOPATH CELL ENHANCE TECH: CPT | Performed by: PATHOLOGY

## 2021-07-02 PROCEDURE — 81002 URINALYSIS NONAUTO W/O SCOPE: CPT | Performed by: UROLOGY

## 2021-07-02 PROCEDURE — 52000 CYSTOURETHROSCOPY: CPT | Performed by: UROLOGY

## 2021-07-02 PROCEDURE — 99214 OFFICE O/P EST MOD 30 MIN: CPT | Performed by: UROLOGY

## 2021-07-02 PROCEDURE — 1036F TOBACCO NON-USER: CPT | Performed by: UROLOGY

## 2021-07-02 PROCEDURE — 1160F RVW MEDS BY RX/DR IN RCRD: CPT | Performed by: UROLOGY

## 2021-07-02 RX ORDER — CEFAZOLIN SODIUM 2 G/50ML
2000 SOLUTION INTRAVENOUS ONCE
Status: CANCELLED | OUTPATIENT
Start: 2021-09-08 | End: 2021-07-02

## 2021-07-02 NOTE — PATIENT INSTRUCTIONS
Transurethral Resection of Bladder Tumors   WHAT YOU NEED TO KNOW:   Transurethral resection of bladder tumors (TURBT) is surgery to remove one or more tumors from your bladder  HOW TO PREPARE:   The week before your surgery:   · Write down the correct date, time, and location of your surgery  · Arrange a ride home  Ask a family member or friend to drive you home after your surgery or procedure  Do not drive yourself home  · Ask your healthcare provider if you need to stop using aspirin or any other prescribed or over-the-counter medicine before your procedure or surgery  · Bring your medicine bottles or a list of your medicines when you see your healthcare provider  Tell your provider if you are allergic to any medicine  Tell your provider if you use any herbs, food supplements, or over-the-counter medicine  · You may need blood or urine tests before your surgery  You may also need a CT scan or a cystoscopy  Talk to your healthcare provider about these or other tests you may need  Write down the date, time, and location for each test     The night before your surgery:  Ask healthcare providers about directions for eating and drinking  The day of your surgery:   · Ask your healthcare provider before you take any medicine on the day of your surgery  Bring a list of all the medicines you take, or your pill bottles, with you to the hospital  Providers will check that your medicines will not interact poorly with the medicine you need for surgery  · You or a close family member will be asked to sign a legal document called a consent form  It gives healthcare providers permission to do the procedure or surgery  It also explains the problems that may happen, and your choices  Make sure all your questions are answered before you sign this form  · Healthcare providers may insert an intravenous tube (IV) into your vein  A vein in the arm is usually chosen   Through the IV tube, you may be given liquids and medicine  · An anesthesiologist will talk to you before your surgery  You may need medicine to keep you asleep or numb an area of your body during surgery  Tell healthcare providers if you or anyone in your family has had a problem with anesthesia in the past     WHAT WILL HAPPEN:   What will happen: Your surgeon will insert a scope through your urethra and into your bladder  He will put fluid through the scope to wash your bladder and widen it for surgery  The scope will have a wire with an electric current  The current is used to stop bleeding in your bladder and remove bladder tumors  Your surgeon may also remove muscle and tissue from your bladder  He may use the scope to insert medicine into your bladder  The medicine will destroy pieces of tumor in your bladder and help prevent new tumors from growing  Your surgeon will remove the scope  After your surgery: You will be taken to a room to rest until you are fully awake  You will be monitored closely for any problems  Do not get out of bed until your healthcare provider says it is okay  You will then be able to go home or be taken to your hospital room  CONTACT YOUR HEALTHCARE PROVIDER IF:   · You cannot make it to your surgery  · You have a fever  · You get a cold or the flu  · You have questions or concerns about your surgery  SEEK CARE IMMEDIATELY IF:   · You have bleeding from your urethra that does not stop  · You start to urinate less often, very little, or not at all  · You have severe pain in your abdomen or pelvis  RISKS:   You may bleed more than expected or get an infection  Your bladder may be damaged  It may be painful to urinate, or you may have blood in your urine  You may feel discomfort in your abdomen or pelvis  You may feel like you need to urinate more often or without warning  You may develop more bladder tumors  CARE AGREEMENT:   You have the right to help plan your care   Learn about your health condition and how it may be treated  Discuss treatment options with your healthcare providers to decide what care you want to receive  You always have the right to refuse treatment  © Copyright [employer] 2018 Information is for End User's use only and may not be sold, redistributed or otherwise used for commercial purposes  All illustrations and images included in CareNotes® are the copyrighted property of A D A M , Inc  or [facility] Katheryn Carroll   The above information is an  only  It is not intended as medical advice for individual conditions or treatments  Talk to your doctor, nurse or pharmacist before following any medical regimen to see if it is safe and effective for you

## 2021-07-02 NOTE — TELEPHONE ENCOUNTER
Patient was seen today by Dr Pro Lai and never stopped at check out to sign his surgical consent  Myself and Dr Pro Lai called patient and left message for patient to please call office back and come back to sign consent

## 2021-07-02 NOTE — PROGRESS NOTES
Cystoscopy     Date/Time 7/2/2021 9:48 AM     Performed by  Ashok Joyce MD     Authorized by Ashok Joyce MD      Universal Protocol:  Timeout called at: 7/2/2021 9:48 AM       Procedure Details:  Procedure type: cystoscopy    Patient tolerance: Patient tolerated the procedure well with no immediate complications    Additional Procedure Details:   Cystoscopy procedure note:    Risk and benefits of flexible cystoscopy were discussed  Informed consent was obtained  Urine dip was adequate for cystoscopy  The patient was placed in the supine position  His genitalia was prepped with Betadine and draped in a sterile fashion  Viscous 2% lidocaine jelly was instilled into the urethra and allowed dwell time for local anesthesia  Patient does have some meatal stenosis from prior meatotomy and this was dilated with graduated lidocaine says cylinder  Flexible cystoscopy was then performed using a 16F scope  The distal urethra and prostatic urethra were evaluated and were normal in course and caliber  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

## 2021-07-02 NOTE — PROGRESS NOTES
UROLOGY PROCEDURE NOTE     CHIEF COMPLAINT   Anson Veloz is a 79 y o  male with a complaint of prostate cancer, metastatic    History of Present Illness:     79 y o  gentleman who had been undergoing routine prostate cancer screening with his primary care team  The patient has had a steady PSA but in March of 2017 was noted to have some oscillation  The patient was given a course of antibiotics and his PSA came down from the mid 5 range to 4 6  He initially presented in June 2017  We initially recommended follow-up in 1 year  Follow-up of his PSA values ordered by his primary team demonstrated concern for rapid rise  The patient return to see me in May of 2018  At that time, we recommended a prostate biopsy but the patient had just undergone percutaneous stenting and was on dual anti-platelet therapy  Biopsy delayed  The patient underwent a CT scan of his chest which demonstrated some sclerotic lesions in his primary care team again contacted us  We recommended repeat PSA and a bone scan  PSA has risen again and bone scan does demonstrate some concern  Lab Results   Component Value Date    PSA 13 0 (H) 06/30/2021    PSA 7 2 (H) 04/27/2021    PSA 6 9 (H) 04/13/2021   10/5/18 PSA 9 8  4/30/18 PSA 8 6, free 19 6%  9/30/17 PSA 7 3, free 15%  5/5/17 PSA 4 6  3/20/17 PSA 5 4     We did discuss with Interventional Radiology possible biopsy of the patient's thoracic and rib lesions although these were not felt to be safe for attempt  As such, the patient was arranged for a prostate biopsy here in our office  This demonstrated high risk Celina 9 disease  Patient was discussed at multidisciplinary conference and although we were not able to biopsy his bony lesions, 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  The patient was started on androgen deprivation therapy and seen by Medical Oncology  He was started on enzalutamide and Zometa  He returns for androgen deprivation therapy    He has had good PSA response  Recently enzalutamide has transitioned to abiraterone and now back to enzalutamide  Patient continues to have progression of his bony disease on imaging as well as slight progression his PSA trend  Patient has had further discussion with medical oncology team after progression castrate resistant disease  PET scan has demonstrated activity in the thoracic spine and at the bladder base and additional pathologic testing was performed on his cancer  Ultimately it was decided the patient would be a candidate for radiation to the prostate and pelvis  He will likely be progressing to Taxotere chemotherapy according to medical oncology  In advance of his radiation, the team has requested fiducial marker placement  Multidisciplinary discussion with patient to ensure he had good understanding of prognosis and goals of care  Has now undergone local therapy with radiation  Last Lupron 3/2021  Continues to have 2nd opinion with the [facility] oncology team   Imaging raised concern for posterior bladder lesion  Questionable secondary malignancy for bladder tumor  Patient presents today for cystoscopy to rule out      Past Medical History:     Past Medical History:   Diagnosis Date    Anemia     last assessed  1/7/14     Cancer [facility])     Coronary artery disease     Hypercholesteremia     Hypertension     Kidney stone     Polyp of sigmoid colon     Prostate cancer ([address] )     Renal disorder     elevated serum creat    Tinnitus     unspecified laterality / last assessed 4/9/13    Vitamin D deficiency        PAST SURGICAL HISTORY:     Past Surgical History:   Procedure Laterality Date    CARDIAC SURGERY      COLONOSCOPY      CORONARY ANGIOPLASTY WITH STENT PLACEMENT      FL RETROGRADE PYELOGRAM  9/11/2019    HEMORRHOID SURGERY      WY CYSTO/URETERO W/LITHOTRIPSY &INDWELL STENT INSRT Left 9/11/2019    Procedure: CYSTO, URETEROSCOPY W/HOLMIUM LASER, BASKET STONE EXTRACTION, RETROGRADE PYELOGRAM, STENT EXCHANGE;  Surgeon: Alexandra Flower MD;  Location: [facility];  Service: Urology    PROSTATE BIOPSY  10/24/2018    TONSILLECTOMY         CURRENT MEDICATIONS:     Current Outpatient Medications   Medication Sig Dispense Refill    acetaminophen (TYLENOL) 500 mg tablet Take 500 mg by mouth every 6 (six) hours as needed for mild pain      aspirin (ASPIRIN ADULT LOW STRENGTH) 81 mg EC tablet Take 1 tablet by mouth daily      atorvastatin (LIPITOR) 40 mg tablet Take 40 mg by mouth daily       Calcium 500 MG tablet Take 1,000 mg by mouth daily       cholecalciferol (VITAMIN D3) 1,000 units tablet Take 1 tablet by mouth daily      fluticasone (FLONASE) 50 mcg/act nasal spray 2 sprays into each nostril daily 16 g 3    hydrochlorothiazide (HYDRODIURIL) 25 mg tablet Take 1 tablet (25 mg total) by mouth daily 90 tablet 1    leuprolide (LUPRON DEPOT 6 MONTH KIT) 45 mg Inject 45 mg into a muscle every 6 (six) months 45 mg 0    losartan (COZAAR) 100 MG tablet Take 1 tablet (100 mg total) by mouth daily 90 tablet 1    MELATONIN PO Take 1 tablet by mouth daily at bedtime as needed       metoprolol succinate (TOPROL-XL) 25 mg 24 hr tablet take 1 tablet by mouth once daily 90 tablet 1    ondansetron (ZOFRAN) 4 mg tablet Take 1 tablet (4 mg total) by mouth every 8 (eight) hours as needed for nausea or vomiting 20 tablet 1    tamsulosin (FLOMAX) 0 4 mg take 1 capsule by mouth once daily with dinner 90 capsule 3    Zoledronic Acid (ZOMETA IV) Infuse into a venous catheter every 3 (three) months      [START ON 7/4/2021] predniSONE 5 mg tablet Take 1 tablet (5 mg total) by mouth 2 (two) times a day with meals 60 tablet 3     No current facility-administered medications for this visit         ALLERGIES:   No Known Allergies    SOCIAL HISTORY:     Social History     Socioeconomic History    Marital status: /Civil Union     Spouse name: None    Number of children: None    Years of education: None    Highest education level: None   Occupational History    Occupation: consultant   Tobacco Use    Smoking status: Never Smoker    Smokeless tobacco: Never Used   Vaping Use    Vaping Use: Never used   Substance and Sexual Activity    Alcohol use: Yes     Alcohol/week: 2 0 - 3 0 standard drinks     Types: 2 - 3 Glasses of wine per week     Comment: Occuational / social     Drug use: No    Sexual activity: None   Other Topics Concern    None   Social History Narrative    Always uses seat belt     Daily caffeine consumption 2-3 servings a day     Feels safe at home     Social Determinants of Health     Financial Resource Strain:     Difficulty of Paying Living Expenses:    Food Insecurity:     Worried About Running Out of Food in the Last Year:     Ran Out of Food in the Last Year:    Transportation Needs:     Lack of Transportation (Medical):  Lack of Transportation (Non-Medical):    Physical Activity:     Days of Exercise per Week:     Minutes of Exercise per Session:    Stress:     Feeling of Stress :    Social Connections:     Frequency of Communication with Friends and Family:     Frequency of Social Gatherings with Friends and Family:     Attends Christian Services:     Active Member of Clubs or Organizations:     Attends Club or Organization Meetings:     Marital Status:    Intimate Partner Violence:     Fear of Current or Ex-Partner:     Emotionally Abused:     Physically Abused:     Sexually Abused:        SOCIAL HISTORY:     Family History   Problem Relation Age of Onset    Breast cancer Mother     Hypertension Father        REVIEW OF SYSTEMS:     Review of Systems   Constitutional: Negative  Respiratory: Negative  Cardiovascular: Negative  Gastrointestinal: Negative  Genitourinary: Positive for frequency  Musculoskeletal: Negative  Skin: Negative  Psychiatric/Behavioral: Negative            PHYSICAL EXAM:     /70 Pulse 63   Ht 5' 5" (1 651 m)   Wt 93 4 kg (206 lb)   BMI 34 28 kg/m²     General:  Healthy appearing male in no acute distress  They have a normal affect  There is not appear to be any gross neurologic defects or abnormalities  HEENT:  Normocephalic, atraumatic  Neck is supple without any palpable lymphadenopathy  Cardiovascular:  Patient has normal palpable distal radial pulses  There is no significant peripheral edema  No JVD is noted  Respiratory:  Patient has unlabored respirations  There is no audible wheeze or rhonchi  Abdomen:  Abdomen is soft and nontender  There is no tympany  Inguinal and umbilical hernia are not appreciated  Genitourinary:   Uncircumcised phallus, reducible foreskin, patient has some lichen sclerosis and prior surgical meatotomy incision is visible with some meatal stenosis, testicles are smooth and descended    Musculoskeletal:  Patient does not have significant CVA tenderness in the  flank with palpation or percussion  They full range of motion in all 4 extremities  Strength in all 4 extremities appears congruent  Patient is able to ambulate without assistance or difficulty  Dermatologic:  Patient has no skin abnormalities or rashes  LABS:     CBC:   Lab Results   Component Value Date    WBC 5 4 2021    HGB 13 1 (L) 2021    HCT 37 9 (L) 2021    MCV 90 2 2021     2021       BMP:   Lab Results   Component Value Date    CALCIUM 9 5 2021     2017    K 4 2 2021    CO2 28 2021     2021    BUN 23 2021    CREATININE 1 09 2021     10/5/18 PSA 9 8  Lab Results   Component Value Date    PSA 13 0 (H) 2021    PSA 7 2 (H) 2021    PSA 6 9 (H) 2021     IMAGIN/18/21  BONE SCAN  WHOLE BODY     INDICATION: Metastatic prostate carcinoma    Right shoulder pain      PREVIOUS FILM CORRELATION:    CT 2021, PET scan 2020, nuclear bone scan 5/5/2020     TECHNIQUE:   This study was performed following the intravenous administration of 24 7 mCi Tc-99m labeled MDP  Delayed, anterior and posterior whole body images were acquired, 2-3 hours after radiopharmaceutical administration      FINDINGS:     IMPRESSION:  Multiple osseous lesions are seen compatible with progressive osseous metastases  In comparison to the previous nuclear bone scan, IMPRESSION: There is increasing activity noted in the proximal humerus, near the junction of the neck and shaft  There is   also increasing intensity with respect to the right anterior 5th rib lesion  Significantly increased intensity has occurred at the T10 vertebral body, and development of increased activity at L5, right lateral iliac bone, and left iliac crest   There   is also a new small focus of activity involving the right posterior T8 rib     IMPRESSION: In comparison to the previous nuclear bone scan, there is both an increase in intensity and number of scintigraphic abnormalities, compatible with progression of osseous metastatic disease    4/21/21  CT CHEST, ABDOMEN AND PELVIS WITH IV CONTRAST     INDICATION:   C79 51: Secondary malignant neoplasm of bone  C61: Malignant neoplasm of prostate      COMPARISON:  Compared with 5/2/2020 and PET scan of 11/23/2020     TECHNIQUE: CT examination of the chest, abdomen and pelvis was performed  Axial, sagittal, and coronal 2D reformatted images were created from the source data and submitted for interpretation      Radiation dose length product (DLP) for this visit:  951 mGy-cm   This examination, like all CT scans performed in the [facility], was performed utilizing techniques to minimize radiation dose exposure, including the use of iterative   reconstruction and automated exposure control      IV Contrast:  100 mL of iohexol (OMNIPAQUE)  Enteric Contrast: Enteric contrast was administered      FINDINGS:     CHEST     LUNGS:  Lungs are clear  There is no tracheal or endobronchial lesion      PLEURA:  Unremarkable      HEART/GREAT VESSELS:  Unremarkable for patient's age      MEDIASTINUM AND JOSE ANTONIO:  Small hiatal hernia is unchanged      CHEST WALL AND LOWER NECK:   Unremarkable      ABDOMEN     LIVER/BILIARY TREE:  Stable hepatic hypodensities of known cysts      GALLBLADDER:  No calcified gallstones  No pericholecystic inflammatory change      SPLEEN:  Unremarkable      PANCREAS:  Unremarkable      ADRENAL GLANDS:  Unremarkable      KIDNEYS/URETERS:  Stable bilateral renal cysts largest on the left  Stable bilateral parapelvic cysts    No hydronephrosis      STOMACH AND BOWEL:  Unremarkable      APPENDIX:  No findings to suggest appendicitis      ABDOMINOPELVIC CAVITY:  No ascites  No pneumoperitoneum  No lymphadenopathy      VESSELS:  Unremarkable for patient's age      PELVIS     REPRODUCTIVE ORGANS:  Unremarkable for patient's age      URINARY BLADDER:  Persistent hyperdense  mass posteriorly measuring 1 8 x 1 5 cm compared to 2 1 x 1 8 cm      ABDOMINAL WALL/INGUINAL REGIONS:  Subcentimeter inguinal lymph nodes unchanged      OSSEOUS STRUCTURES: Tiny less than 4 mm sclerotic foci in the sacrum unchanged  1 5 cm sclerotic focus in L5 vertebral body increased from 1 2 cm  Tiny sclerotic focus in the right posterior 4th rib is unchanged   left posterior 7th sclerotic foci are unchanged  Unchanged right anterior 5th rib sclerotic focus  Sclerotic focus in T10 is unchanged  Other tiny scattered sclerotic foci unchanged  No acute fracture or destructive osseous lesion      IMPRESSION:     Persistent lobulated mass in the posterior bladder      Mostly unchanged sclerotic metastatic lesions in the bone with slight increase in the L5 lesion  PATHOLOGY:     Final Diagnosis   A    Right lateral base prostate core biopsy:  - Adenocarcinoma, [place] score 4+ 5 = 9/10 (grade 5 comprises 10% of core biopsy), grade group 5, continuously involving greater than 95% of this core biopsy  - No perineural invasion is seen     B  Right lateral mid prostate core biopsy:  - Adenocarcinoma, Vitaliy score 4+ 5 = 9/10 (grade 5 conprises 20% of core biopsy), grade group 5, continuously involving 90% of this core biopsy  - Perineural invasion is seen      C  Left lateral apex prostate core biopsy:  - Adenocarcinoma, [place] score 5 + 4 = 9/10 (grade 5 comprises 60% of core biopsy), grade group 5, continuously involving 60% of this core biopsy  - Perineural invasion is seen     D  Right base prostate core biopsy:  - Adenocarcinoma, Vitaliy score 4 + 5 = 9/10 (grade 5 comprises 10% of core biopsy), grade group 5, continuously involving 85% of this core biopsy  - Perineural invasion is seen     E  Right mid prostate core biopsy   - Adenocarcinoma, [place] score 5 + 4 = 9/10 (grade 5 comprises 95% of core biopsy), grade group 5, continuously involving 60% of this core biopsy  - Perineural invasion is seen     F  Right apex prostate core biopsy:  - Adenocarcinoma, [place]o score 4 + 5 = 9/10 (grade 5 comprises 45% of core biopsy), grade group 5, continuously involving 70% of this core biopsy  - Perineural invasion is seen     G  Left base prostate core biopsy:  - Adenocarcinoma, Oketo score 4 + 5 = 9/10 (grade 5 comprises 35% of core biopsy), grade group 5, continuously involving 70% of this core biopsy  - No perineural invasion is seen     H  Left mid prostate core biopsy:  - Adenocarcinoma, Vitaliy score 4 + 4 = 8/10, grade group 4, discontinuously involving 20% of this core biopsy  - No perineural invasion is seen      I  Left apex prostate core biopsy:  - Benign prostatic tissue     J  Left lateral base prostate core biopsy:  - Adenocarcinoma, Vitaliy score 4 + 5 = 9/10, (grade 5 comprises 35% of core biopsy), grade group 5, discontinuously involving 20% of this core biopsy  - No perineural invasion is seen      K   Left lateral mid prostate core biopsy:  - Adenocarcinoma, Vitaliy score 4 + 4 = 8/10, grade group 4, continuously involving 5% of this core biopsy  - No perineural invasion is seen      L  Left lateral apex prostate core biopsy:  - Adenocarcinoma, [place] score 4 + 4 = 8/10, grade group 4, discontinuously involving 5% of this core biopsy  - No perineural invasion is seen      Note: Block for Prolaris testing if required: E     PROCEDURE:     SEE NOTE    ASSESSMENT:     79 y o  male with high volume, high risk [place] 5+4=9 CaP,   With progressive bony lesions despite advanced systemic therapy, now completed local radiation to the prostate and pelvis after discussion with the [facility] 2nd opinion, presenting for cystoscopy to define the lobulated posterior bladder mass seen on CT imaging    PLAN:     Patient does have a growth emanating from the left side bladder neck and prostatic fossa  Although this could be urothelial carcinoma, a high likelihood is that this is a growth off the patient's prosthetic tissue  I still think resection is prudent and so I have recommended operative transurethral resection of his bladder lesion  This will be sent for pathology which will be definitive  Patient is getting ready to start more advanced treatments and chemotherapy with Medical Oncology team and I think it is certainly reasonable for him to start these and the procedure to be performed as soon as can be reasonably scheduled  I discussed procedure the risks and benefits with the patient he has agreed to proceed  He has signed informed consent

## 2021-07-05 PROBLEM — T45.1X5A CHEMOTHERAPY-INDUCED NEUTROPENIA (HCC): Status: ACTIVE | Noted: 2021-07-05

## 2021-07-05 PROBLEM — D70.1 CHEMOTHERAPY-INDUCED NEUTROPENIA (HCC): Status: ACTIVE | Noted: 2021-07-05

## 2021-07-06 ENCOUNTER — TELEPHONE (OUTPATIENT)
Dept: UROLOGY | Facility: MEDICAL CENTER | Age: 81
End: 2021-07-06

## 2021-07-06 ENCOUNTER — TELEPHONE (OUTPATIENT)
Dept: HEMATOLOGY ONCOLOGY | Facility: CLINIC | Age: 81
End: 2021-07-06

## 2021-07-06 NOTE — TELEPHONE ENCOUNTER
Appointment Confirmation     Appointment with  Infusion    Appointment date & time 7-12-21 @ 8:30am    Location [place]    Patient verbilized Understanding

## 2021-07-06 NOTE — TELEPHONE ENCOUNTER
I spoke to patient and scheduled surgery with Dr Asher Garcia on 9/8/21 at [facility] [facility]  Patient will have urine culture and EKG Prior to surgery  I am mailing him a surgery packet

## 2021-07-06 NOTE — TELEPHONE ENCOUNTER
Patient called in stating he will stop by today to sign consent form  Patient asked if the procedure will interfere with his chemo treatments   Patient can be reached at [phone]

## 2021-07-06 NOTE — TELEPHONE ENCOUNTER
Contacted and spoke with patient about coming to office to sign consent  Patient will stop by around 100 pm to sign consent

## 2021-07-07 ENCOUNTER — PATIENT OUTREACH (OUTPATIENT)
Dept: UROLOGY | Facility: CLINIC | Age: 81
End: 2021-07-07

## 2021-07-07 NOTE — PROGRESS NOTES
Roz Zambrano called and requested that his appointment on 9/8/21 with Dr Morey Shone be rescheduled since it conflicts with his TURBT  Rescheduled follow up with Dr Morey Shone to 9/7/21  Roz Zambrano aware of the date, time, and location

## 2021-07-12 ENCOUNTER — HOSPITAL ENCOUNTER (OUTPATIENT)
Dept: INFUSION CENTER | Facility: CLINIC | Age: 81
Discharge: HOME | End: 2021-07-12
Payer: COMMERCIAL

## 2021-07-12 VITALS
SYSTOLIC BLOOD PRESSURE: 128 MMHG | HEART RATE: 57 BPM | RESPIRATION RATE: 18 BRPM | TEMPERATURE: 97 F | DIASTOLIC BLOOD PRESSURE: 76 MMHG | HEIGHT: 65 IN | WEIGHT: 207.23 LBS | BODY MASS INDEX: 34.53 KG/M2

## 2021-07-12 DIAGNOSIS — C79.51 BONE METASTASES (HCC): ICD-10-CM

## 2021-07-12 DIAGNOSIS — T45.1X5A CHEMOTHERAPY-INDUCED NEUTROPENIA (HCC): ICD-10-CM

## 2021-07-12 DIAGNOSIS — D70.1 CHEMOTHERAPY-INDUCED NEUTROPENIA (HCC): ICD-10-CM

## 2021-07-12 DIAGNOSIS — C61 PROSTATE CANCER (HCC): Primary | ICD-10-CM

## 2021-07-12 PROCEDURE — 96367 TX/PROPH/DG ADDL SEQ IV INF: CPT

## 2021-07-12 PROCEDURE — 96413 CHEMO IV INFUSION 1 HR: CPT

## 2021-07-12 RX ORDER — SODIUM CHLORIDE 9 MG/ML
20 INJECTION, SOLUTION INTRAVENOUS ONCE
Status: DISCONTINUED | OUTPATIENT
Start: 2021-07-12 | End: 2021-07-15 | Stop reason: HOSPADM

## 2021-07-12 RX ORDER — SODIUM CHLORIDE 9 MG/ML
20 INJECTION, SOLUTION INTRAVENOUS ONCE
Status: COMPLETED | OUTPATIENT
Start: 2021-07-12 | End: 2021-07-12

## 2021-07-12 RX ORDER — SODIUM CHLORIDE 9 MG/ML
20 INJECTION, SOLUTION INTRAVENOUS ONCE
Status: CANCELLED | OUTPATIENT
Start: 2021-10-04

## 2021-07-12 RX ADMIN — ZOLEDRONIC ACID 3.5 MG: 4 INJECTION, SOLUTION, CONCENTRATE INTRAVENOUS at 10:47

## 2021-07-12 RX ADMIN — SODIUM CHLORIDE 20 ML/HR: 0.9 INJECTION, SOLUTION INTRAVENOUS at 09:18

## 2021-07-12 RX ADMIN — DOCETAXEL 150.8 MG: 20 INJECTION, SOLUTION, CONCENTRATE INTRAVENOUS at 09:44

## 2021-07-12 RX ADMIN — DEXAMETHASONE SODIUM PHOSPHATE 10 MG: 10 INJECTION, SOLUTION INTRAMUSCULAR; INTRAVENOUS at 09:19

## 2021-07-13 ENCOUNTER — HOSPITAL ENCOUNTER (OUTPATIENT)
Dept: INFUSION CENTER | Facility: CLINIC | Age: 81
Discharge: HOME | End: 2021-07-13
Payer: COMMERCIAL

## 2021-07-13 VITALS — TEMPERATURE: 99 F

## 2021-07-13 DIAGNOSIS — D70.1 CHEMOTHERAPY-INDUCED NEUTROPENIA (HCC): Primary | ICD-10-CM

## 2021-07-13 DIAGNOSIS — T45.1X5A CHEMOTHERAPY-INDUCED NEUTROPENIA (HCC): Primary | ICD-10-CM

## 2021-07-13 DIAGNOSIS — C61 PROSTATE CANCER (HCC): ICD-10-CM

## 2021-07-13 PROCEDURE — 96372 THER/PROPH/DIAG INJ SC/IM: CPT

## 2021-07-13 RX ADMIN — PEGFILGRASTIM 6 MG: 6 INJECTION SUBCUTANEOUS at 12:18

## 2021-07-13 NOTE — PROGRESS NOTES
Neulasta given as ordered  Pt offers no complaints  Pt is aware of all future appointments   Declined AVS

## 2021-07-16 DIAGNOSIS — R60.0 LOCALIZED EDEMA: ICD-10-CM

## 2021-07-18 RX ORDER — HYDROCHLOROTHIAZIDE 25 MG/1
25 TABLET ORAL DAILY
Qty: 90 TABLET | Refills: 1 | Status: SHIPPED | OUTPATIENT
Start: 2021-07-18 | End: 2022-01-12 | Stop reason: SDUPTHER

## 2021-07-19 ENCOUNTER — TELEPHONE (OUTPATIENT)
Dept: HEMATOLOGY ONCOLOGY | Facility: CLINIC | Age: 81
End: 2021-07-19

## 2021-07-19 NOTE — TELEPHONE ENCOUNTER
Returned call to patient  He developed a red,raised itchy rash since his last chemo on 7/12/21  Rash is under both arms, across his chest, neck and above his ribs  Patient has been trying Cortisone 10 lotion which does help "a little"  Patient is looking for recommendation from Dr Jensen Hatch - what else can he try to help clear up the rash and itching? Last PSA was drawn 6/30/21  Patient asking when he should have this repeated?   Does he need a PSA prior to his follow up appointment with BOD[facility] on 7/29/21  Patient has articles for Dr Jensen Hatch to review- he will bring them along to his upcoming appointment on 7/29/    Will send to RN to review with Dr Jensen Hatch  Call back number for patient [phone]

## 2021-07-19 NOTE — TELEPHONE ENCOUNTER
Dr Kia Weir reviewed rash through emailed pictures  He would like the patient to be assessed in person  Patient was scheduled for a f/u tomorrow at 8 am in [place]  Patient aware and verbalized understanding

## 2021-07-19 NOTE — TELEPHONE ENCOUNTER
Patient will like a call back to confirm images have been received, he sent over by e-mail to: [email or web address]  His best call back is [phone]

## 2021-07-20 ENCOUNTER — OFFICE VISIT (OUTPATIENT)
Dept: HEMATOLOGY ONCOLOGY | Facility: CLINIC | Age: 81
End: 2021-07-20
Payer: COMMERCIAL

## 2021-07-20 VITALS
HEIGHT: 65 IN | RESPIRATION RATE: 16 BRPM | WEIGHT: 201.2 LBS | HEART RATE: 73 BPM | DIASTOLIC BLOOD PRESSURE: 76 MMHG | BODY MASS INDEX: 33.52 KG/M2 | TEMPERATURE: 97.5 F | SYSTOLIC BLOOD PRESSURE: 122 MMHG | OXYGEN SATURATION: 98 %

## 2021-07-20 DIAGNOSIS — R21 RASH: ICD-10-CM

## 2021-07-20 DIAGNOSIS — I10 ESSENTIAL HYPERTENSION: ICD-10-CM

## 2021-07-20 DIAGNOSIS — C79.51 BONE METASTASES (HCC): Primary | ICD-10-CM

## 2021-07-20 DIAGNOSIS — C61 PROSTATE CANCER (HCC): ICD-10-CM

## 2021-07-20 PROCEDURE — 3074F SYST BP LT 130 MM HG: CPT | Performed by: PHYSICIAN ASSISTANT

## 2021-07-20 PROCEDURE — 3078F DIAST BP <80 MM HG: CPT | Performed by: PHYSICIAN ASSISTANT

## 2021-07-20 PROCEDURE — 99213 OFFICE O/P EST LOW 20 MIN: CPT | Performed by: PHYSICIAN ASSISTANT

## 2021-07-20 RX ORDER — METHYLPREDNISOLONE 4 MG/1
TABLET ORAL
Qty: 1 EACH | Refills: 0 | Status: SHIPPED | OUTPATIENT
Start: 2021-07-20 | End: 2021-07-29

## 2021-07-20 RX ORDER — METOPROLOL SUCCINATE 25 MG/1
25 TABLET, EXTENDED RELEASE ORAL DAILY
Qty: 90 TABLET | Refills: 1 | Status: SHIPPED | OUTPATIENT
Start: 2021-07-20 | End: 2022-01-14 | Stop reason: SDUPTHER

## 2021-07-20 NOTE — PROGRESS NOTES
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     Diagnosed initially on 09/2018 with PSA of 9, CT scan showed involvement of the posterior side of the 10th rib area, vertebral body and distant point of the right clavicle he received Firmagon and later on Lupron by Urology     He received abiraterone/prednisone PSA went down to 0 1 until May 2020 PSA started rising up, bone scan showed lesion in the anterior 5th rib area, right humerus no evidence of visceral disease     He received enzalutamide since 06/02/2020 at PSA of 0 8     In 10/2020 PSA 1 4     Negative for germline BRCA 1/2 mutation, MSI stable, LIONEL on multiple other mutations     PET scan showed scattered metastatic disease specially at T10, the base of the bladder, possible inguinal lymphadenopathy     Radiation to the pelvic area from February until April 2021      Progression of disease in June 2021, PSA 13, CT scan 4/21/21 showed no evidence of visceral metastases however bone scan 6/18/21 showed multiple bony lesions involving the mid thoracic, lumbar, pelvic, right ribs area     He was seen at [facility], the decision was to discontinue enzalutamide and start the patient on Taxotere 75 milligram/meter squared every 3-4 weeks with Pegfilgrastim after Taxotere      Continue Zometa every 3 months     Rash that began last week  Primary is over right flank however he also has involvement involving bilateral axilla and around the neck  He is finding benefit with cortisone cream     Medrol Dosepak prescribed       keep follow-up as scheduled            HPI:    36-year-old  male who was seen by Urology for elevation of the PSA, when he presented in June 2017 with PSA of 4 6, Subsequently in September of 2017 PSA was 7 3 and in May of 2018 PSA was 8  6   A biopsy was recommended but the patient had just undergone percutaneous stenting of the heart and he was on dual anti-platelet therapy      MRI of the abdomen in April 2018 showed 2 simple cyst in the right hepatic lobe, bilateral renal cysts the largest 1 measuring 10 cm in the left lower lobe     In October of 2018 PSA was 9 8  CT scan of the chest 9/28/2018 showed sclerotic lesions in T10, 5 mm nodule in the right lower lobe   Bone scan showed activity in the posterior T10 level and left posterior 7th rib area concerning for osseous metastases and increased activity in the distal right clavicle might be degenerative or metastatic area      He was diagnosed with castration sensitive metastatic prostate cancer  [facility] insurance company could not approve enzalutamide, he also has a high co-payment for abiraterone     The patient initiated on samples of enzalutamide 160 mg p o  daily since the beginning of December 2018   His insurance approved abiraterone however he had a high co-payment        We were finally able to get financial assistance through [employer] and Roseline [employer] (abiraterone) 2/3/2019 with prednisone 5mg po bid without side effects      In May 2020 PSA 0 5, bone scan showed activity in the anterior 5th rib area, right humerus area most likely consistent with metastatic disease, no visceral disease on the CT scan, we had hard time getting enzalutamide , the patient was initiated on enzalutamide on 06/02/2020, PSA at the time of 0 8     PSA 1 4 in October 2020, normal alkaline phosphatase           Genes analyzed of germ line test  LIONEL, BARD1, BRCA1, BRCA2, BRIP1, CDH1, CHEK2, DICER1, EPCAM*, HOXB13, MLH1, MSH2,  MSH6, NBN, NF1, PALB2, PMS2, PTEN, RAD50, RAD51C, RAD51D, SMARCA4, STK11, TP53 all of these were negative     Evaluated at [facility] the decision for radiation therapy to oligo metastases and the primary site of the tumor depends on the PET scan finding which showed multiple metastatic disease, patient to meet with Radiation Oncology      PET scan on 11/13/2020 showed radiotracer retention in the left posterior bladder suspicious for malignancy with prostate cancer invasion progressing from prior CT scan mild to moderate uptake in the few small inguinal lymph nodes, sclerotic lesions in the spine with uptake suspicious for metastases     radiation therapy in the February 2021- April 2021, he continues on enzalutamide      Progression of disease by bone scan in June 2021 with multiple thoracic, right ribs area, hip area involvement, PSA 13 ( 7 2 on 04/2021)    Interval History:    Cycle 1 Taxotere 7/12/2021  patient called 7/19/2021 regarding a rash that developed over the weekend involving his arms across his check, neck and above his ribs  Cortisone 10 lotion helped a little  patient and his wife report that for the past few months he has been focused over the right flank area but had no rash  Since using the cortisone lotion, the rash in the axilla and neck have improved  Itching improved over the right flank area            Test Results:        Labs:   Lab Results   Component Value Date    HGB 13 1 (L) 06/30/2021    HCT 37 9 (L) 06/30/2021    MCV 90 2 06/30/2021     06/30/2021    WBC 5 4 06/30/2021    NRBC 0 12/02/2019     Lab Results   Component Value Date     09/30/2017    K 4 2 06/30/2021     06/30/2021    CO2 28 06/30/2021    BUN 23 06/30/2021    CREATININE 1 09 06/30/2021    GLUF 97 08/08/2019    CALCIUM 9 5 06/30/2021    AST 16 06/30/2021    ALT 12 06/30/2021    ALKPHOS 75 06/30/2021    PROT 6 5 09/30/2017    BILITOT 0 7 09/30/2017    EGFR 61 12/02/2019       Imaging: No results found  ROS:  As mentioned in HPI & Interval History otherwise 14 point ROS negative          Allergies: No Known Allergies  Current Medications: Reviewed  PMH/FH/SH:  Reviewed      Physical Exam:    1 98 meters squared    Ht Readings from Last 3 Encounters:   07/20/21 5' 5" (1 651 m)   07/12/21 5' 5" (1 651 m)   07/02/21 5' 5" (1 651 m)        Wt Readings from Last 3 Encounters:   07/20/21 91 3 kg (201 lb 3 2 oz)   07/12/21 94 kg (207 lb 3 7 oz)   07/02/21 93 4 kg (206 lb)        Temp Readings from Last 3 Encounters:   07/20/21 97 5 °F (36 4 °C) (Tympanic)   07/13/21 99 °F (37 2 °C) (Tympanic)   07/12/21 (!) 97 °F (36 1 °C) (Temporal)        BP Readings from Last 3 Encounters:   07/20/21 122/76   07/12/21 128/76   07/02/21 120/70           Physical Exam  Constitutional:       Appearance: He is well-developed  HENT:      Head: Normocephalic and atraumatic  Cardiovascular:      Rate and Rhythm: Normal rate  Pulmonary:      Effort: Pulmonary effort is normal  No respiratory distress  Abdominal:      Palpations: Abdomen is soft  Skin:     General: Skin is warm and dry  Findings: Rash (bilateral axillae, supraclavicular area;  primary right ant abdominal area) present  Neurological:      Mental Status: He is alert and oriented to person, place, and time     Psychiatric:         Behavior: Behavior normal                Emergency Contacts:    Yoko Colvin, [phone],

## 2021-07-21 ENCOUNTER — TELEPHONE (OUTPATIENT)
Dept: HEMATOLOGY ONCOLOGY | Facility: CLINIC | Age: 81
End: 2021-07-21

## 2021-07-21 NOTE — TELEPHONE ENCOUNTER
Pt saw Lianne Alvarez yesterday and gave her two documents  She can discard the one about prostate cancer because he has an updated form   He would like her to keep the one regarding filtering blood, call back # [phone]

## 2021-07-29 ENCOUNTER — OFFICE VISIT (OUTPATIENT)
Dept: HEMATOLOGY ONCOLOGY | Facility: CLINIC | Age: 81
End: 2021-07-29
Payer: COMMERCIAL

## 2021-07-29 VITALS
DIASTOLIC BLOOD PRESSURE: 82 MMHG | BODY MASS INDEX: 33.52 KG/M2 | HEIGHT: 65 IN | TEMPERATURE: 96.9 F | RESPIRATION RATE: 16 BRPM | OXYGEN SATURATION: 98 % | WEIGHT: 201.2 LBS | SYSTOLIC BLOOD PRESSURE: 120 MMHG | HEART RATE: 62 BPM

## 2021-07-29 DIAGNOSIS — R21 RASH: ICD-10-CM

## 2021-07-29 DIAGNOSIS — C61 PROSTATE CANCER (HCC): ICD-10-CM

## 2021-07-29 DIAGNOSIS — C79.51 BONE METASTASES (HCC): Primary | ICD-10-CM

## 2021-07-29 PROCEDURE — 1160F RVW MEDS BY RX/DR IN RCRD: CPT | Performed by: PHYSICIAN ASSISTANT

## 2021-07-29 PROCEDURE — 1036F TOBACCO NON-USER: CPT | Performed by: PHYSICIAN ASSISTANT

## 2021-07-29 PROCEDURE — 99214 OFFICE O/P EST MOD 30 MIN: CPT | Performed by: PHYSICIAN ASSISTANT

## 2021-07-29 NOTE — PROGRESS NOTES
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     Diagnosed initially on 09/2018 with PSA of 9, CT scan showed involvement of the posterior side of the 10th rib area, vertebral body and distant point of the right clavicle he received Firmagon and later on Lupron by Urology     He received abiraterone/prednisone PSA went down to 0 1 until May 2020 PSA started rising up, bone scan showed lesion in the anterior 5th rib area, right humerus no evidence of visceral disease     He received enzalutamide since 06/02/2020 at PSA of 0 8     In 10/2020 PSA 1 4     Negative for germline BRCA 1/2 mutation, MSI stable, LIONEL on multiple other mutations     PET scan showed scattered metastatic disease specially at T10, the base of the bladder, possible inguinal lymphadenopathy     Radiation to the pelvic area from February until April 2021      Progression of disease in June 2021, PSA 13, CT scan 4/21/21 showed no evidence of visceral metastases however bone scan 6/18/21 showed multiple bony lesions involving the mid thoracic, lumbar, pelvic, right ribs area     He was seen at [facility], the decision was to discontinue enzalutamide and start the patient on Taxotere 75 milligram/meter squared every 3-4 weeks with Pegfilgrastim after Taxotere  Cycle #1 7/12/21      Continue Zometa every 3 months     Rash that began last week  Primary is over right flank however he also has involvement involving bilateral axilla and around the neck  He is finding benefit with cortisone cream     Medrol Dosepak prescribed with benefit  He still has some pruritus    The redness has improved significantly, however                  HPI:    61-year-old  male who was seen by Urology for elevation of the PSA, when he presented in June 2017 with PSA of 4 6, Subsequently in September of 2017 PSA was 7 3 and in May of 2018 PSA was 8  6   A biopsy was recommended but the patient had just undergone percutaneous stenting of the heart and he was on dual anti-platelet therapy      MRI of the abdomen in April 2018 showed 2 simple cyst in the right hepatic lobe, bilateral renal cysts the largest 1 measuring 10 cm in the left lower lobe     In October of 2018 PSA was 9 8  CT scan of the chest 9/28/2018 showed sclerotic lesions in T10, 5 mm nodule in the right lower lobe   Bone scan showed activity in the posterior T10 level and left posterior 7th rib area concerning for osseous metastases and increased activity in the distal right clavicle might be degenerative or metastatic area      He was diagnosed with castration sensitive metastatic prostate cancer  [facility] insurance company could not approve enzalutamide, he also has a high co-payment for abiraterone     The patient initiated on samples of enzalutamide 160 mg p o  daily since the beginning of December 2018   His insurance approved abiraterone however he had a high co-payment        We were finally able to get financial assistance through [facility] and Roseline started [employer] (abiraterone) 2/3/2019 with prednisone 5mg po bid without side effects      In May 2020 PSA 0 5, bone scan showed activity in the anterior 5th rib area, right humerus area most likely consistent with metastatic disease, no visceral disease on the CT scan, we had hard time getting enzalutamide , the patient was initiated on enzalutamide on 06/02/2020, PSA at the time of 0 8     PSA 1 4 in October 2020, normal alkaline phosphatase           Genes analyzed of germ line test  LIONEL, BARD1, BRCA1, BRCA2, BRIP1, CDH1, CHEK2, DICER1, EPCAM*, HOXB13, MLH1, MSH2,  MSH6, NBN, NF1, PALB2, PMS2, PTEN, RAD50, RAD51C, RAD51D, SMARCA4, STK11, TP53 all of these were negative     Evaluated at [facility] the decision for radiation therapy to oligo metastases and the primary site of the tumor depends on the PET scan finding which showed multiple metastatic disease, patient to meet with Radiation Oncology      PET scan on 11/13/2020 showed radiotracer retention in the left posterior bladder suspicious for malignancy with prostate cancer invasion progressing from prior CT scan mild to moderate uptake in the few small inguinal lymph nodes, sclerotic lesions in the spine with uptake suspicious for metastases     radiation therapy in the February 2021- April 2021, he continues on enzalutamide      Progression of disease by bone scan in June 2021 with multiple thoracic, right ribs area, hip area involvement, PSA 13 ( 7 2 on 04/2021)       Cycle 1 Taxotere 7/12/2021  Patient called 7/19/2021 regarding a rash that developed over the weekend involving his arms across his check, neck and above his ribs  Cortisone 10 lotion helped a little      Patient and his wife report that for the past few months he has been focused over the right flank area but had no rash  Since using the cortisone lotion, the rash in the axilla and neck have improved  Itching improved over the right flank area  He was prescribed medrol dosepak          Interval History:    He questions possibility of clinical trial   Currently, there are no open trials at [facility] for which he is eligible  He has established care  At [facility] as well  reports that since completing a steroid pack as the best he has felt in 2 years  His knees are less achy        Test Results:        Labs:   Lab Results   Component Value Date    HGB 13 1 (L) 06/30/2021    HCT 37 9 (L) 06/30/2021    MCV 90 2 06/30/2021     06/30/2021    WBC 5 4 06/30/2021    NRBC 0 12/02/2019     Lab Results   Component Value Date     09/30/2017    K 4 2 06/30/2021     06/30/2021    CO2 28 06/30/2021    BUN 23 06/30/2021    CREATININE 1 09 06/30/2021 GLUF 97 08/08/2019    CALCIUM 9 5 06/30/2021    AST 16 06/30/2021    ALT 12 06/30/2021    ALKPHOS 75 06/30/2021    PROT 6 5 09/30/2017    BILITOT 0 7 09/30/2017    EGFR 61 12/02/2019       Imaging: No results found  ROS:  As mentioned in HPI & Interval History otherwise 14 point ROS negative  Allergies: No Known Allergies  Current Medications: Reviewed  PMH/FH/SH:  Reviewed      Physical Exam:    There is no height or weight on file to calculate BSA  Ht Readings from Last 3 Encounters:   07/20/21 5' 5" (1 651 m)   07/12/21 5' 5" (1 651 m)   07/02/21 5' 5" (1 651 m)        Wt Readings from Last 3 Encounters:   07/20/21 91 3 kg (201 lb 3 2 oz)   07/12/21 94 kg (207 lb 3 7 oz)   07/02/21 93 4 kg (206 lb)        Temp Readings from Last 3 Encounters:   07/20/21 97 5 °F (36 4 °C) (Tympanic)   07/13/21 99 °F (37 2 °C) (Tympanic)   07/12/21 (!) 97 °F (36 1 °C) (Temporal)        BP Readings from Last 3 Encounters:   07/20/21 122/76   07/12/21 128/76   07/02/21 120/70           Physical Exam  Vitals reviewed  Constitutional:       General: He is not in acute distress  Appearance: He is well-developed  He is not diaphoretic  HENT:      Head: Normocephalic and atraumatic  Eyes:      Conjunctiva/sclera: Conjunctivae normal    Neck:      Trachea: No tracheal deviation  Cardiovascular:      Rate and Rhythm: Normal rate and regular rhythm  Heart sounds: No murmur heard  No friction rub  No gallop  Pulmonary:      Effort: Pulmonary effort is normal  No respiratory distress  Breath sounds: Normal breath sounds  No wheezing or rales  Chest:      Chest wall: No tenderness  Abdominal:      General: There is no distension  Palpations: Abdomen is soft  Tenderness: There is no abdominal tenderness  Musculoskeletal:      Cervical back: Normal range of motion and neck supple  Lymphadenopathy:      Cervical: No cervical adenopathy  Skin:     General: Skin is warm and dry  Coloration: Skin is not pale  Findings: No erythema  Comments: Purplish discoloration over right upper abdomen, chest and axilla bilaterally where he had rash  Neurological:      Mental Status: He is alert and oriented to person, place, and time  Psychiatric:         Behavior: Behavior normal          Thought Content:  Thought content normal          Judgment: Judgment normal          ECOG:       Emergency Contacts:    Rockland Apgar, [phone],

## 2021-08-02 ENCOUNTER — HOSPITAL ENCOUNTER (OUTPATIENT)
Dept: INFUSION CENTER | Facility: CLINIC | Age: 81
Discharge: HOME | End: 2021-08-02
Payer: COMMERCIAL

## 2021-08-02 VITALS
HEIGHT: 65 IN | HEART RATE: 55 BPM | BODY MASS INDEX: 33.79 KG/M2 | RESPIRATION RATE: 18 BRPM | WEIGHT: 202.82 LBS | SYSTOLIC BLOOD PRESSURE: 106 MMHG | DIASTOLIC BLOOD PRESSURE: 66 MMHG | TEMPERATURE: 98.4 F

## 2021-08-02 DIAGNOSIS — C61 PROSTATE CANCER (HCC): Primary | ICD-10-CM

## 2021-08-02 DIAGNOSIS — D70.1 CHEMOTHERAPY-INDUCED NEUTROPENIA (HCC): ICD-10-CM

## 2021-08-02 DIAGNOSIS — C79.51 BONE METASTASES (HCC): ICD-10-CM

## 2021-08-02 DIAGNOSIS — T45.1X5A CHEMOTHERAPY-INDUCED NEUTROPENIA (HCC): ICD-10-CM

## 2021-08-02 PROCEDURE — 96413 CHEMO IV INFUSION 1 HR: CPT

## 2021-08-02 PROCEDURE — 96367 TX/PROPH/DG ADDL SEQ IV INF: CPT

## 2021-08-02 RX ORDER — SODIUM CHLORIDE 9 MG/ML
20 INJECTION, SOLUTION INTRAVENOUS ONCE
Status: COMPLETED | OUTPATIENT
Start: 2021-08-02 | End: 2021-08-02

## 2021-08-02 RX ADMIN — DEXAMETHASONE SODIUM PHOSPHATE 10 MG: 10 INJECTION, SOLUTION INTRAMUSCULAR; INTRAVENOUS at 08:29

## 2021-08-02 RX ADMIN — SODIUM CHLORIDE 20 ML/HR: 0.9 INJECTION, SOLUTION INTRAVENOUS at 08:28

## 2021-08-02 RX ADMIN — DOCETAXEL 150.8 MG: 20 INJECTION, SOLUTION, CONCENTRATE INTRAVENOUS at 09:06

## 2021-08-02 NOTE — PROGRESS NOTES
Pt arrived to unit without complaint, received Taxotere as ordered and tolerated well  Pt aware of need to return in 24hr for Neulasta injection  Pt left unit in stable condition without question or concern, AVS provided

## 2021-08-03 ENCOUNTER — HOSPITAL ENCOUNTER (OUTPATIENT)
Dept: INFUSION CENTER | Facility: CLINIC | Age: 81
Discharge: HOME | End: 2021-08-03
Payer: COMMERCIAL

## 2021-08-03 VITALS
SYSTOLIC BLOOD PRESSURE: 142 MMHG | HEART RATE: 56 BPM | TEMPERATURE: 97.8 F | DIASTOLIC BLOOD PRESSURE: 78 MMHG | RESPIRATION RATE: 20 BRPM

## 2021-08-03 DIAGNOSIS — C61 PROSTATE CANCER (HCC): Primary | ICD-10-CM

## 2021-08-03 DIAGNOSIS — D70.1 CHEMOTHERAPY-INDUCED NEUTROPENIA (HCC): ICD-10-CM

## 2021-08-03 DIAGNOSIS — T45.1X5A CHEMOTHERAPY-INDUCED NEUTROPENIA (HCC): ICD-10-CM

## 2021-08-03 PROCEDURE — 96372 THER/PROPH/DIAG INJ SC/IM: CPT

## 2021-08-03 RX ADMIN — PEGFILGRASTIM 6 MG: 6 INJECTION SUBCUTANEOUS at 10:45

## 2021-08-03 NOTE — PROGRESS NOTES
Pt arrived to unit without complaint  Pt tolerated Neulasta injection in left arm without incident  AVS provided  Pt left unit in stable condition

## 2021-08-04 ENCOUNTER — TELEPHONE (OUTPATIENT)
Dept: UROLOGY | Facility: MEDICAL CENTER | Age: 81
End: 2021-08-04

## 2021-08-04 NOTE — TELEPHONE ENCOUNTER
Contacted and spoke with patient about his procedure scheduled for 09/08/2021  I explained the procedure to patient and answered his questions  Patient seems satisfied with my answers   I told patient if he has any other questions to please contact the office -

## 2021-08-04 NOTE — TELEPHONE ENCOUNTER
Patient has question about his procedure that he is having 9/8/21 TRANSURETHRAL RESECTION OF BLADDER TUMOR (TURBT) (N/A Bladder)    Has question about recovery, side effects and biopsy during procedure or additional procedures?

## 2021-08-20 ENCOUNTER — TELEPHONE (OUTPATIENT)
Dept: UROLOGY | Facility: MEDICAL CENTER | Age: 81
End: 2021-08-20

## 2021-08-23 LAB
ALBUMIN SERPL-MCNC: 4 G/DL (ref 3.6–5.1)
ALBUMIN/GLOB SERPL: 1.9 (CALC) (ref 1–2.5)
ALP SERPL-CCNC: 64 U/L (ref 35–144)
ALT SERPL-CCNC: 18 U/L (ref 9–46)
AST SERPL-CCNC: 17 U/L (ref 10–35)
BASOPHILS # BLD AUTO: 68 CELLS/UL (ref 0–200)
BASOPHILS NFR BLD AUTO: 1.1 %
BILIRUB SERPL-MCNC: 0.6 MG/DL (ref 0.2–1.2)
BUN SERPL-MCNC: 30 MG/DL (ref 7–25)
BUN/CREAT SERPL: 27 (CALC) (ref 6–22)
CALCIUM SERPL-MCNC: 8.8 MG/DL (ref 8.6–10.3)
CHLORIDE SERPL-SCNC: 103 MMOL/L (ref 98–110)
CO2 SERPL-SCNC: 30 MMOL/L (ref 20–32)
CREAT SERPL-MCNC: 1.11 MG/DL (ref 0.7–1.18)
EOSINOPHIL # BLD AUTO: 149 CELLS/UL (ref 15–500)
EOSINOPHIL NFR BLD AUTO: 2.4 %
ERYTHROCYTE [DISTWIDTH] IN BLOOD BY AUTOMATED COUNT: 15.1 % (ref 11–15)
GLOBULIN SER CALC-MCNC: 2.1 G/DL (CALC) (ref 1.9–3.7)
GLUCOSE SERPL-MCNC: 103 MG/DL (ref 65–99)
HCT VFR BLD AUTO: 34.2 % (ref 38.5–50)
HGB BLD-MCNC: 11.8 G/DL (ref 13.2–17.1)
LYMPHOCYTES # BLD AUTO: 626 CELLS/UL (ref 850–3900)
LYMPHOCYTES NFR BLD AUTO: 10.1 %
MCH RBC QN AUTO: 32.1 PG (ref 27–33)
MCHC RBC AUTO-ENTMCNC: 34.5 G/DL (ref 32–36)
MCV RBC AUTO: 92.9 FL (ref 80–100)
MONOCYTES # BLD AUTO: 459 CELLS/UL (ref 200–950)
MONOCYTES NFR BLD AUTO: 7.4 %
NEUTROPHILS # BLD AUTO: 4898 CELLS/UL (ref 1500–7800)
NEUTROPHILS NFR BLD AUTO: 79 %
PLATELET # BLD AUTO: 219 THOUSAND/UL (ref 140–400)
PMV BLD REES-ECKER: 10.5 FL (ref 7.5–12.5)
POTASSIUM SERPL-SCNC: 4.2 MMOL/L (ref 3.5–5.3)
PROT SERPL-MCNC: 6.1 G/DL (ref 6.1–8.1)
RBC # BLD AUTO: 3.68 MILLION/UL (ref 4.2–5.8)
SL AMB EGFR AFRICAN AMERICAN: 73 ML/MIN/1.73M2
SL AMB EGFR NON AFRICAN AMERICAN: 63 ML/MIN/1.73M2
SODIUM SERPL-SCNC: 138 MMOL/L (ref 135–146)
WBC # BLD AUTO: 6.2 THOUSAND/UL (ref 3.8–10.8)

## 2021-08-25 ENCOUNTER — CLINICAL SUPPORT (OUTPATIENT)
Dept: URGENT CARE | Facility: MEDICAL CENTER | Age: 81
End: 2021-08-25
Payer: COMMERCIAL

## 2021-08-25 ENCOUNTER — APPOINTMENT (OUTPATIENT)
Dept: LAB | Facility: MEDICAL CENTER | Age: 81
End: 2021-08-25
Payer: COMMERCIAL

## 2021-08-25 DIAGNOSIS — R35.0 URINARY FREQUENCY: ICD-10-CM

## 2021-08-25 LAB
ATRIAL RATE: 64 BPM
P AXIS: 54 DEGREES
PR INTERVAL: 166 MS
QRS AXIS: 65 DEGREES
QRSD INTERVAL: 90 MS
QT INTERVAL: 392 MS
QTC INTERVAL: 404 MS
T WAVE AXIS: 29 DEGREES
VENTRICULAR RATE: 64 BPM

## 2021-08-25 PROCEDURE — 93010 ELECTROCARDIOGRAM REPORT: CPT | Performed by: INTERNAL MEDICINE

## 2021-08-25 PROCEDURE — 93005 ELECTROCARDIOGRAM TRACING: CPT

## 2021-08-25 PROCEDURE — 87086 URINE CULTURE/COLONY COUNT: CPT

## 2021-08-26 ENCOUNTER — HOSPITAL ENCOUNTER (OUTPATIENT)
Dept: INFUSION CENTER | Facility: CLINIC | Age: 81
Discharge: HOME | End: 2021-08-26
Payer: COMMERCIAL

## 2021-08-26 VITALS
RESPIRATION RATE: 18 BRPM | BODY MASS INDEX: 34.42 KG/M2 | HEIGHT: 65 IN | SYSTOLIC BLOOD PRESSURE: 108 MMHG | TEMPERATURE: 97.2 F | HEART RATE: 57 BPM | WEIGHT: 206.57 LBS | DIASTOLIC BLOOD PRESSURE: 65 MMHG

## 2021-08-26 DIAGNOSIS — C61 PROSTATE CANCER (HCC): Primary | ICD-10-CM

## 2021-08-26 DIAGNOSIS — C79.51 BONE METASTASES (HCC): ICD-10-CM

## 2021-08-26 DIAGNOSIS — D70.1 CHEMOTHERAPY-INDUCED NEUTROPENIA (HCC): ICD-10-CM

## 2021-08-26 DIAGNOSIS — T45.1X5A CHEMOTHERAPY-INDUCED NEUTROPENIA (HCC): ICD-10-CM

## 2021-08-26 PROCEDURE — 96413 CHEMO IV INFUSION 1 HR: CPT

## 2021-08-26 PROCEDURE — 96367 TX/PROPH/DG ADDL SEQ IV INF: CPT

## 2021-08-26 RX ORDER — SODIUM CHLORIDE 9 MG/ML
20 INJECTION, SOLUTION INTRAVENOUS ONCE
Status: COMPLETED | OUTPATIENT
Start: 2021-08-26 | End: 2021-08-26

## 2021-08-26 RX ADMIN — DEXAMETHASONE SODIUM PHOSPHATE 10 MG: 10 INJECTION, SOLUTION INTRAMUSCULAR; INTRAVENOUS at 08:30

## 2021-08-26 RX ADMIN — SODIUM CHLORIDE 20 ML/HR: 0.9 INJECTION, SOLUTION INTRAVENOUS at 08:30

## 2021-08-26 RX ADMIN — DOCETAXEL 150.8 MG: 20 INJECTION, SOLUTION, CONCENTRATE INTRAVENOUS at 08:52

## 2021-08-26 NOTE — PROGRESS NOTES
Pt tolerated treatment today without incident  Pt was provided with AVS and is aware of appt tomorrow for neulasta injection

## 2021-08-27 ENCOUNTER — HOSPITAL ENCOUNTER (OUTPATIENT)
Dept: INFUSION CENTER | Facility: CLINIC | Age: 81
Discharge: HOME | End: 2021-08-27
Payer: COMMERCIAL

## 2021-08-27 VITALS
DIASTOLIC BLOOD PRESSURE: 72 MMHG | RESPIRATION RATE: 18 BRPM | TEMPERATURE: 98.7 F | HEART RATE: 58 BPM | SYSTOLIC BLOOD PRESSURE: 137 MMHG

## 2021-08-27 DIAGNOSIS — D70.1 CHEMOTHERAPY-INDUCED NEUTROPENIA (HCC): ICD-10-CM

## 2021-08-27 DIAGNOSIS — C61 PROSTATE CANCER (HCC): Primary | ICD-10-CM

## 2021-08-27 DIAGNOSIS — T45.1X5A CHEMOTHERAPY-INDUCED NEUTROPENIA (HCC): ICD-10-CM

## 2021-08-27 LAB — BACTERIA UR CULT: NORMAL

## 2021-08-27 PROCEDURE — 96372 THER/PROPH/DIAG INJ SC/IM: CPT

## 2021-08-27 RX ADMIN — PEGFILGRASTIM 6 MG: 6 INJECTION SUBCUTANEOUS at 10:42

## 2021-08-30 NOTE — PRE-PROCEDURE INSTRUCTIONS
Pre-Surgery Instructions:   Medication Instructions    acetaminophen (TYLENOL) 500 mg tablet PRN     aspirin (ASPIRIN ADULT LOW STRENGTH) 81 mg EC tablet TO CLEAR WITH CARDS TO STOP    atorvastatin (LIPITOR) 40 mg tablet HS MED    Calcium 500 MG tablet LD 9/1    cholecalciferol (VITAMIN D3) 1,000 units tablet LD 9/1    fluticasone (FLONASE) 50 mcg/act nasal spray MAY TAKE DOS    hydrochlorothiazide (HYDRODIURIL) 25 mg tablet DO NOT TAKE DOS    leuprolide (LUPRON DEPOT 6 MONTH KIT) 45 mg INJECTION    losartan (COZAAR) 100 MG tablet DO NOT TAKE DOS    MELATONIN PO HS MED    metoprolol succinate (TOPROL-XL) 25 mg 24 hr tablet WILL TAKE DOS SM SIP    ondansetron (ZOFRAN) 4 mg tablet PRN    predniSONE 5 mg tablet MAY TAKE DOS    tamsulosin (FLOMAX) 0 4 mg HS MED    Zoledronic Acid (ZOMETA IV) INJECT MED   INSTR  [address] [place] [address] ID/MED LIST/INS  INFO , SHOWER REV , NO ASA/NSAIDS/VIT 1 WEEK PREOP  Pre Procedure Consult Calls    1  Are you currently experiencing symptoms of fever >100 4, cough, or shortness of breath or sore throat? ______YES    ___X__NO   If yes, then please call your PCP immediately for further direction  If you are completing this form on site, please find the safest and most direct route to the nearest exit and avoid close contact with others until you can get further advice from your PCP  2  Have you recently traveled to any foreign country or area within the [place] that has reported cases of COVID-19? ______YES      ___X__NO   IF YES, LIST LOCATION(S): __________________________   3  Have you recently been in contact with someone who is a suspected or confirmed case of COVID-19?   ______ YES   ____X__ No   INSTRUCTED ON NEW COVID VISITOR POLICY- ONLY 1 VISITOR, ALL MUST WEAR MASKS, PT VERBALIZES UNDERSTANDING

## 2021-09-07 ENCOUNTER — OFFICE VISIT (OUTPATIENT)
Dept: HEMATOLOGY ONCOLOGY | Facility: CLINIC | Age: 81
End: 2021-09-07
Payer: COMMERCIAL

## 2021-09-07 ENCOUNTER — ANESTHESIA EVENT (OUTPATIENT)
Dept: PERIOP | Facility: HOSPITAL | Age: 81
End: 2021-09-07
Payer: COMMERCIAL

## 2021-09-07 VITALS
HEIGHT: 67 IN | SYSTOLIC BLOOD PRESSURE: 120 MMHG | DIASTOLIC BLOOD PRESSURE: 80 MMHG | TEMPERATURE: 97.5 F | OXYGEN SATURATION: 98 % | RESPIRATION RATE: 16 BRPM | BODY MASS INDEX: 32.43 KG/M2 | WEIGHT: 206.6 LBS | HEART RATE: 63 BPM

## 2021-09-07 DIAGNOSIS — C61 PROSTATE CANCER (HCC): ICD-10-CM

## 2021-09-07 DIAGNOSIS — C79.51 BONE METASTASES (HCC): Primary | ICD-10-CM

## 2021-09-07 PROCEDURE — 3074F SYST BP LT 130 MM HG: CPT | Performed by: INTERNAL MEDICINE

## 2021-09-07 PROCEDURE — 3079F DIAST BP 80-89 MM HG: CPT | Performed by: INTERNAL MEDICINE

## 2021-09-07 PROCEDURE — 99214 OFFICE O/P EST MOD 30 MIN: CPT | Performed by: INTERNAL MEDICINE

## 2021-09-07 NOTE — PROGRESS NOTES
Hematology Outpatient Follow - Up Note  Zia Zaragoza 79 y o  male MRN: @ Encounter: [id number]        Date:  9/7/2021        Assessment/ Plan:    78-year-old  male who was seen by Urology for elevation of the PSA, when he presented in June 2017 with PSA of 4 6, Subsequently in September of 2017 PSA was 7 3 and in May of 2018 PSA was 8  6   A biopsy was recommended but the patient had just undergone percutaneous stenting of the heart and he was on dual anti-platelet therapy      MRI of the abdomen in April 2018 showed 2 simple cyst in the right hepatic lobe, bilateral renal cysts the largest 1 measuring 10 cm in the left lower lobe     In October of 2018 PSA was 9 8  CT scan of the chest 9/28/2018 showed sclerotic lesions in T10, 5 mm nodule in the right lower lobe   Bone scan showed activity in the posterior T10 level and left posterior 7th rib area concerning for osseous metastases and increased activity in the distal right clavicle might be degenerative or metastatic area      He was diagnosed with castration sensitive metastatic prostate cancer  [facility] insurance company could not approve enzalutamide, he also has a high co-payment for abiraterone     The patient initiated on samples of enzalutamide 160 mg p o  daily since the beginning of December 2018   His insurance approved abiraterone however he had a high co-payment        We were finally able to get financial assistance through [employer] and Roseline started Zytsusi (abiraterone) 2/3/2019 with prednisone 5mg po bid without side effects      In May 2020 PSA 0 5, bone scan showed activity in the anterior 5th rib area, right humerus area most likely consistent with metastatic disease, no visceral disease on the CT scan, we had hard time getting enzalutamide , the patient was initiated on enzalutamide on 06/02/2020, PSA at the time of 0 8     PSA 1 4 in October 2020, normal alkaline phosphatase           Genes analyzed of germ line test  LIONEL, BARD1, BRCA1, BRCA2, BRIP1, CDH1, CHEK2, DICER1, EPCAM*, HOXB13, MLH1, MSH2,  MSH6, NBN, NF1, PALB2, PMS2, PTEN, RAD50, RAD51C, RAD51D, SMARCA4, STK11, TP53 all of these were negative     Evaluated at [facility] the decision for radiation therapy to oligo metastases and the primary site of the tumor depends on the PET scan finding which showed multiple metastatic disease, patient to meet with Radiation Oncology      PET scan on 11/13/2020 showed radiotracer retention in the left posterior bladder suspicious for malignancy with prostate cancer invasion progressing from prior CT scan mild to moderate uptake in the few small inguinal lymph nodes, sclerotic lesions in the spine with uptake suspicious for metastases     radiation therapy in the February 2021- April 2021, he continues on enzalutamide      Progression of disease by bone scan in June 2021 with multiple thoracic, right ribs area, hip area involvement, PSA 13 ( 7 2 on 04/2021)        Cycle 1 Taxotere 7/12/2021  He received 3 cycle, will do PSA, CBC, CMP and depends on the PSA value will determine continuation with Taxotere versus ordering imaging studies and switch to Xofigo    Bone metastases continue Zometa every 3 months        Labs and imaging studies are reviewed by ordering provider once results are available  If there are findings that need immediate attention, you will be contacted when results available  Discussing results and the implication on your healthcare is best discussed in person at your follow-up visit  HPI:    52-year-old  male who was seen by Urology for elevation of the PSA, when he presented in June 2017 with PSA of 4 6, Subsequently in September of 2017 PSA was 7 3 and in May of 2018 PSA was 8  6   A biopsy was recommended but the patient had just undergone percutaneous stenting of the heart and he was on dual anti-platelet therapy      MRI of the abdomen in April 2018 showed 2 simple cyst in the right hepatic lobe, bilateral renal cysts the largest 1 measuring 10 cm in the left lower lobe     In October of 2018 PSA was 9 8  CT scan of the chest 9/28/2018 showed sclerotic lesions in T10, 5 mm nodule in the right lower lobe   Bone scan showed activity in the posterior T10 level and left posterior 7th rib area concerning for osseous metastases and increased activity in the distal right clavicle might be degenerative or metastatic area      He was diagnosed with castration sensitive metastatic prostate cancer  [facility] could not approve enzalutamide, he also has a high co-payment for abiraterone     The patient initiated on samples of enzalutamide 160 mg p o  daily since the beginning of December 2018   His insurance approved abiraterone however he had a high co-payment        We were finally able to get financial assistance through Kathleen Crowder started Kaleb (abiraterone) 2/3/2019 with prednisone 5mg po bid without side effects      In May 2020 PSA 0 5, bone scan showed activity in the anterior 5th rib area, right humerus area most likely consistent with metastatic disease, no visceral disease on the CT scan, we had hard time getting enzalutamide , the patient was initiated on enzalutamide on 06/02/2020, PSA at the time of 0 8     PSA 1 4 in October 2020, normal alkaline phosphatase           Genes analyzed of germ line test  LIONEL, BARD1, BRCA1, BRCA2, BRIP1, CDH1, CHEK2, DICER1, EPCAM*, HOXB13, MLH1, MSH2,  MSH6, NBN, NF1, PALB2, PMS2, PTEN, RAD50, RAD51C, RAD51D, SMARCA4, STK11, TP53 all of these were negative     Evaluated at [facility] the decision for radiation therapy to oligo metastases and the primary site of the tumor depends on the PET scan finding which showed multiple metastatic disease, patient to meet with Radiation Oncology      PET scan on 11/13/2020 showed radiotracer retention in the left posterior bladder suspicious for malignancy with prostate cancer invasion progressing from prior CT scan mild to moderate uptake in the few small inguinal lymph nodes, sclerotic lesions in the spine with uptake suspicious for metastases     radiation therapy in the February 2021- April 2021, he continues on enzalutamide      Progression of disease by bone scan in June 2021 with multiple thoracic, right ribs area, hip area involvement, PSA 13 ( 7 2 on 04/2021)        Cycle 1 Taxotere 7/12/2021  Interval History: No changes from the last visit       Previous Treatment:         Test Results:    Imaging: No results found  Labs:   Lab Results   Component Value Date    WBC 6 2 08/23/2021    HGB 11 8 (L) 08/23/2021    HCT 34 2 (L) 08/23/2021    MCV 92 9 08/23/2021     08/23/2021     Lab Results   Component Value Date     09/30/2017    K 4 2 08/23/2021     08/23/2021    CO2 30 08/23/2021    BUN 30 (H) 08/23/2021    CREATININE 1 11 08/23/2021    GLUF 97 08/08/2019    CALCIUM 8 8 08/23/2021    AST 17 08/23/2021    ALT 18 08/23/2021    ALKPHOS 64 08/23/2021    PROT 6 5 09/30/2017    BILITOT 0 7 09/30/2017    EGFR 61 12/02/2019       No results found for: IRON, TIBC, FERRITIN    No results found for: [id number]      ROS: Review of Systems   Constitutional: Negative  Negative for appetite change, chills, diaphoresis, fatigue, fever and unexpected weight change  HENT:   Negative for hearing loss, lump/mass, mouth sores, nosebleeds, sore throat, trouble swallowing and voice change  Eyes: Negative  Negative for eye problems and icterus  Respiratory: Negative  Negative for chest tightness, cough, hemoptysis and shortness of breath  Cardiovascular: Negative for chest pain and leg swelling  Gastrointestinal: Negative for abdominal distention, abdominal pain, blood in stool, constipation, diarrhea and nausea  Endocrine: Negative  Genitourinary: Negative for dysuria, frequency, hematuria and pelvic pain  Musculoskeletal: Negative    Negative for arthralgias, back pain, flank pain, gait problem, myalgias and neck stiffness  Skin: Negative for itching and rash  Neurological: Negative for dizziness, gait problem, headaches, light-headedness, numbness and speech difficulty  Hematological: Negative for adenopathy  Does not bruise/bleed easily  Psychiatric/Behavioral: Negative for confusion, decreased concentration, depression and sleep disturbance  The patient is not nervous/anxious  Current Medications: Reviewed  Allergies: Reviewed  PMH/FH/SH:  Reviewed      Physical Exam:    Body surface area is 2 05 meters squared  Wt Readings from Last 3 Encounters:   09/07/21 93 7 kg (206 lb 9 6 oz)   08/26/21 93 7 kg (206 lb 9 1 oz)   08/02/21 92 kg (202 lb 13 2 oz)        Temp Readings from Last 3 Encounters:   09/07/21 97 5 °F (36 4 °C) (Tympanic)   08/27/21 98 7 °F (37 1 °C) (Temporal)   08/26/21 (!) 97 2 °F (36 2 °C) (Temporal)        BP Readings from Last 3 Encounters:   09/07/21 120/80   08/27/21 137/72   08/26/21 108/65         Pulse Readings from Last 3 Encounters:   09/07/21 63   08/27/21 58   08/26/21 57        Physical Exam  Vitals reviewed  Constitutional:       General: He is not in acute distress  Appearance: He is well-developed  He is obese  He is not diaphoretic  HENT:      Head: Normocephalic and atraumatic  Eyes:      Conjunctiva/sclera: Conjunctivae normal    Neck:      Trachea: No tracheal deviation  Cardiovascular:      Rate and Rhythm: Normal rate and regular rhythm  Heart sounds: No murmur heard  No friction rub  No gallop  Pulmonary:      Effort: Pulmonary effort is normal  No respiratory distress  Breath sounds: Normal breath sounds  No wheezing or rales  Chest:      Chest wall: No tenderness  Abdominal:      General: There is no distension  Palpations: Abdomen is soft  Tenderness: There is no abdominal tenderness  Musculoskeletal:      Cervical back: Normal range of motion and neck supple        Right lower leg: No edema  Left lower leg: No edema  Lymphadenopathy:      Cervical: No cervical adenopathy  Skin:     General: Skin is warm and dry  Coloration: Skin is not pale  Findings: No erythema  Neurological:      Mental Status: He is alert and oriented to person, place, and time  Psychiatric:         Behavior: Behavior normal          Thought Content: Thought content normal          Judgment: Judgment normal          ECO    Goals and Barriers:  Current Goal: Minimize effects of disease  Barriers: None  Patient's Capacity to Self Care:  Patient is able to self care      Code Status: @[facility]@

## 2021-09-07 NOTE — H&P (VIEW-ONLY)
Hematology Outpatient Follow - Up Note  Dao Beard 79 y o  male MRN: @ Encounter: [id number]        Date:  9/7/2021        Assessment/ Plan:    61-year-old  male who was seen by Urology for elevation of the PSA, when he presented in June 2017 with PSA of 4 6, Subsequently in September of 2017 PSA was 7 3 and in May of 2018 PSA was 8  6   A biopsy was recommended but the patient had just undergone percutaneous stenting of the heart and he was on dual anti-platelet therapy      MRI of the abdomen in April 2018 showed 2 simple cyst in the right hepatic lobe, bilateral renal cysts the largest 1 measuring 10 cm in the left lower lobe     In October of 2018 PSA was 9 8  CT scan of the chest 9/28/2018 showed sclerotic lesions in T10, 5 mm nodule in the right lower lobe   Bone scan showed activity in the posterior T10 level and left posterior 7th rib area concerning for osseous metastases and increased activity in the distal right clavicle might be degenerative or metastatic area      He was diagnosed with castration sensitive metastatic prostate cancer  [facility] insurance company could not approve enzalutamide, he also has a high co-payment for abiraterone     The patient initiated on samples of enzalutamide 160 mg p o  daily since the beginning of December 2018   His insurance approved abiraterone however he had a high co-payment        We were finally able to get financial assistance through Stephanie Vogel and Roseline started Zytiga (abiraterone) 2/3/2019 with prednisone 5mg po bid without side effects      In May 2020 PSA 0 5, bone scan showed activity in the anterior 5th rib area, right humerus area most likely consistent with metastatic disease, no visceral disease on the CT scan, we had hard time getting enzalutamide , the patient was initiated on enzalutamide on 06/02/2020, PSA at the time of 0 8     PSA 1 4 in October 2020, normal alkaline phosphatase           Genes analyzed of germ line test  LIONEL, BARD1, BRCA1, BRCA2, BRIP1, CDH1, CHEK2, DICER1, EPCAM*, HOXB13, MLH1, MSH2,  MSH6, NBN, NF1, PALB2, PMS2, PTEN, RAD50, RAD51C, RAD51D, SMARCA4, STK11, TP53 all of these were negative     Evaluated at [facility] the decision for radiation therapy to oligo metastases and the primary site of the tumor depends on the PET scan finding which showed multiple metastatic disease, patient to meet with Radiation Oncology      PET scan on 11/13/2020 showed radiotracer retention in the left posterior bladder suspicious for malignancy with prostate cancer invasion progressing from prior CT scan mild to moderate uptake in the few small inguinal lymph nodes, sclerotic lesions in the spine with uptake suspicious for metastases     radiation therapy in the February 2021- April 2021, he continues on enzalutamide      Progression of disease by bone scan in June 2021 with multiple thoracic, right ribs area, hip area involvement, PSA 13 ( 7 2 on 04/2021)        Cycle 1 Taxotere 7/12/2021  He received 3 cycle, will do PSA, CBC, CMP and depends on the PSA value will determine continuation with Taxotere versus ordering imaging studies and switch to Xofigo    Bone metastases continue Zometa every 3 months        Labs and imaging studies are reviewed by ordering provider once results are available  If there are findings that need immediate attention, you will be contacted when results available  Discussing results and the implication on your healthcare is best discussed in person at your follow-up visit  HPI:    58-year-old  male who was seen by Urology for elevation of the PSA, when he presented in June 2017 with PSA of 4 6, Subsequently in September of 2017 PSA was 7 3 and in May of 2018 PSA was 8  6   A biopsy was recommended but the patient had just undergone percutaneous stenting of the heart and he was on dual anti-platelet therapy      MRI of the abdomen in April 2018 showed 2 simple cyst in the right hepatic lobe, bilateral renal cysts the largest 1 measuring 10 cm in the left lower lobe     In October of 2018 PSA was 9 8  CT scan of the chest 9/28/2018 showed sclerotic lesions in T10, 5 mm nodule in the right lower lobe   Bone scan showed activity in the posterior T10 level and left posterior 7th rib area concerning for osseous metastases and increased activity in the distal right clavicle might be degenerative or metastatic area      He was diagnosed with castration sensitive metastatic prostate cancer  [facility] could not approve enzalutamide, he also has a high co-payment for abiraterone     The patient initiated on samples of enzalutamide 160 mg p o  daily since the beginning of December 2018   His insurance approved abiraterone however he had a high co-payment        We were finally able to get financial assistance through [place] started Zytiga (abiraterone) 2/3/2019 with prednisone 5mg po bid without side effects      In May 2020 PSA 0 5, bone scan showed activity in the anterior 5th rib area, right humerus area most likely consistent with metastatic disease, no visceral disease on the CT scan, we had hard time getting enzalutamide , the patient was initiated on enzalutamide on 06/02/2020, PSA at the time of 0 8     PSA 1 4 in October 2020, normal alkaline phosphatase           Genes analyzed of germ line test  LIONEL, BARD1, BRCA1, BRCA2, BRIP1, CDH1, CHEK2, DICER1, EPCAM*, HOXB13, MLH1, MSH2,  MSH6, NBN, NF1, PALB2, PMS2, PTEN, RAD50, RAD51C, RAD51D, SMARCA4, STK11, TP53 all of these were negative     Evaluated at [facility] the decision for radiation therapy to oligo metastases and the primary site of the tumor depends on the PET scan finding which showed multiple metastatic disease, patient to meet with Radiation Oncology      PET scan on 11/13/2020 showed radiotracer retention in the left posterior bladder suspicious for malignancy with prostate cancer invasion progressing from prior CT scan mild to moderate uptake in the few small inguinal lymph nodes, sclerotic lesions in the spine with uptake suspicious for metastases     radiation therapy in the February 2021- April 2021, he continues on enzalutamide      Progression of disease by bone scan in June 2021 with multiple thoracic, right ribs area, hip area involvement, PSA 13 ( 7 2 on 04/2021)        Cycle 1 Taxotere 7/12/2021  Interval History: No changes from the last visit       Previous Treatment:         Test Results:    Imaging: No results found  Labs:   Lab Results   Component Value Date    WBC 6 2 08/23/2021    HGB 11 8 (L) 08/23/2021    HCT 34 2 (L) 08/23/2021    MCV 92 9 08/23/2021     08/23/2021     Lab Results   Component Value Date     09/30/2017    K 4 2 08/23/2021     08/23/2021    CO2 30 08/23/2021    BUN 30 (H) 08/23/2021    CREATININE 1 11 08/23/2021    GLUF 97 08/08/2019    CALCIUM 8 8 08/23/2021    AST 17 08/23/2021    ALT 18 08/23/2021    ALKPHOS 64 08/23/2021    PROT 6 5 09/30/2017    BILITOT 0 7 09/30/2017    EGFR 61 12/02/2019       No results found for: IRON, TIBC, FERRITIN    No results found for: [id number]      ROS: Review of Systems   Constitutional: Negative  Negative for appetite change, chills, diaphoresis, fatigue, fever and unexpected weight change  HENT:   Negative for hearing loss, lump/mass, mouth sores, nosebleeds, sore throat, trouble swallowing and voice change  Eyes: Negative  Negative for eye problems and icterus  Respiratory: Negative  Negative for chest tightness, cough, hemoptysis and shortness of breath  Cardiovascular: Negative for chest pain and leg swelling  Gastrointestinal: Negative for abdominal distention, abdominal pain, blood in stool, constipation, diarrhea and nausea  Endocrine: Negative  Genitourinary: Negative for dysuria, frequency, hematuria and pelvic pain  Musculoskeletal: Negative    Negative for arthralgias, back pain, flank pain, gait problem, myalgias and neck stiffness  Skin: Negative for itching and rash  Neurological: Negative for dizziness, gait problem, headaches, light-headedness, numbness and speech difficulty  Hematological: Negative for adenopathy  Does not bruise/bleed easily  Psychiatric/Behavioral: Negative for confusion, decreased concentration, depression and sleep disturbance  The patient is not nervous/anxious  Current Medications: Reviewed  Allergies: Reviewed  PMH/FH/SH:  Reviewed      Physical Exam:    Body surface area is 2 05 meters squared  Wt Readings from Last 3 Encounters:   09/07/21 93 7 kg (206 lb 9 6 oz)   08/26/21 93 7 kg (206 lb 9 1 oz)   08/02/21 92 kg (202 lb 13 2 oz)        Temp Readings from Last 3 Encounters:   09/07/21 97 5 °F (36 4 °C) (Tympanic)   08/27/21 98 7 °F (37 1 °C) (Temporal)   08/26/21 (!) 97 2 °F (36 2 °C) (Temporal)        BP Readings from Last 3 Encounters:   09/07/21 120/80   08/27/21 137/72   08/26/21 108/65         Pulse Readings from Last 3 Encounters:   09/07/21 63   08/27/21 58   08/26/21 57        Physical Exam  Vitals reviewed  Constitutional:       General: He is not in acute distress  Appearance: He is well-developed  He is obese  He is not diaphoretic  HENT:      Head: Normocephalic and atraumatic  Eyes:      Conjunctiva/sclera: Conjunctivae normal    Neck:      Trachea: No tracheal deviation  Cardiovascular:      Rate and Rhythm: Normal rate and regular rhythm  Heart sounds: No murmur heard  No friction rub  No gallop  Pulmonary:      Effort: Pulmonary effort is normal  No respiratory distress  Breath sounds: Normal breath sounds  No wheezing or rales  Chest:      Chest wall: No tenderness  Abdominal:      General: There is no distension  Palpations: Abdomen is soft  Tenderness: There is no abdominal tenderness  Musculoskeletal:      Cervical back: Normal range of motion and neck supple        Right lower leg: No edema  Left lower leg: No edema  Lymphadenopathy:      Cervical: No cervical adenopathy  Skin:     General: Skin is warm and dry  Coloration: Skin is not pale  Findings: No erythema  Neurological:      Mental Status: He is alert and oriented to person, place, and time  Psychiatric:         Behavior: Behavior normal          Thought Content: Thought content normal          Judgment: Judgment normal          ECO    Goals and Barriers:  Current Goal: Minimize effects of disease  Barriers: None  Patient's Capacity to Self Care:  Patient is able to self care      Code Status: @[facility]@

## 2021-09-08 ENCOUNTER — HOSPITAL ENCOUNTER (OUTPATIENT)
Facility: HOSPITAL | Age: 81
Setting detail: OUTPATIENT SURGERY
Discharge: HOME | End: 2021-09-08
Attending: UROLOGY | Admitting: UROLOGY
Payer: COMMERCIAL

## 2021-09-08 ENCOUNTER — ANESTHESIA (OUTPATIENT)
Dept: PERIOP | Facility: HOSPITAL | Age: 81
End: 2021-09-08
Payer: COMMERCIAL

## 2021-09-08 VITALS
OXYGEN SATURATION: 95 % | HEIGHT: 67 IN | DIASTOLIC BLOOD PRESSURE: 72 MMHG | TEMPERATURE: 97.8 F | HEART RATE: 63 BPM | WEIGHT: 206 LBS | SYSTOLIC BLOOD PRESSURE: 146 MMHG | BODY MASS INDEX: 32.33 KG/M2 | RESPIRATION RATE: 16 BRPM

## 2021-09-08 DIAGNOSIS — N32.9 LESION OF BLADDER: ICD-10-CM

## 2021-09-08 PROCEDURE — 88307 TISSUE EXAM BY PATHOLOGIST: CPT | Performed by: PATHOLOGY

## 2021-09-08 PROCEDURE — 88344 IMHCHEM/IMCYTCHM EA MLT ANTB: CPT | Performed by: PATHOLOGY

## 2021-09-08 PROCEDURE — 88341 IMHCHEM/IMCYTCHM EA ADD ANTB: CPT | Performed by: PATHOLOGY

## 2021-09-08 PROCEDURE — 88342 IMHCHEM/IMCYTCHM 1ST ANTB: CPT | Performed by: PATHOLOGY

## 2021-09-08 PROCEDURE — 52235 CYSTOSCOPY AND TREATMENT: CPT | Performed by: UROLOGY

## 2021-09-08 RX ORDER — FENTANYL CITRATE/PF 50 MCG/ML
50 SYRINGE (ML) INJECTION
Status: DISCONTINUED | OUTPATIENT
Start: 2021-09-08 | End: 2021-09-08 | Stop reason: HOSPADM

## 2021-09-08 RX ORDER — OXYBUTYNIN CHLORIDE 5 MG/1
5 TABLET, EXTENDED RELEASE ORAL DAILY PRN
Qty: 5 TABLET | Refills: 0 | Status: SHIPPED | OUTPATIENT
Start: 2021-09-08 | End: 2021-09-21

## 2021-09-08 RX ORDER — PROPOFOL 10 MG/ML
INJECTION, EMULSION INTRAVENOUS AS NEEDED
Status: DISCONTINUED | OUTPATIENT
Start: 2021-09-08 | End: 2021-09-08

## 2021-09-08 RX ORDER — ROCURONIUM BROMIDE 10 MG/ML
INJECTION, SOLUTION INTRAVENOUS AS NEEDED
Status: DISCONTINUED | OUTPATIENT
Start: 2021-09-08 | End: 2021-09-08

## 2021-09-08 RX ORDER — CEFAZOLIN SODIUM 2 G/50ML
2000 SOLUTION INTRAVENOUS ONCE
Status: COMPLETED | OUTPATIENT
Start: 2021-09-08 | End: 2021-09-08

## 2021-09-08 RX ORDER — GLYCINE 1.5 G/100ML
SOLUTION IRRIGATION AS NEEDED
Status: DISCONTINUED | OUTPATIENT
Start: 2021-09-08 | End: 2021-09-08 | Stop reason: HOSPADM

## 2021-09-08 RX ORDER — GLYCOPYRROLATE 0.2 MG/ML
INJECTION INTRAMUSCULAR; INTRAVENOUS AS NEEDED
Status: DISCONTINUED | OUTPATIENT
Start: 2021-09-08 | End: 2021-09-08

## 2021-09-08 RX ORDER — ONDANSETRON 2 MG/ML
INJECTION INTRAMUSCULAR; INTRAVENOUS AS NEEDED
Status: DISCONTINUED | OUTPATIENT
Start: 2021-09-08 | End: 2021-09-08

## 2021-09-08 RX ORDER — HYDROMORPHONE HCL/PF 1 MG/ML
0.5 SYRINGE (ML) INJECTION
Status: DISCONTINUED | OUTPATIENT
Start: 2021-09-08 | End: 2021-09-08 | Stop reason: HOSPADM

## 2021-09-08 RX ORDER — PHENAZOPYRIDINE HYDROCHLORIDE 100 MG/1
100 TABLET, FILM COATED ORAL ONCE
Status: COMPLETED | OUTPATIENT
Start: 2021-09-08 | End: 2021-09-08

## 2021-09-08 RX ORDER — NALOXONE HYDROCHLORIDE 0.4 MG/ML
INJECTION, SOLUTION INTRAMUSCULAR; INTRAVENOUS; SUBCUTANEOUS AS NEEDED
Status: DISCONTINUED | OUTPATIENT
Start: 2021-09-08 | End: 2021-09-08

## 2021-09-08 RX ORDER — PHENAZOPYRIDINE HYDROCHLORIDE 100 MG/1
100 TABLET, FILM COATED ORAL 3 TIMES DAILY PRN
Qty: 20 TABLET | Refills: 0 | Status: SHIPPED | OUTPATIENT
Start: 2021-09-08 | End: 2021-09-21

## 2021-09-08 RX ORDER — TAMSULOSIN HYDROCHLORIDE 0.4 MG/1
0.4 CAPSULE ORAL ONCE
Status: COMPLETED | OUTPATIENT
Start: 2021-09-08 | End: 2021-09-08

## 2021-09-08 RX ORDER — SODIUM CHLORIDE 9 MG/ML
125 INJECTION, SOLUTION INTRAVENOUS CONTINUOUS
Status: DISCONTINUED | OUTPATIENT
Start: 2021-09-08 | End: 2021-09-08 | Stop reason: HOSPADM

## 2021-09-08 RX ORDER — NEOSTIGMINE METHYLSULFATE 1 MG/ML
INJECTION INTRAVENOUS AS NEEDED
Status: DISCONTINUED | OUTPATIENT
Start: 2021-09-08 | End: 2021-09-08

## 2021-09-08 RX ORDER — ONDANSETRON 2 MG/ML
4 INJECTION INTRAMUSCULAR; INTRAVENOUS ONCE AS NEEDED
Status: DISCONTINUED | OUTPATIENT
Start: 2021-09-08 | End: 2021-09-08 | Stop reason: HOSPADM

## 2021-09-08 RX ORDER — LIDOCAINE HYDROCHLORIDE 20 MG/ML
INJECTION, SOLUTION EPIDURAL; INFILTRATION; INTRACAUDAL; PERINEURAL AS NEEDED
Status: DISCONTINUED | OUTPATIENT
Start: 2021-09-08 | End: 2021-09-08

## 2021-09-08 RX ORDER — LIDOCAINE HYDROCHLORIDE 20 MG/ML
JELLY TOPICAL AS NEEDED
Status: DISCONTINUED | OUTPATIENT
Start: 2021-09-08 | End: 2021-09-08 | Stop reason: HOSPADM

## 2021-09-08 RX ORDER — FENTANYL CITRATE 50 UG/ML
INJECTION, SOLUTION INTRAMUSCULAR; INTRAVENOUS AS NEEDED
Status: DISCONTINUED | OUTPATIENT
Start: 2021-09-08 | End: 2021-09-08

## 2021-09-08 RX ADMIN — ROCURONIUM BROMIDE 30 MG: 50 INJECTION, SOLUTION INTRAVENOUS at 10:29

## 2021-09-08 RX ADMIN — FENTANYL CITRATE 50 MCG: 50 INJECTION INTRAMUSCULAR; INTRAVENOUS at 10:29

## 2021-09-08 RX ADMIN — GLYCOPYRROLATE 0.4 MG: 0.2 INJECTION, SOLUTION INTRAMUSCULAR; INTRAVENOUS at 10:46

## 2021-09-08 RX ADMIN — ONDANSETRON 4 MG: 2 INJECTION INTRAMUSCULAR; INTRAVENOUS at 10:44

## 2021-09-08 RX ADMIN — CEFAZOLIN SODIUM 2000 MG: 2 SOLUTION INTRAVENOUS at 10:15

## 2021-09-08 RX ADMIN — NEOSTIGMINE METHYLSULFATE 3 MG: 1 INJECTION INTRAVENOUS at 10:46

## 2021-09-08 RX ADMIN — HYDROCORTISONE SODIUM SUCCINATE 100 MG: 100 INJECTION, POWDER, FOR SOLUTION INTRAMUSCULAR; INTRAVENOUS at 10:33

## 2021-09-08 RX ADMIN — NALXONE HYDROCHLORIDE 0.1 MG: 0.4 INJECTION INTRAMUSCULAR; INTRAVENOUS; SUBCUTANEOUS at 10:53

## 2021-09-08 RX ADMIN — PHENAZOPYRIDINE HYDROCHLORIDE 100 MG: 100 TABLET ORAL at 12:09

## 2021-09-08 RX ADMIN — FENTANYL CITRATE 50 MCG: 50 INJECTION INTRAMUSCULAR; INTRAVENOUS at 11:14

## 2021-09-08 RX ADMIN — LIDOCAINE HYDROCHLORIDE 100 MG: 20 INJECTION, SOLUTION EPIDURAL; INFILTRATION; INTRACAUDAL; PERINEURAL at 10:27

## 2021-09-08 RX ADMIN — TAMSULOSIN HYDROCHLORIDE 0.4 MG: 0.4 CAPSULE ORAL at 12:09

## 2021-09-08 RX ADMIN — PROPOFOL 150 MG: 10 INJECTION, EMULSION INTRAVENOUS at 10:29

## 2021-09-08 RX ADMIN — FENTANYL CITRATE 50 MCG: 50 INJECTION INTRAMUSCULAR; INTRAVENOUS at 10:45

## 2021-09-08 RX ADMIN — SODIUM CHLORIDE 125 ML/HR: 0.9 INJECTION, SOLUTION INTRAVENOUS at 09:39

## 2021-09-08 NOTE — OP NOTE
OPERATIVE REPORT  PATIENT NAME: Sarah Deras    :  1940  MRN: [id number]  Pt Location: [facility] OR ROOM 01    SURGERY DATE: 2021    Surgeon(s) and Role:     * Sylvester Zepeda MD - Primary    Preop Diagnosis:  Lesion of bladder [N32 9]    Post-Op Diagnosis Codes:     * Lesion of bladder [N32 9]    Procedure(s) (LRB):  TRANSURETHRAL RESECTION OF BLADDER TUMOR (TURBT) (N/A)    Specimen(s):  ID Type Source Tests Collected by Time Destination   1 : bladder tumor  Tissue Urinary Bladder TISSUE EXAM Sylvester Zepeda MD 2021 10:43 AM        Estimated Blood Loss:   Minimal    Drains:  * No LDAs found *    Anesthesia Type:   General    Operative Indications:  Lesion of bladder [N32 9]      Operative Findings:  3 cm hypervascular growth in the mid trigone emanating from the intravesical prostate verses mid trigone bladder tissue    Complications:   None    Procedure and Technique:  Sarah Deras is a 79 y o  male with history of advanced prostate cancer on systemic therapy status post radiation  Imaging demonstrated concern for bladder growth  Office cystoscopy demonstrated area of question emanating from either prostate or bladder the mid trigone    The patient was counseled regarding their options and ultimately opted to proceed with cystoscopy with transurethral resection  Risks and benefits of the procedure were described and the patient signed an informed consent  On 2021, the patient proceeded to the operating room  They were laid supine on the operating room table and a pre-procedure time out was performed with all parties present and in agreement of the procedure planned and laterality  Patient received intravenous antibiotics in the form of Ancef and sequential compression devices were placed on bilateral lower extremities  They were then induced with a general endotracheal anesthetic  Patient was placed in dorsal lithotomy with care to pad all pressure point    He was prepped with a ChloraPrep solution and draped in a sterile fashion  2% viscous lidocaine was placed per urethra  Patient was noted to have some lichen sclerosis of the glans penis and urethral meatus  We were able to pass a 22 Japanese cystoscope sheath without difficulty  Entered into the proximal urethra and there was noted to be some concentric stricture  This was easily passable with the scope  In the prosthetic urethra, we noted minimal obstruction  Once inside the bladder, we immediately encountered a mid trigonal lesion approximately 2-3 cm  This was hypervascular and did not appear as consistent with urothelial carcinoma  It was difficult to determine whether this was emanating from the intravesical prostate or the mid trigonal bladder tissue  Both ureteral orifice sees were visualized and separate from the tumor  We changed the cystoscope for a continuous-flow resection setup  The tumor was resected, 3 cm in total, with great care taken to avoid ureteral orifices  Point electrocautery was used to obtain hemostasis  All specimen was evacuated and handed off as bladder tumor  Bladder was emptied and again there was no active bleeding  Bladder was emptied  Resectoscope was removed  At the completion of the procedure, the patient was extubated and transferred to PACU in good condition        Plan-patient will return for pathology    Patient Disposition:  PACU     SIGNATURE: Vero Mac MD  DATE: September 8, 2021  TIME: 10:50 AM

## 2021-09-08 NOTE — ANESTHESIA PREPROCEDURE EVALUATION
Procedure:  TRANSURETHRAL RESECTION OF BLADDER TUMOR (TURBT) (N/A Bladder)    Relevant Problems   CARDIO   (+) Benign essential hypertension   (+) Coronary artery disease of native artery of native heart with stable angina pectoris (HCC)   (+) Mixed hyperlipidemia      /RENAL   (+) Calculus of kidney   (+) Prostate cancer (HCC)   (+) Renal cyst      MUSCULOSKELETAL   (+) DDD (degenerative disc disease), cervical      Other   (+) Liver lesion   (+) Pulmonary nodule   (+) S/P coronary artery stent placement        Physical Exam    Airway    Mallampati score: II  TM Distance: >3 FB  Neck ROM: full     Dental       Cardiovascular  Rhythm: regular, Rate: normal,     Pulmonary  Breath sounds clear to auscultation,     Other Findings        Anesthesia Plan  ASA Score- 3     Anesthesia Type- general with ASA Monitors  Additional Monitors:   Airway Plan:           Plan Factors-Exercise tolerance (METS): >4 METS  Chart reviewed  Existing labs reviewed  Patient summary reviewed  Patient is not a current smoker  Patient not instructed to abstain from smoking on day of procedure  Patient did not smoke on day of surgery  Obstructive sleep apnea risk education given perioperatively  Induction- intravenous  Postoperative Plan-     Informed Consent- Anesthetic plan and risks discussed with patient

## 2021-09-08 NOTE — INTERVAL H&P NOTE
H&P reviewed  After examining the patient I find no changes in the patients condition since the H&P had been written  Proceed with transurethral resection of prostate/bladder lesion  Risks and benefits of procedure discussed and reviewed  Possibility of catheterization after procedure  Patient agrees to proceed      Vitals:    09/08/21 0909   BP: 125/72   Pulse: 60   Resp: 16   Temp: 98 3 °F (36 8 °C)   SpO2: 96%

## 2021-09-08 NOTE — DISCHARGE INSTRUCTIONS
Transurethral Resection of Bladder Tumors   WHAT YOU NEED TO KNOW:   Transurethral resection of bladder tumors (TURBT) is surgery to remove one or more tumors from your bladder  DISCHARGE INSTRUCTIONS:   Medicines:   · Medicines  help decrease pain or prevent vomiting  · Take your medicine as directed  Contact your healthcare provider if you think your medicine is not helping or if you have side effects  Tell him or her if you are allergic to any medicine  Keep a list of the medicines, vitamins, and herbs you take  Include the amounts, and when and why you take them  Bring the list or the pill bottles to follow-up visits  Carry your medicine list with you in case of an emergency  Follow up with your healthcare provider as directed:  Write down your questions so you remember to ask them during your visits  Care for your Molina catheter:  Keep the bag below your waist  This will prevent urine from flowing back into your bladder and causing an infection or other problems  Also, keep the tube free of kinks so the urine will drain properly  Do not pull on the catheter  This can cause pain and bleeding and may cause the catheter to come out  Empty your urine drainage bag when it is ½ to ? full, or every 8 hours  If you have a smaller leg bag, empty it every 3 to 4 hours  Do the following when you empty your urine drainage bag:  · Hold the urine bag over the toilet or a large container  · Remove the drain spout from its sleeve at the bottom of the urine bag  Do not touch the tip of the drain spout  Open the slide valve on the spout  · Let the urine flow out of the urine bag into the toilet or container  Do not let the drainage tube touch anything  · Clean the end of the drain spout with alcohol when the bag is empty  Ask which cleaning solution is best to use  · Close the slide valve and put the drain spout into its sleeve at the bottom of the urine bag   Write down how much urine was in your bag if you were asked to keep a record  Contact your healthcare provider if:   · You have a fever or chills  · You have new or more blood in your urine  · You have nausea or are vomiting  · You have new or more pain when you urinate  · You are unable to control when you urinate  · You have questions or concerns about your condition or care  Seek care immediately or call 911 if:   · You have heavy bleeding from your urethra  · You start to urinate less often, very little, or not at all  · You have severe pain in your abdomen or pelvis  © Copyright [employer] 2018 Information is for End User's use only and may not be sold, redistributed or otherwise used for commercial purposes  All illustrations and images included in CareNotes® are the copyrighted property of [employer]  or [employer]  The above information is an  only  It is not intended as medical advice for individual conditions or treatments  Talk to your doctor, nurse or pharmacist before following any medical regimen to see if it is safe and effective for you

## 2021-09-13 ENCOUNTER — HOSPITAL ENCOUNTER (OUTPATIENT)
Dept: INFUSION CENTER | Facility: CLINIC | Age: 81
Discharge: HOME | End: 2021-09-13
Payer: COMMERCIAL

## 2021-09-13 VITALS
RESPIRATION RATE: 18 BRPM | DIASTOLIC BLOOD PRESSURE: 63 MMHG | HEART RATE: 72 BPM | TEMPERATURE: 97 F | HEIGHT: 67 IN | BODY MASS INDEX: 32.42 KG/M2 | SYSTOLIC BLOOD PRESSURE: 103 MMHG | WEIGHT: 206.57 LBS

## 2021-09-13 DIAGNOSIS — C79.51 BONE METASTASES (HCC): ICD-10-CM

## 2021-09-13 DIAGNOSIS — D70.1 CHEMOTHERAPY-INDUCED NEUTROPENIA (HCC): ICD-10-CM

## 2021-09-13 DIAGNOSIS — T45.1X5A CHEMOTHERAPY-INDUCED NEUTROPENIA (HCC): ICD-10-CM

## 2021-09-13 DIAGNOSIS — C61 PROSTATE CANCER (HCC): Primary | ICD-10-CM

## 2021-09-13 PROCEDURE — 96367 TX/PROPH/DG ADDL SEQ IV INF: CPT

## 2021-09-13 PROCEDURE — 96413 CHEMO IV INFUSION 1 HR: CPT

## 2021-09-13 RX ORDER — SODIUM CHLORIDE 9 MG/ML
20 INJECTION, SOLUTION INTRAVENOUS ONCE
Status: COMPLETED | OUTPATIENT
Start: 2021-09-13 | End: 2021-09-13

## 2021-09-13 RX ADMIN — DOCETAXEL 150.8 MG: 20 INJECTION, SOLUTION, CONCENTRATE INTRAVENOUS at 09:03

## 2021-09-13 RX ADMIN — SODIUM CHLORIDE 20 ML/HR: 0.9 INJECTION, SOLUTION INTRAVENOUS at 08:35

## 2021-09-13 RX ADMIN — DEXAMETHASONE SODIUM PHOSPHATE 10 MG: 10 INJECTION, SOLUTION INTRAMUSCULAR; INTRAVENOUS at 08:36

## 2021-09-13 NOTE — PLAN OF CARE
Problem: Potential for Falls  Goal: Patient will remain free of falls  Description: INTERVENTIONS:  - Educate patient/family on patient safety including physical limitations  - Instruct patient to call for assistance with activity   - Consult OT/PT to assist with strengthening/mobility   - Keep Call bell within reach  - Keep bed low and locked with side rails adjusted as appropriate  - Keep care items and personal belongings within reach  - Initiate and maintain comfort rounds  - Make Fall Risk Sign visible to staff  - Offer Toileting every  Hours, in advance of need  - Initiate/Maintainalarm  - Obtain necessary fall risk management equipment:   - Apply yellow socks and bracelet for high fall risk patients  - Consider moving patient to room near nurses station  Outcome: Progressing

## 2021-09-13 NOTE — PROGRESS NOTES
Pt tolerated treatment today without incident  Pt was provided with AVS and is aware of his appt tomorrow for neulasta

## 2021-09-14 ENCOUNTER — HOSPITAL ENCOUNTER (OUTPATIENT)
Dept: INFUSION CENTER | Facility: CLINIC | Age: 81
Discharge: HOME | End: 2021-09-14
Payer: COMMERCIAL

## 2021-09-14 VITALS — TEMPERATURE: 98.8 F

## 2021-09-14 DIAGNOSIS — C61 PROSTATE CANCER (HCC): Primary | ICD-10-CM

## 2021-09-14 DIAGNOSIS — T45.1X5A CHEMOTHERAPY-INDUCED NEUTROPENIA (HCC): ICD-10-CM

## 2021-09-14 DIAGNOSIS — D70.1 CHEMOTHERAPY-INDUCED NEUTROPENIA (HCC): ICD-10-CM

## 2021-09-14 PROCEDURE — 96372 THER/PROPH/DIAG INJ SC/IM: CPT

## 2021-09-14 RX ADMIN — PEGFILGRASTIM 6 MG: 6 INJECTION SUBCUTANEOUS at 11:01

## 2021-09-20 ENCOUNTER — TELEPHONE (OUTPATIENT)
Dept: HEMATOLOGY ONCOLOGY | Facility: CLINIC | Age: 81
End: 2021-09-20

## 2021-09-20 DIAGNOSIS — C61 PROSTATE CANCER (HCC): Primary | ICD-10-CM

## 2021-09-20 NOTE — TELEPHONE ENCOUNTER
Patient is requesting a standing lab order for a PSA be ordered and mailed to his home address   Best call back [phone]

## 2021-09-21 ENCOUNTER — OFFICE VISIT (OUTPATIENT)
Dept: UROLOGY | Facility: CLINIC | Age: 81
End: 2021-09-21
Payer: COMMERCIAL

## 2021-09-21 VITALS
SYSTOLIC BLOOD PRESSURE: 140 MMHG | HEART RATE: 78 BPM | WEIGHT: 207 LBS | HEIGHT: 67 IN | BODY MASS INDEX: 32.49 KG/M2 | DIASTOLIC BLOOD PRESSURE: 80 MMHG

## 2021-09-21 DIAGNOSIS — C61 PROSTATE CANCER (HCC): Primary | ICD-10-CM

## 2021-09-21 PROCEDURE — 1160F RVW MEDS BY RX/DR IN RCRD: CPT | Performed by: UROLOGY

## 2021-09-21 PROCEDURE — 1036F TOBACCO NON-USER: CPT | Performed by: UROLOGY

## 2021-09-21 PROCEDURE — 96372 THER/PROPH/DIAG INJ SC/IM: CPT

## 2021-09-21 PROCEDURE — 99213 OFFICE O/P EST LOW 20 MIN: CPT | Performed by: UROLOGY

## 2021-09-21 NOTE — PROGRESS NOTES
UROLOGY FOLLOWUP NOTE     CHIEF COMPLAINT   Maximino De Leon is a [de-identified] y o  male with a complaint of prostate cancer, metastatic    History of Present Illness:     [de-identified] y o  gentleman who had been undergoing routine prostate cancer screening with his primary care team  The patient has had a steady PSA but in March of 2017 was noted to have some oscillation  The patient was given a course of antibiotics and his PSA came down from the mid 5 range to 4 6  He initially presented in June 2017  We initially recommended follow-up in 1 year  Follow-up of his PSA values ordered by his primary team demonstrated concern for rapid rise  The patient return to see me in May of 2018  At that time, we recommended a prostate biopsy but the patient had just undergone percutaneous stenting and was on dual anti-platelet therapy  Biopsy delayed  The patient underwent a CT scan of his chest which demonstrated some sclerotic lesions in his primary care team again contacted us  We recommended repeat PSA and a bone scan  PSA has risen again and bone scan does demonstrate some concern  Lab Results   Component Value Date    PSA 13 0 (H) 06/30/2021    PSA 7 2 (H) 04/27/2021    PSA 6 9 (H) 04/13/2021   10/5/18 PSA 9 8  4/30/18 PSA 8 6, free 19 6%  9/30/17 PSA 7 3, free 15%  5/5/17 PSA 4 6  3/20/17 PSA 5 4     We did discuss with Interventional Radiology possible biopsy of the patient's thoracic and rib lesions although these were not felt to be safe for attempt  As such, the patient was arranged for a prostate biopsy here in our office  This demonstrated high risk Vitaliy 9 disease  Patient was discussed at multidisciplinary conference and although we were not able to biopsy his bony lesions, 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  The patient was started on androgen deprivation therapy and seen by Medical Oncology  Patient was then started on additional systemic treatments        Patient continues to have progression of his bony disease on imaging as well as slight progression his PSA trend  Patient has had further discussion with medical oncology team after progression castrate resistant disease  PET scan has demonstrated activity in the thoracic spine and at the bladder base and additional pathologic testing was performed on his cancer  Ultimately it was decided the patient would be a candidate for radiation to the prostate and pelvis  He will likely be progressing to Taxotere chemotherapy according to medical oncology  In advance of his radiation, the team has requested fiducial marker placement  Multidisciplinary discussion with patient to ensure he had good understanding of prognosis and goals of care  Has now undergone local therapy with radiation  Continues to have 2nd opinion with the [facility] oncology team   Imaging raised concern for posterior bladder lesion  Questionable secondary malignancy for bladder tumor  Transurethral resection demonstrated 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  Returns today for pathology  Recently sought out additional opinion at the [place] of [address] [place] [address]  Recommendation to continue chemotherapy and androgen deprivation therapy with local follow-up  Last Lupron 3/2021      Past Medical History:     Past Medical History:   Diagnosis Date    Anemia     last assessed  1/7/14     Cancer ([facility] [address] )     PROSTATE, BONE CANCER    Coronary artery disease     DDD (degenerative disc disease), cervical     Hypercholesteremia     Hypertension     Kidney stone     kidney stones    Polyp of sigmoid colon     Prostate cancer ([facility] [address] )     Renal disorder     elevated serum creat    Tinnitus     unspecified laterality / last assessed 4/9/13    Vitamin D deficiency        PAST SURGICAL HISTORY:     Past Surgical History:   Procedure Laterality Date    CARDIAC SURGERY      CARD CATH W/ STENTS    COLONOSCOPY      CORONARY ANGIOPLASTY WITH STENT PLACEMENT      FL RETROGRADE PYELOGRAM  9/11/2019    HEMORRHOID SURGERY      RI CYSTO/URETERO W/LITHOTRIPSY &INDWELL STENT INSRT Left 9/11/2019    Procedure: CYSTO, URETEROSCOPY W/HOLMIUM LASER, BASKET STONE EXTRACTION, RETROGRADE PYELOGRAM, STENT EXCHANGE;  Surgeon: Francy Rivas MD;  Location: [facility];  Service: Urology    RI CYSTOURETHROSCOPY,FULGUR 0 5-2 CM LESN N/A 9/8/2021    Procedure: TRANSURETHRAL RESECTION OF BLADDER TUMOR (TURBT);   Surgeon: Francy Rivas MD;  Location: [facility];  Service: Urology    PROSTATE BIOPSY  10/24/2018    TONSILLECTOMY         CURRENT MEDICATIONS:     Current Outpatient Medications   Medication Sig Dispense Refill    acetaminophen (TYLENOL) 500 mg tablet Take 500 mg by mouth every 6 (six) hours as needed for mild pain      aspirin (ASPIRIN ADULT LOW STRENGTH) 81 mg EC tablet Take 81 mg by mouth daily       atorvastatin (LIPITOR) 40 mg tablet Take 40 mg by mouth daily at bedtime       Calcium 500 MG tablet Take 1,000 mg by mouth daily       cholecalciferol (VITAMIN D3) 1,000 units tablet Take 1,000 Units by mouth daily       fluticasone (FLONASE) 50 mcg/act nasal spray 2 sprays into each nostril daily 16 g 3    hydrochlorothiazide (HYDRODIURIL) 25 mg tablet Take 1 tablet (25 mg total) by mouth daily 90 tablet 1    leuprolide (LUPRON DEPOT 6 MONTH KIT) 45 mg Inject 45 mg into a muscle every 6 (six) months 45 mg 0    losartan (COZAAR) 100 MG tablet Take 1 tablet (100 mg total) by mouth daily 90 tablet 1    MELATONIN PO Take 1 tablet by mouth daily at bedtime as needed       metoprolol succinate (TOPROL-XL) 25 mg 24 hr tablet Take 1 tablet (25 mg total) by mouth daily 90 tablet 1    ondansetron (ZOFRAN) 4 mg tablet Take 1 tablet (4 mg total) by mouth every 8 (eight) hours as needed for nausea or vomiting 20 tablet 1    oxybutynin (Ditropan XL) 5 mg 24 hr tablet Take 1 tablet (5 mg total) by mouth daily as needed (bladder pain) for up to 5 doses 5 tablet 0    phenazopyridine (PYRIDIUM) 100 mg tablet Take 1 tablet (100 mg total) by mouth 3 (three) times a day as needed for bladder spasms 20 tablet 0    predniSONE 5 mg tablet Take 1 tablet (5 mg total) by mouth 2 (two) times a day with meals 60 tablet 3    tamsulosin (FLOMAX) 0 4 mg take 1 capsule by mouth once daily with dinner (Patient taking differently: Take 0 4 mg by mouth daily with dinner ) 90 capsule 3    Zoledronic Acid (ZOMETA IV) Infuse into a venous catheter every 3 (three) months       No current facility-administered medications for this visit  ALLERGIES:     Allergies   Allergen Reactions    Other Rash     bandaids rash        SOCIAL HISTORY:     Social History     Socioeconomic History    Marital status: /Civil Union     Spouse name: Not on file    Number of children: Not on file    Years of education: Not on file    Highest education level: Not on file   Occupational History    Occupation: consultant   Tobacco Use    Smoking status: Never Smoker    Smokeless tobacco: Never Used   Vaping Use    Vaping Use: Never used   Substance and Sexual Activity    Alcohol use: Yes     Alcohol/week: 2 0 - 3 0 standard drinks     Types: 2 - 3 Glasses of wine per week     Comment: Occuational / social     Drug use: No    Sexual activity: Not on file   Other Topics Concern    Not on file   Social History Narrative    Always uses seat belt     Daily caffeine consumption 2-3 servings a day     Feels safe at home     Social Determinants of Health     Financial Resource Strain:     Difficulty of Paying Living Expenses:    Food Insecurity:     Worried About Running Out of Food in the Last Year:     Ran Out of Food in the Last Year:    Transportation Needs:     Lack of Transportation (Medical):      Lack of Transportation (Non-Medical):    Physical Activity:     Days of Exercise per Week:     Minutes of Exercise per Session:    Stress:     Feeling of Stress :    Social Connections:     Frequency of Communication with Friends and Family:     Frequency of Social Gatherings with Friends and Family:     Attends Denominational Services:     Active Member of Clubs or Organizations:     Attends Club or Organization Meetings:     Marital Status:    Intimate Partner Violence:     Fear of Current or Ex-Partner:     Emotionally Abused:     Physically Abused:     Sexually Abused:        SOCIAL HISTORY:     Family History   Problem Relation Age of Onset    Breast cancer Mother     Hypertension Father        REVIEW OF SYSTEMS:     Review of Systems   Constitutional: Negative  Respiratory: Negative  Cardiovascular: Negative  Gastrointestinal: Negative  Genitourinary: Positive for frequency  Musculoskeletal: Negative  Skin: Negative  Psychiatric/Behavioral: Negative  PHYSICAL EXAM:     There were no vitals taken for this visit  General:  Healthy appearing male in no acute distress  They have a normal affect  There is not appear to be any gross neurologic defects or abnormalities  HEENT:  Normocephalic, atraumatic  Neck is supple without any palpable lymphadenopathy  Cardiovascular:  Patient has normal palpable distal radial pulses  There is no significant peripheral edema  No JVD is noted  Respiratory:  Patient has unlabored respirations  There is no audible wheeze or rhonchi  Abdomen:  Abdomen is soft and nontender  There is no tympany  Inguinal and umbilical hernia are not appreciated  Genitourinary:   Uncircumcised phallus, reducible foreskin, patient has some lichen sclerosis and prior surgical meatotomy incision is visible with some meatal stenosis, testicles are smooth and descended    Musculoskeletal:  Patient does not have significant CVA tenderness in the  flank with palpation or percussion  They full range of motion in all 4 extremities  Strength in all 4 extremities appears congruent    Patient is able to ambulate without assistance or difficulty  Dermatologic:  Patient has no skin abnormalities or rashes  LABS:     CBC:   Lab Results   Component Value Date    WBC 9 6 09/10/2021    HGB 11 7 (L) 09/10/2021    HCT 34 5 (L) 09/10/2021    MCV 96 1 09/10/2021     (L) 09/10/2021       BMP:   Lab Results   Component Value Date    CALCIUM 8 9 09/10/2021     2017    K 4 1 09/10/2021    CO2 31 09/10/2021     09/10/2021    BUN 24 09/10/2021    CREATININE 1 12 09/10/2021     10/5/18 PSA 9 8  Lab Results   Component Value Date    PSA 13 0 (H) 2021    PSA 7 2 (H) 2021    PSA 6 9 (H) 2021     IMAGIN/18/21  BONE SCAN  WHOLE BODY     INDICATION: Metastatic prostate carcinoma  Right shoulder pain      PREVIOUS FILM CORRELATION:    CT 2021, PET scan 2020, nuclear bone scan 2020     TECHNIQUE:   This study was performed following the intravenous administration of 24 7 mCi Tc-99m labeled MDP  Delayed, anterior and posterior whole body images were acquired, 2-3 hours after radiopharmaceutical administration      FINDINGS:     IMPRESSION:  Multiple osseous lesions are seen compatible with progressive osseous metastases  In comparison to the previous nuclear bone scan, IMPRESSION: There is increasing activity noted in the proximal humerus, near the junction of the neck and shaft  There is   also increasing intensity with respect to the right anterior 5th rib lesion  Significantly increased intensity has occurred at the T10 vertebral body, and development of increased activity at L5, right lateral iliac bone, and left iliac crest   There   is also a new small focus of activity involving the right posterior T8 rib     IMPRESSION: In comparison to the previous nuclear bone scan, there is both an increase in intensity and number of scintigraphic abnormalities, compatible with progression of osseous metastatic disease    PATHOLOGY:     21  Final Diagnosis   A   Urinary Bladder, bladder tumor   -High grade carcinoma , consistent with High grade prostatic adenocarcinoma      Note:  Tumor cells are  positive for prostatic  markers ( NKX3 1, Triple stain) and negative for bladder marker ( GATA3)   Controls reacted appropriately       Final Diagnosis   A  Right lateral base prostate core biopsy:  - Adenocarcinoma, Vitaliy score 4+ 5 = 9/10 (grade 5 comprises 10% of core biopsy), grade group 5, continuously involving greater than 95% of this core biopsy  - No perineural invasion is seen     B  Right lateral mid prostate core biopsy:  - Adenocarcinoma, Republic score 4+ 5 = 9/10 (grade 5 conprises 20% of core biopsy), grade group 5, continuously involving 90% of this core biopsy  - Perineural invasion is seen      C  Left lateral apex prostate core biopsy:  - Adenocarcinoma, [place] score 5 + 4 = 9/10 (grade 5 comprises 60% of core biopsy), grade group 5, continuously involving 60% of this core biopsy  - Perineural invasion is seen     D  Right base prostate core biopsy:  - Adenocarcinoma, Vitaliy score 4 + 5 = 9/10 (grade 5 comprises 10% of core biopsy), grade group 5, continuously involving 85% of this core biopsy  - Perineural invasion is seen     E  Right mid prostate core biopsy   - Adenocarcinoma, Republic score 5 + 4 = 9/10 (grade 5 comprises 95% of core biopsy), grade group 5, continuously involving 60% of this core biopsy  - Perineural invasion is seen     F  Right apex prostate core biopsy:  - Adenocarcinoma, Republic score 4 + 5 = 9/10 (grade 5 comprises 45% of core biopsy), grade group 5, continuously involving 70% of this core biopsy  - Perineural invasion is seen     G  Left base prostate core biopsy:  - Adenocarcinoma, Republic score 4 + 5 = 9/10 (grade 5 comprises 35% of core biopsy), grade group 5, continuously involving 70% of this core biopsy  - No perineural invasion is seen     H   Left mid prostate core biopsy:  - Adenocarcinoma, Republic score 4 + 4 = 8/10, grade group 4, discontinuously involving 20% of this core biopsy  - No perineural invasion is seen      I  Left apex prostate core biopsy:  - Benign prostatic tissue     J  Left lateral base prostate core biopsy:  - Adenocarcinoma, Vitaliy score 4 + 5 = 9/10, (grade 5 comprises 35% of core biopsy), grade group 5, discontinuously involving 20% of this core biopsy  - No perineural invasion is seen      K  Left lateral mid prostate core biopsy:  - Adenocarcinoma, [place] score 4 + 4 = 8/10, grade group 4, continuously involving 5% of this core biopsy  - No perineural invasion is seen      L  Left lateral apex prostate core biopsy:  - Adenocarcinoma, [place] score 4 + 4 = 8/10, grade group 4, discontinuously involving 5% of this core biopsy  - No perineural invasion is seen      Note: Block for Prolaris testing if required: E     PROCEDURE:     Lupron 6mo depot administered today    ASSESSMENT:     [de-identified] y o  male with high volume, high risk Vitaliy 5+4=9 CaP, with progressive bony lesions despite advanced systemic therapy, now completed local radiation to the prostate and pelvis after discussion with the [facility] 2nd opinion, s/p transurethral resection of tumor on 9/8/2021 which does demonstrate 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    PLAN:       I reviewed the results of the pathology with the patient  He is doing reasonably well with voiding with some occasional nocturia  We updated his androgen deprivation therapy today with 6 month injection  Will return in 6 months  Patient will continue follow-up with Medical Oncology  He is evaluating radioisotope treatments as potential cure for his prostate cancer, he has been given some options with a opinion at the [address] [place] [address] for treatment in [place]

## 2021-09-28 LAB
ALBUMIN SERPL-MCNC: 4.2 G/DL (ref 3.6–5.1)
ALBUMIN/GLOB SERPL: 2.2 (CALC) (ref 1–2.5)
ALP SERPL-CCNC: 78 U/L (ref 35–144)
ALT SERPL-CCNC: 19 U/L (ref 9–46)
AST SERPL-CCNC: 17 U/L (ref 10–35)
BASOPHILS # BLD AUTO: 57 CELLS/UL (ref 0–200)
BASOPHILS NFR BLD AUTO: 0.6 %
BILIRUB SERPL-MCNC: 0.6 MG/DL (ref 0.2–1.2)
BUN SERPL-MCNC: 25 MG/DL (ref 7–25)
BUN/CREAT SERPL: 21 (CALC) (ref 6–22)
CALCIUM SERPL-MCNC: 9.1 MG/DL (ref 8.6–10.3)
CHLORIDE SERPL-SCNC: 105 MMOL/L (ref 98–110)
CO2 SERPL-SCNC: 29 MMOL/L (ref 20–32)
CREAT SERPL-MCNC: 1.19 MG/DL (ref 0.7–1.11)
EOSINOPHIL # BLD AUTO: 19 CELLS/UL (ref 15–500)
EOSINOPHIL NFR BLD AUTO: 0.2 %
ERYTHROCYTE [DISTWIDTH] IN BLOOD BY AUTOMATED COUNT: 14.9 % (ref 11–15)
GLOBULIN SER CALC-MCNC: 1.9 G/DL (CALC) (ref 1.9–3.7)
GLUCOSE SERPL-MCNC: 89 MG/DL (ref 65–99)
HCT VFR BLD AUTO: 35.1 % (ref 38.5–50)
HGB BLD-MCNC: 11.9 G/DL (ref 13.2–17.1)
LYMPHOCYTES # BLD AUTO: 808 CELLS/UL (ref 850–3900)
LYMPHOCYTES NFR BLD AUTO: 8.5 %
MCH RBC QN AUTO: 32.2 PG (ref 27–33)
MCHC RBC AUTO-ENTMCNC: 33.9 G/DL (ref 32–36)
MCV RBC AUTO: 94.9 FL (ref 80–100)
MONOCYTES # BLD AUTO: 456 CELLS/UL (ref 200–950)
MONOCYTES NFR BLD AUTO: 4.8 %
NEUTROPHILS # BLD AUTO: 8161 CELLS/UL (ref 1500–7800)
NEUTROPHILS NFR BLD AUTO: 85.9 %
PLATELET # BLD AUTO: 166 THOUSAND/UL (ref 140–400)
PMV BLD REES-ECKER: 10.3 FL (ref 7.5–12.5)
POTASSIUM SERPL-SCNC: 4.2 MMOL/L (ref 3.5–5.3)
PROT SERPL-MCNC: 6.1 G/DL (ref 6.1–8.1)
PSA SERPL-MCNC: 2.58 NG/ML
RBC # BLD AUTO: 3.7 MILLION/UL (ref 4.2–5.8)
SL AMB EGFR AFRICAN AMERICAN: 66 ML/MIN/1.73M2
SL AMB EGFR NON AFRICAN AMERICAN: 57 ML/MIN/1.73M2
SODIUM SERPL-SCNC: 142 MMOL/L (ref 135–146)
WBC # BLD AUTO: 9.5 THOUSAND/UL (ref 3.8–10.8)

## 2021-09-30 ENCOUNTER — OFFICE VISIT (OUTPATIENT)
Dept: HEMATOLOGY ONCOLOGY | Facility: CLINIC | Age: 81
End: 2021-09-30
Payer: COMMERCIAL

## 2021-09-30 VITALS
HEIGHT: 67 IN | SYSTOLIC BLOOD PRESSURE: 140 MMHG | TEMPERATURE: 97.3 F | OXYGEN SATURATION: 97 % | RESPIRATION RATE: 17 BRPM | BODY MASS INDEX: 32.18 KG/M2 | HEART RATE: 68 BPM | DIASTOLIC BLOOD PRESSURE: 78 MMHG | WEIGHT: 205 LBS

## 2021-09-30 DIAGNOSIS — D70.1 CHEMOTHERAPY-INDUCED NEUTROPENIA (HCC): ICD-10-CM

## 2021-09-30 DIAGNOSIS — C79.51 BONE METASTASES (HCC): Primary | ICD-10-CM

## 2021-09-30 DIAGNOSIS — C61 PROSTATE CANCER (HCC): ICD-10-CM

## 2021-09-30 DIAGNOSIS — D64.81 ANTINEOPLASTIC CHEMOTHERAPY INDUCED ANEMIA: ICD-10-CM

## 2021-09-30 DIAGNOSIS — T45.1X5A ANTINEOPLASTIC CHEMOTHERAPY INDUCED ANEMIA: ICD-10-CM

## 2021-09-30 DIAGNOSIS — T45.1X5A CHEMOTHERAPY-INDUCED NEUTROPENIA (HCC): ICD-10-CM

## 2021-09-30 PROCEDURE — 99215 OFFICE O/P EST HI 40 MIN: CPT | Performed by: PHYSICIAN ASSISTANT

## 2021-09-30 NOTE — PROGRESS NOTES
Hematology/Oncology Outpatient Follow- up Note  Brandon Mcfadden [de-identified] y o  male MRN: @ Encounter: [id number]        Date:  9/30/2021      Assessment / Plan:    81-year-old  male who was seen by Urology for elevation of the PSA, when he presented in June 2017 with PSA of 4 6  Subsequently in September of 2017 PSA was 7 3 and in May of 2018 PSA was 8  6   A biopsy was recommended but the patient had just undergone percutaneous stenting of the heart and he was on dual anti-platelet therapy     MRI of the abdomen in April 2018 showed 2 simple cyst in the right hepatic lobe, bilateral renal cysts the largest 1 measuring 10 cm in the left lower lobe     In October of 2018 PSA was 9 8  CT scan of the chest 9/28/2018 showed sclerotic lesions in T10, 5 mm nodule in the right lower lobe   Bone scan showed activity in the posterior T10 level and left posterior 7th rib area concerning for osseous metastases and increased activity in the distal right clavicle might be degenerative or metastatic area      He was diagnosed with castration sensitive metastatic prostate cancer  [facility] [place] insurance company could not approve enzalutamide, he also had a high co-payment for abiraterone     The patient initiated on samples of enzalutamide 160 mg p o  daily in the beginning of December 2018   His insurance approved abiraterone however he had a high co-payment        We were finally able to get financial assistance through [place] started WILLytpabloa (abiraterone) 2/3/2019 with prednisone 5mg po bid without side effects       In May 2020 PSA 0 5, bone scan showed activity in the anterior 5th rib area, right humerus area most likely consistent with metastatic disease  No visceral disease on the CT scan  We had hard time getting enzalutamide  The patient was initiated on enzalutamide on 06/02/2020   PSA at the time of 0 8     PSA 1 4 in October 2020, normal alkaline phosphatase     Genes analyzed of germ line test  LIONEL, BARD1, BRCA1, BRCA2, BRIP1, CDH1, CHEK2, DICER1, EPCAM*, HOXB13, MLH1, MSH2,  MSH6, NBN, NF1, PALB2, PMS2, PTEN, RAD50, RAD51C, RAD51D, SMARCA4, STK11, TP53 all of these were negative     Evaluated at [facility] by Dr Morgan Cooper  The recommendation was for radiation therapy to oligo metastases and the primary site of the tumor        PET scan on 11/13/2020 showed radiotracer retention in the left posterior bladder suspicious for malignancy with prostate cancer invasion progressing from prior CT scan mild to moderate uptake in the few small inguinal lymph nodes, sclerotic lesions in the spine with uptake suspicious for metastases      Radiation therapy in the February 2021- April 2021      Progression of disease by bone scan in June 2021 with multiple thoracic, right ribs area, hip area involvement, PSA 13 ( 7 2 on 04/2021)      Cycle 1 Taxotere 7/12/2021      He received 4 cycles  PSA decreased to 2 58 9/28/21 from 13 6/30/21  Will continue with Taxotere at this time  If his PSA increases, anticipate ordering imaging studies and potential switch to [place]  Patient  Is investigating clinical trials and other treatment options  He has had 2nd opinions at [facility] as well as Dr Janette Law at [employer] [facility]  He is interested in [facility] [place]  Currently this is only available in [place]  He was given contact information for Dr Ana Dunlap in [place] by Dr Angel John at his 9/17/21 office visit  Anup Booker MD, PhD  [email or web address]  Patient is very interested in this and states he may go to [place] for treatment         PSA ordered every cycle  He is s/p TURBT 9/8/21 by Dr Dykes Bodily  Will send pathology for molecular testing per Carabiodun  Chemo induced anemia - mild  No Aranesp needed at this time        Bone metastases   Continue Zometa every 3 months        HPI:  81-year-old  male who was seen by Urology for elevation of the PSA, when he presented in June 2017 with PSA of 4 6, Subsequently in September of 2017 PSA was 7 3 and in May of 2018 PSA was 8  6   A biopsy was recommended but the patient had just undergone percutaneous stenting of the heart and he was on dual anti-platelet therapy      MRI of the abdomen in April 2018 showed 2 simple cyst in the right hepatic lobe, bilateral renal cysts the largest 1 measuring 10 cm in the left lower lobe     In October of 2018 PSA was 9 8  CT scan of the chest 9/28/2018 showed sclerotic lesions in T10, 5 mm nodule in the right lower lobe   Bone scan showed activity in the posterior T10 level and left posterior 7th rib area concerning for osseous metastases and increased activity in the distal right clavicle might be degenerative or metastatic area      He was diagnosed with castration sensitive metastatic prostate cancer  [facility] [place] insurance company could not approve enzalutamide, he also has a high co-payment for abiraterone     The patient initiated on samples of enzalutamide 160 mg p o  daily since the beginning of December 2018   His insurance approved abiraterone however he had a high co-payment        We were finally able to get financial assistance through [place] started WILLytiga (abiraterone) 2/3/2019 with prednisone 5mg po bid without side effects      In May 2020 PSA 0 5, bone scan showed activity in the anterior 5th rib area, right humerus area most likely consistent with metastatic disease, no visceral disease on the CT scan, we had hard time getting enzalutamide , the patient was initiated on enzalutamide on 06/02/2020, PSA at the time of 0 8     PSA 1 4 in October 2020, normal alkaline phosphatase           Genes analyzed of germ line test  LIONEL, BARD1, BRCA1, BRCA2, BRIP1, CDH1, CHEK2, DICER1, EPCAM*, HOXB13, MLH1, MSH2,  MSH6, NBN, NF1, PALB2, PMS2, PTEN, RAD50, RAD51C, RAD51D, SMARCA4, STK11, TP53 all of these were negative     Evaluated at [facility] the decision for radiation therapy to oligo metastases and the primary site of the tumor depends on the PET scan finding which showed multiple metastatic disease, patient to meet with Radiation Oncology      PET scan on 11/13/2020 showed radiotracer retention in the left posterior bladder suspicious for malignancy with prostate cancer invasion progressing from prior CT scan mild to moderate uptake in the few small inguinal lymph nodes, sclerotic lesions in the spine with uptake suspicious for metastases     Radiation therapy in the February 2021- April 2021      Progression of disease by bone scan in June 2021 with multiple thoracic, right ribs area, hip area involvement, PSA 13 ( 7 2 on 04/2021)        Cycle 1 Taxotere 7/12/2021  Interval History:    9/8/21 TURBT of Urinary Bladder, bladder tumor   -High grade carcinoma , consistent with High grade prostatic adenocarcinoma  Cycle #4 Taxotere 9/13/21  PSA has decreased  He is tolerating treatment relatively well  Had consultation with Dr Rosie Brown at [facility]  Alida Malone was discussed which at present is only available in [place]  He was given contact information for Dr Brendon Robles in [place]  Jen Kumari MD, PhD  [email or web address]  [email or web address]        Test Results:        Labs:   Lab Results   Component Value Date    HGB 11 9 (L) 09/28/2021    HCT 35 1 (L) 09/28/2021    MCV 94 9 09/28/2021     09/28/2021    WBC 9 5 09/28/2021    NRBC 0 12/02/2019     Lab Results   Component Value Date     09/30/2017    K 4 2 09/28/2021     09/28/2021    CO2 29 09/28/2021    BUN 25 09/28/2021    CREATININE 1 19 (H) 09/28/2021    GLUF 97 08/08/2019    CALCIUM 9 1 09/28/2021    AST 17 09/28/2021    ALT 19 09/28/2021    ALKPHOS 78 09/28/2021    PROT 6 5 09/30/2017    BILITOT 0 7 09/30/2017    EGFR 61 12/02/2019       Imaging: No results found  ROS:  As mentioned in HPI & Interval History otherwise 14 point ROS negative  Allergies:    Allergies   Allergen Reactions    Other Rash     bandaids rash      Current Medications: Reviewed  PMH/FH/SH:  Reviewed      Physical Exam:    2 04 meters squared    Ht Readings from Last 3 Encounters:   09/30/21 5' 7" (1 702 m)   09/21/21 5' 7" (1 702 m)   09/13/21 5' 7 01" (1 702 m)        Wt Readings from Last 3 Encounters:   09/30/21 93 kg (205 lb)   09/21/21 93 9 kg (207 lb)   09/13/21 93 7 kg (206 lb 9 1 oz)        Temp Readings from Last 3 Encounters:   09/30/21 (!) 97 3 °F (36 3 °C)   09/14/21 98 8 °F (37 1 °C) (Temporal)   09/13/21 (!) 97 °F (36 1 °C) (Tympanic)        BP Readings from Last 3 Encounters:   09/30/21 140/78   09/21/21 140/80   09/13/21 103/63           Physical Exam  Vitals reviewed  Constitutional:       General: He is not in acute distress  Appearance: He is well-developed  He is not diaphoretic  HENT:      Head: Normocephalic and atraumatic  Eyes:      Conjunctiva/sclera: Conjunctivae normal    Neck:      Trachea: No tracheal deviation  Cardiovascular:      Rate and Rhythm: Normal rate and regular rhythm  Heart sounds: No murmur heard  No friction rub  No gallop  Pulmonary:      Effort: Pulmonary effort is normal  No respiratory distress  Breath sounds: Normal breath sounds  No wheezing or rales  Chest:      Chest wall: No tenderness  Abdominal:      General: There is no distension  Palpations: Abdomen is soft  Tenderness: There is no abdominal tenderness  Musculoskeletal:      Cervical back: Normal range of motion and neck supple  Lymphadenopathy:      Cervical: No cervical adenopathy  Skin:     General: Skin is warm and dry  Coloration: Skin is not pale  Findings: No erythema  Neurological:      Mental Status: He is alert and oriented to person, place, and time  Psychiatric:         Behavior: Behavior normal          Thought Content:  Thought content normal          Judgment: Judgment normal          ECOG:       Emergency Contacts:    Extended Emergency Contact Information  Primary Emergency Contact: Zechariah Ramirez Phone: [phone]  Relation: Spouse

## 2021-10-04 ENCOUNTER — HOSPITAL ENCOUNTER (OUTPATIENT)
Dept: INFUSION CENTER | Facility: CLINIC | Age: 81
Discharge: HOME | End: 2021-10-04
Payer: COMMERCIAL

## 2021-10-04 VITALS
WEIGHT: 206.57 LBS | TEMPERATURE: 96.9 F | BODY MASS INDEX: 32.42 KG/M2 | SYSTOLIC BLOOD PRESSURE: 107 MMHG | RESPIRATION RATE: 18 BRPM | DIASTOLIC BLOOD PRESSURE: 67 MMHG | HEART RATE: 67 BPM | HEIGHT: 67 IN

## 2021-10-04 DIAGNOSIS — C61 PROSTATE CANCER (HCC): Primary | ICD-10-CM

## 2021-10-04 DIAGNOSIS — D70.1 CHEMOTHERAPY-INDUCED NEUTROPENIA (HCC): ICD-10-CM

## 2021-10-04 DIAGNOSIS — T45.1X5A CHEMOTHERAPY-INDUCED NEUTROPENIA (HCC): ICD-10-CM

## 2021-10-04 DIAGNOSIS — C79.51 BONE METASTASES (HCC): ICD-10-CM

## 2021-10-04 PROCEDURE — 96367 TX/PROPH/DG ADDL SEQ IV INF: CPT

## 2021-10-04 PROCEDURE — 96413 CHEMO IV INFUSION 1 HR: CPT

## 2021-10-04 RX ORDER — SODIUM CHLORIDE 9 MG/ML
20 INJECTION, SOLUTION INTRAVENOUS ONCE
Status: COMPLETED | OUTPATIENT
Start: 2021-10-04 | End: 2021-10-04

## 2021-10-04 RX ORDER — SODIUM CHLORIDE 9 MG/ML
20 INJECTION, SOLUTION INTRAVENOUS ONCE
Status: CANCELLED | OUTPATIENT
Start: 2021-12-27

## 2021-10-04 RX ADMIN — DOCETAXEL 150.8 MG: 20 INJECTION, SOLUTION, CONCENTRATE INTRAVENOUS at 09:01

## 2021-10-04 RX ADMIN — SODIUM CHLORIDE 20 ML/HR: 0.9 INJECTION, SOLUTION INTRAVENOUS at 08:37

## 2021-10-04 RX ADMIN — ZOLEDRONIC ACID 3.3 MG: 4 INJECTION, SOLUTION, CONCENTRATE INTRAVENOUS at 10:22

## 2021-10-04 RX ADMIN — DEXAMETHASONE SODIUM PHOSPHATE 10 MG: 10 INJECTION, SOLUTION INTRAMUSCULAR; INTRAVENOUS at 08:37

## 2021-10-05 ENCOUNTER — HOSPITAL ENCOUNTER (OUTPATIENT)
Dept: INFUSION CENTER | Facility: CLINIC | Age: 81
Discharge: HOME | End: 2021-10-05
Payer: COMMERCIAL

## 2021-10-05 VITALS
HEART RATE: 61 BPM | SYSTOLIC BLOOD PRESSURE: 150 MMHG | TEMPERATURE: 97.9 F | DIASTOLIC BLOOD PRESSURE: 75 MMHG | RESPIRATION RATE: 18 BRPM

## 2021-10-05 DIAGNOSIS — D70.1 CHEMOTHERAPY-INDUCED NEUTROPENIA (HCC): ICD-10-CM

## 2021-10-05 DIAGNOSIS — C61 PROSTATE CANCER (HCC): Primary | ICD-10-CM

## 2021-10-05 DIAGNOSIS — T45.1X5A CHEMOTHERAPY-INDUCED NEUTROPENIA (HCC): ICD-10-CM

## 2021-10-05 PROCEDURE — 96372 THER/PROPH/DIAG INJ SC/IM: CPT

## 2021-10-05 RX ADMIN — PEGFILGRASTIM 6 MG: 6 INJECTION SUBCUTANEOUS at 10:21

## 2021-10-07 ENCOUNTER — TELEPHONE (OUTPATIENT)
Dept: HEMATOLOGY ONCOLOGY | Facility: CLINIC | Age: 81
End: 2021-10-07

## 2021-10-11 DIAGNOSIS — I10 ESSENTIAL HYPERTENSION: ICD-10-CM

## 2021-10-11 RX ORDER — LOSARTAN POTASSIUM 100 MG/1
100 TABLET ORAL DAILY
Qty: 90 TABLET | Refills: 1 | Status: SHIPPED | OUTPATIENT
Start: 2021-10-11 | End: 2022-04-18 | Stop reason: SDUPTHER

## 2021-10-19 ENCOUNTER — TELEPHONE (OUTPATIENT)
Dept: SURGICAL ONCOLOGY | Facility: CLINIC | Age: 81
End: 2021-10-19

## 2021-10-21 ENCOUNTER — TELEPHONE (OUTPATIENT)
Dept: HEMATOLOGY ONCOLOGY | Facility: CLINIC | Age: 81
End: 2021-10-21

## 2021-10-25 ENCOUNTER — HOSPITAL ENCOUNTER (OUTPATIENT)
Dept: INFUSION CENTER | Facility: CLINIC | Age: 81
Discharge: HOME | End: 2021-10-25
Payer: COMMERCIAL

## 2021-10-25 VITALS
DIASTOLIC BLOOD PRESSURE: 70 MMHG | BODY MASS INDEX: 32.53 KG/M2 | SYSTOLIC BLOOD PRESSURE: 124 MMHG | RESPIRATION RATE: 18 BRPM | TEMPERATURE: 97 F | HEART RATE: 66 BPM | WEIGHT: 207.23 LBS | HEIGHT: 67 IN

## 2021-10-25 DIAGNOSIS — D70.1 CHEMOTHERAPY-INDUCED NEUTROPENIA (HCC): ICD-10-CM

## 2021-10-25 DIAGNOSIS — T45.1X5A CHEMOTHERAPY-INDUCED NEUTROPENIA (HCC): ICD-10-CM

## 2021-10-25 DIAGNOSIS — C79.51 BONE METASTASES (HCC): ICD-10-CM

## 2021-10-25 DIAGNOSIS — C61 PROSTATE CANCER (HCC): Primary | ICD-10-CM

## 2021-10-25 LAB — SCAN RESULT: NORMAL

## 2021-10-25 PROCEDURE — 96367 TX/PROPH/DG ADDL SEQ IV INF: CPT

## 2021-10-25 PROCEDURE — 96413 CHEMO IV INFUSION 1 HR: CPT

## 2021-10-25 RX ORDER — SODIUM CHLORIDE 9 MG/ML
20 INJECTION, SOLUTION INTRAVENOUS ONCE
Status: COMPLETED | OUTPATIENT
Start: 2021-10-25 | End: 2021-10-25

## 2021-10-25 RX ADMIN — DEXAMETHASONE SODIUM PHOSPHATE 10 MG: 10 INJECTION, SOLUTION INTRAMUSCULAR; INTRAVENOUS at 08:39

## 2021-10-25 RX ADMIN — DOCETAXEL 150.8 MG: 20 INJECTION, SOLUTION, CONCENTRATE INTRAVENOUS at 09:10

## 2021-10-25 RX ADMIN — SODIUM CHLORIDE 20 ML/HR: 0.9 INJECTION, SOLUTION INTRAVENOUS at 08:38

## 2021-10-26 ENCOUNTER — HOSPITAL ENCOUNTER (OUTPATIENT)
Dept: INFUSION CENTER | Facility: CLINIC | Age: 81
Discharge: HOME | End: 2021-10-26
Payer: COMMERCIAL

## 2021-10-26 VITALS
SYSTOLIC BLOOD PRESSURE: 145 MMHG | HEART RATE: 58 BPM | TEMPERATURE: 97.2 F | RESPIRATION RATE: 18 BRPM | DIASTOLIC BLOOD PRESSURE: 81 MMHG

## 2021-10-26 DIAGNOSIS — T45.1X5A CHEMOTHERAPY-INDUCED NEUTROPENIA (HCC): ICD-10-CM

## 2021-10-26 DIAGNOSIS — C61 PROSTATE CANCER (HCC): Primary | ICD-10-CM

## 2021-10-26 DIAGNOSIS — D70.1 CHEMOTHERAPY-INDUCED NEUTROPENIA (HCC): ICD-10-CM

## 2021-10-26 PROCEDURE — 96372 THER/PROPH/DIAG INJ SC/IM: CPT

## 2021-10-26 RX ADMIN — PEGFILGRASTIM 6 MG: 6 INJECTION SUBCUTANEOUS at 11:04

## 2021-11-02 ENCOUNTER — TELEPHONE (OUTPATIENT)
Dept: OTHER | Facility: OTHER | Age: 81
End: 2021-11-02

## 2021-11-04 ENCOUNTER — OFFICE VISIT (OUTPATIENT)
Dept: HEMATOLOGY ONCOLOGY | Facility: CLINIC | Age: 81
End: 2021-11-04
Payer: COMMERCIAL

## 2021-11-04 VITALS
SYSTOLIC BLOOD PRESSURE: 144 MMHG | RESPIRATION RATE: 18 BRPM | BODY MASS INDEX: 32.65 KG/M2 | WEIGHT: 208 LBS | OXYGEN SATURATION: 98 % | HEART RATE: 66 BPM | HEIGHT: 67 IN | DIASTOLIC BLOOD PRESSURE: 90 MMHG

## 2021-11-04 DIAGNOSIS — C79.51 BONE METASTASES (HCC): ICD-10-CM

## 2021-11-04 DIAGNOSIS — D70.1 CHEMOTHERAPY-INDUCED NEUTROPENIA (HCC): ICD-10-CM

## 2021-11-04 DIAGNOSIS — C61 PROSTATE CANCER (HCC): Primary | ICD-10-CM

## 2021-11-04 DIAGNOSIS — T45.1X5A CHEMOTHERAPY-INDUCED NEUTROPENIA (HCC): ICD-10-CM

## 2021-11-04 PROBLEM — R21 RASH: Status: RESOLVED | Noted: 2021-07-20 | Resolved: 2021-11-04

## 2021-11-04 PROCEDURE — 99214 OFFICE O/P EST MOD 30 MIN: CPT | Performed by: INTERNAL MEDICINE

## 2021-11-06 DIAGNOSIS — C61 PROSTATE CANCER (HCC): ICD-10-CM

## 2021-11-08 ENCOUNTER — TELEPHONE (OUTPATIENT)
Dept: HEMATOLOGY ONCOLOGY | Facility: CLINIC | Age: 81
End: 2021-11-08

## 2021-11-08 RX ORDER — PREDNISONE 1 MG/1
TABLET ORAL
Qty: 60 TABLET | Refills: 3 | Status: SHIPPED | OUTPATIENT
Start: 2021-11-08 | End: 2021-12-06 | Stop reason: SDUPTHER

## 2021-11-15 ENCOUNTER — HOSPITAL ENCOUNTER (OUTPATIENT)
Dept: INFUSION CENTER | Facility: CLINIC | Age: 81
Discharge: HOME | End: 2021-11-15
Payer: COMMERCIAL

## 2021-11-15 VITALS
WEIGHT: 212.52 LBS | SYSTOLIC BLOOD PRESSURE: 110 MMHG | HEART RATE: 72 BPM | DIASTOLIC BLOOD PRESSURE: 65 MMHG | HEIGHT: 67 IN | BODY MASS INDEX: 33.36 KG/M2 | TEMPERATURE: 97.2 F | RESPIRATION RATE: 18 BRPM

## 2021-11-15 DIAGNOSIS — C61 PROSTATE CANCER (HCC): Primary | ICD-10-CM

## 2021-11-15 DIAGNOSIS — C79.51 BONE METASTASES (HCC): ICD-10-CM

## 2021-11-15 DIAGNOSIS — D70.1 CHEMOTHERAPY-INDUCED NEUTROPENIA (HCC): ICD-10-CM

## 2021-11-15 DIAGNOSIS — T45.1X5A CHEMOTHERAPY-INDUCED NEUTROPENIA (HCC): ICD-10-CM

## 2021-11-15 PROCEDURE — 96413 CHEMO IV INFUSION 1 HR: CPT

## 2021-11-15 PROCEDURE — 96367 TX/PROPH/DG ADDL SEQ IV INF: CPT

## 2021-11-15 RX ORDER — SODIUM CHLORIDE 9 MG/ML
20 INJECTION, SOLUTION INTRAVENOUS ONCE
Status: COMPLETED | OUTPATIENT
Start: 2021-11-15 | End: 2021-11-15

## 2021-11-15 RX ADMIN — DEXAMETHASONE SODIUM PHOSPHATE 10 MG: 10 INJECTION, SOLUTION INTRAMUSCULAR; INTRAVENOUS at 08:35

## 2021-11-15 RX ADMIN — DOCETAXEL 150.8 MG: 20 INJECTION, SOLUTION, CONCENTRATE INTRAVENOUS at 08:55

## 2021-11-15 RX ADMIN — SODIUM CHLORIDE 20 ML/HR: 0.9 INJECTION, SOLUTION INTRAVENOUS at 08:35

## 2021-11-16 ENCOUNTER — HOSPITAL ENCOUNTER (OUTPATIENT)
Dept: INFUSION CENTER | Facility: CLINIC | Age: 81
Discharge: HOME | End: 2021-11-16
Payer: COMMERCIAL

## 2021-11-16 VITALS
SYSTOLIC BLOOD PRESSURE: 152 MMHG | RESPIRATION RATE: 18 BRPM | DIASTOLIC BLOOD PRESSURE: 83 MMHG | TEMPERATURE: 97.2 F | HEART RATE: 64 BPM

## 2021-11-16 DIAGNOSIS — T45.1X5A CHEMOTHERAPY-INDUCED NEUTROPENIA (HCC): ICD-10-CM

## 2021-11-16 DIAGNOSIS — D70.1 CHEMOTHERAPY-INDUCED NEUTROPENIA (HCC): ICD-10-CM

## 2021-11-16 DIAGNOSIS — C79.51 BONE METASTASES (HCC): ICD-10-CM

## 2021-11-16 DIAGNOSIS — C61 PROSTATE CANCER (HCC): Primary | ICD-10-CM

## 2021-11-16 PROCEDURE — 96372 THER/PROPH/DIAG INJ SC/IM: CPT

## 2021-11-16 RX ADMIN — PEGFILGRASTIM 6 MG: 6 INJECTION SUBCUTANEOUS at 11:06

## 2021-11-22 ENCOUNTER — TELEPHONE (OUTPATIENT)
Dept: HEMATOLOGY ONCOLOGY | Facility: CLINIC | Age: 81
End: 2021-11-22

## 2021-11-24 ENCOUNTER — OFFICE VISIT (OUTPATIENT)
Dept: HEMATOLOGY ONCOLOGY | Facility: CLINIC | Age: 81
End: 2021-11-24
Payer: COMMERCIAL

## 2021-11-24 VITALS
RESPIRATION RATE: 16 BRPM | HEART RATE: 77 BPM | SYSTOLIC BLOOD PRESSURE: 130 MMHG | BODY MASS INDEX: 32.49 KG/M2 | HEIGHT: 67 IN | DIASTOLIC BLOOD PRESSURE: 80 MMHG | TEMPERATURE: 97 F | WEIGHT: 207 LBS | OXYGEN SATURATION: 96 %

## 2021-11-24 DIAGNOSIS — C79.51 BONE METASTASES (HCC): ICD-10-CM

## 2021-11-24 DIAGNOSIS — C61 PROSTATE CANCER (HCC): Primary | ICD-10-CM

## 2021-11-24 PROCEDURE — 99214 OFFICE O/P EST MOD 30 MIN: CPT | Performed by: INTERNAL MEDICINE

## 2021-11-24 PROCEDURE — 1036F TOBACCO NON-USER: CPT | Performed by: INTERNAL MEDICINE

## 2021-11-24 PROCEDURE — 1160F RVW MEDS BY RX/DR IN RCRD: CPT | Performed by: INTERNAL MEDICINE

## 2021-11-26 ENCOUNTER — TELEPHONE (OUTPATIENT)
Dept: OTHER | Facility: OTHER | Age: 81
End: 2021-11-26

## 2021-11-29 ENCOUNTER — RA CDI HCC (OUTPATIENT)
Dept: OTHER | Facility: HOSPITAL | Age: 81
End: 2021-11-29

## 2021-12-01 PROBLEM — N18.30 CHRONIC KIDNEY DISEASE, STAGE III (MODERATE) (HCC): Status: ACTIVE | Noted: 2021-12-01

## 2021-12-02 LAB
CHOLEST SERPL-MCNC: 112 MG/DL
CHOLEST/HDLC SERPL: 2.8 (CALC)
HDLC SERPL-MCNC: 40 MG/DL
LDLC SERPL CALC-MCNC: 58 MG/DL (CALC)
NONHDLC SERPL-MCNC: 72 MG/DL (CALC)
TRIGL SERPL-MCNC: 55 MG/DL
TSH SERPL-ACNC: 1.92 MIU/L (ref 0.4–4.5)

## 2021-12-06 ENCOUNTER — HOSPITAL ENCOUNTER (OUTPATIENT)
Dept: INFUSION CENTER | Facility: CLINIC | Age: 81
Discharge: HOME | End: 2021-12-06
Payer: COMMERCIAL

## 2021-12-06 ENCOUNTER — OFFICE VISIT (OUTPATIENT)
Dept: FAMILY MEDICINE CLINIC | Facility: CLINIC | Age: 81
End: 2021-12-06
Payer: COMMERCIAL

## 2021-12-06 VITALS
HEART RATE: 74 BPM | BODY MASS INDEX: 33.12 KG/M2 | DIASTOLIC BLOOD PRESSURE: 70 MMHG | OXYGEN SATURATION: 94 % | TEMPERATURE: 97.5 F | HEIGHT: 67 IN | RESPIRATION RATE: 16 BRPM | SYSTOLIC BLOOD PRESSURE: 110 MMHG | WEIGHT: 211 LBS

## 2021-12-06 VITALS
SYSTOLIC BLOOD PRESSURE: 107 MMHG | WEIGHT: 208.22 LBS | BODY MASS INDEX: 32.68 KG/M2 | HEART RATE: 64 BPM | HEIGHT: 67 IN | TEMPERATURE: 98.9 F | DIASTOLIC BLOOD PRESSURE: 71 MMHG | RESPIRATION RATE: 18 BRPM

## 2021-12-06 DIAGNOSIS — T45.1X5A CHEMOTHERAPY-INDUCED NEUTROPENIA (HCC): ICD-10-CM

## 2021-12-06 DIAGNOSIS — C61 PROSTATE CANCER (HCC): Primary | ICD-10-CM

## 2021-12-06 DIAGNOSIS — I25.118 CORONARY ARTERY DISEASE OF NATIVE ARTERY OF NATIVE HEART WITH STABLE ANGINA PECTORIS (HCC): ICD-10-CM

## 2021-12-06 DIAGNOSIS — C79.51 BONE METASTASES (HCC): ICD-10-CM

## 2021-12-06 DIAGNOSIS — R60.0 LOCALIZED EDEMA: ICD-10-CM

## 2021-12-06 DIAGNOSIS — D70.1 CHEMOTHERAPY-INDUCED NEUTROPENIA (HCC): ICD-10-CM

## 2021-12-06 DIAGNOSIS — H90.3 SENSORINEURAL HEARING LOSS (SNHL) OF BOTH EARS: ICD-10-CM

## 2021-12-06 DIAGNOSIS — I10 BENIGN ESSENTIAL HYPERTENSION: ICD-10-CM

## 2021-12-06 DIAGNOSIS — E78.2 MIXED HYPERLIPIDEMIA: ICD-10-CM

## 2021-12-06 PROCEDURE — 99214 OFFICE O/P EST MOD 30 MIN: CPT | Performed by: FAMILY MEDICINE

## 2021-12-06 PROCEDURE — 96367 TX/PROPH/DG ADDL SEQ IV INF: CPT

## 2021-12-06 PROCEDURE — 3074F SYST BP LT 130 MM HG: CPT | Performed by: FAMILY MEDICINE

## 2021-12-06 PROCEDURE — 3078F DIAST BP <80 MM HG: CPT | Performed by: FAMILY MEDICINE

## 2021-12-06 PROCEDURE — 96413 CHEMO IV INFUSION 1 HR: CPT

## 2021-12-06 RX ORDER — PREDNISONE 1 MG/1
5 TABLET ORAL 2 TIMES DAILY WITH MEALS
Qty: 180 TABLET | Refills: 0 | Status: SHIPPED | OUTPATIENT
Start: 2021-12-06 | End: 2022-03-01

## 2021-12-06 RX ORDER — SODIUM CHLORIDE 9 MG/ML
20 INJECTION, SOLUTION INTRAVENOUS ONCE
Status: COMPLETED | OUTPATIENT
Start: 2021-12-06 | End: 2021-12-06

## 2021-12-06 RX ADMIN — DEXAMETHASONE SODIUM PHOSPHATE 10 MG: 10 INJECTION, SOLUTION INTRAMUSCULAR; INTRAVENOUS at 08:42

## 2021-12-06 RX ADMIN — DOCETAXEL 150.8 MG: 20 INJECTION, SOLUTION, CONCENTRATE INTRAVENOUS at 09:09

## 2021-12-06 RX ADMIN — SODIUM CHLORIDE 20 ML/HR: 0.9 INJECTION, SOLUTION INTRAVENOUS at 08:41

## 2021-12-07 ENCOUNTER — HOSPITAL ENCOUNTER (OUTPATIENT)
Dept: INFUSION CENTER | Facility: CLINIC | Age: 81
Discharge: HOME | End: 2021-12-07
Payer: COMMERCIAL

## 2021-12-07 VITALS
SYSTOLIC BLOOD PRESSURE: 151 MMHG | DIASTOLIC BLOOD PRESSURE: 71 MMHG | RESPIRATION RATE: 18 BRPM | TEMPERATURE: 96.1 F | HEART RATE: 62 BPM

## 2021-12-07 DIAGNOSIS — C61 PROSTATE CANCER (HCC): Primary | ICD-10-CM

## 2021-12-07 DIAGNOSIS — C79.51 BONE METASTASES (HCC): ICD-10-CM

## 2021-12-07 DIAGNOSIS — D70.1 CHEMOTHERAPY-INDUCED NEUTROPENIA (HCC): ICD-10-CM

## 2021-12-07 DIAGNOSIS — T45.1X5A CHEMOTHERAPY-INDUCED NEUTROPENIA (HCC): ICD-10-CM

## 2021-12-07 LAB — PSA SERPL-MCNC: 3.05 NG/ML

## 2021-12-07 PROCEDURE — 96372 THER/PROPH/DIAG INJ SC/IM: CPT

## 2021-12-07 RX ADMIN — PEGFILGRASTIM 6 MG: 6 INJECTION SUBCUTANEOUS at 10:59

## 2021-12-15 ENCOUNTER — TELEPHONE (OUTPATIENT)
Dept: GYNECOLOGIC ONCOLOGY | Facility: CLINIC | Age: 81
End: 2021-12-15

## 2021-12-16 ENCOUNTER — HOSPITAL ENCOUNTER (OUTPATIENT)
Dept: CT IMAGING | Facility: HOSPITAL | Age: 81
Discharge: HOME | End: 2021-12-16
Attending: INTERNAL MEDICINE
Payer: COMMERCIAL

## 2021-12-16 ENCOUNTER — HOSPITAL ENCOUNTER (OUTPATIENT)
Dept: NUCLEAR MEDICINE | Facility: HOSPITAL | Age: 81
Discharge: HOME | End: 2021-12-16
Attending: INTERNAL MEDICINE
Payer: COMMERCIAL

## 2021-12-16 DIAGNOSIS — C61 PROSTATE CANCER (HCC): ICD-10-CM

## 2021-12-16 DIAGNOSIS — C79.51 BONE METASTASES (HCC): ICD-10-CM

## 2021-12-16 PROCEDURE — A9503 TC99M MEDRONATE: HCPCS

## 2021-12-16 PROCEDURE — 74177 CT ABD & PELVIS W/CONTRAST: CPT

## 2021-12-16 PROCEDURE — 71260 CT THORAX DX C+: CPT

## 2021-12-16 PROCEDURE — 78306 BONE IMAGING WHOLE BODY: CPT

## 2021-12-16 RX ADMIN — IOHEXOL 100 ML: 350 INJECTION, SOLUTION INTRAVENOUS at 12:16

## 2021-12-22 LAB — PSA SERPL-MCNC: 2.61 NG/ML

## 2021-12-23 ENCOUNTER — OFFICE VISIT (OUTPATIENT)
Dept: HEMATOLOGY ONCOLOGY | Facility: CLINIC | Age: 81
End: 2021-12-23
Payer: COMMERCIAL

## 2021-12-23 DIAGNOSIS — C79.51 BONE METASTASES (HCC): ICD-10-CM

## 2021-12-23 DIAGNOSIS — C61 PROSTATE CANCER (HCC): Primary | ICD-10-CM

## 2021-12-23 PROCEDURE — 99214 OFFICE O/P EST MOD 30 MIN: CPT | Performed by: INTERNAL MEDICINE

## 2021-12-23 PROCEDURE — 1160F RVW MEDS BY RX/DR IN RCRD: CPT | Performed by: INTERNAL MEDICINE

## 2021-12-23 PROCEDURE — 1036F TOBACCO NON-USER: CPT | Performed by: INTERNAL MEDICINE

## 2021-12-27 ENCOUNTER — HOSPITAL ENCOUNTER (OUTPATIENT)
Dept: INFUSION CENTER | Facility: CLINIC | Age: 81
Discharge: HOME | End: 2021-12-27
Payer: COMMERCIAL

## 2021-12-27 VITALS
HEART RATE: 68 BPM | DIASTOLIC BLOOD PRESSURE: 62 MMHG | WEIGHT: 211.09 LBS | TEMPERATURE: 98.7 F | SYSTOLIC BLOOD PRESSURE: 110 MMHG | RESPIRATION RATE: 18 BRPM | HEIGHT: 67 IN | BODY MASS INDEX: 33.13 KG/M2

## 2021-12-27 DIAGNOSIS — D70.1 CHEMOTHERAPY-INDUCED NEUTROPENIA (HCC): ICD-10-CM

## 2021-12-27 DIAGNOSIS — C79.51 BONE METASTASES (HCC): ICD-10-CM

## 2021-12-27 DIAGNOSIS — T45.1X5A CHEMOTHERAPY-INDUCED NEUTROPENIA (HCC): ICD-10-CM

## 2021-12-27 DIAGNOSIS — C61 PROSTATE CANCER (HCC): Primary | ICD-10-CM

## 2021-12-27 PROCEDURE — 96413 CHEMO IV INFUSION 1 HR: CPT

## 2021-12-27 PROCEDURE — 96367 TX/PROPH/DG ADDL SEQ IV INF: CPT

## 2021-12-27 RX ORDER — SODIUM CHLORIDE 9 MG/ML
20 INJECTION, SOLUTION INTRAVENOUS ONCE
Status: DISCONTINUED | OUTPATIENT
Start: 2021-12-27 | End: 2021-12-30 | Stop reason: HOSPADM

## 2021-12-27 RX ORDER — SODIUM CHLORIDE 9 MG/ML
20 INJECTION, SOLUTION INTRAVENOUS ONCE
Status: COMPLETED | OUTPATIENT
Start: 2021-12-27 | End: 2021-12-27

## 2021-12-27 RX ORDER — SODIUM CHLORIDE 9 MG/ML
20 INJECTION, SOLUTION INTRAVENOUS ONCE
Status: CANCELLED | OUTPATIENT
Start: 2022-03-21

## 2021-12-27 RX ADMIN — DOCETAXEL 150.8 MG: 20 INJECTION, SOLUTION, CONCENTRATE INTRAVENOUS at 09:03

## 2021-12-27 RX ADMIN — ZOLEDRONIC ACID 3.5 MG: 4 INJECTION INTRAVENOUS at 10:10

## 2021-12-27 RX ADMIN — DEXAMETHASONE SODIUM PHOSPHATE 10 MG: 10 INJECTION, SOLUTION INTRAMUSCULAR; INTRAVENOUS at 08:37

## 2021-12-27 RX ADMIN — SODIUM CHLORIDE 20 ML/HR: 0.9 INJECTION, SOLUTION INTRAVENOUS at 08:30

## 2021-12-27 NOTE — PROGRESS NOTES
Pt  Denies new symptoms or concerns today  Labs reviewed  Taxotere and Zometa ordered for infusion today

## 2021-12-27 NOTE — PROGRESS NOTES
Pt  Tolerated infusion today without adverse event  Pt  Will return tomorrow as scheduled for Neulasta  AVS provided

## 2021-12-28 ENCOUNTER — HOSPITAL ENCOUNTER (OUTPATIENT)
Dept: INFUSION CENTER | Facility: CLINIC | Age: 81
Discharge: HOME | End: 2021-12-28
Payer: COMMERCIAL

## 2021-12-28 VITALS — TEMPERATURE: 96.7 F

## 2021-12-28 DIAGNOSIS — C79.51 BONE METASTASES (HCC): ICD-10-CM

## 2021-12-28 DIAGNOSIS — C61 PROSTATE CANCER (HCC): Primary | ICD-10-CM

## 2021-12-28 DIAGNOSIS — T45.1X5A CHEMOTHERAPY-INDUCED NEUTROPENIA (HCC): ICD-10-CM

## 2021-12-28 DIAGNOSIS — D70.1 CHEMOTHERAPY-INDUCED NEUTROPENIA (HCC): ICD-10-CM

## 2021-12-28 PROCEDURE — 96372 THER/PROPH/DIAG INJ SC/IM: CPT

## 2021-12-28 RX ADMIN — PEGFILGRASTIM 6 MG: 6 INJECTION SUBCUTANEOUS at 10:08

## 2021-12-28 NOTE — TELEPHONE ENCOUNTER
Please review previous encounter [Care Plan reviewed and provided to patient/caregiver] : Care plan reviewed and provided to patient/caregiver [Understands and communicates without difficulty] : Patient/Caregiver understands and communicates without difficulty

## 2022-01-01 ENCOUNTER — APPOINTMENT (OUTPATIENT)
Dept: LAB | Facility: MEDICAL CENTER | Age: 82
End: 2022-01-01

## 2022-01-01 ENCOUNTER — HOME CARE VISIT (OUTPATIENT)
Dept: HOME HEALTH SERVICES | Facility: HOME HEALTHCARE | Age: 82
End: 2022-01-01

## 2022-01-01 ENCOUNTER — HOSPICE ADMISSION (OUTPATIENT)
Dept: HOME HOSPICE | Facility: HOSPICE | Age: 82
End: 2022-01-01

## 2022-01-01 DIAGNOSIS — C79.51 BONE METASTASES: Primary | ICD-10-CM

## 2022-01-01 DIAGNOSIS — C61 PROSTATE CANCER (HCC): ICD-10-CM

## 2022-01-01 DIAGNOSIS — D70.1 CHEMOTHERAPY-INDUCED NEUTROPENIA: Primary | ICD-10-CM

## 2022-01-01 DIAGNOSIS — T45.1X5A CHEMOTHERAPY-INDUCED NEUTROPENIA: Primary | ICD-10-CM

## 2022-01-01 DIAGNOSIS — C79.51 BONE METASTASES: ICD-10-CM

## 2022-01-01 DIAGNOSIS — T45.1X5A CHEMOTHERAPY-INDUCED NEUTROPENIA: ICD-10-CM

## 2022-01-01 DIAGNOSIS — D64.81 ANTINEOPLASTIC CHEMOTHERAPY INDUCED ANEMIA: Primary | ICD-10-CM

## 2022-01-01 DIAGNOSIS — T45.1X5A ANTINEOPLASTIC CHEMOTHERAPY INDUCED ANEMIA: Primary | ICD-10-CM

## 2022-01-01 DIAGNOSIS — D64.9 ANEMIA, UNSPECIFIED TYPE: ICD-10-CM

## 2022-01-01 DIAGNOSIS — D70.1 CHEMOTHERAPY-INDUCED NEUTROPENIA: ICD-10-CM

## 2022-01-01 LAB
ANISOCYTOSIS BLD QL SMEAR: PRESENT
BASOPHILS # BLD MANUAL: 0.07 THOUSAND/UL (ref 0–0.1)
BASOPHILS NFR MAR MANUAL: 1 % (ref 0–1)
DACRYOCYTES BLD QL SMEAR: PRESENT
EOSINOPHIL # BLD MANUAL: 0.07 THOUSAND/UL (ref 0–0.4)
EOSINOPHIL NFR BLD MANUAL: 1 % (ref 0–6)
ERYTHROCYTE [DISTWIDTH] IN BLOOD BY AUTOMATED COUNT: 21 % (ref 11.6–15.1)
HCT VFR BLD AUTO: 24.9 % (ref 36.5–49.3)
HGB BLD-MCNC: 7.5 G/DL (ref 12–17)
LYMPHOCYTES # BLD AUTO: 0.28 THOUSAND/UL (ref 0.6–4.47)
LYMPHOCYTES # BLD AUTO: 4 % (ref 14–44)
MACROCYTES BLD QL AUTO: PRESENT
MCH RBC QN AUTO: 30.2 PG (ref 26.8–34.3)
MCHC RBC AUTO-ENTMCNC: 30.1 G/DL (ref 31.4–37.4)
MCV RBC AUTO: 100 FL (ref 82–98)
METAMYELOCYTES NFR BLD MANUAL: 4 % (ref 0–1)
MONOCYTES # BLD AUTO: 0.42 THOUSAND/UL (ref 0–1.22)
MONOCYTES NFR BLD: 6 % (ref 4–12)
MYELOCYTES NFR BLD MANUAL: 2 % (ref 0–1)
NEUTROPHILS # BLD MANUAL: 5.67 THOUSAND/UL (ref 1.85–7.62)
NEUTS BAND NFR BLD MANUAL: 9 % (ref 0–8)
NEUTS SEG NFR BLD AUTO: 72 % (ref 43–75)
OVALOCYTES BLD QL SMEAR: PRESENT
PLATELET # BLD AUTO: 95 THOUSANDS/UL (ref 149–390)
PLATELET BLD QL SMEAR: ABNORMAL
PMV BLD AUTO: 10.2 FL (ref 8.9–12.7)
POIKILOCYTOSIS BLD QL SMEAR: PRESENT
POLYCHROMASIA BLD QL SMEAR: PRESENT
RBC # BLD AUTO: 2.48 MILLION/UL (ref 3.88–5.62)
RBC MORPH BLD: PRESENT
VARIANT LYMPHS # BLD AUTO: 1 %
WBC # BLD AUTO: 7 THOUSAND/UL (ref 4.31–10.16)

## 2022-01-01 PROCEDURE — 85007 BL SMEAR W/DIFF WBC COUNT: CPT

## 2022-01-01 PROCEDURE — 36415 COLL VENOUS BLD VENIPUNCTURE: CPT

## 2022-01-01 PROCEDURE — 85027 COMPLETE CBC AUTOMATED: CPT

## 2022-01-01 RX ORDER — SODIUM CHLORIDE 9 MG/ML
20 INJECTION, SOLUTION INTRAVENOUS ONCE
Status: CANCELLED | OUTPATIENT
Start: 2022-01-01

## 2022-01-01 RX ORDER — ACETAMINOPHEN 325 MG/1
650 TABLET ORAL ONCE
Status: CANCELLED
Start: 2022-01-01 | End: 2022-01-01

## 2022-01-01 RX ORDER — SODIUM CHLORIDE 9 MG/ML
75 INJECTION, SOLUTION INTRAVENOUS CONTINUOUS
Status: CANCELLED | OUTPATIENT
Start: 2022-01-01

## 2022-01-10 ENCOUNTER — TELEPHONE (OUTPATIENT)
Dept: GYNECOLOGIC ONCOLOGY | Facility: CLINIC | Age: 82
End: 2022-01-10

## 2022-01-10 NOTE — TELEPHONE ENCOUNTER
Patient called and was told he should have received a phone call by now for a [employer] Program? He would like a call back to find out what is going on with that as he has not heard anything  His last cycle is next week and he is getting anxious for this next step   Patient can be reached at [phone]

## 2022-01-12 DIAGNOSIS — R60.0 LOCALIZED EDEMA: ICD-10-CM

## 2022-01-12 NOTE — TELEPHONE ENCOUNTER
Spoke with pt and advised the recommendation for some time has been for him to see an oncologist at [facility] that can facilitate this   Pt voiced frustration that Dr Pierre Hampton had not enrolled him in the program

## 2022-01-13 LAB — PSA SERPL-MCNC: 2.73 NG/ML

## 2022-01-13 RX ORDER — HYDROCHLOROTHIAZIDE 25 MG/1
25 TABLET ORAL DAILY
Qty: 90 TABLET | Refills: 1 | Status: SHIPPED | OUTPATIENT
Start: 2022-01-13 | End: 2022-07-08 | Stop reason: SDUPTHER

## 2022-01-14 DIAGNOSIS — I10 ESSENTIAL HYPERTENSION: ICD-10-CM

## 2022-01-17 ENCOUNTER — HOSPITAL ENCOUNTER (OUTPATIENT)
Dept: INFUSION CENTER | Facility: CLINIC | Age: 82
Discharge: HOME | End: 2022-01-17
Payer: COMMERCIAL

## 2022-01-17 VITALS
WEIGHT: 211.2 LBS | HEART RATE: 80 BPM | BODY MASS INDEX: 33.15 KG/M2 | HEIGHT: 67 IN | RESPIRATION RATE: 18 BRPM | DIASTOLIC BLOOD PRESSURE: 65 MMHG | SYSTOLIC BLOOD PRESSURE: 135 MMHG | TEMPERATURE: 96.8 F

## 2022-01-17 DIAGNOSIS — D70.1 CHEMOTHERAPY-INDUCED NEUTROPENIA (HCC): ICD-10-CM

## 2022-01-17 DIAGNOSIS — T45.1X5A CHEMOTHERAPY-INDUCED NEUTROPENIA (HCC): ICD-10-CM

## 2022-01-17 DIAGNOSIS — C61 PROSTATE CANCER (HCC): Primary | ICD-10-CM

## 2022-01-17 DIAGNOSIS — C79.51 BONE METASTASES (HCC): ICD-10-CM

## 2022-01-17 PROCEDURE — 96367 TX/PROPH/DG ADDL SEQ IV INF: CPT

## 2022-01-17 PROCEDURE — 96413 CHEMO IV INFUSION 1 HR: CPT

## 2022-01-17 RX ORDER — METOPROLOL SUCCINATE 25 MG/1
25 TABLET, EXTENDED RELEASE ORAL DAILY
Qty: 90 TABLET | Refills: 1 | Status: SHIPPED | OUTPATIENT
Start: 2022-01-17 | End: 2022-07-11 | Stop reason: SDUPTHER

## 2022-01-17 RX ORDER — SODIUM CHLORIDE 9 MG/ML
20 INJECTION, SOLUTION INTRAVENOUS ONCE
Status: COMPLETED | OUTPATIENT
Start: 2022-01-17 | End: 2022-01-17

## 2022-01-17 RX ADMIN — SODIUM CHLORIDE 20 ML/HR: 0.9 INJECTION, SOLUTION INTRAVENOUS at 08:33

## 2022-01-17 RX ADMIN — DEXAMETHASONE SODIUM PHOSPHATE 10 MG: 10 INJECTION, SOLUTION INTRAMUSCULAR; INTRAVENOUS at 08:36

## 2022-01-17 RX ADMIN — DOCETAXEL 150.8 MG: 20 INJECTION, SOLUTION, CONCENTRATE INTRAVENOUS at 09:14

## 2022-01-17 NOTE — PLAN OF CARE
Problem: Potential for Falls  Goal: Patient will remain free of falls  Description: INTERVENTIONS:  - Educate patient/family on patient safety including physical limitations  - Instruct patient to call for assistance with activity   - Consult OT/PT to assist with strengthening/mobility   - Keep Call bell within reach  - Keep bed low and locked with side rails adjusted as appropriate  - Keep care items and personal belongings within reach  - Initiate and maintain comfort rounds  - Make Fall Risk Sign visible to staff  - Apply yellow socks and bracelet for high fall risk patients  - Consider moving patient to room near nurses station  Outcome: Progressing History of bilateral hip replacements

## 2022-01-18 ENCOUNTER — HOSPITAL ENCOUNTER (OUTPATIENT)
Dept: INFUSION CENTER | Facility: CLINIC | Age: 82
Discharge: HOME | End: 2022-01-18
Payer: COMMERCIAL

## 2022-01-18 VITALS — TEMPERATURE: 97.8 F

## 2022-01-18 DIAGNOSIS — D70.1 CHEMOTHERAPY-INDUCED NEUTROPENIA (HCC): ICD-10-CM

## 2022-01-18 DIAGNOSIS — T45.1X5A CHEMOTHERAPY-INDUCED NEUTROPENIA (HCC): ICD-10-CM

## 2022-01-18 DIAGNOSIS — C61 PROSTATE CANCER (HCC): Primary | ICD-10-CM

## 2022-01-18 DIAGNOSIS — C79.51 BONE METASTASES (HCC): ICD-10-CM

## 2022-01-18 PROCEDURE — 96372 THER/PROPH/DIAG INJ SC/IM: CPT

## 2022-01-18 RX ADMIN — PEGFILGRASTIM 6 MG: 6 INJECTION SUBCUTANEOUS at 10:28

## 2022-01-27 ENCOUNTER — RA CDI HCC (OUTPATIENT)
Dept: OTHER | Facility: HOSPITAL | Age: 82
End: 2022-01-27

## 2022-01-27 NOTE — PROGRESS NOTES
I70 0    [facility] 75  coding opportunities             Chart Reviewed * (Number of) Inbasket suggestions sent to Provider: 1                  Patients insurance company: [employer] (Medicare Advantage and Commercial)

## 2022-01-31 ENCOUNTER — VBI (OUTPATIENT)
Dept: ADMINISTRATIVE | Facility: OTHER | Age: 82
End: 2022-01-31

## 2022-02-03 ENCOUNTER — OFFICE VISIT (OUTPATIENT)
Dept: HEMATOLOGY ONCOLOGY | Facility: CLINIC | Age: 82
End: 2022-02-03
Payer: COMMERCIAL

## 2022-02-03 VITALS
DIASTOLIC BLOOD PRESSURE: 70 MMHG | RESPIRATION RATE: 18 BRPM | WEIGHT: 211 LBS | HEIGHT: 67 IN | OXYGEN SATURATION: 97 % | HEART RATE: 76 BPM | SYSTOLIC BLOOD PRESSURE: 130 MMHG | BODY MASS INDEX: 33.12 KG/M2

## 2022-02-03 DIAGNOSIS — C79.51 BONE METASTASES (HCC): ICD-10-CM

## 2022-02-03 DIAGNOSIS — C61 PROSTATE CANCER (HCC): Primary | ICD-10-CM

## 2022-02-03 PROCEDURE — 3075F SYST BP GE 130 - 139MM HG: CPT | Performed by: INTERNAL MEDICINE

## 2022-02-03 PROCEDURE — 1160F RVW MEDS BY RX/DR IN RCRD: CPT | Performed by: INTERNAL MEDICINE

## 2022-02-03 PROCEDURE — 1036F TOBACCO NON-USER: CPT | Performed by: INTERNAL MEDICINE

## 2022-02-03 PROCEDURE — 99214 OFFICE O/P EST MOD 30 MIN: CPT | Performed by: INTERNAL MEDICINE

## 2022-02-03 PROCEDURE — 3078F DIAST BP <80 MM HG: CPT | Performed by: INTERNAL MEDICINE

## 2022-02-03 NOTE — PROGRESS NOTES
Hematology Outpatient Follow - Up Note  Keerthi Rasheed [de-identified] y o  male MRN: @ Encounter: [id number]        Date:  2/3/2022        Assessment/ Plan:    28-year-old  male who was seen by Urology for elevation of the PSA, when he presented in June 2017 with PSA of 4 6, Subsequently in September of 2017 PSA was 7 3 and in May of 2018 PSA was 8  6   A biopsy was recommended but the patient had just undergone percutaneous stenting of the heart and he was on dual anti-platelet therapy      MRI of the abdomen in April 2018 showed 2 simple cyst in the right hepatic lobe, bilateral renal cysts the largest 1 measuring 10 cm in the left lower lobe     In October of 2018 PSA was 9 8  CT scan of the chest 9/28/2018 showed sclerotic lesions in T10, 5 mm nodule in the right lower lobe   Bone scan showed activity in the posterior T10 level and left posterior 7th rib area concerning for osseous metastases and increased activity in the distal right clavicle might be degenerative or metastatic area      He was diagnosed with castration sensitive metastatic prostate cancer  [facility] [place] [facility] company could not approve enzalutamide, he also has a high co-payment for abiraterone     The patient initiated on samples of enzalutamide 160 mg p o  daily since the beginning of December 2018   His insurance approved abiraterone however he had a high co-payment        We were finally able to get financial assistance through [employer] started Zytiga (abiraterone) 2/3/2019 with prednisone 5mg po bid without side effects      In May 2020 PSA 0 5, bone scan showed activity in the anterior 5th rib area, right humerus area most likely consistent with metastatic disease, no visceral disease on the CT scan, we had hard time getting enzalutamide , the patient was initiated on enzalutamide on 06/02/2020, PSA at the time of 0 8     PSA 1 4 in October 2020, normal alkaline phosphatase           Genes analyzed of germ line test  LIONEL, BARD1, BRCA1, BRCA2, BRIP1, CDH1, CHEK2, DICER1, EPCAM*, HOXB13, MLH1, MSH2,  MSH6, NBN, NF1, PALB2, PMS2, PTEN, RAD50, RAD51C, RAD51D, SMARCA4, STK11, TP53 all of these were negative     Evaluated at [facility] the decision for radiation therapy to oligo metastases and the primary site of the tumor depends on the PET scan finding which showed multiple metastatic disease, patient to meet with Radiation Oncology      PET scan on 11/13/2020 showed radiotracer retention in the left posterior bladder suspicious for malignancy with prostate cancer invasion progressing from prior CT scan mild to moderate uptake in the few small inguinal lymph nodes, sclerotic lesions in the spine with uptake suspicious for metastases     Radiation therapy in the February 2021- April 2021      Progression of disease by bone scan in June 2021 with multiple thoracic, right ribs area, hip area involvement, PSA 13 ( 7 2 on 04/2021)        Cycle 1 Taxotere 7/12/2021    Cycle 10  On 01/17/2022, PSA 2 4, will schedule for bone scan and CT scan of the abdomen and pelvis and will proceed from there, he was evaluated by multiple 2nd opinions, we are await for bone scan and CT scan of the abdomen and pelvis if oligo metastases did than 5 foci he might benefit from radiation therapy    Hoping for PSMA antibodies to be approved soon        Labs and imaging studies are reviewed by ordering provider once results are available  If there are findings that need immediate attention, you will be contacted when results available  Discussing results and the implication on your healthcare is best discussed in person at your follow-up visit  HPI:    81-year-old  male who was seen by Urology for elevation of the PSA, when he presented in June 2017 with PSA of 4 6, Subsequently in September of 2017 PSA was 7 3 and in May of 2018 PSA was 8  6   A biopsy was recommended but the patient had just undergone percutaneous stenting of the heart and he was on dual anti-platelet therapy      MRI of the abdomen in April 2018 showed 2 simple cyst in the right hepatic lobe, bilateral renal cysts the largest 1 measuring 10 cm in the left lower lobe     In October of 2018 PSA was 9 8  CT scan of the chest 9/28/2018 showed sclerotic lesions in T10, 5 mm nodule in the right lower lobe   Bone scan showed activity in the posterior T10 level and left posterior 7th rib area concerning for osseous metastases and increased activity in the distal right clavicle might be degenerative or metastatic area      He was diagnosed with castration sensitive metastatic prostate cancer  [facility] [place] insurance company could not approve enzalutamide, he also has a high co-payment for abiraterone     The patient initiated on samples of enzalutamide 160 mg p o  daily since the beginning of December 2018   His insurance approved abiraterone however he had a high co-payment        We were finally able to get financial assistance through [employer] and [employer] started Zytiga (abiraterone) 2/3/2019 with prednisone 5mg po bid without side effects      In May 2020 PSA 0 5, bone scan showed activity in the anterior 5th rib area, right humerus area most likely consistent with metastatic disease, no visceral disease on the CT scan, we had hard time getting enzalutamide , the patient was initiated on enzalutamide on 06/02/2020, PSA at the time of 0 8     PSA 1 4 in October 2020, normal alkaline phosphatase           Genes analyzed of germ line test  LIONEL, BARD1, BRCA1, BRCA2, BRIP1, CDH1, CHEK2, DICER1, EPCAM*, HOXB13, MLH1, MSH2,  MSH6, NBN, NF1, PALB2, PMS2, PTEN, RAD50, RAD51C, RAD51D, SMARCA4, STK11, TP53 all of these were negative     Evaluated at [facility] the decision for radiation therapy to oligo metastases and the primary site of the tumor depends on the PET scan finding which showed multiple metastatic disease, patient to meet with Radiation Oncology      PET scan on 11/13/2020 showed radiotracer retention in the left posterior bladder suspicious for malignancy with prostate cancer invasion progressing from prior CT scan mild to moderate uptake in the few small inguinal lymph nodes, sclerotic lesions in the spine with uptake suspicious for metastases     Radiation therapy in the February 2021- April 2021      Progression of disease by bone scan in June 2021 with multiple thoracic, right ribs area, hip area involvement, PSA 13 ( 7 2 on 04/2021)        Cycle 1 Taxotere 7/12/2021  Cycle 10  On 01/17/2022 PSA in the range of 2  Interval History:        Previous Treatment:         Test Results:    Imaging: No results found  Labs:   Lab Results   Component Value Date    WBC 8 2 01/13/2022    HGB 11 2 (L) 01/13/2022    HCT 32 7 (L) 01/13/2022    MCV 98 8 01/13/2022     01/13/2022     Lab Results   Component Value Date     09/30/2017    K 4 3 01/13/2022     01/13/2022    CO2 30 01/13/2022    BUN 22 01/13/2022    CREATININE 1 09 01/13/2022    GLUF 97 08/08/2019    CALCIUM 9 2 01/13/2022    AST 18 01/13/2022    ALT 24 01/13/2022    ALKPHOS 55 01/13/2022    PROT 6 5 09/30/2017    BILITOT 0 7 09/30/2017    EGFR 61 12/02/2019       No results found for: IRON, TIBC, FERRITIN    No results found for: [id number]      ROS: Review of Systems   Constitutional: Negative  Negative for appetite change, chills, diaphoresis, fatigue, fever and unexpected weight change  HENT:   Negative for hearing loss, lump/mass, mouth sores, nosebleeds, sore throat, trouble swallowing and voice change  Eyes: Negative  Negative for eye problems and icterus  Respiratory: Negative  Negative for chest tightness, cough, hemoptysis and shortness of breath  Cardiovascular: Negative for chest pain and leg swelling  Gastrointestinal: Negative for abdominal distention, abdominal pain, blood in stool, constipation, diarrhea and nausea  Endocrine: Negative      Genitourinary: Negative for dysuria, frequency, hematuria and pelvic pain  Musculoskeletal: Negative  Negative for arthralgias, back pain, flank pain, gait problem, myalgias and neck stiffness  Skin: Negative for itching and rash  Neurological: Negative for dizziness, gait problem, headaches, light-headedness, numbness and speech difficulty  Hematological: Negative for adenopathy  Does not bruise/bleed easily  Psychiatric/Behavioral: Negative for confusion, decreased concentration, depression and sleep disturbance  The patient is not nervous/anxious  Current Medications: Reviewed  Allergies: Reviewed  PMH/FH/SH:  Reviewed      Physical Exam:    Body surface area is 2 07 meters squared  Wt Readings from Last 3 Encounters:   02/03/22 95 7 kg (211 lb)   01/17/22 95 8 kg (211 lb 3 2 oz)   12/27/21 95 8 kg (211 lb 1 5 oz)        Temp Readings from Last 3 Encounters:   01/18/22 97 8 °F (36 6 °C) (Tympanic)   01/17/22 (!) 96 8 °F (36 °C) (Temporal)   12/28/21 (!) 96 7 °F (35 9 °C) (Tympanic)        BP Readings from Last 3 Encounters:   02/03/22 130/70   01/17/22 135/65   12/27/21 110/62         Pulse Readings from Last 3 Encounters:   02/03/22 76   01/17/22 80   12/27/21 68        Physical Exam  Vitals reviewed  Constitutional:       General: He is not in acute distress  Appearance: He is well-developed  He is not diaphoretic  HENT:      Head: Normocephalic and atraumatic  Eyes:      Conjunctiva/sclera: Conjunctivae normal    Neck:      Trachea: No tracheal deviation  Cardiovascular:      Rate and Rhythm: Normal rate and regular rhythm  Heart sounds: No murmur heard  No friction rub  No gallop  Pulmonary:      Effort: Pulmonary effort is normal  No respiratory distress  Breath sounds: Normal breath sounds  No wheezing or rales  Chest:      Chest wall: No tenderness  Abdominal:      General: There is no distension  Palpations: Abdomen is soft  Tenderness: There is no abdominal tenderness  Musculoskeletal:      Cervical back: Normal range of motion and neck supple  Right lower leg: No edema  Left lower leg: No edema  Lymphadenopathy:      Cervical: No cervical adenopathy  Skin:     General: Skin is warm and dry  Coloration: Skin is not pale  Findings: No erythema  Neurological:      Mental Status: He is alert and oriented to person, place, and time  Psychiatric:         Behavior: Behavior normal          Thought Content: Thought content normal          Judgment: Judgment normal          ECO    Goals and Barriers:  Current Goal: Minimize effects of disease  Barriers: None  Patient's Capacity to Self Care:  Patient is able to self care      Code Status: [unfilled]

## 2022-02-15 ENCOUNTER — TELEPHONE (OUTPATIENT)
Dept: FAMILY MEDICINE CLINIC | Facility: CLINIC | Age: 82
End: 2022-02-15

## 2022-02-15 ENCOUNTER — TELEPHONE (OUTPATIENT)
Dept: HEMATOLOGY ONCOLOGY | Facility: CLINIC | Age: 82
End: 2022-02-15

## 2022-02-15 NOTE — TELEPHONE ENCOUNTER
Spoke with pt and let him know bone scan has been denied by his insurance as they only allow this test every 3 months  Pt verbalized understanding  He asked that we notify Dr Carolyn Buerger at [facility]  Left voicemail on office voicemail

## 2022-02-18 ENCOUNTER — TELEPHONE (OUTPATIENT)
Dept: HEMATOLOGY ONCOLOGY | Facility: CLINIC | Age: 82
End: 2022-02-18

## 2022-02-18 NOTE — TELEPHONE ENCOUNTER
SYMPTOM [facility]   Reason for patient call? Swelling    Patient's primary oncologist? Ju Goodrich   Name of RN call was transferred to?  Jeana   Time call was transferred to RN? 10:52   Informed patient that the nurse may not be available and they will reach voicemail, they should leave a message and call will be returned

## 2022-02-18 NOTE — TELEPHONE ENCOUNTER
Per Dr Linares Res pt should resume Prednisone 5 mg twice a day  Pt in agreement with plan  Has ample supply

## 2022-02-18 NOTE — TELEPHONE ENCOUNTER
Received voicemail from pt reporting pedal edema since stopping Prednisone  Worsens as the day goes on  Will review with provider  Pt also asking about rescheduling his bone scan  Rescheduled to 3/17

## 2022-02-21 ENCOUNTER — TELEPHONE (OUTPATIENT)
Dept: FAMILY MEDICINE CLINIC | Facility: CLINIC | Age: 82
End: 2022-02-21

## 2022-02-21 NOTE — TELEPHONE ENCOUNTER
Voicemail from patient questioning order for stress test that expires next month  He came across it while looking at [address]  LMOM for patient informing him it was ordered 3/8/2018, and that he could disregard it at this point

## 2022-02-24 ENCOUNTER — APPOINTMENT (OUTPATIENT)
Dept: RADIOLOGY | Facility: HOSPITAL | Age: 82
End: 2022-02-24
Attending: INTERNAL MEDICINE
Payer: COMMERCIAL

## 2022-02-24 ENCOUNTER — HOSPITAL ENCOUNTER (OUTPATIENT)
Dept: CT IMAGING | Facility: HOSPITAL | Age: 82
Discharge: HOME | End: 2022-02-24
Attending: INTERNAL MEDICINE
Payer: COMMERCIAL

## 2022-02-24 DIAGNOSIS — C61 PROSTATE CANCER (HCC): ICD-10-CM

## 2022-02-24 DIAGNOSIS — C79.51 BONE METASTASES (HCC): ICD-10-CM

## 2022-02-24 PROCEDURE — G1004 CDSM NDSC: HCPCS

## 2022-02-24 PROCEDURE — 71260 CT THORAX DX C+: CPT

## 2022-02-24 PROCEDURE — 74177 CT ABD & PELVIS W/CONTRAST: CPT

## 2022-02-24 RX ADMIN — IOHEXOL 100 ML: 350 INJECTION, SOLUTION INTRAVENOUS at 11:53

## 2022-02-25 LAB — PSA SERPL-MCNC: 3.33 NG/ML

## 2022-03-01 ENCOUNTER — OFFICE VISIT (OUTPATIENT)
Dept: HEMATOLOGY ONCOLOGY | Facility: CLINIC | Age: 82
End: 2022-03-01
Payer: COMMERCIAL

## 2022-03-01 VITALS
WEIGHT: 215 LBS | SYSTOLIC BLOOD PRESSURE: 122 MMHG | HEIGHT: 67 IN | BODY MASS INDEX: 33.74 KG/M2 | DIASTOLIC BLOOD PRESSURE: 80 MMHG | RESPIRATION RATE: 17 BRPM | HEART RATE: 67 BPM | TEMPERATURE: 97.6 F | OXYGEN SATURATION: 96 %

## 2022-03-01 DIAGNOSIS — C79.51 BONE METASTASES (HCC): ICD-10-CM

## 2022-03-01 DIAGNOSIS — C61 PROSTATE CANCER (HCC): Primary | ICD-10-CM

## 2022-03-01 PROCEDURE — 99214 OFFICE O/P EST MOD 30 MIN: CPT | Performed by: INTERNAL MEDICINE

## 2022-03-01 PROCEDURE — 1160F RVW MEDS BY RX/DR IN RCRD: CPT | Performed by: INTERNAL MEDICINE

## 2022-03-01 PROCEDURE — 1036F TOBACCO NON-USER: CPT | Performed by: INTERNAL MEDICINE

## 2022-03-01 NOTE — PROGRESS NOTES
Hematology Outpatient Follow - Up Note  Manuelito Torres [de-identified] y o  male MRN: @ Encounter: [id number]        Date:  3/1/2022        Assessment/ Plan:    58-year-old  male who was seen by Urology for elevation of the PSA, when he presented in June 2017 with PSA of 4 6, Subsequently in September of 2017 PSA was 7 3 and in May of 2018 PSA was 8  6   A biopsy was recommended but the patient had just undergone percutaneous stenting of the heart and he was on dual anti-platelet therapy      MRI of the abdomen in April 2018 showed 2 simple cyst in the right hepatic lobe, bilateral renal cysts the largest 1 measuring 10 cm in the left lower lobe     In October of 2018 PSA was 9 8  CT scan of the chest 9/28/2018 showed sclerotic lesions in T10, 5 mm nodule in the right lower lobe   Bone scan showed activity in the posterior T10 level and left posterior 7th rib area concerning for osseous metastases and increased activity in the distal right clavicle might be degenerative or metastatic area      He was diagnosed with castration sensitive metastatic prostate cancer  [facility] insurance company could not approve enzalutamide, he also has a high co-payment for abiraterone     The patient initiated on samples of enzalutamide 160 mg p o  daily since the beginning of December 2018   His insurance approved abiraterone however he had a high co-payment        We were finally able to get financial assistance through Paula Chavez and Roseline [employer] (abiraterone) 2/3/2019 with prednisone 5mg po bid without side effects      In May 2020 PSA 0 5, bone scan showed activity in the anterior 5th rib area, right humerus area most likely consistent with metastatic disease, no visceral disease on the CT scan, we had hard time getting enzalutamide , the patient was initiated on enzalutamide on 06/02/2020, PSA at the time of 0 8     PSA 1 4 in October 2020, normal alkaline phosphatase           Genes analyzed of germ line test  LIONEL, BARD1, BRCA1, BRCA2, BRIP1, CDH1, CHEK2, DICER1, EPCAM*, HOXB13, MLH1, MSH2,  MSH6, NBN, NF1, PALB2, PMS2, PTEN, RAD50, RAD51C, RAD51D, SMARCA4, STK11, TP53 all of these were negative     Evaluated at [facility] the decision for radiation therapy to oligo metastases and the primary site of the tumor depends on the PET scan finding which showed multiple metastatic disease, patient to meet with Radiation Oncology      PET scan on 11/13/2020 showed radiotracer retention in the left posterior bladder suspicious for malignancy with prostate cancer invasion progressing from prior CT scan mild to moderate uptake in the few small inguinal lymph nodes, sclerotic lesions in the spine with uptake suspicious for metastases     Radiation therapy in the February 2021- April 2021      Progression of disease by bone scan in June 2021 with multiple thoracic, right ribs area, hip area involvement, PSA 13 ( 7 2 on 04/2021)        Cycle 1 Taxotere 7/12/2021    Cycle 10  On 01/17/2022    PSA 3 3 in March 2022, CT scan showed stable bony disease no evidence of visceral metastases however we looked at the CT scan together and there might be a slight progression of bony disease    He is scheduled to meet at [place] [address] for PSMA ADRYAN 177 approval    Otherwise he will be candidate for [facility] and imaging studies are reviewed by ordering provider once results are available  If there are findings that need immediate attention, you will be contacted when results available  Discussing results and the implication on your healthcare is best discussed in person at your follow-up visit  HPI:    58-year-old  male who was seen by Urology for elevation of the PSA, when he presented in June 2017 with PSA of 4 6, Subsequently in September of 2017 PSA was 7 3 and in May of 2018 PSA was 8  6   A biopsy was recommended but the patient had just undergone percutaneous stenting of the heart and he was on dual anti-platelet therapy      MRI of the abdomen in April 2018 showed 2 simple cyst in the right hepatic lobe, bilateral renal cysts the largest 1 measuring 10 cm in the left lower lobe     In October of 2018 PSA was 9 8  CT scan of the chest 9/28/2018 showed sclerotic lesions in T10, 5 mm nodule in the right lower lobe   Bone scan showed activity in the posterior T10 level and left posterior 7th rib area concerning for osseous metastases and increased activity in the distal right clavicle might be degenerative or metastatic area      He was diagnosed with castration sensitive metastatic prostate cancer  [facility] insurance company could not approve enzalutamide, he also has a high co-payment for abiraterone     The patient initiated on samples of enzalutamide 160 mg p o  daily since the beginning of December 2018   His insurance approved abiraterone however he had a high co-payment        We were finally able to get financial assistance through Karth[employer] Denis and Roseline started Zytiga (abiraterone) 2/3/2019 with prednisone 5mg po bid without side effects      In May 2020 PSA 0 5, bone scan showed activity in the anterior 5th rib area, right humerus area most likely consistent with metastatic disease, no visceral disease on the CT scan, we had hard time getting enzalutamide , the patient was initiated on enzalutamide on 06/02/2020, PSA at the time of 0 8     PSA 1 4 in October 2020, normal alkaline phosphatase           Genes analyzed of germ line test  LIONEL, BARD1, BRCA1, BRCA2, BRIP1, CDH1, CHEK2, DICER1, EPCAM*, HOXB13, MLH1, MSH2,  MSH6, NBN, NF1, PALB2, PMS2, PTEN, RAD50, RAD51C, RAD51D, SMARCA4, STK11, TP53 all of these were negative     Evaluated at [facility] the decision for radiation therapy to oligo metastases and the primary site of the tumor depends on the PET scan finding which showed multiple metastatic disease, patient to meet with Radiation Oncology      PET scan on 11/13/2020 showed radiotracer retention in the left posterior bladder suspicious for malignancy with prostate cancer invasion progressing from prior CT scan mild to moderate uptake in the few small inguinal lymph nodes, sclerotic lesions in the spine with uptake suspicious for metastases     Radiation therapy in the February 2021- April 2021      Progression of disease by bone scan in June 2021 with multiple thoracic, right ribs area, hip area involvement, PSA 13 ( 7 2 on 04/2021)        Cycle 1 Taxotere 7/12/2021      Cycle 10  On 01/17/2022 PSA in the range of 2    PSA 3 3 in March 2022    CT scan showed stable bony disease only however there was mild progression of the bony disease  Interval History:        Previous Treatment:         Test Results:    Imaging: CT chest abdomen pelvis w contrast    Result Date: 2/25/2022  Narrative: CT CHEST, ABDOMEN AND PELVIS WITH IV CONTRAST INDICATION:   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: Secondary malignant neoplasm of bone C61: Malignant neoplasm of prostate  COMPARISON:  CT 12/16/21 TECHNIQUE: CT examination of the chest, abdomen and pelvis was performed  Axial, sagittal, and coronal 2D reformatted images were created from the source data and submitted for interpretation  Radiation dose length product (DLP) for this visit:  747 mGy-cm   This examination, like all CT scans performed in the [facility], was performed utilizing techniques to minimize radiation dose exposure, including the use of iterative reconstruction and automated exposure control  IV Contrast:  100 mL of iohexol (OMNIPAQUE) Enteric Contrast: Enteric contrast was not administered  FINDINGS: CHEST LUNGS:  Lungs are clear  There is no tracheal or endobronchial lesion  PLEURA:  Unremarkable  HEART/GREAT VESSELS: Heart is unremarkable for patient's age  No thoracic aortic aneurysm  MEDIASTINUM AND JOSE ANTONIO:  Hiatal hernia CHEST WALL AND LOWER NECK:   Unremarkable  ABDOMEN LIVER/BILIARY TREE:  Unremarkable   GALLBLADDER:  No calcified gallstones  No pericholecystic inflammatory change  SPLEEN:  Unremarkable  PANCREAS:  Unremarkable  ADRENAL GLANDS:  Unremarkable  KIDNEYS/URETERS:  Stable cysts STOMACH AND BOWEL:  Unremarkable  APPENDIX:  No findings to suggest appendicitis  ABDOMINOPELVIC CAVITY:  No ascites  No pneumoperitoneum  No lymphadenopathy  VESSELS:  Unremarkable for patient's age  PELVIS REPRODUCTIVE ORGANS:  Metallic markers within the prostate URINARY BLADDER:  Thick walled  ABDOMINAL WALL/INGUINAL REGIONS:  Unremarkable  OSSEOUS STRUCTURES:  Stable sclerotic lesions in the left greater trochanter, left inferior ramus, both iliac wings, right posterior acetabulum and iliac, left posterior iliac, sacrum, L5, L4, L2,, T8 throughout T10, right ribs 4 posteriorly, 5 6 and 8 anteriorly, left lateral ribs 6 and 7, left posterior ribs 3 7 and 8  Overall these lesions are not significantly changed  Impression: 1  Stable sclerotic osseous lesions as described 2  No abnormality to suggest disease progression Workstation performed: [id number]       Labs:   Lab Results   Component Value Date    WBC 5 3 02/25/2022    HGB 11 7 (L) 02/25/2022    HCT 33 9 (L) 02/25/2022    MCV 93 6 02/25/2022     02/25/2022     Lab Results   Component Value Date     09/30/2017    K 4 1 02/25/2022     02/25/2022    CO2 30 02/25/2022    BUN 18 02/25/2022    CREATININE 1 11 02/25/2022    GLUF 97 08/08/2019    CALCIUM 9 1 02/25/2022    AST 16 02/25/2022    ALT 17 02/25/2022    ALKPHOS 49 02/25/2022    PROT 6 5 09/30/2017    BILITOT 0 7 09/30/2017    EGFR 61 12/02/2019       No results found for: IRON, TIBC, FERRITIN    No results found for: [id number]      ROS: Review of Systems   Constitutional: Negative  Negative for appetite change, chills, diaphoresis, fatigue, fever and unexpected weight change  HENT:   Negative for hearing loss, lump/mass, mouth sores, nosebleeds, sore throat, trouble swallowing and voice change  Eyes: Negative  Negative for eye problems and icterus  Respiratory: Positive for shortness of breath  Negative for chest tightness, cough and hemoptysis  Cardiovascular: Negative for chest pain and leg swelling  Gastrointestinal: Negative for abdominal distention, abdominal pain, blood in stool, constipation, diarrhea and nausea  Endocrine: Negative  Genitourinary: Negative for dysuria, frequency, hematuria and pelvic pain  Musculoskeletal: Negative  Negative for arthralgias, back pain, flank pain, gait problem, myalgias and neck stiffness  Skin: Negative for itching and rash  Neurological: Negative for dizziness, gait problem, headaches, light-headedness, numbness and speech difficulty  Hematological: Negative for adenopathy  Does not bruise/bleed easily  Psychiatric/Behavioral: Negative for confusion, decreased concentration, depression and sleep disturbance  The patient is not nervous/anxious  Current Medications: Reviewed  Allergies: Reviewed  PMH/FH/SH:  Reviewed      Physical Exam:    Body surface area is 2 08 meters squared  Wt Readings from Last 3 Encounters:   03/01/22 97 5 kg (215 lb)   02/03/22 95 7 kg (211 lb)   01/17/22 95 8 kg (211 lb 3 2 oz)        Temp Readings from Last 3 Encounters:   03/01/22 97 6 °F (36 4 °C)   01/18/22 97 8 °F (36 6 °C) (Tympanic)   01/17/22 (!) 96 8 °F (36 °C) (Temporal)        BP Readings from Last 3 Encounters:   03/01/22 122/80   02/03/22 130/70   01/17/22 135/65         Pulse Readings from Last 3 Encounters:   03/01/22 67   02/03/22 76   01/17/22 80        Physical Exam  Vitals reviewed  Constitutional:       General: He is not in acute distress  Appearance: He is well-developed  He is obese  He is not diaphoretic  HENT:      Head: Normocephalic and atraumatic  Eyes:      Conjunctiva/sclera: Conjunctivae normal    Neck:      Trachea: No tracheal deviation  Cardiovascular:      Rate and Rhythm: Normal rate and regular rhythm        Heart sounds: No murmur heard  No friction rub  No gallop  Pulmonary:      Effort: Pulmonary effort is normal  No respiratory distress  Breath sounds: Normal breath sounds  No wheezing or rales  Chest:      Chest wall: No tenderness  Abdominal:      General: There is no distension  Palpations: Abdomen is soft  Tenderness: There is no abdominal tenderness  Musculoskeletal:      Cervical back: Normal range of motion and neck supple  Lymphadenopathy:      Cervical: No cervical adenopathy  Skin:     General: Skin is warm and dry  Coloration: Skin is not pale  Findings: No erythema  Neurological:      Mental Status: He is alert and oriented to person, place, and time  Psychiatric:         Behavior: Behavior normal          Thought Content: Thought content normal          Judgment: Judgment normal          ECO    Goals and Barriers:  Current Goal: Minimize effects of disease  Barriers: None  Patient's Capacity to Self Care:  Patient is able to self care      Code Status: [unfilled]

## 2022-03-03 DIAGNOSIS — R35.1 NOCTURIA: Primary | ICD-10-CM

## 2022-03-03 NOTE — TELEPHONE ENCOUNTER
Continue flomax at bedtime and add myrbetriq 50mg daily each morning for frequency/nocturia   Keep 3/30 appt make sure has PVR done at visit thx!

## 2022-03-03 NOTE — TELEPHONE ENCOUNTER
Patient is having issues with urinary frequency in the evenings  Flomax has not been helping, and patient would like to discuss a medication change

## 2022-03-04 NOTE — TELEPHONE ENCOUNTER
Patient's medication seemed to be called in, but the pharmacy doesn't have the prescription  Please advise

## 2022-03-17 LAB
ALBUMIN SERPL-MCNC: 3.7 G/DL (ref 3.6–5.1)
ALBUMIN/GLOB SERPL: 1.8 (CALC) (ref 1–2.5)
ALP SERPL-CCNC: 54 U/L (ref 35–144)
ALT SERPL-CCNC: 15 U/L (ref 9–46)
AST SERPL-CCNC: 17 U/L (ref 10–35)
BASOPHILS # BLD AUTO: 41 CELLS/UL (ref 0–200)
BASOPHILS NFR BLD AUTO: 0.8 %
BILIRUB SERPL-MCNC: 0.5 MG/DL (ref 0.2–1.2)
BUN SERPL-MCNC: 18 MG/DL (ref 7–25)
BUN/CREAT SERPL: 15 (CALC) (ref 6–22)
CALCIUM SERPL-MCNC: 8.8 MG/DL (ref 8.6–10.3)
CHLORIDE SERPL-SCNC: 104 MMOL/L (ref 98–110)
CO2 SERPL-SCNC: 30 MMOL/L (ref 20–32)
CREAT SERPL-MCNC: 1.22 MG/DL (ref 0.7–1.11)
EOSINOPHIL # BLD AUTO: 291 CELLS/UL (ref 15–500)
EOSINOPHIL NFR BLD AUTO: 5.7 %
ERYTHROCYTE [DISTWIDTH] IN BLOOD BY AUTOMATED COUNT: 11.7 % (ref 11–15)
GLOBULIN SER CALC-MCNC: 2.1 G/DL (CALC) (ref 1.9–3.7)
GLUCOSE SERPL-MCNC: 106 MG/DL (ref 65–99)
HCT VFR BLD AUTO: 34.3 % (ref 38.5–50)
HGB BLD-MCNC: 11.6 G/DL (ref 13.2–17.1)
LYMPHOCYTES # BLD AUTO: 857 CELLS/UL (ref 850–3900)
LYMPHOCYTES NFR BLD AUTO: 16.8 %
MCH RBC QN AUTO: 31.3 PG (ref 27–33)
MCHC RBC AUTO-ENTMCNC: 33.8 G/DL (ref 32–36)
MCV RBC AUTO: 92.5 FL (ref 80–100)
MONOCYTES # BLD AUTO: 408 CELLS/UL (ref 200–950)
MONOCYTES NFR BLD AUTO: 8 %
NEUTROPHILS # BLD AUTO: 3504 CELLS/UL (ref 1500–7800)
NEUTROPHILS NFR BLD AUTO: 68.7 %
PLATELET # BLD AUTO: 220 THOUSAND/UL (ref 140–400)
PMV BLD REES-ECKER: 10.4 FL (ref 7.5–12.5)
POTASSIUM SERPL-SCNC: 3.8 MMOL/L (ref 3.5–5.3)
PROT SERPL-MCNC: 5.8 G/DL (ref 6.1–8.1)
PSA SERPL-MCNC: 3.79 NG/ML
RBC # BLD AUTO: 3.71 MILLION/UL (ref 4.2–5.8)
SL AMB EGFR AFRICAN AMERICAN: 64 ML/MIN/1.73M2
SL AMB EGFR NON AFRICAN AMERICAN: 56 ML/MIN/1.73M2
SODIUM SERPL-SCNC: 143 MMOL/L (ref 135–146)
WBC # BLD AUTO: 5.1 THOUSAND/UL (ref 3.8–10.8)

## 2022-03-21 ENCOUNTER — HOSPITAL ENCOUNTER (OUTPATIENT)
Dept: INFUSION CENTER | Facility: CLINIC | Age: 82
Discharge: HOME | End: 2022-03-21
Payer: COMMERCIAL

## 2022-03-21 VITALS
SYSTOLIC BLOOD PRESSURE: 129 MMHG | BODY MASS INDEX: 33.29 KG/M2 | HEART RATE: 84 BPM | TEMPERATURE: 96 F | DIASTOLIC BLOOD PRESSURE: 69 MMHG | RESPIRATION RATE: 18 BRPM | WEIGHT: 212.52 LBS

## 2022-03-21 DIAGNOSIS — C79.51 BONE METASTASES (HCC): ICD-10-CM

## 2022-03-21 DIAGNOSIS — C61 PROSTATE CANCER (HCC): Primary | ICD-10-CM

## 2022-03-21 PROCEDURE — 96365 THER/PROPH/DIAG IV INF INIT: CPT

## 2022-03-21 RX ORDER — SODIUM CHLORIDE 9 MG/ML
20 INJECTION, SOLUTION INTRAVENOUS ONCE
Status: COMPLETED | OUTPATIENT
Start: 2022-03-21 | End: 2022-03-21

## 2022-03-21 RX ORDER — SODIUM CHLORIDE 9 MG/ML
20 INJECTION, SOLUTION INTRAVENOUS ONCE
Status: CANCELLED | OUTPATIENT
Start: 2022-06-13

## 2022-03-21 RX ADMIN — ZOLEDRONIC ACID 3.3 MG: 4 INJECTION INTRAVENOUS at 09:03

## 2022-03-21 RX ADMIN — SODIUM CHLORIDE 20 ML/HR: 0.9 INJECTION, SOLUTION INTRAVENOUS at 08:49

## 2022-03-21 NOTE — PLAN OF CARE
Problem: Potential for Falls  Goal: Patient will remain free of falls  Description: INTERVENTIONS:  - Educate patient/family on patient safety including physical limitations  - Instruct patient to call for assistance with activity   - Consult OT/PT to assist with strengthening/mobility   - Keep Call bell within reach  - Keep bed low and locked with side rails adjusted as appropriate  - Keep care items and personal belongings within reach  - Initiate and maintain comfort rounds  - Make Fall Risk Sign visible to staff  - O- Apply yellow socks and bracelet for high fall risk patients  - Consider moving patient to room near nurses station  Outcome: Progressing

## 2022-03-21 NOTE — PROGRESS NOTES
Pt arrived to unit without complaint, denies any jaw pain, recent or upcoming dental procedures  Ca+ and Creat clearance reviewed, pt within parameters for treatment today, dose adjusted per pharmacy protocol  Zometa administered, pt tolerated well, left unit in stable condition  AVS provided

## 2022-03-29 ENCOUNTER — HOSPITAL ENCOUNTER (OUTPATIENT)
Dept: MRI IMAGING | Facility: HOSPITAL | Age: 82
Discharge: HOME | End: 2022-03-29
Payer: COMMERCIAL

## 2022-03-29 DIAGNOSIS — M25.511 ACUTE PAIN OF RIGHT SHOULDER: ICD-10-CM

## 2022-03-29 PROCEDURE — G1004 CDSM NDSC: HCPCS

## 2022-03-29 PROCEDURE — A9585 GADOBUTROL INJECTION: HCPCS | Performed by: RADIOLOGY

## 2022-03-29 PROCEDURE — 73223 MRI JOINT UPR EXTR W/O&W/DYE: CPT

## 2022-03-29 RX ADMIN — GADOBUTROL 10 ML: 604.72 INJECTION INTRAVENOUS at 10:51

## 2022-03-30 ENCOUNTER — OFFICE VISIT (OUTPATIENT)
Dept: UROLOGY | Facility: CLINIC | Age: 82
End: 2022-03-30
Payer: COMMERCIAL

## 2022-03-30 VITALS
SYSTOLIC BLOOD PRESSURE: 138 MMHG | DIASTOLIC BLOOD PRESSURE: 76 MMHG | BODY MASS INDEX: 33.36 KG/M2 | WEIGHT: 213 LBS | HEART RATE: 72 BPM

## 2022-03-30 DIAGNOSIS — R35.1 NOCTURIA: ICD-10-CM

## 2022-03-30 DIAGNOSIS — C61 PROSTATE CANCER (HCC): ICD-10-CM

## 2022-03-30 DIAGNOSIS — R35.0 URINARY FREQUENCY: Primary | ICD-10-CM

## 2022-03-30 LAB
BACTERIA UR QL AUTO: ABNORMAL /HPF
BILIRUB UR QL STRIP: NEGATIVE
CLARITY UR: CLEAR
COLOR UR: ABNORMAL
GLUCOSE UR STRIP-MCNC: NEGATIVE MG/DL
HGB UR QL STRIP.AUTO: NEGATIVE
KETONES UR STRIP-MCNC: NEGATIVE MG/DL
LEUKOCYTE ESTERASE UR QL STRIP: NEGATIVE
MUCOUS THREADS UR QL AUTO: ABNORMAL
NITRITE UR QL STRIP: NEGATIVE
NON-SQ EPI CELLS URNS QL MICRO: ABNORMAL /HPF
PH UR STRIP.AUTO: 6.5 [PH]
PROT UR STRIP-MCNC: NEGATIVE MG/DL
RBC #/AREA URNS AUTO: ABNORMAL /HPF
SP GR UR STRIP.AUTO: 1.01 (ref 1–1.03)
UROBILINOGEN UR STRIP-ACNC: <2 MG/DL
WBC #/AREA URNS AUTO: ABNORMAL /HPF

## 2022-03-30 PROCEDURE — 96372 THER/PROPH/DIAG INJ SC/IM: CPT

## 2022-03-30 PROCEDURE — 81001 URINALYSIS AUTO W/SCOPE: CPT | Performed by: NURSE PRACTITIONER

## 2022-03-30 PROCEDURE — 99213 OFFICE O/P EST LOW 20 MIN: CPT | Performed by: NURSE PRACTITIONER

## 2022-03-30 PROCEDURE — 87086 URINE CULTURE/COLONY COUNT: CPT | Performed by: NURSE PRACTITIONER

## 2022-03-30 NOTE — ASSESSMENT & PLAN NOTE
· Continue Myrbetriq 50 milligrams daily  · Avoid bladder irritants  · Hydrate with water  · Referral to pelvic floor physical therapy  · Send urine microscopic and culture  · Follow-up in 6 months for recheck of symptoms

## 2022-03-30 NOTE — ASSESSMENT & PLAN NOTE
· [place] 5+4=9 prostate cancer with progressive bony lesions despite advanced systemic therapy  · PSA 3 79 (03/17/2022)  · PSMA PET scan being completed at [facility] today  · Six-month Lupron given  · Continue follow-up with Hematology-Oncology  · Continue follow-up with [facility] as scheduled  · Return in 6 months

## 2022-03-30 NOTE — PATIENT INSTRUCTIONS
BLADDER HEALTH    WHAT IS CONSIDERED NORMAL?  The average bladder can hold about 2 cups of urine before it needs to be emptied   The normal range of voiding urine is 6 to 8 times during a 24 hour period  As we get older, our bladder capacity can get smaller and we may need to pass urine more frequently but usually not more than every 2 hours   Urine should flow easily without discomfort in a good, steady stream until the bladder is empty  No pushing or straining is necessary to empty the bladder   An urge is a signal that you feel as the bladder stretches to fill with urine  Urges can be felt even if the bladder is not full  Urges are not commands to go to the toilet, merely a signal and can be controlled  WHAT ARE GOOD BLADDER HABITS?  Take your time when emptying your bladder  Dont strain or push to empty your bladder  Make sure you empty your bladder completely each time you pass urine  Do not rush the process   Consistently ignoring the urge to go (waiting more than 4 hours between toileting) or urinating too infrequently may be convenient but not healthy for your bladder   Avoid going to the toilet just in case or more often than every 2 hours  It is usually not necessary to go when you feel the first urge  Try to go only when your bladder is full  Urgency and frequency of urination can be improved by retraining the bladder and spacing your fluid intake throughout the day  Practice good toilet habits  Dont let your bladder control your life  TIPS TO MAINTAIN GOOD BLADDER HABITS   Maintain a good fluid intake  Depending on your body size and environment, drink 6 -8 cups (8 oz each) of fluid per day unless otherwise advised by your doctor  Not enough fluid creates a foul odor and dark color of the urine     Limit the amount of caffeine (coffee, cola, chocolate or tea) and citrus foods that you consume as these foods can be associated with increased sensation of urinary urgency and frequency   Limit the amount of alcohol you drink  Alcohol increases urine production and makes it difficult for the brain to coordinate bladder control   Avoid constipation by maintaining a balanced diet of dietary fiber   Cigarette smoking is also irritating to the bladder surface and is associated with bladder cancer  In addition, the coughing associated with smoking may lead to increased incontinent episodes because of the increased pressure  HOW DIET CAN AFFECT YOUR BLADDER  Although there is no particular "diet" that can cure bladder control, there are certain dietary suggestions you can use to help control the problem  There are 2 points to consider when evaluating how your habits and diet may affect your bladder:    1  Foods and fluids can irritate the bladder  Some foods and beverages are thought to contribute to bladder leakage and irritability  However their effect on the bladder is not completely understood and you may want to see if eliminating one or all of these items improves your bladder control  If you are unable to give them up completely, it is recommended that you use the following items in moderation:   Acidic beverages and foods (orange juice, grapefruit juice, lemonade etc)   Alcoholic beverages   Vinegar   Coffee (regular and decaf)   Tea (regular and decaf)   Caffeinated beverages   Carbonated beverages          2  Drinking enough and the right kinds of fluids  Many people with bladder control issues decrease their intake of liquids in hope that they will need to urinate less frequently or have less urinary leakage   You should not restrict fluids to control your bladder  While a decrease in liquid intake does result in a decrease in the volume of urine, the smaller amount of urine may be more highly concentrated         Highly concentrated, dark yellow urine is irritating to the bladder surface and may actually cause you to go to the bathroom more frequently   It also encourages the growth of bacteria, which may lead to infections resulting in incontinence   Substitutions for Bladder Irritants: water is always the best beverage choice  Grape and apple juice are thirst quenchers are good selections and are not as irritating to the bladder   o Low acid fruits:  Pears, apricots, papaya, watermelon  o For coffee drinkers: KAVA®, Postum®, Loree®, Kaffree Purvi®  o For tea drinkers:  non-citrus or herbal and sun brewed tea    BEHAVIORAL THERAPY FOR IMPROVED BLADDER CONTROL      1  Urge Control Techniques       A  Stop whatever you are doing and concentrate on tre your pelvic floor muscles  B   Contract your pelvic floor muscles repetitively (as in your "flick" exercises)  C   Once the urgency has subsided, realize that sometimes the urgency is because you have a             full bladder and have to urinate  Other times the urgency is a false signal and you do not have a full bladder  D  If you have urgency triggered by running water, "key in the door," getting up from a sitting position, etc , contract your pelvic floor muscles prior to       initiating the activity  2   Daily Fluid Intake       A  Avoid drinking too little (less than 3 cups/day) or drinking too much (more than 6             cups/day)  B   Avoid caffeinated beverages  C   If voiding frequently at night, limit your liquid intake after 7 p m  3   Bowel Regularity--Constipation Makes Bladder Symptoms Worse       A  Drink enough liquids, eat plenty of high fiber food  B  Exercise       C  Avoid laxatives and enemas on a regular basis, this decreases the bowel's normal function  4   Voiding Schedules       A  Empty your bladder at 1½ to 2 hour intervals even if you may not have the urge to urinate             at that time    You may gradually increase the time between voidings to 30  minutes, until you can comfortably void every 3 hours        B  If you are voiding more frequently than every 1½ to 2 hours, resist the urge to go  by using urge control techniques (described in 1 )  normal...

## 2022-03-30 NOTE — PROGRESS NOTES
Assessment and plan:     Prostate cancer ([facility] [address] )  · Vitaliy 5+4=9 prostate cancer with progressive bony lesions despite advanced systemic therapy  · PSA 3 79 (03/17/2022)  · PSMA PET scan being completed at [facility] today  · Six-month Lupron given  · Continue follow-up with Hematology-Oncology  · Continue follow-up with [facility] as scheduled  · Return in 6 months    Urinary frequency  · Continue Myrbetriq 50 milligrams daily  · Avoid bladder irritants  · Hydrate with water  · Referral to pelvic floor physical therapy  · Send urine microscopic and culture  · Follow-up in 6 months for recheck of symptoms          AUBREY Goodwin    History of Present Illness     Leopold Glee is a [de-identified] y o  male with a history of high-volume high-risk Marcella 5+4=9 prostate cancer with progressive bony lesions despite advanced systemic therapy  He completed local radiation to the prostate  He is also following with oncology locally and [facility]  Completed chemo 8 weeks ago after 30 weeks of treatment  He reports being exhausted  He reports urinary frequency  He still takes flomax  He feels his flow is good  He was recently started on mirabegron on March 4  He feels no effect from this  He drinks about 36-48 ounces of water per day, a large cup of coffee or two per day  Recommended continuing the Myrbetriq and giving it some more time to be effective  Also referral was made to pelvic floor physical therapy to help with some of his symptoms  New pain in his left shoulder but has hx of shoulder separation  He also reports chronic back and hip pain that seems to have worsened  He is going for a PSMA pet scan at [facility] this afternoon  He is considering a newer treatment  He also has a follow up tomorrow with radiology    He is status post transurethral resection of a bladder tumor 09/08/2021  This did demonstrate 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        Laboratory     Lab Results Component Value Date    BUN 18 03/17/2022    CREATININE 1 22 (H) 03/17/2022       No components found for: GFR    Lab Results   Component Value Date    CALCIUM 8 8 03/17/2022     09/30/2017    K 3 8 03/17/2022    CO2 30 03/17/2022     03/17/2022       Lab Results   Component Value Date    WBC 5 1 03/17/2022    HGB 11 6 (L) 03/17/2022    HCT 34 3 (L) 03/17/2022    MCV 92 5 03/17/2022     03/17/2022       Lab Results   Component Value Date    PSA 3 79 03/17/2022    PSA 3 33 02/25/2022    PSA 2 73 01/13/2022       No results found for this or any previous visit (from the past 1 hour(s))  @RESULT(URINEMICROSCOPIC)@    @RESULT(URINECULTURE)@    Radiology     CT CHEST, ABDOMEN AND PELVIS WITH IV CONTRAST 2/24/2022     INDICATION:   C79 51: Secondary malignant neoplasm of bone  C61: Malignant neoplasm of prostate      COMPARISON:  CT 12/16/21     TECHNIQUE: CT examination of the chest, abdomen and pelvis was performed  Axial, sagittal, and coronal 2D reformatted images were created from the source data and submitted for interpretation      Radiation dose length product (DLP) for this visit:  747 mGy-cm   This examination, like all CT scans performed in the [facility], was performed utilizing techniques to minimize radiation dose exposure, including the use of iterative   reconstruction and automated exposure control      IV Contrast:  100 mL of iohexol (OMNIPAQUE)  Enteric Contrast: Enteric contrast was not administered      FINDINGS:     CHEST     LUNGS:  Lungs are clear  There is no tracheal or endobronchial lesion      PLEURA:  Unremarkable      HEART/GREAT VESSELS: Heart is unremarkable for patient's age  No thoracic aortic aneurysm      MEDIASTINUM AND JOSE ANTONIO:  Hiatal hernia     CHEST WALL AND LOWER NECK:   Unremarkable      ABDOMEN     LIVER/BILIARY TREE:  Unremarkable      GALLBLADDER:  No calcified gallstones   No pericholecystic inflammatory change      SPLEEN: Unremarkable      PANCREAS:  Unremarkable      ADRENAL GLANDS:  Unremarkable      KIDNEYS/URETERS:  Stable cysts     STOMACH AND BOWEL:  Unremarkable      APPENDIX:  No findings to suggest appendicitis      ABDOMINOPELVIC CAVITY:  No ascites  No pneumoperitoneum  No lymphadenopathy      VESSELS:  Unremarkable for patient's age      PELVIS     REPRODUCTIVE ORGANS:  Metallic markers within the prostate     URINARY BLADDER:  Thick walled      ABDOMINAL WALL/INGUINAL REGIONS:  Unremarkable      OSSEOUS STRUCTURES:  Stable sclerotic lesions in the left greater trochanter, left inferior ramus, both iliac wings, right posterior acetabulum and iliac, left posterior iliac, sacrum, L5, L4, L2,, T8 throughout T10, right ribs 4 posteriorly, 5 6 and 8   anteriorly, left lateral ribs 6 and 7, left posterior ribs 3 7 and 8  Overall these lesions are not significantly changed      IMPRESSION:     1  Stable sclerotic osseous lesions as described  2  No abnormality to suggest disease progression    BONE SCAN  WHOLE BODY 12/16/2021     INDICATION: C79 51: Secondary malignant neoplasm of bone  C61: Malignant neoplasm of prostate     PREVIOUS FILM CORRELATION:    Bone scan 6/18/2021, CT chest abdomen pelvis 12/16/2021     TECHNIQUE:   This study was performed following the intravenous administration of 26 4 mCi Tc-99m labeled MDP  Delayed, anterior and posterior whole body images were acquired, 2-3 hours after radiopharmaceutical administration   Additional delayed   oblique static images acquired of the bilateral ribs and pelvis      FINDINGS:     Scattered foci of increased radiotracer uptake in the axial and appendicular skeleton compatible with osseous metastasis      A right proximal humeral shaft focus may be slightly more prominent        There is a focus of increased radiotracer uptake at the right shoulder region superiorly, stable, question osseous metastasis versus degenerative changes      Scattered bilateral rib foci suspicious for metastasis may be slightly more prominent at a right mid rib anteriorly        A few foci of radiotracer uptake in the lower thoracic and lumbar region suspicious for metastasis are again seen, probably T10 and L5  L5 focus appears more prominent      Scattered foci of increased radiotracer uptake at the left and right hemipelvis appear stable      Renal activity is symmetric      IMPRESSION:     1  Findings for scattered osseous metastasis, overall stable in distribution with possible minimal progression as a few lesions appear more prominent from bone scan of 6/18/2021  Review of Systems     Review of Systems   Constitutional: Positive for fatigue  Negative for activity change, appetite change, chills, fever and unexpected weight change  HENT: Negative for facial swelling  Eyes: Negative for discharge  Respiratory: Negative  Negative for cough and shortness of breath  Cardiovascular: Negative for chest pain and leg swelling  Gastrointestinal: Negative  Negative for abdominal distention, abdominal pain, constipation, diarrhea, nausea and vomiting  Endocrine: Negative  Genitourinary: Positive for frequency  Negative for decreased urine volume, difficulty urinating, dysuria, enuresis, flank pain, genital sores, hematuria and urgency  Musculoskeletal: Positive for arthralgias and back pain  Negative for myalgias  Skin: Negative for pallor and rash  Allergic/Immunologic: Negative  Negative for immunocompromised state  Neurological: Negative for facial asymmetry and speech difficulty  Psychiatric/Behavioral: Negative for agitation and confusion           AUA SYMPTOM SCORE      Most Recent Value   AUA SYMPTOM SCORE    How often have you had a sensation of not emptying your bladder completely after you finished urinating? 0 (P)     How often have you had to urinate again less than two hours after you finished urinating? 2 (P)     How often have you found you stopped and started again several times when you urinate? 0 (P)     How often have you found it difficult to postpone urination? 2 (P)     How often have you had a weak urinary stream? 1 (P)     How often have you had to push or strain to begin urination? 0 (P)     How many times did you most typically get up to urinate from the time you went to bed at night until the time you got up in the morning? 5 (P)     Quality of Life: If you were to spend the rest of your life with your urinary condition just the way it is now, how would you feel about that? 4 (P)     AUA SYMPTOM SCORE 10 (P)                 Allergies     Allergies   Allergen Reactions    Other Rash     bandaids rash        Physical Exam     Physical Exam  Vitals reviewed  Constitutional:       General: He is not in acute distress  Appearance: Normal appearance  He is obese  He is not ill-appearing, toxic-appearing or diaphoretic  HENT:      Head: Normocephalic and atraumatic  Eyes:      General: No scleral icterus  Cardiovascular:      Rate and Rhythm: Normal rate  Pulmonary:      Effort: Pulmonary effort is normal  No respiratory distress  Abdominal:      General: Abdomen is flat  There is no distension  Palpations: Abdomen is soft  Tenderness: There is no abdominal tenderness  There is no right CVA tenderness, left CVA tenderness, guarding or rebound  Musculoskeletal:         General: No swelling  Cervical back: Normal range of motion  Skin:     General: Skin is warm and dry  Coloration: Skin is not jaundiced or pale  Findings: No rash  Neurological:      General: No focal deficit present  Mental Status: He is alert and oriented to person, place, and time  Gait: Gait normal    Psychiatric:         Mood and Affect: Mood normal          Behavior: Behavior normal          Thought Content:  Thought content normal          Judgment: Judgment normal          Vital Signs     Vitals:    03/30/22 1036   BP: 138/76 Pulse: 72   Weight: 96 6 kg (213 lb)       Current Medications       Current Outpatient Medications:     aspirin (ASPIRIN ADULT LOW STRENGTH) 81 mg EC tablet, Take 81 mg by mouth daily , Disp: , Rfl:     atorvastatin (LIPITOR) 40 mg tablet, Take 40 mg by mouth daily at bedtime , Disp: , Rfl:     Calcium 500 MG tablet, Take 1,000 mg by mouth daily , Disp: , Rfl:     cholecalciferol (VITAMIN D3) 1,000 units tablet, Take 1,000 Units by mouth daily , Disp: , Rfl:     hydrochlorothiazide (HYDRODIURIL) 25 mg tablet, Take 1 tablet (25 mg total) by mouth daily, Disp: 90 tablet, Rfl: 1    leuprolide (LUPRON DEPOT 6 MONTH KIT) 45 mg, Inject 45 mg into a muscle every 6 (six) months, Disp: 45 mg, Rfl: 0    losartan (COZAAR) 100 MG tablet, Take 1 tablet (100 mg total) by mouth daily, Disp: 90 tablet, Rfl: 1    MELATONIN PO, Take 1 tablet by mouth daily at bedtime as needed , Disp: , Rfl:     metoprolol succinate (TOPROL-XL) 25 mg 24 hr tablet, Take 1 tablet (25 mg total) by mouth daily, Disp: 90 tablet, Rfl: 1    Mirabegron ER 50 MG TB24, Take 1 tablet (50 mg total) by mouth in the morning, Disp: 30 tablet, Rfl: 12    tamsulosin (FLOMAX) 0 4 mg, take 1 capsule by mouth once daily with dinner (Patient taking differently: Take 0 4 mg by mouth daily with dinner ), Disp: 90 capsule, Rfl: 3    Zoledronic Acid (ZOMETA IV), Infuse into a venous catheter every 3 (three) months, Disp: , Rfl:   No current facility-administered medications for this visit      Active Problems     Patient Active Problem List   Diagnosis    Benign essential hypertension    Renal cyst    Liver lesion    Pulmonary nodule    Combined arterial insufficiency and corporo-venous occlusive erectile dysfunction    Prostate cancer ([address] )    Coronary artery disease of native artery of native heart with stable angina pectoris ([address] )    DDD (degenerative disc disease), cervical    S/P coronary artery stent placement    Vitamin D deficiency    Abnormal radionuclide bone scan    Bone metastases (HCC)    Calculus of kidney    Mixed hyperlipidemia    Localized edema    Chronic pain of left knee    Bilateral hearing loss    Chemotherapy-induced neutropenia (HCC)    Antineoplastic chemotherapy induced anemia    Chronic kidney disease, stage III (moderate) (HCC)    Urinary frequency       Past Medical History     Past Medical History:   Diagnosis Date    Anemia     last assessed  1/7/14     Cancer ([facility] )     PROSTATE, BONE CANCER    Coronary artery disease     DDD (degenerative disc disease), cervical     Hypercholesteremia     Hypertension     Kidney stone     kidney stones    Polyp of sigmoid colon     Prostate cancer ([facility] )     Renal disorder     elevated serum creat    Tinnitus     unspecified laterality / last assessed 4/9/13    Vitamin D deficiency        Surgical History     Past Surgical History:   Procedure Laterality Date    CARDIAC SURGERY      CARD CATH W/ STENTS    COLONOSCOPY      CORONARY ANGIOPLASTY WITH STENT PLACEMENT      FL RETROGRADE PYELOGRAM  9/11/2019    HEMORRHOID SURGERY      UT CYSTO/URETERO W/LITHOTRIPSY &INDWELL STENT INSRT Left 9/11/2019    Procedure: CYSTO, URETEROSCOPY W/HOLMIUM LASER, BASKET STONE EXTRACTION, RETROGRADE PYELOGRAM, STENT EXCHANGE;  Surgeon: Kt Eisenberg MD;  [facility];  Service: Urology    UT CYSTOURETHROSCOPY,FULGUR 0 5-2 CM LESN N/A 9/8/2021    Procedure: TRANSURETHRAL RESECTION OF BLADDER TUMOR (TURBT);   Surgeon: tK Eisenberg MD;  [facility];  Service: Urology    PROSTATE BIOPSY  10/24/2018    TONSILLECTOMY         Family History     Family History   Problem Relation Age of Onset    Breast cancer Mother     Hypertension Father        Social History     Social History     Social History     Tobacco Use   Smoking Status Never Smoker   Smokeless Tobacco Never Used       Past Surgical History:   Procedure Laterality Date    CARDIAC SURGERY CARD CATH W/ STENTS    COLONOSCOPY      CORONARY ANGIOPLASTY WITH STENT PLACEMENT      FL RETROGRADE PYELOGRAM  9/11/2019    HEMORRHOID SURGERY      MI CYSTO/URETERO W/LITHOTRIPSY &INDWELL STENT INSRT Left 9/11/2019    Procedure: CYSTO, URETEROSCOPY W/HOLMIUM LASER, BASKET STONE EXTRACTION, RETROGRADE PYELOGRAM, STENT EXCHANGE;  Surgeon: Gala Boo MD;  [facility];  Service: Urology    MI CYSTOURETHROSCOPY,FULGUR 0 5-2 CM LESN N/A 9/8/2021    Procedure: TRANSURETHRAL RESECTION OF BLADDER TUMOR (TURBT); Surgeon: Gala Boo MD;  [facility];  Service: Urology    PROSTATE BIOPSY  10/24/2018    TONSILLECTOMY           The following portions of the patient's history were reviewed and updated as appropriate: allergies, current medications, past family history, past medical history, past social history, past surgical history and problem list    Please note :  Voice dictation software has been used to create this document  There may be inadvertent transcription errors      [id number] [address]

## 2022-03-31 LAB — BACTERIA UR CULT: NORMAL

## 2022-04-04 ENCOUNTER — TELEPHONE (OUTPATIENT)
Dept: FAMILY MEDICINE CLINIC | Facility: CLINIC | Age: 82
End: 2022-04-04

## 2022-04-04 NOTE — TELEPHONE ENCOUNTER
Voicemail from patient stating he is having pain R breast, needs evaluation  [facility] 4/4/22 @ 8:56 am asking patient to call back to schedule visit

## 2022-04-05 ENCOUNTER — OFFICE VISIT (OUTPATIENT)
Dept: FAMILY MEDICINE CLINIC | Facility: CLINIC | Age: 82
End: 2022-04-05
Payer: COMMERCIAL

## 2022-04-05 VITALS
HEART RATE: 63 BPM | BODY MASS INDEX: 33.97 KG/M2 | HEIGHT: 67 IN | OXYGEN SATURATION: 96 % | SYSTOLIC BLOOD PRESSURE: 110 MMHG | RESPIRATION RATE: 18 BRPM | WEIGHT: 216.4 LBS | TEMPERATURE: 97.2 F | DIASTOLIC BLOOD PRESSURE: 74 MMHG

## 2022-04-05 DIAGNOSIS — N64.4 BREAST PAIN, RIGHT: Primary | ICD-10-CM

## 2022-04-05 PROCEDURE — 3074F SYST BP LT 130 MM HG: CPT | Performed by: FAMILY MEDICINE

## 2022-04-05 PROCEDURE — 3288F FALL RISK ASSESSMENT DOCD: CPT | Performed by: FAMILY MEDICINE

## 2022-04-05 PROCEDURE — 99213 OFFICE O/P EST LOW 20 MIN: CPT | Performed by: FAMILY MEDICINE

## 2022-04-05 PROCEDURE — 3078F DIAST BP <80 MM HG: CPT | Performed by: FAMILY MEDICINE

## 2022-04-05 PROCEDURE — 1101F PT FALLS ASSESS-DOCD LE1/YR: CPT | Performed by: FAMILY MEDICINE

## 2022-04-05 NOTE — PROGRESS NOTES
Assessment/Plan:     1  Breast pain, right  Assessment & Plan:  I do appreciate increased fullness and firmness behind nipple compared to left; advised u/s and f/u with results; no erythema or localized edema to suggest infection    Orders:  -     US breast right limited (diagnostic); Future; Expected date: 04/05/2022        Subjective:      Patient ID: Caroline Waddell is a [de-identified] y o  male  Patent is complaining of irritation in right breast  No rash or nipple discharge  He has not felt any lumps  States he gets nervous with pain due to hx of prostate cancer  Did have biopsy of left breast in past and was benign  Started about 2 weeks ago  No known trauma  The following portions of the patient's history were reviewed and updated as appropriate: allergies, current medications, past family history, past medical history, past social history, past surgical history, and problem list     Review of Systems   Constitutional: Negative for chills and fever  Objective:      /74 (BP Location: Left arm, Patient Position: Sitting, Cuff Size: Adult)   Pulse 63   Temp (!) 97 2 °F (36 2 °C) (Temporal)   Resp 18   Ht 5' 7" (1 702 m)   Wt 98 2 kg (216 lb 6 4 oz)   SpO2 96%   BMI 33 89 kg/m²          Physical Exam  Vitals reviewed  Constitutional:       General: He is not in acute distress  Appearance: Normal appearance  He is not ill-appearing, toxic-appearing or diaphoretic  HENT:      Head: Normocephalic and atraumatic  Eyes:      General: No scleral icterus  Right eye: No discharge  Left eye: No discharge  Conjunctiva/sclera: Conjunctivae normal    Cardiovascular:      Rate and Rhythm: Normal rate and regular rhythm  Pulses: Normal pulses  Heart sounds: Normal heart sounds  No murmur heard  No gallop  Pulmonary:      Effort: Pulmonary effort is normal  No respiratory distress  Breath sounds: Normal breath sounds  No stridor  No wheezing, rhonchi or rales  Chest:   Breasts:      Right: No inverted nipple or axillary adenopathy  Left: Normal             Comments: No bony tenderness over rib cage   Musculoskeletal:      Right lower leg: No edema  Left lower leg: No edema  Lymphadenopathy:      Upper Body:      Right upper body: No axillary adenopathy  Neurological:      General: No focal deficit present  Mental Status: He is alert and oriented to person, place, and time  Psychiatric:         Mood and Affect: Mood normal          Behavior: Behavior normal          Thought Content:  Thought content normal          Judgment: Judgment normal

## 2022-04-05 NOTE — ASSESSMENT & PLAN NOTE
I do appreciate increased fullness and firmness behind nipple compared to left; advised u/s and f/u with results; no erythema or localized edema to suggest infection

## 2022-04-07 LAB — PSA SERPL-MCNC: 6.34 NG/ML

## 2022-04-14 ENCOUNTER — OFFICE VISIT (OUTPATIENT)
Dept: HEMATOLOGY ONCOLOGY | Facility: CLINIC | Age: 82
End: 2022-04-14
Payer: COMMERCIAL

## 2022-04-14 VITALS
RESPIRATION RATE: 16 BRPM | WEIGHT: 211 LBS | BODY MASS INDEX: 33.12 KG/M2 | DIASTOLIC BLOOD PRESSURE: 60 MMHG | TEMPERATURE: 97.7 F | HEIGHT: 67 IN | HEART RATE: 60 BPM | SYSTOLIC BLOOD PRESSURE: 115 MMHG | OXYGEN SATURATION: 97 %

## 2022-04-14 DIAGNOSIS — C79.51 BONE METASTASES (HCC): ICD-10-CM

## 2022-04-14 DIAGNOSIS — C61 PROSTATE CANCER (HCC): Primary | ICD-10-CM

## 2022-04-14 PROCEDURE — 1036F TOBACCO NON-USER: CPT | Performed by: INTERNAL MEDICINE

## 2022-04-14 PROCEDURE — 99214 OFFICE O/P EST MOD 30 MIN: CPT | Performed by: INTERNAL MEDICINE

## 2022-04-14 PROCEDURE — 1160F RVW MEDS BY RX/DR IN RCRD: CPT | Performed by: INTERNAL MEDICINE

## 2022-04-14 NOTE — PROGRESS NOTES
Hematology Outpatient Follow - Up Note  Darrell Sweet [de-identified] y o  male MRN: @ Encounter: [id number]        Date:  4/14/2022        Assessment/ Plan:    78-year-old  male who was seen by Urology for elevation of the PSA, when he presented in June 2017 with PSA of 4 6, Subsequently in September of 2017 PSA was 7 3 and in May of 2018 PSA was 8  6   A biopsy was recommended but the patient had just undergone percutaneous stenting of the heart and he was on dual anti-platelet therapy      MRI of the abdomen in April 2018 showed 2 simple cyst in the right hepatic lobe, bilateral renal cysts the largest 1 measuring 10 cm in the left lower lobe     In October of 2018 PSA was 9 8  CT scan of the chest 9/28/2018 showed sclerotic lesions in T10, 5 mm nodule in the right lower lobe   Bone scan showed activity in the posterior T10 level and left posterior 7th rib area concerning for osseous metastases and increased activity in the distal right clavicle might be degenerative or metastatic area      He was diagnosed with castration sensitive metastatic prostate cancer  [facility] could not approve enzalutamide, he also has a high co-payment for abiraterone     The patient initiated on samples of enzalutamide 160 mg p o  daily since the beginning of December 2018   His insurance approved abiraterone however he had a high co-payment        We were finally able to get financial assistance through Marilou Estrada and Roseline dodson Zytsusi (abiraterone) 2/3/2019 with prednisone 5mg po bid without side effects      In May 2020 PSA 0 5, bone scan showed activity in the anterior 5th rib area, right humerus area most likely consistent with metastatic disease, no visceral disease on the CT scan, we had hard time getting enzalutamide , the patient was initiated on enzalutamide on 06/02/2020, PSA at the time of 0 8     PSA 1 4 in October 2020, normal alkaline phosphatase           Genes analyzed of germ line test  LIONEL, BARD1, BRCA1, BRCA2, BRIP1, CDH1, CHEK2, DICER1, EPCAM*, HOXB13, MLH1, MSH2,  MSH6, NBN, NF1, PALB2, PMS2, PTEN, RAD50, RAD51C, RAD51D, SMARCA4, STK11, TP53 all of these were negative     Evaluated at [facility] the decision for radiation therapy to oligo metastases and the primary site of the tumor depends on the PET scan finding which showed multiple metastatic disease, patient to meet with Radiation Oncology      PET scan on 11/13/2020 showed radiotracer retention in the left posterior bladder suspicious for malignancy with prostate cancer invasion progressing from prior CT scan mild to moderate uptake in the few small inguinal lymph nodes, sclerotic lesions in the spine with uptake suspicious for metastases     Radiation therapy in the February 2021- April 2021      Progression of disease by bone scan in June 2021 with multiple thoracic, right ribs area, hip area involvement, PSA 13 ( 7 2 on 04/2021)        Cycle 1 Taxotere 7/12/2021    Cycle 10  On 01/17/2022     PSA 3 3 in March 2022, PSMA PET scan at [facility] showed 5 bony lesions no evidence of visceral metastases    Scheduled for radiation therapy     At this time will continue watchful observation and follow-up in 2 months with CBC, CMP, PSA and evaluate later on for treatment with ADRYAN 177            Labs and imaging studies are reviewed by ordering provider once results are available  If there are findings that need immediate attention, you will be contacted when results available  Discussing results and the implication on your healthcare is best discussed in person at your follow-up visit  HPI:    60-year-old  male who was seen by Urology for elevation of the PSA, when he presented in June 2017 with PSA of 4 6, Subsequently in September of 2017 PSA was 7 3 and in May of 2018 PSA was 8  6   A biopsy was recommended but the patient had just undergone percutaneous stenting of the heart and he was on dual anti-platelet therapy      MRI of the abdomen in April 2018 showed 2 simple cyst in the right hepatic lobe, bilateral renal cysts the largest 1 measuring 10 cm in the left lower lobe     In October of 2018 PSA was 9 8  CT scan of the chest 9/28/2018 showed sclerotic lesions in T10, 5 mm nodule in the right lower lobe   Bone scan showed activity in the posterior T10 level and left posterior 7th rib area concerning for osseous metastases and increased activity in the distal right clavicle might be degenerative or metastatic area      He was diagnosed with castration sensitive metastatic prostate cancer  [facility] insurance company could not approve enzalutamide, he also has a high co-payment for abiraterone     The patient initiated on samples of enzalutamide 160 mg p o  daily since the beginning of December 2018   His insurance approved abiraterone however he had a high co-payment        We were finally able to get financial assistance through Penny Orellana and Roseline started Zytsusi (abiraterone) 2/3/2019 with prednisone 5mg po bid without side effects      In May 2020 PSA 0 5, bone scan showed activity in the anterior 5th rib area, right humerus area most likely consistent with metastatic disease, no visceral disease on the CT scan, we had hard time getting enzalutamide , the patient was initiated on enzalutamide on 06/02/2020, PSA at the time of 0 8     PSA 1 4 in October 2020, normal alkaline phosphatase           Genes analyzed of germ line test  LIONEL, BARD1, BRCA1, BRCA2, BRIP1, CDH1, CHEK2, DICER1, EPCAM*, HOXB13, MLH1, MSH2,  MSH6, NBN, NF1, PALB2, PMS2, PTEN, RAD50, RAD51C, RAD51D, SMARCA4, STK11, TP53 all of these were negative     Evaluated at [facility] the decision for radiation therapy to oligo metastases and the primary site of the tumor depends on the PET scan finding which showed multiple metastatic disease, patient to meet with Radiation Oncology      PET scan on 11/13/2020 showed radiotracer retention in the left posterior bladder suspicious for malignancy with prostate cancer invasion progressing from prior CT scan mild to moderate uptake in the few small inguinal lymph nodes, sclerotic lesions in the spine with uptake suspicious for metastases     Radiation therapy in the February 2021- April 2021      Progression of disease by bone scan in June 2021 with multiple thoracic, right ribs area, hip area involvement, PSA 13 ( 7 2 on 04/2021)        Cycle 1 Taxotere 7/12/2021      Cycle 10  On 01/17/2022 PSA in the range of 2     PSA 3 3 in March 2022    PSMA scan showed bony lesions in 5 locations including right humeri, ribs, right pelvis scheduled for radiation therapy at [facility]  Interval History:        Previous Treatment:         Test Results:    Imaging: MRI shoulder right w wo contrast    Result Date: 3/29/2022  Narrative: MRI SHOULDER RIGHT W WO CONTRAST INDICATION:   M25 511: Pain in right shoulder  Metastatic prostate cancer COMPARISON:  Correlated with nuclear medicine bone scan 12/16/2021 TECHNIQUE:   The following MR sequences were obtained of the right shoulder: Precontrast images: Localizer, axial T1/T2 fat sat/T1 fat sat, oblique coronal T1/T2 fat sat, oblique sagittal T1/T2 fat sat  Postcontrast Images:Sagittal, coronal, and axial T1  fat sat IV Contrast:  10 mL of Gadobutrol injection (SINGLE-DOSE) FINDINGS: SUBCUTANEOUS TISSUES: Normal  JOINT EFFUSION: Small glenohumeral joint effusion  ACROMION PROCESS: Normal  ROTATOR CUFF: Supraspinatus and infraspinatus tendinosis without a significant rotator cuff tear  SUBACROMIAL/SUBDELTOID BURSA: Normal  LONG HEAD OF BICEPS TENDON: There is mild tendinosis without tear  GLENOID LABRUM: Degenerative-type circumferential tear  GLENOHUMERAL JOINT: Moderate osteoarthritis  Probable degenerative subchondral cysts in the humeral head and superior glenoid  ACROMIOCLAVICULAR JOINT: Moderate osteoarthritis   BONES: There is a T1 marrow replacing, T2 hyperintense, heterogeneously enhancing lesion in the proximal humeral diaphysis that measures 2 x 1 8 x 3 4 cm (transverse by AP by CC; images #801/17 and #701/10)  No significant extraosseous extension  Otherwise normal marrow signal      Impression: Metastatic lesion (2 x 1 8 x 3 4 cm) centered in the proximal humeral diaphysis  No significant extraosseous extension  Workstation performed: [id number]       Labs:   Lab Results   Component Value Date    WBC 6 3 04/07/2022    HGB 11 7 (L) 04/07/2022    HCT 33 7 (L) 04/07/2022    MCV 90 1 04/07/2022     04/07/2022     Lab Results   Component Value Date     09/30/2017    K 4 0 04/07/2022     04/07/2022    CO2 28 04/07/2022    BUN 22 04/07/2022    CREATININE 1 17 (H) 04/07/2022    GLUF 97 08/08/2019    CALCIUM 9 0 04/07/2022    AST 15 04/07/2022    ALT 13 04/07/2022    ALKPHOS 59 04/07/2022    PROT 6 5 09/30/2017    BILITOT 0 7 09/30/2017    EGFR 61 12/02/2019       No results found for: IRON, TIBC, FERRITIN    No results found for: [id number]      ROS: Review of Systems   Constitutional: Negative  Negative for appetite change, chills, diaphoresis, fatigue, fever and unexpected weight change  HENT:   Negative for hearing loss, lump/mass, mouth sores, nosebleeds, sore throat, trouble swallowing and voice change  Eyes: Negative  Negative for eye problems and icterus  Respiratory: Negative  Negative for chest tightness, cough, hemoptysis and shortness of breath  Cardiovascular: Negative for chest pain and leg swelling  Gastrointestinal: Negative for abdominal distention, abdominal pain, blood in stool, constipation, diarrhea and nausea  Endocrine: Negative  Genitourinary: Negative for dysuria, frequency, hematuria and pelvic pain  Musculoskeletal: Negative  Negative for arthralgias, back pain, flank pain, gait problem, myalgias and neck stiffness  Skin: Negative for itching and rash     Neurological: Negative for dizziness, gait problem, headaches, light-headedness, numbness and speech difficulty  Hematological: Negative for adenopathy  Does not bruise/bleed easily  Psychiatric/Behavioral: Negative for confusion, decreased concentration, depression and sleep disturbance  The patient is not nervous/anxious  Current Medications: Reviewed  Allergies: Reviewed  PMH/FH/SH:  Reviewed      Physical Exam:    Body surface area is 2 07 meters squared  Wt Readings from Last 3 Encounters:   04/14/22 95 7 kg (211 lb)   04/05/22 98 2 kg (216 lb 6 4 oz)   03/30/22 96 6 kg (213 lb)        Temp Readings from Last 3 Encounters:   04/14/22 97 7 °F (36 5 °C)   04/05/22 (!) 97 2 °F (36 2 °C) (Temporal)   03/21/22 (!) 96 °F (35 6 °C) (Temporal)        BP Readings from Last 3 Encounters:   04/14/22 115/60   04/05/22 110/74   03/30/22 138/76         Pulse Readings from Last 3 Encounters:   04/14/22 60   04/05/22 63   03/30/22 72        Physical Exam  Vitals reviewed  Constitutional:       General: He is not in acute distress  Appearance: He is well-developed  He is not diaphoretic  HENT:      Head: Normocephalic and atraumatic  Eyes:      Conjunctiva/sclera: Conjunctivae normal    Neck:      Trachea: No tracheal deviation  Cardiovascular:      Rate and Rhythm: Normal rate and regular rhythm  Heart sounds: No murmur heard  No friction rub  No gallop  Pulmonary:      Effort: Pulmonary effort is normal  No respiratory distress  Breath sounds: Normal breath sounds  No wheezing or rales  Chest:      Chest wall: No tenderness  Abdominal:      General: There is no distension  Palpations: Abdomen is soft  Tenderness: There is no abdominal tenderness  Musculoskeletal:      Cervical back: Normal range of motion and neck supple  Lymphadenopathy:      Cervical: No cervical adenopathy  Skin:     General: Skin is warm and dry  Coloration: Skin is not pale  Findings: No erythema     Neurological: Mental Status: He is alert and oriented to person, place, and time  Psychiatric:         Behavior: Behavior normal          Thought Content: Thought content normal          Judgment: Judgment normal          ECO  Goals and Barriers:  Current Goal: Minimize effects of disease  Barriers: None  Patient's Capacity to Self Care:  Patient is able to self care      Code Status: [unfilled]

## 2022-04-18 DIAGNOSIS — I10 ESSENTIAL HYPERTENSION: ICD-10-CM

## 2022-04-18 RX ORDER — LOSARTAN POTASSIUM 100 MG/1
100 TABLET ORAL DAILY
Qty: 90 TABLET | Refills: 1 | Status: SHIPPED | OUTPATIENT
Start: 2022-04-18

## 2022-04-20 ENCOUNTER — TELEPHONE (OUTPATIENT)
Dept: FAMILY MEDICINE CLINIC | Facility: CLINIC | Age: 82
End: 2022-04-20

## 2022-04-20 DIAGNOSIS — S20.111A ABRASION OF RIGHT BREAST, INITIAL ENCOUNTER: Primary | ICD-10-CM

## 2022-04-21 ENCOUNTER — HOSPITAL ENCOUNTER (OUTPATIENT)
Dept: MAMMOGRAPHY | Facility: CLINIC | Age: 82
Discharge: HOME | End: 2022-04-21
Payer: COMMERCIAL

## 2022-04-21 ENCOUNTER — HOSPITAL ENCOUNTER (OUTPATIENT)
Dept: ULTRASOUND IMAGING | Facility: CLINIC | Age: 82
Discharge: HOME | End: 2022-04-21
Payer: COMMERCIAL

## 2022-04-21 VITALS — WEIGHT: 200 LBS | HEIGHT: 67 IN | BODY MASS INDEX: 31.39 KG/M2

## 2022-04-21 DIAGNOSIS — N64.4 BREAST PAIN, RIGHT: Primary | ICD-10-CM

## 2022-04-21 DIAGNOSIS — N64.4 BREAST PAIN, RIGHT: ICD-10-CM

## 2022-04-21 DIAGNOSIS — S20.111A ABRASION OF RIGHT BREAST, INITIAL ENCOUNTER: ICD-10-CM

## 2022-04-21 PROCEDURE — G0279 TOMOSYNTHESIS, MAMMO: HCPCS

## 2022-04-21 PROCEDURE — 77066 DX MAMMO INCL CAD BI: CPT

## 2022-04-21 PROCEDURE — 76642 ULTRASOUND BREAST LIMITED: CPT

## 2022-04-26 ENCOUNTER — TELEPHONE (OUTPATIENT)
Dept: FAMILY MEDICINE CLINIC | Facility: CLINIC | Age: 82
End: 2022-04-26

## 2022-05-18 ENCOUNTER — RA CDI HCC (OUTPATIENT)
Dept: OTHER | Facility: HOSPITAL | Age: 82
End: 2022-05-18

## 2022-05-27 ENCOUNTER — TELEPHONE (OUTPATIENT)
Dept: HEMATOLOGY ONCOLOGY | Facility: CLINIC | Age: 82
End: 2022-05-27

## 2022-05-27 NOTE — TELEPHONE ENCOUNTER
Spoke with patient advised Dr Rianna Blancas will not be office on 6/15  Offered appt in [place] on 6/17 at 12:40 pm patient agreed voiced understanding

## 2022-05-28 DIAGNOSIS — C61 PROSTATE CANCER (HCC): ICD-10-CM

## 2022-05-29 RX ORDER — TAMSULOSIN HYDROCHLORIDE 0.4 MG/1
CAPSULE ORAL
Qty: 90 CAPSULE | Refills: 3 | Status: SHIPPED | OUTPATIENT
Start: 2022-05-29

## 2022-06-05 LAB
ALBUMIN SERPL-MCNC: 3.9 G/DL (ref 3.6–5.1)
ALBUMIN/GLOB SERPL: 1.8 (CALC) (ref 1–2.5)
ALP SERPL-CCNC: 42 U/L (ref 35–144)
ALT SERPL-CCNC: 23 U/L (ref 9–46)
AST SERPL-CCNC: 22 U/L (ref 10–35)
BASOPHILS # BLD AUTO: 20 CELLS/UL (ref 0–200)
BASOPHILS NFR BLD AUTO: 0.7 %
BILIRUB SERPL-MCNC: 0.6 MG/DL (ref 0.2–1.2)
BUN SERPL-MCNC: 19 MG/DL (ref 7–25)
BUN/CREAT SERPL: NORMAL (CALC) (ref 6–22)
CALCIUM SERPL-MCNC: 9.1 MG/DL (ref 8.6–10.3)
CHLORIDE SERPL-SCNC: 102 MMOL/L (ref 98–110)
CHOLEST SERPL-MCNC: 103 MG/DL
CHOLEST/HDLC SERPL: 2.7 (CALC)
CO2 SERPL-SCNC: 30 MMOL/L (ref 20–32)
CREAT SERPL-MCNC: 1.11 MG/DL (ref 0.7–1.11)
EOSINOPHIL # BLD AUTO: 122 CELLS/UL (ref 15–500)
EOSINOPHIL NFR BLD AUTO: 4.2 %
ERYTHROCYTE [DISTWIDTH] IN BLOOD BY AUTOMATED COUNT: 14.7 % (ref 11–15)
GLOBULIN SER CALC-MCNC: 2.2 G/DL (CALC) (ref 1.9–3.7)
GLUCOSE SERPL-MCNC: 98 MG/DL (ref 65–99)
HCT VFR BLD AUTO: 32.9 % (ref 38.5–50)
HDLC SERPL-MCNC: 38 MG/DL
HGB BLD-MCNC: 11.2 G/DL (ref 13.2–17.1)
LDLC SERPL CALC-MCNC: 45 MG/DL (CALC)
LYMPHOCYTES # BLD AUTO: 438 CELLS/UL (ref 850–3900)
LYMPHOCYTES NFR BLD AUTO: 15.1 %
MCH RBC QN AUTO: 30.4 PG (ref 27–33)
MCHC RBC AUTO-ENTMCNC: 34 G/DL (ref 32–36)
MCV RBC AUTO: 89.4 FL (ref 80–100)
MONOCYTES # BLD AUTO: 305 CELLS/UL (ref 200–950)
MONOCYTES NFR BLD AUTO: 10.5 %
NEUTROPHILS # BLD AUTO: 2016 CELLS/UL (ref 1500–7800)
NEUTROPHILS NFR BLD AUTO: 69.5 %
NONHDLC SERPL-MCNC: 65 MG/DL (CALC)
PLATELET # BLD AUTO: 139 THOUSAND/UL (ref 140–400)
PMV BLD REES-ECKER: 9.8 FL (ref 7.5–12.5)
POTASSIUM SERPL-SCNC: 4.1 MMOL/L (ref 3.5–5.3)
PROT SERPL-MCNC: 6.1 G/DL (ref 6.1–8.1)
RBC # BLD AUTO: 3.68 MILLION/UL (ref 4.2–5.8)
SL AMB EGFR AFRICAN AMERICAN: 72 ML/MIN/1.73M2
SL AMB EGFR NON AFRICAN AMERICAN: 62 ML/MIN/1.73M2
SODIUM SERPL-SCNC: 138 MMOL/L (ref 135–146)
TRIGL SERPL-MCNC: 115 MG/DL
TSH SERPL-ACNC: 2.69 MIU/L (ref 0.4–4.5)
WBC # BLD AUTO: 2.9 THOUSAND/UL (ref 3.8–10.8)

## 2022-06-06 LAB — PSA SERPL-MCNC: 2.88 NG/ML

## 2022-06-08 ENCOUNTER — OFFICE VISIT (OUTPATIENT)
Dept: FAMILY MEDICINE CLINIC | Facility: CLINIC | Age: 82
End: 2022-06-08
Payer: COMMERCIAL

## 2022-06-08 VITALS
SYSTOLIC BLOOD PRESSURE: 110 MMHG | TEMPERATURE: 97.5 F | HEART RATE: 67 BPM | HEIGHT: 67 IN | WEIGHT: 211 LBS | DIASTOLIC BLOOD PRESSURE: 70 MMHG | BODY MASS INDEX: 33.12 KG/M2 | OXYGEN SATURATION: 98 % | RESPIRATION RATE: 16 BRPM

## 2022-06-08 DIAGNOSIS — E78.2 MIXED HYPERLIPIDEMIA: ICD-10-CM

## 2022-06-08 DIAGNOSIS — I10 BENIGN ESSENTIAL HYPERTENSION: Primary | ICD-10-CM

## 2022-06-08 DIAGNOSIS — R60.0 LOCALIZED EDEMA: ICD-10-CM

## 2022-06-08 DIAGNOSIS — C61 PROSTATE CANCER (HCC): ICD-10-CM

## 2022-06-08 DIAGNOSIS — I25.118 CORONARY ARTERY DISEASE OF NATIVE ARTERY OF NATIVE HEART WITH STABLE ANGINA PECTORIS (HCC): ICD-10-CM

## 2022-06-08 PROBLEM — M25.562 CHRONIC PAIN OF LEFT KNEE: Status: RESOLVED | Noted: 2020-11-23 | Resolved: 2022-06-08

## 2022-06-08 PROBLEM — N64.4 BREAST PAIN, RIGHT: Status: RESOLVED | Noted: 2022-04-05 | Resolved: 2022-06-08

## 2022-06-08 PROBLEM — G89.29 CHRONIC PAIN OF LEFT KNEE: Status: RESOLVED | Noted: 2020-11-23 | Resolved: 2022-06-08

## 2022-06-08 PROCEDURE — 1160F RVW MEDS BY RX/DR IN RCRD: CPT | Performed by: FAMILY MEDICINE

## 2022-06-08 PROCEDURE — 3725F SCREEN DEPRESSION PERFORMED: CPT | Performed by: FAMILY MEDICINE

## 2022-06-08 PROCEDURE — 3078F DIAST BP <80 MM HG: CPT | Performed by: FAMILY MEDICINE

## 2022-06-08 PROCEDURE — 3074F SYST BP LT 130 MM HG: CPT | Performed by: FAMILY MEDICINE

## 2022-06-08 PROCEDURE — 1170F FXNL STATUS ASSESSED: CPT | Performed by: FAMILY MEDICINE

## 2022-06-08 PROCEDURE — 1036F TOBACCO NON-USER: CPT | Performed by: FAMILY MEDICINE

## 2022-06-08 PROCEDURE — 1101F PT FALLS ASSESS-DOCD LE1/YR: CPT | Performed by: FAMILY MEDICINE

## 2022-06-08 PROCEDURE — 3288F FALL RISK ASSESSMENT DOCD: CPT | Performed by: FAMILY MEDICINE

## 2022-06-08 PROCEDURE — G0439 PPPS, SUBSEQ VISIT: HCPCS | Performed by: FAMILY MEDICINE

## 2022-06-08 PROCEDURE — 1125F AMNT PAIN NOTED PAIN PRSNT: CPT | Performed by: FAMILY MEDICINE

## 2022-06-08 PROCEDURE — 99214 OFFICE O/P EST MOD 30 MIN: CPT | Performed by: FAMILY MEDICINE

## 2022-06-08 NOTE — PATIENT INSTRUCTIONS

## 2022-06-08 NOTE — PROGRESS NOTES
[facility]      NAME: Navarro Saxena  AGE: [de-identified] y o  SEX: male  : 1940   MRN: [id number]    DATE: 2022  TIME: 9:13 AM    Assessment and Plan     Problem List Items Addressed This Visit     Benign essential hypertension - Primary     Blood pressure well controlled on metoprolol XL 25, losartan 100, and hydrochlorothiazide 25           Relevant Orders    CBC and differential    Comprehensive metabolic panel    Prostate cancer ([facility] [address] )     History of Vitaliy 9 prostate cancer with evidence of metastasis to spine, bladder, and inguinal lymph nodes  Patient has finished chemotherapy and recently finished targeted radiation therapy may 2022  He will be seeing radiation oncologist tomorrow  Still being followed by oncology team at [facility] and [facility]  Overall is feeling well  His appetite is good and he is maintaining weight  I encouraged him to increase his activity level  Coronary artery disease of native artery of native heart with stable angina pectoris ([facility] [address] )     Status post stenting in   Currently feels well without chest pain or shortness of breath  Continue regular follow-up with cardiologist at [facility] (Dr Frida Bautista)  Mixed hyperlipidemia     Cholesterol from  103, LDL 45  Doing well on atorvastatin 40  Continue regular follow-up with cardiologist at [facility] (Dr Frida Bautista)  Localized edema     Minimal edema today  Continue hydrochlorothiazide 25  Continue compression socks             Patient had COVID vaccination x 4  Patient Pneumovax  Tetanus   Patient had Shingrix        Return to office in:  6 months, fasting blood work prior at [facility]   Patient presents with   Patience Freire Medicare Wellness Visit    Follow-up     6 months       History of Present Illness     Patient presents for recheck chronic medical problems today    Still being followed by oncology team  Patient had labs done June 4th      The following portions of the patient's history were reviewed and updated as appropriate: allergies, current medications, past family history, past medical history, past social history, past surgical history and problem list     Review of Systems   Review of Systems   Respiratory: Negative  Cardiovascular: Negative  Gastrointestinal: Negative  Genitourinary: Negative  Active Problem List     Patient Active Problem List   Diagnosis    Benign essential hypertension    Renal cyst    Liver lesion    Pulmonary nodule    Combined arterial insufficiency and corporo-venous occlusive erectile dysfunction    [facility]    Coronary artery disease of native artery of native heart with stable angina pectoris [facility]    DDD (degenerative disc disease), cervical    S/P coronary artery stent placement    Vitamin D deficiency    Abnormal radionuclide bone scan    [facility]    Calculus of kidney    Mixed hyperlipidemia    Localized edema    Bilateral hearing loss    Chemotherapy-induced neutropenia (HCC)    Antineoplastic chemotherapy induced anemia    Chronic kidney disease, stage III (moderate) (HCC)    Urinary frequency       Objective   /70 (BP Location: Left arm, Patient Position: Sitting, Cuff Size: Adult)   Pulse 67   Temp 97 5 °F (36 4 °C) (Temporal)   Resp 16   Ht 5' 6 93" (1 7 m)   Wt 95 7 kg (211 lb)   SpO2 98%   BMI 33 12 kg/m²     Physical Exam  Cardiovascular:      Rate and Rhythm: Normal rate and regular rhythm  Heart sounds: Normal heart sounds  Comments: Carotids: no bruits  Ext: no edema  Pulmonary:      Effort: Pulmonary effort is normal  No respiratory distress  Breath sounds: No wheezing or rales  Psychiatric:         Behavior: Behavior normal          Thought Content:  Thought content normal          Pertinent Laboratory/Diagnostic Studies:  Labs June 4th    Current Medications     Current Outpatient Medications:     aspirin (ECOTRIN LOW STRENGTH) 81 mg EC tablet, Take 81 mg by mouth daily , Disp: , Rfl:     atorvastatin (LIPITOR) 40 mg tablet, Take 40 mg by mouth daily at bedtime , Disp: , Rfl:     Calcium 500 MG tablet, Take 1,000 mg by mouth daily , Disp: , Rfl:     cholecalciferol (VITAMIN D3) 1,000 units tablet, Take 1,000 Units by mouth daily , Disp: , Rfl:     hydrochlorothiazide (HYDRODIURIL) 25 mg tablet, Take 1 tablet (25 mg total) by mouth daily, Disp: 90 tablet, Rfl: 1    leuprolide (LUPRON DEPOT 6 MONTH KIT) 45 mg, Inject 45 mg into a muscle every 6 (six) months, Disp: 45 mg, Rfl: 0    losartan (COZAAR) 100 MG tablet, Take 1 tablet (100 mg total) by mouth daily, Disp: 90 tablet, Rfl: 1    MELATONIN PO, Take 1 tablet by mouth daily at bedtime as needed , Disp: , Rfl:     metoprolol succinate (TOPROL-XL) 25 mg 24 hr tablet, Take 1 tablet (25 mg total) by mouth daily, Disp: 90 tablet, Rfl: 1    tamsulosin (FLOMAX) 0 4 mg, take 1 capsule by mouth once daily with dinner, Disp: 90 capsule, Rfl: 3    Zoledronic Acid (ZOMETA IV), Infuse into a venous catheter every 3 (three) months, Disp: , Rfl:     Mirabegron ER 50 MG TB24, Take 1 tablet (50 mg total) by mouth in the morning, Disp: 30 tablet, Rfl: 12    Health Maintenance     Health Maintenance   Topic Date Due    SLP PLAN OF CARE  Never done    Pneumococcal Vaccine: 65+ Years (3 - PPSV23 or PCV20) 07/26/2016    BMI: Followup Plan  03/27/2022    COVID-19 Vaccine (5 - Booster for Moderna series) 08/08/2022    Fall Risk  06/08/2023    Depression Screening  06/08/2023    Medicare Annual Wellness Visit (AWV)  06/08/2023    BMI: Adult  06/08/2023    DTaP,Tdap,and Td Vaccines (3 - Td or Tdap) 01/17/2032    Influenza Vaccine  Completed    HIB Vaccine  Aged Out    Hepatitis B Vaccine  Aged Out    IPV Vaccine  Aged Out    Hepatitis A Vaccine  Aged Out    Meningococcal ACWY Vaccine  Aged Out    HPV Vaccine  Aged Out Immunization History   Administered Date(s) Administered    COVID-19 MODERNA VACC 0 5 ML IM 01/11/2021, 02/08/2021, 10/18/2021, 04/08/2022    H1N1, All Formulations 02/01/2010    INFLUENZA 09/17/2015, 09/23/2016, 08/20/2020, 11/12/2021    Influenza Split High Dose Preservative Free IM 09/04/2014, 09/17/2015, 09/23/2016, 10/06/2017    Influenza, high dose seasonal 0 7 mL 09/14/2018, 10/21/2019    Influenza, seasonal, injectable 09/20/2012, 09/17/2013    Pneumococcal Conjugate 13-Valent 03/16/2015    Pneumococcal Polysaccharide PPV23 07/26/2011    Tdap 07/26/2011, 01/17/2022    Zoster 01/10/2014    Zoster Vaccine Recombinant 08/20/2020, 11/01/2020       Zoila Cerrato,   [facility]

## 2022-06-08 NOTE — ASSESSMENT & PLAN NOTE
History of Evans 9 prostate cancer with evidence of metastasis to spine, bladder, and inguinal lymph nodes  Patient has finished chemotherapy and recently finished targeted radiation therapy may 2022  He will be seeing radiation oncologist tomorrow  Still being followed by oncology team at [facility] and [facility]  Overall is feeling well  His appetite is good and he is maintaining weight  I encouraged him to increase his activity level

## 2022-06-08 NOTE — ASSESSMENT & PLAN NOTE
Cholesterol from June 4th 103, LDL 45  Doing well on atorvastatin 40  Continue regular follow-up with cardiologist at [facility] (Dr Garrick Chaney)

## 2022-06-08 NOTE — ASSESSMENT & PLAN NOTE
Status post stenting in 2018  Currently feels well without chest pain or shortness of breath  Continue regular follow-up with cardiologist at [facility] (Dr Nahed Schrader)

## 2022-06-13 ENCOUNTER — HOSPITAL ENCOUNTER (OUTPATIENT)
Dept: INFUSION CENTER | Facility: CLINIC | Age: 82
Discharge: HOME | End: 2022-06-13
Payer: COMMERCIAL

## 2022-06-13 VITALS
TEMPERATURE: 97.7 F | SYSTOLIC BLOOD PRESSURE: 118 MMHG | HEART RATE: 62 BPM | DIASTOLIC BLOOD PRESSURE: 73 MMHG | RESPIRATION RATE: 18 BRPM

## 2022-06-13 DIAGNOSIS — C61 PROSTATE CANCER (HCC): Primary | ICD-10-CM

## 2022-06-13 DIAGNOSIS — C79.51 BONE METASTASES (HCC): ICD-10-CM

## 2022-06-13 PROCEDURE — 96365 THER/PROPH/DIAG IV INF INIT: CPT

## 2022-06-13 RX ORDER — SODIUM CHLORIDE 9 MG/ML
20 INJECTION, SOLUTION INTRAVENOUS ONCE
Status: COMPLETED | OUTPATIENT
Start: 2022-06-13 | End: 2022-06-13

## 2022-06-13 RX ORDER — SODIUM CHLORIDE 9 MG/ML
20 INJECTION, SOLUTION INTRAVENOUS ONCE
OUTPATIENT
Start: 2022-09-05

## 2022-06-13 RX ADMIN — ZOLEDRONIC ACID 3.5 MG: 4 INJECTION INTRAVENOUS at 08:32

## 2022-06-13 RX ADMIN — SODIUM CHLORIDE 20 ML/HR: 9 INJECTION, SOLUTION INTRAVENOUS at 08:26

## 2022-07-08 DIAGNOSIS — R60.0 LOCALIZED EDEMA: ICD-10-CM

## 2022-07-08 RX ORDER — HYDROCHLOROTHIAZIDE 25 MG/1
25 TABLET ORAL DAILY
Qty: 90 TABLET | Refills: 1 | Status: SHIPPED | OUTPATIENT
Start: 2022-07-08

## 2022-07-11 DIAGNOSIS — I10 ESSENTIAL HYPERTENSION: ICD-10-CM

## 2022-07-11 RX ORDER — METOPROLOL SUCCINATE 25 MG/1
25 TABLET, EXTENDED RELEASE ORAL DAILY
Qty: 90 TABLET | Refills: 1 | Status: SHIPPED | OUTPATIENT
Start: 2022-07-11

## 2022-07-13 ENCOUNTER — OFFICE VISIT (OUTPATIENT)
Dept: HEMATOLOGY ONCOLOGY | Facility: CLINIC | Age: 82
End: 2022-07-13
Payer: COMMERCIAL

## 2022-07-13 VITALS
HEIGHT: 66 IN | OXYGEN SATURATION: 97 % | DIASTOLIC BLOOD PRESSURE: 82 MMHG | TEMPERATURE: 98 F | BODY MASS INDEX: 32.95 KG/M2 | SYSTOLIC BLOOD PRESSURE: 120 MMHG | RESPIRATION RATE: 17 BRPM | WEIGHT: 205 LBS | HEART RATE: 66 BPM

## 2022-07-13 DIAGNOSIS — C61 PROSTATE CANCER (HCC): ICD-10-CM

## 2022-07-13 DIAGNOSIS — C79.51 BONE METASTASES (HCC): ICD-10-CM

## 2022-07-13 DIAGNOSIS — C61 PROSTATE CANCER (HCC): Primary | ICD-10-CM

## 2022-07-13 DIAGNOSIS — C79.51 BONE METASTASES (HCC): Primary | ICD-10-CM

## 2022-07-13 PROCEDURE — 1160F RVW MEDS BY RX/DR IN RCRD: CPT | Performed by: INTERNAL MEDICINE

## 2022-07-13 PROCEDURE — 3079F DIAST BP 80-89 MM HG: CPT | Performed by: INTERNAL MEDICINE

## 2022-07-13 PROCEDURE — 3074F SYST BP LT 130 MM HG: CPT | Performed by: INTERNAL MEDICINE

## 2022-07-13 PROCEDURE — 99214 OFFICE O/P EST MOD 30 MIN: CPT | Performed by: INTERNAL MEDICINE

## 2022-07-13 NOTE — PROGRESS NOTES
Hematology Outpatient Follow - Up Note  Milagros Gamez [de-identified] y o  male MRN: @ Encounter: [id number]        Date:  7/13/2022        Assessment/ Plan:    45-year-old  male who was seen by Urology for elevation of the PSA, when he presented in June 2017 with PSA of 4 6, Subsequently in September of 2017 PSA was 7 3 and in May of 2018 PSA was 8  6   A biopsy was recommended but the patient had just undergone percutaneous stenting of the heart and he was on dual anti-platelet therapy      MRI of the abdomen in April 2018 showed 2 simple cyst in the right hepatic lobe, bilateral renal cysts the largest 1 measuring 10 cm in the left lower lobe     In October of 2018 PSA was 9 8  CT scan of the chest 9/28/2018 showed sclerotic lesions in T10, 5 mm nodule in the right lower lobe   Bone scan showed activity in the posterior T10 level and left posterior 7th rib area concerning for osseous metastases and increased activity in the distal right clavicle might be degenerative or metastatic area      He was diagnosed with castration sensitive metastatic prostate cancer  [facility] insurance company could not approve enzalutamide, he also has a high co-payment for abiraterone     The patient initiated on samples of enzalutamide 160 mg p o  daily since the beginning of December 2018   His insurance approved abiraterone however he had a high co-payment        We were finally able to get financial assistance through Elaine Irving and Roseline started Zytiga (abiraterone) 2/3/2019 with prednisone 5mg po bid without side effects      In May 2020 PSA 0 5, bone scan showed activity in the anterior 5th rib area, right humerus area most likely consistent with metastatic disease, no visceral disease on the CT scan, we had hard time getting enzalutamide , the patient was initiated on enzalutamide on 06/02/2020, PSA at the time of 0 8     PSA 1 4 in October 2020, normal alkaline phosphatase           Genes analyzed of germ line test  LIONEL, BARD1, BRCA1, BRCA2, BRIP1, CDH1, CHEK2, DICER1, EPCAM*, HOXB13, MLH1, MSH2,  MSH6, NBN, NF1, PALB2, PMS2, PTEN, RAD50, RAD51C, RAD51D, SMARCA4, STK11, TP53 all of these were negative     Evaluated at [facility] the decision for radiation therapy to oligo metastases and the primary site of the tumor depends on the PET scan finding which showed multiple metastatic disease, patient to meet with Radiation Oncology      PET scan on 11/13/2020 showed radiotracer retention in the left posterior bladder suspicious for malignancy with prostate cancer invasion progressing from prior CT scan mild to moderate uptake in the few small inguinal lymph nodes, sclerotic lesions in the spine with uptake suspicious for metastases     Radiation therapy in the February 2021- April 2021      Progression of disease by bone scan in June 2021 with multiple thoracic, right ribs area, hip area involvement, PSA 13 ( 7 2 on 04/2021)        Cycle 1 Taxotere 7/12/2021    Cycle 10  On 01/17/2022     PSA 3 3 in March 2022, PSMA PET scan at [facility] showed 5 bony lesions no evidence of visceral metastases    Status post stereotactic radiation therapy at [facility] finished in June 2022, PSA went down to 2 8, at this time watchful observation and repeat PSA in 3 months, continue Zometa every 3 months, if he has true progression of disease he might get ADRYAN 177, docetaxel or clinical trial        Labs and imaging studies are reviewed by ordering provider once results are available  If there are findings that need immediate attention, you will be contacted when results available  Discussing results and the implication on your healthcare is best discussed in person at your follow-up visit  HPI:    67-year-old  male who was seen by Urology for elevation of the PSA, when he presented in June 2017 with PSA of 4 6, Subsequently in September of 2017 PSA was 7 3 and in May of 2018 PSA was 8  6   A biopsy was recommended but the patient had just undergone percutaneous stenting of the heart and he was on dual anti-platelet therapy      MRI of the abdomen in April 2018 showed 2 simple cyst in the right hepatic lobe, bilateral renal cysts the largest 1 measuring 10 cm in the left lower lobe     In October of 2018 PSA was 9 8  CT scan of the chest 9/28/2018 showed sclerotic lesions in T10, 5 mm nodule in the right lower lobe   Bone scan showed activity in the posterior T10 level and left posterior 7th rib area concerning for osseous metastases and increased activity in the distal right clavicle might be degenerative or metastatic area      He was diagnosed with castration sensitive metastatic prostate cancer  [facility] [employer] could not approve enzalutamide, he also has a high co-payment for abiraterone     The patient initiated on samples of enzalutamide 160 mg p o  daily since the beginning of December 2018   His insurance approved abiraterone however he had a high co-payment        We were finally able to get financial assistance through Rylie Brannon and Roseline started Kaleb (abiraterone) 2/3/2019 with prednisone 5mg po bid without side effects      In May 2020 PSA 0 5, bone scan showed activity in the anterior 5th rib area, right humerus area most likely consistent with metastatic disease, no visceral disease on the CT scan, we had hard time getting enzalutamide , the patient was initiated on enzalutamide on 06/02/2020, PSA at the time of 0 8     PSA 1 4 in October 2020, normal alkaline phosphatase           Genes analyzed of germ line test  LIONEL, BARD1, BRCA1, BRCA2, BRIP1, CDH1, CHEK2, DICER1, EPCAM*, HOXB13, MLH1, MSH2,  MSH6, NBN, NF1, PALB2, PMS2, PTEN, RAD50, RAD51C, RAD51D, SMARCA4, STK11, TP53 all of these were negative     Evaluated at [facility] the decision for radiation therapy to oligo metastases and the primary site of the tumor depends on the PET scan finding which showed multiple metastatic disease, patient to meet with Radiation Oncology      PET scan on 11/13/2020 showed radiotracer retention in the left posterior bladder suspicious for malignancy with prostate cancer invasion progressing from prior CT scan mild to moderate uptake in the few small inguinal lymph nodes, sclerotic lesions in the spine with uptake suspicious for metastases     Radiation therapy in the February 2021- April 2021      Progression of disease by bone scan in June 2021 with multiple thoracic, right ribs area, hip area involvement, PSA 13 ( 7 2 on 04/2021)        Cycle 1 Taxotere 7/12/2021      Cycle 10  On 01/17/2022 PSA in the range of 2     PSA 3 3 in March 2022     PSMA scan showed bony lesions in 5 locations including right humeri, ribs, right pelvis  Status post stereotactic radiation therapy finished in June 2022 at [facility]    PSA went down to 2 8  Interval History:        Previous Treatment:         Test Results:    Imaging: No results found  Labs:   Lab Results   Component Value Date    WBC 2 9 (L) 06/04/2022    HGB 11 2 (L) 06/04/2022    HCT 32 9 (L) 06/04/2022    MCV 89 4 06/04/2022     (L) 06/04/2022     Lab Results   Component Value Date     09/30/2017    K 4 1 06/04/2022     06/04/2022    CO2 30 06/04/2022    BUN 19 06/04/2022    CREATININE 1 11 06/04/2022    GLUF 97 08/08/2019    CALCIUM 9 1 06/04/2022    AST 22 06/04/2022    ALT 23 06/04/2022    ALKPHOS 42 06/04/2022    PROT 6 5 09/30/2017    BILITOT 0 7 09/30/2017    EGFR 61 12/02/2019       No results found for: IRON, TIBC, FERRITIN    No results found for: [id number]      ROS: Review of Systems   Constitutional: Negative  Negative for appetite change, chills, diaphoresis, fatigue, fever and unexpected weight change  HENT:   Negative for hearing loss, lump/mass, mouth sores, nosebleeds, sore throat, trouble swallowing and voice change  Eyes: Negative  Negative for eye problems and icterus  Respiratory: Negative  Negative for chest tightness, cough, hemoptysis and shortness of breath  Cardiovascular: Negative for chest pain and leg swelling  Gastrointestinal: Negative for abdominal distention, abdominal pain, blood in stool, constipation, diarrhea and nausea  Endocrine: Negative  Genitourinary: Negative for dysuria, frequency, hematuria and pelvic pain  Musculoskeletal: Negative  Negative for arthralgias, back pain, flank pain, gait problem, myalgias and neck stiffness  Skin: Negative for itching and rash  Neurological: Negative for dizziness, gait problem, headaches, light-headedness, numbness and speech difficulty  Hematological: Negative for adenopathy  Does not bruise/bleed easily  Psychiatric/Behavioral: Negative for confusion, decreased concentration, depression and sleep disturbance  The patient is not nervous/anxious  Current Medications: Reviewed  Allergies: Reviewed  PMH/FH/SH:  Reviewed      Physical Exam:    Body surface area is 2 02 meters squared  Wt Readings from Last 3 Encounters:   07/13/22 93 kg (205 lb)   06/08/22 95 7 kg (211 lb)   04/21/22 90 7 kg (200 lb)        Temp Readings from Last 3 Encounters:   07/13/22 98 °F (36 7 °C)   06/13/22 97 7 °F (36 5 °C) (Temporal)   06/08/22 97 5 °F (36 4 °C) (Temporal)        BP Readings from Last 3 Encounters:   07/13/22 120/82   06/13/22 118/73   06/08/22 110/70         Pulse Readings from Last 3 Encounters:   07/13/22 66   06/13/22 62   06/08/22 67        Physical Exam  Vitals reviewed  Constitutional:       General: He is not in acute distress  Appearance: He is well-developed  He is not diaphoretic  HENT:      Head: Normocephalic and atraumatic  Eyes:      Conjunctiva/sclera: Conjunctivae normal    Neck:      Trachea: No tracheal deviation  Cardiovascular:      Rate and Rhythm: Normal rate and regular rhythm  Heart sounds: No murmur heard  No friction rub  No gallop     Pulmonary: Effort: Pulmonary effort is normal  No respiratory distress  Breath sounds: Normal breath sounds  No wheezing or rales  Chest:      Chest wall: No tenderness  Abdominal:      General: There is no distension  Palpations: Abdomen is soft  Tenderness: There is no abdominal tenderness  Musculoskeletal:      Cervical back: Normal range of motion and neck supple  Right lower leg: No edema  Left lower leg: No edema  Lymphadenopathy:      Cervical: No cervical adenopathy  Skin:     General: Skin is warm and dry  Coloration: Skin is not pale  Findings: No erythema  Neurological:      Mental Status: He is alert and oriented to person, place, and time  Psychiatric:         Behavior: Behavior normal          Thought Content: Thought content normal          Judgment: Judgment normal          ECO  Goals and Barriers:  Current Goal: Minimize effects of disease  Barriers: None  Patient's Capacity to Self Care:  Patient is able to self care      Code Status: @[facility]@

## 2022-07-20 ENCOUNTER — EVALUATION (OUTPATIENT)
Dept: PHYSICAL THERAPY | Facility: CLINIC | Age: 82
End: 2022-07-20
Payer: COMMERCIAL

## 2022-07-20 DIAGNOSIS — R35.0 URINARY FREQUENCY: ICD-10-CM

## 2022-07-20 PROCEDURE — 97162 PT EVAL MOD COMPLEX 30 MIN: CPT | Performed by: PHYSICAL THERAPIST

## 2022-07-20 PROCEDURE — 97112 NEUROMUSCULAR REEDUCATION: CPT | Performed by: PHYSICAL THERAPIST

## 2022-07-20 NOTE — PROGRESS NOTES
Physical Therapy Initial Evaluation    Today's date: 2022  Patient name: Refugio Guardado  :   MRN: [id number]  Referring provider: Mendel Grew, CRNP  Dx:   Encounter Diagnosis     ICD-10-CM    1  Urinary frequency  R35 0 Ambulatory Referral to Physical Therapy                  Assessment  Impairments: abnormal or restricted ROM, activity intolerance, poor posture  and poor body mechanics  Understanding of Dx/Px/POC: good   Prognosis: good    Goals  (up to 6 weeks)  1  Independent and safe with HEP  2  Independent and safe with daily bladder diary use to max fluid intake  3  Independent and safe with postural correction and home use of a squatty potty  4  Independent and safe with abdominal breathing tech for abdominal quieting tech  Plan  Patient would benefit from: skilled physical therapy  Planned modality interventions: biofeedback  Planned therapy interventions: manual therapy, neuromuscular re-education, patient education, postural training, strengthening, stretching, therapeutic activities, therapeutic exercise, therapeutic training, home exercise program, graded exercise, graded activity, functional ROM exercises, flexibility, breathing training, body mechanics training, behavior modification, activity modification and abdominal trunk stabilization  Frequency: 1x week  Duration in weeks: 6  Treatment plan discussed with: patient        PT Pelvic Floor Subjective:   History of Present Illness:   Pt is [de-identified] y/o male with Hx of urinary frequency for years due to prostate Ca  Recently pt stopped chemo tx and symptoms improved with use of med recommended by urology  Pt was referred to [facility] for PF evaluation and tx  Current functional limitations: increased urinary frequency at night, not able to get good night rest - frequently getting up  Date of onset: 2022    See PMHx for details          Recurrent probem    Quality of life: good    Social Support:     Lives in:  Multiple-level home    Lives with:  Spouse and adult children    Relationship status: /committed    Work status: retired and employed part time    Life stress level: 2    Life stress severity: mild    History of Depression: no  Hand dominance:  Right  Diet and Exercise:    Diet:balanced nutrition    Exercise type: walking, combination activity, strengthening exercise program and weight training    Exercise frequency: 2-3 times per week  Bladder Function:     Voiding Difficulties positive for: urgency and frequent urination      Voiding Difficulties negative for: incomplete emptying      Voiding Difficulties comments:     Voiding frequency: every 1-2 hours    Urinary leakage: no urine leakage    Nocturia (episodes per night): 4    Painful urination: No      Fluid Intake Type: Water, coffee, juice, soda and alcohol    Intake (ounces): Water intake (oz): 30 oz per day  Juice intake (oz): cramberry, OJ 4 oz  Coffee intake (oz): 2 cups per day  Soda intake (oz): on/off  Alcohol intake (oz):  2x glass of wine per week  Incontinence Management:     Pads/Diaper Use:  None  Bowel Function:     Voiding DIfficulties: unfinished feeling after defecating and constipation      Bowel Function comments:  Reviewed home use of a squatty potty  Bowel frequency: daily and multiple times a day    Dent Stool Scale: type 4, type 1 and type 2    Stool softener use: no stool softeners    Enema use: no enema    Uses "squatty potty": no Squatty Potty  Sexual Function:     Sexually Active:  Not sexually active  Pain:     Location:  No pain in pelvic region  Diagnostic Tests:     None    Treatments:     Previous treatment:  Medication    Current treatment: medication    Patient Goals:     Patient goals for therapy:  Fully empty bladder or bowels, improved bladder or bowel function, improved comfort, improved quality of life and improved sleep      Objective     Static Posture   General Observations  Symmetrical weight bearing  Head  Forward  Shoulders  Rounded  Thoracic Spine  Flattened thoracic spine  Lumbar Spine   Flattened and decreased lordosis  Postural Observations  Seated posture: fair  Standing posture: fair  Correction of posture: has no consistent effect        Active Range of Motion   Cervical/Thoracic Spine       Thoracic    Flexion:  WFL  Extension:  Restriction level: moderate  Left lateral flexion:  Restriction level: moderate  Right lateral flexion:  Restriction level: moderate  Left rotation:  Restriction level: moderate  Right rotation:  Restriction level: moderate    Lumbar   Flexion:  WFL  Extension:  Restriction level: moderate  Left lateral flexion:  Restriction level: moderate  Right lateral flexion:  Restriction level: moderate  Left rotation:  Restriction level: moderate  Right rotation:  Restriction level: moderate    Strength/Myotome Testing     Lumbar   Left   Normal strength    Right   Normal strength  Pelvic Floor Exam   Position: supine exam  Abdominal assessment: (+) Diastasis Recti    Diastatis   Diastasis recti present: yes  3" above umbilicus (# fingers): 4  Umbilicus (# fingers): 3  3" below umbilicus (# fingers): 1  Connective tissue integrity at linea alba: boggy  no tenderness at linea alba  unable to engage transverse abdominis     Skin inspection:   no scars present  General Perineum Exam:   Positive for hemorrhoids       Visual Inspection of Perineum:   Excursion of perineal body in cephalad direction with contraction of pelvic floor muscles (PFM): fair   Excursion of perineal body in caudal direction with relaxation of pelvic floor muscles (PFM): good   Involuntary contraction with coughing: no  Involuntary relaxation with bearing down: no  Cotton swab test: non-tender  Cough reflex: cough reflex  Sphincter Tone Resting: normal  Sphincter Tone Squeeze: weak  Sensation: intact  Tenderness: unprovoked    Pelvic Floor Muscle Exam       PERFECT Score   Power right: 3+/5  Power left: 3+/5  Endurance (seconds to max): 5  Repetitions (before fatigue): 6  Fast flicks (in 10 seconds): 6               Precautions: Hx of Ca        Manuals 7/20            Objective part of IE  next            PF MMT next                                      Neuro Re-Ed                           anatomy/purpose/benefits 5'            Bladder diary use/benefits/purpose 3'            Squatty potty use/edu/benefits/purpose 2'                                                   Ther Ex                                                                                                                     Ther Activity                                       Gait Training                                       Modalities

## 2022-07-28 ENCOUNTER — OFFICE VISIT (OUTPATIENT)
Dept: PHYSICAL THERAPY | Facility: CLINIC | Age: 82
End: 2022-07-28
Payer: COMMERCIAL

## 2022-07-28 DIAGNOSIS — R35.0 URINARY FREQUENCY: Primary | ICD-10-CM

## 2022-07-28 PROCEDURE — 97140 MANUAL THERAPY 1/> REGIONS: CPT | Performed by: PHYSICAL THERAPIST

## 2022-07-28 PROCEDURE — 97110 THERAPEUTIC EXERCISES: CPT | Performed by: PHYSICAL THERAPIST

## 2022-07-28 PROCEDURE — 97112 NEUROMUSCULAR REEDUCATION: CPT | Performed by: PHYSICAL THERAPIST

## 2022-07-28 NOTE — PROGRESS NOTES
Daily Note     Today's date: 2022  Patient name: Mendez Dumont  : [birth year]  MRN: [id number]  Referring provider: AUBREY Lemus  Dx:   Encounter Diagnosis     ICD-10-CM    1  Urinary frequency  R35 0                   Subjective: No new changes since IE    Objective: See treatment diary below      Assessment: Tolerated treatment well  Patient demonstrated fatigue post treatment and would benefit from continued PT for further PFM strengthening as juice, abdominal breathing tech and correct postural awareness  HEP was given and reviewed  Plan: Continue per plan of care  Progress treatment as tolerated  Precautions: Hx of Ca        Manuals            Objective part of IE  next SZ           PF MMT next 3+/5                                     Neuro Re-Ed                          MH anatomy/purpose/benefits 5'            Bladder diary use/benefits/purpose 3' 5'           Squatty potty use/edu/benefits/purpose 2'            Timed voiding/edu/purpose  5'                        Rec bike/posture/PF  5'           Ther Ex             PF/TA  Sitting 10x5" VC's           PF/TA/AD's T-ball use  Sitting 10x5" VC's           PF/TA/AB's TB use  Sitting 10x5" VC's                                                                            Ther Activity             HEP review  5'           Abdominal Breathing  5' sitting           Gait Training                                       Modalities

## 2022-08-04 ENCOUNTER — OFFICE VISIT (OUTPATIENT)
Dept: PHYSICAL THERAPY | Facility: CLINIC | Age: 82
End: 2022-08-04
Payer: COMMERCIAL

## 2022-08-04 DIAGNOSIS — R35.0 URINARY FREQUENCY: Primary | ICD-10-CM

## 2022-08-04 PROCEDURE — 97112 NEUROMUSCULAR REEDUCATION: CPT | Performed by: PHYSICAL THERAPIST

## 2022-08-04 PROCEDURE — 97110 THERAPEUTIC EXERCISES: CPT | Performed by: PHYSICAL THERAPIST

## 2022-08-04 PROCEDURE — 97530 THERAPEUTIC ACTIVITIES: CPT | Performed by: PHYSICAL THERAPIST

## 2022-08-04 NOTE — PROGRESS NOTES
Daily Note     Today's date: 2022  Patient name: Ancelmo Weller  :   MRN: [id number]  Referring provider: AUBREY Wells  Dx:   Encounter Diagnosis     ICD-10-CM    1  Urinary frequency  R35 0                   Subjective: "I feel much better  I urinate ever 2 hours during the day, and sometimes I don't have to go all night "      Objective: See treatment diary below      Assessment: Tolerated treatment well  Patient exhibited good technique with therapeutic exercises and would benefit from continued PT in sitting and sit to stand at this time  No discomfort verbalized  Plan: Continue per plan of care  Progress treatment as tolerated  Precautions: Hx of Ca        Manuals           Objective part of IE  next SZ           PF MMT next 3+/5                                     Neuro Re-Ed                           anatomy/purpose/benefits 5'            Bladder diary use/benefits/purpose 3' 5'           Squatty potty use/edu/benefits/purpose 2'            Timed voiding/edu/purpose  5'                        Rec bike/posture/PF  5' 10'          Ther Ex             PF/TA  Sitting 10x5" VC's Sitting FF/b'kwrd 10x5" VC's          PF/TA/AD's T-ball use  Sitting 10x5" VC's Sitting FF/b'kwrd  10x5" VC's          PF/TA/AB's TB use  Sitting 10x5" VC's Sitting FF/bkwrd 10x5" VC's           TKE alt LE/PF/TA   10x5"          sit to stand/PF/TA   10x5"                                                 Ther Activity             HEP review  5' 5'          Abdominal Breathing  5' sitting 5' review VC's          Gait Training                                       Modalities

## 2022-08-24 ENCOUNTER — OFFICE VISIT (OUTPATIENT)
Dept: PHYSICAL THERAPY | Facility: CLINIC | Age: 82
End: 2022-08-24
Payer: COMMERCIAL

## 2022-08-24 DIAGNOSIS — R35.0 URINARY FREQUENCY: Primary | ICD-10-CM

## 2022-08-24 PROCEDURE — 97530 THERAPEUTIC ACTIVITIES: CPT | Performed by: PHYSICAL THERAPIST

## 2022-08-24 PROCEDURE — 97110 THERAPEUTIC EXERCISES: CPT | Performed by: PHYSICAL THERAPIST

## 2022-08-24 PROCEDURE — 97112 NEUROMUSCULAR REEDUCATION: CPT | Performed by: PHYSICAL THERAPIST

## 2022-08-24 NOTE — PROGRESS NOTES
Daily Note     Today's date: 2022  Patient name: Chandra Chan  : [birth year]  MRN: [id number]  Referring provider: AUBREY Stanton  Dx:   Encounter Diagnosis     ICD-10-CM    1  Urinary frequency  R35 0                   Subjective: Pt reports improvement with HEP use  Objective: See treatment diary below      Assessment: Tolerated treatment well  Patient demonstrated fatigue post treatment, exhibited good technique with therapeutic exercises and would benefit from continued PT      Plan: Continue per plan of care  Potential discharge next visit  Precautions: Hx of Ca        Manuals          Objective part of IE  next SZ           PF MMT next 3+/5                                     Neuro Re-Ed                          MH anatomy/purpose/benefits 5'            Bladder diary use/benefits/purpose 3' 5'           Squatty potty use/edu/benefits/purpose 2'            Timed voiding/edu/purpose  5'                        Rec bike/posture/PF  5' 10' 10'         Ther Ex             PF/TA  Sitting 10x5" VC's Sitting FF/b'kwrd 10x5" VC's 5x5"         PF/TA/AD's T-ball use  Sitting 10x5" VC's Sitting FF/b'kwrd  10x5" VC's 5x5"         PF/TA/AB's TB use  Sitting 10x5" VC's Sitting FF/bkwrd 10x5" VC's  5x5"         TKE alt LE/PF/TA   10x5" 5x5"         sit to stand/PF/TA   10x5" 5x5"         standing PF/TA    10x5"         Mini squats/PF/TA    10x5"         FF steps/PF/TA    5x5" ea LE         U/l balance/PF    5x5" ea LE                                                Ther Activity             HEP review  5' 5' 5'         Abdominal Breathing  5' sitting 5' review VC's 5' review         Gait Training                                       Modalities

## 2022-08-27 ENCOUNTER — TELEPHONE (OUTPATIENT)
Dept: FAMILY MEDICINE CLINIC | Facility: CLINIC | Age: 82
End: 2022-08-27

## 2022-08-27 NOTE — TELEPHONE ENCOUNTER
Spoke with patient he is positive for covid advised him to quarantine for five days and after wear a mask for five days  Call us if he needs anything or his symptoms worsens

## 2022-08-31 ENCOUNTER — APPOINTMENT (OUTPATIENT)
Dept: PHYSICAL THERAPY | Facility: CLINIC | Age: 82
End: 2022-08-31
Payer: COMMERCIAL

## 2022-08-31 ENCOUNTER — TELEMEDICINE (OUTPATIENT)
Dept: FAMILY MEDICINE CLINIC | Facility: CLINIC | Age: 82
End: 2022-08-31
Payer: COMMERCIAL

## 2022-08-31 ENCOUNTER — TELEPHONE (OUTPATIENT)
Dept: FAMILY MEDICINE CLINIC | Facility: CLINIC | Age: 82
End: 2022-08-31

## 2022-08-31 DIAGNOSIS — U07.1 COVID-19: Primary | ICD-10-CM

## 2022-08-31 PROCEDURE — 99213 OFFICE O/P EST LOW 20 MIN: CPT | Performed by: FAMILY MEDICINE

## 2022-08-31 RX ORDER — NIRMATRELVIR AND RITONAVIR 300-100 MG
3 KIT ORAL 2 TIMES DAILY
Qty: 30 TABLET | Refills: 0 | Status: SHIPPED | OUTPATIENT
Start: 2022-08-31 | End: 2022-09-05

## 2022-08-31 NOTE — ASSESSMENT & PLAN NOTE
Patient tested positive for COVID-19  Patient may start Paxlovid  He was instructed to stop lipitor for 10 days  Patient was advised to self-quarantine at home to reduce risk of spreading infection  The patient was counseled on risk factors and what signs and symptoms to monitor for:  Shortness of breath, chest pain, difficulty breathing, or fever that is not responding to antipyretic  If symptoms worsen the patient was advised to notify their PCP and go to the ED for immediate attention  Increase fluid intake and get plenty of rest     You may use over the counter cough syrup with cough suppressant, such as Robitussin DM or Mucinex DM  You may also use Acetaminophen containing product for symptom relief  Please call the office if you have any questions  The patient verbalized understanding of treatment plan

## 2022-08-31 NOTE — PROGRESS NOTES
COVID-19 Outpatient Progress Note    Assessment/Plan:    Problem List Items Addressed This Visit        Other    ACBTL-65 - Primary     Patient tested positive for COVID-19  Patient may start Paxlovid  He was instructed to stop lipitor for 10 days  Patient was advised to self-quarantine at home to reduce risk of spreading infection  The patient was counseled on risk factors and what signs and symptoms to monitor for:  Shortness of breath, chest pain, difficulty breathing, or fever that is not responding to antipyretic  If symptoms worsen the patient was advised to notify their PCP and go to the ED for immediate attention  Increase fluid intake and get plenty of rest     You may use over the counter cough syrup with cough suppressant, such as Robitussin DM or Mucinex DM  You may also use Acetaminophen containing product for symptom relief  Please call the office if you have any questions  The patient verbalized understanding of treatment plan  Relevant Medications    nirmatrelvir & ritonavir (Paxlovid, 300/100,) tablet therapy pack         Disposition:     Discussed symptom directed medication options with patient  Patient meets criteria for PAXLOVID and they have been counseled appropriately according to EUA documentation released by the FDA  After discussion, patient agrees to treatment  [address] is an investigational medicine used to treat mild-to-moderate COVID-19 in adults and children (15years of age and older weighing at least 80 pounds (40 kg)) with positive results of direct SARS-CoV-2 viral testing, and who are at high risk for progression to severe COVID-19, including hospitalization or death  PAXLOVID is investigational because it is still being studied  There is limited information about the safety and effectiveness of using PAXLOVID to treat people with mild-to-moderate COVID-19      The FDA has authorized the emergency use of PAXLOVID for the treatment of mild-tomoderate COVID-19 in adults and children (15years of age and older weighing at least 80 pounds (40 kg)) with a positive test for the virus that causes COVID-19, and who are at high risk for progression to severe COVID-19, including hospitalization or death, under an EUA  What should I tell my healthcare provider before I take PAXLOVID? Tell your healthcare provider if you:  - Have any allergies  - Have liver or kidney disease  - Are pregnant or plan to become pregnant  - Are breastfeeding a child  - Have any serious illnesses    Tell your healthcare provider about all the medicines you take, including prescription and over-the-counter medicines, vitamins, and herbal supplements  Some medicines may interact with PAXLOVID and may cause serious side effects  Keep a list of your medicines to show your healthcare provider and pharmacist when you get a new medicine  You can ask your healthcare provider or pharmacist for a list of medicines that interact with PAXLOVID  Do not start taking a new medicine without telling your healthcare provider  Your healthcare provider can tell you if it is safe to take PAXLOVID with other medicines  Tell your healthcare provider if you are taking combined hormonal contraceptive  PAXLOVID may affect how your birth control pills work  Females who are able to become pregnant should use another effective alternative form of contraception or an additional barrier method of contraception  Talk to your healthcare provider if you have any questions about contraceptive methods that might be right for you  How do I take PAXLOVID? PAXLOVID consists of 2 medicines: nirmatrelvir and ritonavir  - Take 2 pink tablets of nirmatrelvir with 1 white tablet of ritonavir by mouth 2 times each day (in the morning and in the evening) for 5 days  For each dose, take all 3 tablets at the same time  - If you have kidney disease, talk to your healthcare provider   You may need a different dose  - Swallow the tablets whole  Do not chew, break, or crush the tablets  - Take PAXLOVID with or without food  - Do not stop taking PAXLOVID without talking to your healthcare provider, even if you feel better  - If you miss a dose of PAXLOVID within 8 hours of the time it is usually taken, take it as soon as you remember  If you miss a dose by more than 8 hours, skip the missed dose and take the next dose at your regular time  Do not take 2 doses of PAXLOVID at the same time  - If you take too much PAXLOVID, call your healthcare provider or go to the nearest [facility] emergency room right away  - If you are taking a ritonavir- or cobicistat-containing medicine to treat hepatitis C or Human Immunodeficiency Virus (HIV), you should continue to take your medicine as prescribed by your healthcare provider   - Talk to your healthcare provider if you do not feel better or if you feel worse after 5 days  Who should generally not take PAXLOVID? Do not take PAXLOVID if:  You are allergic to nirmatrelvir, ritonavir, or any of the ingredients in PAXLOVID  You are taking any of the following medicines:  - Alfuzosin  - Pethidine, piroxicam, propoxyphene  - Ranolazine  - Amiodarone, dronedarone, flecainide, propafenone, quinidine  - Colchicine  - Lurasidone, pimozide, clozapine  - Dihydroergotamine, ergotamine, methylergonovine  - Lovastatin, simvastatin  - Sildenafil (Revatio®) for pulmonary arterial hypertension (PAH)  - Triazolam, oral midazolam  - Apalutamide  - Carbamazepine, phenobarbital, phenytoin  - Rifampin  - St  Christophers Wort (hypericum perforatum)    What are the important possible side effects of PAXLOVID? Possible side effects of PAXLOVID are:  - Liver Problems   Tell your healthcare provider right away if you have any of these signs and symptoms of liver problems: loss of appetite, yellowing of your skin and the whites of eyes (jaundice), dark-colored urine, pale colored stools and itchy skin, stomach area (abdominal) pain  - Resistance to HIV Medicines  If you have untreated HIV infection, PAXLOVID may lead to some HIV medicines not working as well in the future  - Other possible side effects include: altered sense of taste, diarrhea, high blood pressure, or muscle aches    These are not all the possible side effects of PAXLOVID  Not many people have taken PAXLOVID  Serious and unexpected side effects may happen  Aubree Donnelly is still being studied, so it is possible that all of the risks are not known at this time  What other treatment choices are there? Like Fabián Donaldson may allow for the emergency use of other medicines to treat people with COVID-19  Go to https://Sports[employer]/ for information on the emergency use of other medicines that are authorized by FDA to treat people with COVID-19  Your healthcare provider may talk with you about clinical trials for which you may be eligible  It is your choice to be treated or not to be treated with PAXLOVID  Should you decide not to receive it or for your child not to receive it, it will not change your standard medical care  What if I am pregnant or breastfeeding? There is no experience treating pregnant women or breastfeeding mothers with PAXLOVID  For a mother and unborn baby, the benefit of taking PAXLOVID may be greater than the risk from the treatment  If you are pregnant, discuss your options and specific situation with your healthcare provider  It is recommended that you use effective barrier contraception or do not have sexual activity while taking PAXLOVID  If you are breastfeeding, discuss your options and specific situation with your healthcare provider  How do I report side effects with PAXLOVID? Contact your healthcare provider if you have any side effects that bother you or do not go away      Report side effects to FDA MedWatch at www fda gov/medwatch or call 5-834-WTY2986 or you can report side effects to [facility]  at the contact information provided below  Website Fax number Telephone number   Qalendra [phone]     How should I store [address]? Store PAXLOVID tablets at room temperature between 68°F to 77°F (20°C to 25°C)  Full fact sheet for patients, parents, and caregivers can be found at: [employer]    I have spent 15 minutes directly with the patient  Greater than 50% of this time was spent in counseling/coordination of care regarding: prognosis, risks and benefits of treatment options, instructions for management, patient and family education, importance of treatment compliance, risk factor reductions and impressions  Encounter provider: July Carroll DO     Provider located at: [address] [place] [address] [address]  [address] [place] [address] [place] [address]  [phone]     Recent Visits  Date Type Provider Dept   08/27/22 Telephone [place] [id number] [address] recent visits within past 7 days and meeting all other requirements  Today's Visits  Date Type Provider Dept   08/31/22 Telemedicine July Carroll DO [facility]   08/31/22 Telephone [facility]   Showing today's visits and meeting all other requirements  Future Appointments  No visits were found meeting these conditions  Showing future appointments within next 150 days and meeting all other requirements     This virtual check-in was done via [facility] and patient was informed that this is a secure, HIPAA-compliant platform  He agrees to proceed  Patient agrees to participate in a virtual check in via telephone or video visit instead of presenting to the office to address urgent/immediate medical needs  Patient is aware this is a billable service   He acknowledged consent and understanding of privacy and security of the video platform  The patient has agreed to participate and understands they can discontinue the visit at any time  After connecting through [facility], the patient was identified by name and date of birth  Apryl Michel was informed that this was a telemedicine visit and that the exam was being conducted confidentially over secure lines  My office door was closed  No one else was in the room  Apryl Michel acknowledged consent and understanding of privacy and security of the telemedicine visit  I informed the patient that I have reviewed his record in Epic and presented the opportunity for him to ask any questions regarding the visit today  The patient agreed to participate  Verification of patient location:  Patient is located in the following state in which I hold an active license: [place]    Subjective:   Apryl Michel is a [de-identified] y o  male who has been screened for COVID-19  Patient's symptoms include fatigue, cough and myalgias  Patient denies fever, anosmia, loss of taste, shortness of breath, chest tightness and abdominal pain  - Date of symptom onset: 8/27/2022  - Date of positive COVID-19 test: 8/27/2022  Type of test: Home antigen  Patient with typical symptoms of COVID-19 and they attest that they were positive on home rapid antigen testing  Image of positive result is not able to be uploaded into their chart       COVID-19 vaccination status: Fully vaccinated with booster    No results found for: Jayson Bolaños, [address] [facility], [address] [facility], [facility] [address], [address] [facility], [address] [facility]  Past Medical History:   Diagnosis Date    Anemia     last assessed  1/7/14     BRCA1 negative     BRCA2 negative     Cancer ([facility] [address] )     PROSTATE, BONE CANCER    Coronary artery disease     DDD (degenerative disc disease), cervical     Hypercholesteremia     Hypertension     Kidney stone     kidney stones    Polyp of sigmoid colon     Prostate cancer ([facility] [address] )     Renal disorder elevated serum creat    Tinnitus     unspecified laterality / last assessed 4/9/13    Vitamin D deficiency      Past Surgical History:   Procedure Laterality Date    BREAST EXCISIONAL BIOPSY Left     age 16 - benign    CARDIAC SURGERY      CARD CATH W/ STENTS    COLONOSCOPY      CORONARY ANGIOPLASTY WITH STENT PLACEMENT      FL RETROGRADE PYELOGRAM  9/11/2019    HEMORRHOID SURGERY      NV CYSTO/URETERO W/LITHOTRIPSY &INDWELL STENT INSRT Left 9/11/2019    Procedure: CYSTO, URETEROSCOPY W/HOLMIUM LASER, BASKET STONE EXTRACTION, RETROGRADE PYELOGRAM, STENT EXCHANGE;  Surgeon: Anjali Antonio MD;  Location: [facility];  Service: Urology    NV CYSTOURETHROSCOPY,FULGUR 0 5-2 CM LESN N/A 9/8/2021    Procedure: TRANSURETHRAL RESECTION OF BLADDER TUMOR (TURBT);   Surgeon: Anjali Antonio MD;  Location: [facility];  Service: Urology    PROSTATE BIOPSY  10/24/2018    TONSILLECTOMY       Current Outpatient Medications   Medication Sig Dispense Refill    aspirin (ECOTRIN LOW STRENGTH) 81 mg EC tablet Take 81 mg by mouth daily       atorvastatin (LIPITOR) 40 mg tablet Take 40 mg by mouth daily at bedtime       Calcium 500 MG tablet Take 1,000 mg by mouth daily       cholecalciferol (VITAMIN D3) 1,000 units tablet Take 1,000 Units by mouth daily       diphenhydrAMINE HCl (BENADRYL ALLERGY PO) Take by mouth      hydrochlorothiazide (HYDRODIURIL) 25 mg tablet Take 1 tablet (25 mg total) by mouth daily 90 tablet 1    leuprolide (LUPRON DEPOT 6 MONTH KIT) 45 mg Inject 45 mg into a muscle every 6 (six) months 45 mg 0    losartan (COZAAR) 100 MG tablet Take 1 tablet (100 mg total) by mouth daily 90 tablet 1    metoprolol succinate (TOPROL-XL) 25 mg 24 hr tablet Take 1 tablet (25 mg total) by mouth daily 90 tablet 1    nirmatrelvir & ritonavir (Paxlovid, 300/100,) tablet therapy pack Take 3 tablets by mouth 2 (two) times a day for 5 days Take 2 nirmatrelvir tablets + 1 ritonavir tablet together per dose 30 tablet 0    tamsulosin (FLOMAX) 0 4 mg take 1 capsule by mouth once daily with dinner 90 capsule 3    Zoledronic Acid (ZOMETA IV) Infuse into a venous catheter every 3 (three) months      Mirabegron ER 50 MG TB24 Take 1 tablet (50 mg total) by mouth in the morning 30 tablet 12     No current facility-administered medications for this visit  Allergies   Allergen Reactions    Other Rash     bandaids rash        Review of Systems   Constitutional: Positive for fatigue  Negative for fever  Respiratory: Positive for cough  Negative for chest tightness and shortness of breath  Gastrointestinal: Negative for abdominal pain  Musculoskeletal: Positive for myalgias  All other systems reviewed and are negative  Objective:    Vitals:       Physical Exam  Vitals and nursing note reviewed  Constitutional:       General: He is not in acute distress  Appearance: Normal appearance  He is not toxic-appearing  HENT:      Head: Normocephalic and atraumatic  Nose: No congestion or rhinorrhea  Eyes:      Extraocular Movements: Extraocular movements intact  Pulmonary:      Effort: Pulmonary effort is normal  No respiratory distress  Neurological:      General: No focal deficit present  Mental Status: He is alert and oriented to person, place, and time  Mental status is at baseline     Psychiatric:         Mood and Affect: Mood normal          Behavior: Behavior normal

## 2022-08-31 NOTE — TELEPHONE ENCOUNTER
Patient left voicemail inquiring if he should be on Paxlovid  He is scheduled for an infusion next week and is supposed to have labs done this week prior, he has questions if he will still be able to have infusion  I spoke with patient 8/31/22 @ 8:10 am and informed him he would need to speak to a provider regarding eligibility for Paxlovid  No openings available for Dr Allison Avila, appointment given for Dr Bailey Pruett at 10:30 for virtual appointment

## 2022-09-06 ENCOUNTER — HOSPITAL ENCOUNTER (OUTPATIENT)
Dept: INFUSION CENTER | Facility: CLINIC | Age: 82
Discharge: HOME | End: 2022-09-06

## 2022-09-09 LAB
ALBUMIN SERPL-MCNC: 3.7 G/DL (ref 3.6–5.1)
ALBUMIN/GLOB SERPL: 1.2 (CALC) (ref 1–2.5)
ALP SERPL-CCNC: 280 U/L (ref 35–144)
ALT SERPL-CCNC: 46 U/L (ref 9–46)
AST SERPL-CCNC: 36 U/L (ref 10–35)
BASOPHILS # BLD AUTO: 32 CELLS/UL (ref 0–200)
BASOPHILS NFR BLD AUTO: 0.7 %
BILIRUB SERPL-MCNC: 0.4 MG/DL (ref 0.2–1.2)
BUN SERPL-MCNC: 28 MG/DL (ref 7–25)
BUN/CREAT SERPL: 22 (CALC) (ref 6–22)
CALCIUM SERPL-MCNC: 8.8 MG/DL (ref 8.6–10.3)
CHLORIDE SERPL-SCNC: 100 MMOL/L (ref 98–110)
CO2 SERPL-SCNC: 28 MMOL/L (ref 20–32)
CREAT SERPL-MCNC: 1.25 MG/DL (ref 0.7–1.22)
EOSINOPHIL # BLD AUTO: 161 CELLS/UL (ref 15–500)
EOSINOPHIL NFR BLD AUTO: 3.5 %
ERYTHROCYTE [DISTWIDTH] IN BLOOD BY AUTOMATED COUNT: 12.8 % (ref 11–15)
GFR/BSA.PRED SERPLBLD CYS-BASED-ARV: 58 ML/MIN/1.73M2
GLOBULIN SER CALC-MCNC: 3 G/DL (CALC) (ref 1.9–3.7)
GLUCOSE SERPL-MCNC: 97 MG/DL (ref 65–99)
HCT VFR BLD AUTO: 28.1 % (ref 38.5–50)
HGB BLD-MCNC: 9.4 G/DL (ref 13.2–17.1)
LYMPHOCYTES # BLD AUTO: 469 CELLS/UL (ref 850–3900)
LYMPHOCYTES NFR BLD AUTO: 10.2 %
MCH RBC QN AUTO: 30.1 PG (ref 27–33)
MCHC RBC AUTO-ENTMCNC: 33.5 G/DL (ref 32–36)
MCV RBC AUTO: 90.1 FL (ref 80–100)
MONOCYTES # BLD AUTO: 331 CELLS/UL (ref 200–950)
MONOCYTES NFR BLD AUTO: 7.2 %
NEUTROPHILS # BLD AUTO: 3606 CELLS/UL (ref 1500–7800)
NEUTROPHILS NFR BLD AUTO: 78.4 %
PLATELET # BLD AUTO: 254 THOUSAND/UL (ref 140–400)
PMV BLD REES-ECKER: 9.1 FL (ref 7.5–12.5)
POTASSIUM SERPL-SCNC: 4.4 MMOL/L (ref 3.5–5.3)
PROT SERPL-MCNC: 6.7 G/DL (ref 6.1–8.1)
PSA SERPL-MCNC: 122.42 NG/ML
RBC # BLD AUTO: 3.12 MILLION/UL (ref 4.2–5.8)
SODIUM SERPL-SCNC: 138 MMOL/L (ref 135–146)
WBC # BLD AUTO: 4.6 THOUSAND/UL (ref 3.8–10.8)

## 2022-09-13 ENCOUNTER — TELEPHONE (OUTPATIENT)
Dept: HEMATOLOGY ONCOLOGY | Facility: CLINIC | Age: 82
End: 2022-09-13

## 2022-09-13 NOTE — TELEPHONE ENCOUNTER
"This is Carvel Seat birth date [id number], patient of Doctor Tera Ames had mentioned that Jerry Fung was getting in PSM, a PET scan, unit imaging for prostate cancer, for metastatic prostate cancer, and I'm calling to find out if in fact you have the unit has is installed because this, so I may need to schedule it  My number is [phone]  It's Christopher BARRIGA thank you "    Called and spoke with patient who stated he will be getting his PSMA scan done at [facility]  Advised him to call back with any further questions or concerns

## 2022-09-15 ENCOUNTER — HOSPITAL ENCOUNTER (OUTPATIENT)
Dept: INFUSION CENTER | Facility: CLINIC | Age: 82
Discharge: HOME | End: 2022-09-15
Payer: COMMERCIAL

## 2022-09-15 VITALS
SYSTOLIC BLOOD PRESSURE: 107 MMHG | TEMPERATURE: 97.4 F | RESPIRATION RATE: 18 BRPM | WEIGHT: 191.8 LBS | HEART RATE: 65 BPM | BODY MASS INDEX: 30.96 KG/M2 | DIASTOLIC BLOOD PRESSURE: 69 MMHG

## 2022-09-15 DIAGNOSIS — C61 PROSTATE CANCER (HCC): Primary | ICD-10-CM

## 2022-09-15 DIAGNOSIS — C79.51 BONE METASTASES (HCC): ICD-10-CM

## 2022-09-15 PROCEDURE — 96365 THER/PROPH/DIAG IV INF INIT: CPT

## 2022-09-15 RX ORDER — SODIUM CHLORIDE 9 MG/ML
20 INJECTION, SOLUTION INTRAVENOUS ONCE
Status: COMPLETED | OUTPATIENT
Start: 2022-09-15 | End: 2022-09-15

## 2022-09-15 RX ORDER — SODIUM CHLORIDE 9 MG/ML
20 INJECTION, SOLUTION INTRAVENOUS ONCE
OUTPATIENT
Start: 2022-11-28

## 2022-09-15 RX ADMIN — ZOLEDRONIC ACID 3.3 MG: 4 INJECTION INTRAVENOUS at 12:10

## 2022-09-15 RX ADMIN — SODIUM CHLORIDE 20 ML/HR: 0.9 INJECTION, SOLUTION INTRAVENOUS at 12:07

## 2022-09-15 NOTE — PROGRESS NOTES
Pt arrived to unit without complaint  CrCl 48 7, calcium 8 8  Pt tolerated Zometa without incident  AVS declined, but aware of future appts  Pt left unit in stable condition

## 2022-09-19 ENCOUNTER — TELEPHONE (OUTPATIENT)
Dept: HEMATOLOGY ONCOLOGY | Facility: CLINIC | Age: 82
End: 2022-09-19

## 2022-09-19 NOTE — TELEPHONE ENCOUNTER
CALL RETURN FORM   Reason for patient call? Patient calling in to inform Dr Beck Burns that his PSA results are [phone]  Patient is concerned and is requesting a call back to discuss his next steps in treatment  Patient also states PSMA Pet scan was scheduled on 10/10  Patient's primary oncologist?  Dr Beck Burns    Name of person the patient was calling for? Dr Beck Burns    Any additional information to add, if applicable? Patient's best call back number is [phone]   Informed patient that the message will be forwarded to the team and someone will get back to them as soon as possible    Did you relay this information to the patient?  Yes

## 2022-09-19 NOTE — TELEPHONE ENCOUNTER
Reviewed with Dr Sonal Cantu  Okay to wait for results of PSMA scan  Spoke with pt and he has follow up with Dr Marie Callejas shortly after the scan and then with Dr Sonal Cantu 10/13

## 2022-09-23 ENCOUNTER — TELEPHONE (OUTPATIENT)
Dept: FAMILY MEDICINE CLINIC | Facility: CLINIC | Age: 82
End: 2022-09-23

## 2022-09-23 NOTE — TELEPHONE ENCOUNTER
Pt called in stating he has covid a couple weeks ago and as of 9/18 tested neg  Pt states he still feels run down, has facial pain and a slight headache and is wondering what he can take to relieve these symptoms?

## 2022-09-25 NOTE — TELEPHONE ENCOUNTER
Recommend exercise strength Tylenol for headache  Increase fluids to ensure good hydration  Symptoms that he is experiencing are fairly normal for recovery from [place]  If they persist he should schedule follow-up in the office

## 2022-09-28 ENCOUNTER — TELEPHONE (OUTPATIENT)
Dept: UROLOGY | Facility: CLINIC | Age: 82
End: 2022-09-28

## 2022-09-28 ENCOUNTER — TELEPHONE (OUTPATIENT)
Dept: FAMILY MEDICINE CLINIC | Facility: CLINIC | Age: 82
End: 2022-09-28

## 2022-09-28 NOTE — TELEPHONE ENCOUNTER
Please verify authorization, as patient is scheduled for Lupron on 10/04/22 with Edger Pillow    Thank you

## 2022-09-28 NOTE — TELEPHONE ENCOUNTER
Spoke to patient regarding setting up an appt for him after his cystostomy surgery that he had last week  Patient was told to make an appt with his PCP to follow up  I spoke to Dr Allison Avila who said it would be ok for patient to see someone else because he has no availability  I called patient to let him know Dr Allison Avila has no openings but I could put him in with another provider  Patient stated that he didn't know what that would accomplish and decided that he didn't feel it was necessary to schedule a follow up appt at this time  I told him to call back if he changed his mind

## 2022-09-29 NOTE — TELEPHONE ENCOUNTER
LUPRON 45mg - [employer] P[employer] - NO authorization required  Office stock okay to use  Active coverage verified as of today

## 2022-09-30 ENCOUNTER — TRANSITIONAL CARE MANAGEMENT (OUTPATIENT)
Dept: FAMILY MEDICINE CLINIC | Facility: CLINIC | Age: 82
End: 2022-09-30

## 2022-10-04 ENCOUNTER — OFFICE VISIT (OUTPATIENT)
Dept: FAMILY MEDICINE CLINIC | Facility: CLINIC | Age: 82
End: 2022-10-04
Payer: COMMERCIAL

## 2022-10-04 ENCOUNTER — OFFICE VISIT (OUTPATIENT)
Dept: UROLOGY | Facility: CLINIC | Age: 82
End: 2022-10-04
Payer: COMMERCIAL

## 2022-10-04 VITALS
WEIGHT: 187 LBS | BODY MASS INDEX: 29.35 KG/M2 | SYSTOLIC BLOOD PRESSURE: 110 MMHG | HEIGHT: 67 IN | HEART RATE: 93 BPM | OXYGEN SATURATION: 99 % | DIASTOLIC BLOOD PRESSURE: 70 MMHG

## 2022-10-04 VITALS
WEIGHT: 189.6 LBS | TEMPERATURE: 97.6 F | HEART RATE: 69 BPM | SYSTOLIC BLOOD PRESSURE: 116 MMHG | RESPIRATION RATE: 14 BRPM | BODY MASS INDEX: 29.76 KG/M2 | HEIGHT: 67 IN | DIASTOLIC BLOOD PRESSURE: 74 MMHG | OXYGEN SATURATION: 99 %

## 2022-10-04 DIAGNOSIS — Z23 NEED FOR INFLUENZA VACCINATION: ICD-10-CM

## 2022-10-04 DIAGNOSIS — N32.89 BLADDER MASS: ICD-10-CM

## 2022-10-04 DIAGNOSIS — T45.1X5A ANTINEOPLASTIC CHEMOTHERAPY INDUCED ANEMIA: ICD-10-CM

## 2022-10-04 DIAGNOSIS — C61 PROSTATE CANCER (HCC): Primary | ICD-10-CM

## 2022-10-04 DIAGNOSIS — N32.89 BLADDER MASS: Primary | ICD-10-CM

## 2022-10-04 DIAGNOSIS — R35.0 URINARY FREQUENCY: ICD-10-CM

## 2022-10-04 DIAGNOSIS — I10 BENIGN ESSENTIAL HYPERTENSION: ICD-10-CM

## 2022-10-04 DIAGNOSIS — C79.51 BONE METASTASES (HCC): ICD-10-CM

## 2022-10-04 DIAGNOSIS — D64.81 ANTINEOPLASTIC CHEMOTHERAPY INDUCED ANEMIA: ICD-10-CM

## 2022-10-04 DIAGNOSIS — J32.9 SINUSITIS, UNSPECIFIED CHRONICITY, UNSPECIFIED LOCATION: ICD-10-CM

## 2022-10-04 PROBLEM — U07.1 COVID-19: Status: RESOLVED | Noted: 2022-08-31 | Resolved: 2022-10-04

## 2022-10-04 PROCEDURE — 90662 IIV NO PRSV INCREASED AG IM: CPT | Performed by: FAMILY MEDICINE

## 2022-10-04 PROCEDURE — 99214 OFFICE O/P EST MOD 30 MIN: CPT | Performed by: NURSE PRACTITIONER

## 2022-10-04 PROCEDURE — 99495 TRANSJ CARE MGMT MOD F2F 14D: CPT | Performed by: FAMILY MEDICINE

## 2022-10-04 PROCEDURE — G0008 ADMIN INFLUENZA VIRUS VAC: HCPCS | Performed by: FAMILY MEDICINE

## 2022-10-04 PROCEDURE — 96402 CHEMO HORMON ANTINEOPL SQ/IM: CPT

## 2022-10-04 RX ORDER — AMOXICILLIN AND CLAVULANATE POTASSIUM 875; 125 MG/1; MG/1
1 TABLET, FILM COATED ORAL EVERY 12 HOURS SCHEDULED
Qty: 20 TABLET | Refills: 0 | Status: SHIPPED | OUTPATIENT
Start: 2022-10-04 | End: 2022-10-14

## 2022-10-04 RX ORDER — SULFAMETHOXAZOLE AND TRIMETHOPRIM 800; 160 MG/1; MG/1
TABLET ORAL
COMMUNITY
Start: 2022-09-27

## 2022-10-04 RX ORDER — ACETAMINOPHEN 500 MG
1000 TABLET ORAL EVERY 8 HOURS PRN
COMMUNITY
Start: 2022-09-27 | End: 2022-10-07

## 2022-10-04 RX ORDER — OXYBUTYNIN CHLORIDE 5 MG/1
5 TABLET, EXTENDED RELEASE ORAL DAILY
COMMUNITY
Start: 2022-09-27

## 2022-10-04 RX ORDER — PHENAZOPYRIDINE HYDROCHLORIDE 200 MG/1
TABLET, FILM COATED ORAL
COMMUNITY
Start: 2022-09-27

## 2022-10-04 RX ORDER — NAPROXEN SODIUM 220 MG
220 TABLET ORAL EVERY 12 HOURS PRN
COMMUNITY
Start: 2022-09-24

## 2022-10-04 NOTE — PROGRESS NOTES
Assessment & Plan     Reviewed patient's recent hospitalization and updated his medication an ongoing chronic medical issues  Currently seeing for urologist through [facility] as well as [facility] for separate issues but may at some point want to consider consolidating his care  Acute kidney injury that was noted on admission resolved by the time of his discharge with normalization of his creatinine levels  Anemia noted during hospitalization with hemoglobin of 8 8 will be followed by his oncologist at his next follow-up appointment in near future  Symptoms of right-sided headache possibly due to persistent sinusitis following his recent COVID infection  Will treat with short course of Augmentin  1  Bladder mass  Assessment & Plan:  Bladder mass removed by cystoscopy by [facility] affiliated urologist during his recent hospitalization  He has follow-up scheduled with that urologist to review pathology results from that lesion  2  Need for influenza vaccination  -     influenza vaccine, high-dose, PF 0 7 mL (FLUZONE HIGH-DOSE)    3  Sinusitis, unspecified chronicity, unspecified location  -     amoxicillin-clavulanate (Augmentin) 875-125 mg per tablet; Take 1 tablet by mouth every 12 (twelve) hours for 10 days    4  Benign essential hypertension  Assessment & Plan:  Blood pressure stable on current treatment with hydrochlorothiazide losartan and metoprolol  5  Antineoplastic chemotherapy induced anemia  Assessment & Plan:  Hemoglobin during recent hospitalization at 8 8  Patient will be following with his oncologist next week on this issue  6  Bone metastases [facility])  Assessment & Plan:  Bone scan done December 2021 indicates scattered sites of bony metastasis which may include pelvis and hip region  This may account for some have his pelvic and hip region pain  Patient can continue with Tylenol or Aleve as needed    Encouraged that him to continue with light activity as tolerated but avoid any high impact exercise        BMI Counseling: Body mass index is 29 7 kg/m²  The BMI is above normal  Nutrition recommendations include encouraging healthy choices of fruits and vegetables, decreasing fast food intake and increasing intake of lean protein  Exercise recommendations include exercising 3-5 times per week  No pharmacotherapy was ordered  Rationale for BMI follow-up plan is due to patient being overweight or obese  Subjective     Transitional Care Management Review:   Veronique Ibarra is a 81 y o  male here for TCM follow up  During the TCM phone call patient stated:  TCM Call     Date and time call was made  9/30/2022 10:34 AM    Hospital care reviewed  Records reviewed    Patient was hospitialized at  [facility]    Date of Admission  09/23/22    Date of discharge  09/27/22    Diagnosis  CARA    Disposition  Home    Were the patients medications reviewed and updated  No    Current Symptoms  --  discomfort from stent, fatigue "need a good night sleep", believes he may have a cold and/or sinus infection, but feels like symptoms are getting better  Has some numbness R side face x several days, denies facial drooping      TCM Call     Post hospital issues  Reduced activity    Should patient be enrolled in anticoag monitoring? No    Scheduled for follow up?   Yes    Patient refusal reason  Is going to follow up with urology and oncology     Patients specialists  Urologist    Urologist name  Dr Asia Navas     Urologist contact #  [phone]    Did you obtain your prescribed medications  Yes    Do you need help managing your prescriptions or medications  No    Is transportation to your appointment needed  No    I have advised the patient to call PCP with any new or worsening symptoms  Tyler Trinidad MA    Living Arrangements  Spouse or Significiant other    Are you recieving any outpatient services  No    Are you recieving home care services  No    Are you using any community resources  No    Current waiver services  No    Have you fallen in the last 12 months  No    Interperter language line needed  No        Patient presents for follow-up from recent hospitalization  This is a transition of care visit  Patient was admitted [facility] for dehydration and acute kidney injury  This was following a recent COVID-19 infection  Patient during the course of his hospitalization was also found to have a soft tissue mass in his bladder which was removed by cystoscopy  Pathology still pending  Patient has ongoing issues related to his prostate cancer with metastasis  He is being treated with Lupron  He follows with Hematology-Oncology but will be exploring additional treatment options possibly at [facility] in the near future  He complains of right-sided pain in the temporal region with pressure and some numbness across his maxillary sinus area on the right  He is concerned about possible sinus infection  Does note some previous problems with chronic nasal drainage  Denies fever and chills  Also complains of left hip and leg pain along with low back discomfort  Seems to improve with Advil or Aleve  Review of Systems   Constitutional: Positive for fatigue  HENT: Positive for sinus pain  Respiratory: Negative  Cardiovascular: Negative  Gastrointestinal: Negative  Genitourinary: Negative  Musculoskeletal: Positive for arthralgias  Psychiatric/Behavioral: Negative  Objective     /74 (BP Location: Left arm, Patient Position: Sitting, Cuff Size: Standard)   Pulse 69   Temp 97 6 °F (36 4 °C) (Tympanic)   Resp 14   Ht 5' 7" (1 702 m)   Wt 86 kg (189 lb 9 6 oz)   SpO2 99%   BMI 29 70 kg/m²      Physical Exam  Vitals and nursing note reviewed  Constitutional:       Appearance: He is well-developed  HENT:      Head: Normocephalic        Right Ear: External ear normal       Left Ear: External ear normal       Nose: Nose normal  Eyes:      Conjunctiva/sclera: Conjunctivae normal       Pupils: Pupils are equal, round, and reactive to light  Cardiovascular:      Rate and Rhythm: Normal rate and regular rhythm  Heart sounds: Normal heart sounds  Pulmonary:      Effort: Pulmonary effort is normal       Breath sounds: Normal breath sounds  Musculoskeletal:         General: Normal range of motion  Cervical back: Normal range of motion and neck supple  Skin:     General: Skin is warm and dry  Psychiatric:         Behavior: Behavior normal          Thought Content:  Thought content normal          Judgment: Judgment normal        Carlin Asencio DO

## 2022-10-04 NOTE — ASSESSMENT & PLAN NOTE
Hemoglobin during recent hospitalization at 8 8  Patient will be following with his oncologist next week on this issue

## 2022-10-04 NOTE — ASSESSMENT & PLAN NOTE
· On Myrbetriq 50 milligrams daily  · Avoid bladder irritants  · Hydrate with water  · completed pelvic floor physical therapy-this has helped  · Follow-up in 6 months for recheck of symptoms

## 2022-10-04 NOTE — ASSESSMENT & PLAN NOTE
· Otis 5+4=9 prostate cancer with progressive bony lesions despite advanced systemic therapy  ·  43 (9/9/2022)  · PSMA PET scan being completed 10/10/2022  · Six-month Lupron given  · Continue follow-up with Hematology-Oncology; Dr Beck Burns and Dr Arabella Trejo  · Return in 6 months for Lupron

## 2022-10-04 NOTE — ASSESSMENT & PLAN NOTE
Bladder mass removed by cystoscopy by [facility] affiliated urologist during his recent hospitalization  He has follow-up scheduled with that urologist to review pathology results from that lesion

## 2022-10-04 NOTE — ASSESSMENT & PLAN NOTE
Continue follow-up with [address] [place] [address] Urology and Hematology-Oncology for follow-up treatments  Also will be seeing specialist at [facility] for additional input

## 2022-10-04 NOTE — PROGRESS NOTES
Assessment and plan:     Prostate cancer ([facility] [address] )  · Vitaliy 5+4=9 prostate cancer with progressive bony lesions despite advanced systemic therapy  ·  43 (9/9/2022)  · PSMA PET scan being completed 10/10/2022  · Six-month Lupron given  · Continue follow-up with Hematology-Oncology; Dr Jodee Forte and Dr Diana Escamilla  · Return in 6 months for Lupron    Urinary frequency  · On Myrbetriq 50 milligrams daily  · Avoid bladder irritants  · Hydrate with water  · completed pelvic floor physical therapy-this has helped  · Follow-up in 6 months for recheck of symptoms    Bladder mass  · S/p cystoscopy, biopsy with stent placement by [address] urology 09/27/2022  · Pathology pending  · Has appointment for stent removal and pathology review 10/12/2022  · S/p TURBT 09/08/2021; pathology consistent with high-grade prostatic adenocarcinoma    He would like to discontinue Myrbetriq and Flomax and monitor his symptoms off of medications  He feels pelvic floor physical therapy has helped him most with his urinary symptoms  Sue Krause    History of Present Illness     Refugio Guardado is a 81 y o  male with a history of high-volume high-risk Paxton 5+4=9 prostate cancer with progressive bony lesions despite advanced systemic therapy  He completed local radiation to the prostate  He is also following with oncology locally and [facility]  Completed chemo earlier this year after 30 weeks of treatment  He had a PSMA PET scan completed 03/30/2022 that revealed multiple active bone lesions in the right proximal humerus and right 5th anterior rib, T10 vertebral body and L5 vertebral body consistent with active osseous metastasis  No uptake in the prostate  This may or may not suggest progression of disease due to increased sensitivity of PSMA PET CT  He recently underwent a CT abdomen pelvis 09/23/2022 that reveals stable mild to moderate left ureteral hydronephrosis since 2019    He was noted to have soft tissue fullness in the dependent aspect of the bladder in prominent soft tissue fullness in the left UVJ and soft tissue fullness at the surgical bed of prior prostatectomy  He reports having a "blocked right kidney"  He underwent a cystoscopy last Tuesday per his report  Per review of the records from [facility] urology surgical report: There was a growth at the end of the ureter  patient had a 2 cm tumor covering the left hemitrigone which was resected and stent was placed  He will follow up in the urology office for stent removal and review of pathology  He is status post transurethral resection of a bladder tumor 09/08/2021  This did demonstrate 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  He has appointment next week for stent removal and pathology review      He reports being exhausted  He reports urinary frequency  He still takes flomax  He feels his flow is good  He was recently started on mirabegron on March 4  He feels no effect from this  He drinks about 36-48 ounces of water per day, a large cup of coffee or two per day  Recommended continuing the Myrbetriq and giving it some more time to be effective  Also referral was made to pelvic floor physical therapy to help with some of his symptoms      New pain in his left shoulder but has hx of shoulder separation  He also reports chronic back and hip pain that seems to have worsened  He is going for a PSMA pet scan at [facility] this afternoon  He is considering a newer treatment  He also has a follow up tomorrow with radiology    He had covid about 6-8 weeks ago after a trip to [place]  He was hospitalized a little over a week ago  He reports feeling exhausted and is taking  No appetite, had some weight loss  Has upcoming pet scan on 10/10/2022    Pain behind nipples of breasts, night sweats since being on chemo    Laboratory     Lab Results   Component Value Date    BUN 28 (H) 09/09/2022    CREATININE 1 25 (H) 09/09/2022       No components found for: GFR    Lab Results   Component Value Date    CALCIUM 8 8 09/09/2022     09/30/2017    K 4 4 09/09/2022    CO2 28 09/09/2022     09/09/2022       Lab Results   Component Value Date    WBC 4 6 09/09/2022    HGB 9 4 (L) 09/09/2022    HCT 28 1 (L) 09/09/2022    MCV 90 1 09/09/2022     09/09/2022       Lab Results   Component Value Date     42 (H) 09/09/2022    PSA 2 88 06/04/2022    PSA 6 34 (H) 04/07/2022     Case Report   Surgical Pathology Report                         Case: [id number]                                    Authorizing Provider: Tevin Patel MD   Collected:           09/08/2021 1043               Ordering Location:     [facility]        Received:            09/08/2021 [address] [place] [address] Operating Room                                                      Pathologist:           Nancy Bose MD                                                               Specimen:    Urinary Bladder, bladder tumor                                                             Addendum   At the request of Dr Teena Bergman, unstained slides from paraffin BLOCK A1 containing the patient's cancer cells were sent to [facility] for MI Profile testing  Upon completion of testing, the [facility] report will be directly sent to the requesting physician as well as posted in the Media Tab of the patient's [employer] EMR by the [facility]  Pathology Department      Please note: The [facility] Lab's analysis and report is performed independently of [employer], and neither the Hospital nor the Pathology Department screen, review or comment upon [id number] report    Because the role of testing in cancer diagnosis and management is subject to evolving development, usage and interpretation, we strongly urge that the test limitations described in the [facility] report be carefully read, appropriately shared with the patient, and critically considered when reaching treatment decisions      This pathology material was removed from archive for purpose of molecular testing  Addendum electronically signed by Mao Reese MD on 10/9/2021 at 12:43 PM   Final Diagnosis   A  Urinary Bladder, bladder tumor   -High grade carcinoma , consistent with High grade prostatic adenocarcinoma      Note:  Tumor cells are  positive for prostatic  markers ( NKX3 1, Triple stain) and negative for bladder marker ( GATA3)   Controls reacted appropriately    Electronically signed by Mao Reese MD on 9/13/2021 at 12:47 PM   Preliminary result electronically signed by Mao Reese MD on 9/10/2021 at  1:21 PM   Preliminary Diagnosis    A  Urinary Bladder, bladder tumor :  -High grade carcinoma present     Note:  Immunostains will be ordered for confirmation and the results will be issued in the final report        No results found for this or any previous visit (from the past 1 hour(s))  @RESULT(URINEMICROSCOPIC)@    @RESULT(URINECULTURE)@    Radiology   Giancarlo Ambriz MD - 09/23/2022   Formatting of this note might be different from the original    HISTORY: Hydronephrosis   Nausea/vomiting        MRN:  [id number]         DATE OF SERVICE:  9/23/2022 10:40 PM                           EXAM DESCRIPTION:  CT ABDOMEN PELVIS WO ANY CONTRAST (IV OR ORAL)       COMPARISON: CT of the chest completed same day  CT of abdomen pelvis from   8/10/2019     TECHNIQUE: Using helical technique, axial images were obtained through the   abdomen and pelvis without IV contrast   Coronal and sagittal reformations were   performed  FINDINGS:     Lung bases: See separate CT of the chest completed same day for further   characterization       Liver: Multiple hypodensities throughout the liver likely representing simple   cyst  A few hypodensities within the liver are new from comparison exam and can   be assessed on follow-up, with sample hypodensity within the right hepatic lobe   measuring up to 1 2 cm (axial image 45, series 2)  Bile Ducts/Gallbladder: Unremarkable  Spleen: Within normal limits        Pancreas: Unremarkable  Adrenal glands: Both adrenals are unremarkable  Kidneys/Ureters: Again seen moderate left ureterohydronephrosis with protrusion   of enlarged prostate into the dependent portion of the bladder  No evidence of   obstructing urinary stones  Bilateral renal cysts with the largest exophytic   renal cyst seen along the lower pole the left kidney measuring up to 9 6 cm   (axial image 91, series 2)  Vessels: No evidence of abnormal aortic aneurysm  Bowel/Mesentery: Moderate hiatal hernia  No bowel obstruction    No evidence of   appendicitis  Abdominal Wall/Soft Tissues:  Unremarkable  Pelvis: Patient appears to be status post prostatectomy with radiation beads   noted at the surgical bed there is soft tissue fullness in the region of the   prostate  Lymph nodes: Limited evaluation secondary to technique  No evidence of   retroperitoneal lymphadenopathy        Urinary Bladder:  Soft tissue prominence protruding into the posterior aspect of   the bladder with prominent soft tissue fullness at the left UVJ (axial image   148, series 2)  Bones: Evidence of osseous metastatic disease with progression of sclerotic   lesions particularly at the T10, L2 and L5 vertebral bodies  IMPRESSION:   IMPRESSION:   1  Stable mild to moderate left ureterohydronephrosis from comparison exam on   8/10/2019  No CT evidence of obstructing urinary stones however there is soft   tissue fullness within the dependent aspect of the bladder as well as prominent   soft tissue fullness at the left UVJ along with soft tissue fullness at the   surgical bed of patient's prior prostatectomy  Follow-up with direct   visualization and/or urology consultation for further evaluation       2 Multiple hypodensities throughout the liver likely represent simple cysts and   appear overall similar in size to comparison exam on 8/10/2019 however there   appear to be new hypodensities in the liver and underlying metastatic disease   cannot be excluded  CT liver protocol is recommended for further evaluation  3  Interval progression of sclerotic osseous lesions concerning for osseous   metastatic disease  Cyndi Mena MD - 03/30/2022   Formatting of this note might be different from the original        ACCESSION #: [id number] FPT - PET CT PSMA  - 03/30/2022 16:11     CLINICAL HISTORY:  Prostate cancer status post radiation therapy completed   March 2021, on ADT,   restaging,  for PET/CT subsequent treatment strategy  COMMENTS:  Following the uneventful intravenous administration of   9 25millicurie of FCOQCAFR-22   DCFPyl (Plyrify) and approximately 60 minutes delay, a PET/CT scan was   obtained from the mid thigh   to the skull   The CT scan was performed for purposes of localization and   attenuation correction   without contrast at low radiation levels and does not replace a CT scan   performed for diagnostic   purposes   In addition to routine processing, the PET and CT images were   processed with additional   steps to obtain PET/CT fusion display   The colored images were transferred   to PACS for optimal   display  The injection site was in LEFT HAND IV, the SUV is calculated with   total-body mass  COMPARISON: A prior outside [address]  [facility] PET/CT is available for comparison on   11/23/2020  Loan Minors is   made to whole body bone scan dated 12/16/2021, CT of the chest, abdomen   pelvis dated 12/16/2021  Findings: Keara Honor is physiologic activity in the lacrimal and salivary glands   bilaterally with SUV of   around 14 9 for the left and right parotid glands  There is decreased left   submandibular salivary   gland uptake with activity as background, SUV of 2 0   The right   submandibular salivary gland has SUV   of 18 7  No apparent abnormal activity is identified in the brain parenchyma, the   underlying cortical and   subcortical structures are unremarkable in CT  No abnormal soft tissue or lymph node is visualized in the head or neck   region, no supraclavicular   adenopathy bilaterally  No abnormal focal lesion is noted in the lung parenchyma, without pleural   effusion, no apparent   hilar or mediastinal adenopathy  There is physiologic activity in the liver (SUVmean of 6 4, and spleen   (SUVmean of 4 0)   Highest   physiological activity is in the kidneys bilaterally (SUVmax of 41 9 and   44 0 for the left and   right, respectively)  No active lymph node in the abdomen is present  In the pelvis, there is no active lesion to suggest metastasis  The patient   is status post radiation   therapy to the prostate  No active node in the pelvis, no active lymph node in the inguinal region   bilaterally, no ascites  There are 3 active bone lesions noted in the right proximal humerus (SUV of   57 6), right 5th   anterior rib (SUV of 43 5), T10 vertebral body (SUV of 17 2), L5 vertebral   body (SUV of 44 6),   consistent with active osseous metastatic disease  These lesions were also   seen on the whole body   bone scan  Compared to prior [facility] PET/CT scan, there are 2 new active bone lesions   identified in addition to   the T10 vertebral body active lesion  IMPRESSION:   1   Multiple active bone lesions noted in the right proximal humerus, right   fifth anterior rib, T10   vertebral body and L5 vertebral body consistent with active osseous   metastatic disease  2   No abnormal uptake in the prostate to suggest local recurrence  3   No additional abnormal uptake in the remainder of the scan to suggest   metastatic disease     4   When compared with the prior [facility] PET/CT, there are more active bone   lesions identified in   addition to previously seen T10 vertebral body active bone lesion  Due to   the higher sensitivity of   the PSMA PET/CT, the findings may or may not suggest progression of disease  Review of Systems     Review of Systems   Constitutional: Positive for appetite change and fatigue  Negative for activity change, chills, fever and unexpected weight change  Night sweats   HENT: Negative for facial swelling  Eyes: Negative for discharge  Respiratory: Negative  Negative for cough and shortness of breath  Cardiovascular: Negative for chest pain and leg swelling  Gastrointestinal: Negative  Negative for abdominal distention, abdominal pain, constipation, diarrhea, nausea and vomiting  Endocrine: Negative  Genitourinary: Positive for frequency  Negative for decreased urine volume, difficulty urinating, dysuria, enuresis, flank pain, genital sores, hematuria and urgency  Musculoskeletal: Positive for arthralgias  Negative for back pain and myalgias  Skin: Negative for pallor and rash  Allergic/Immunologic: Negative  Negative for immunocompromised state  Neurological: Negative for facial asymmetry and speech difficulty  Psychiatric/Behavioral: Negative for agitation and confusion  Allergies     Allergies   Allergen Reactions    Other Rash     bandaids rash        Physical Exam     Physical Exam  Vitals reviewed  Constitutional:       General: He is not in acute distress  Appearance: Normal appearance  He is normal weight  He is not ill-appearing, toxic-appearing or diaphoretic  HENT:      Head: Normocephalic and atraumatic  Eyes:      General: No scleral icterus  Cardiovascular:      Rate and Rhythm: Normal rate  Pulmonary:      Effort: Pulmonary effort is normal  No respiratory distress  Abdominal:      General: Abdomen is flat  There is no distension  Palpations: Abdomen is soft  Musculoskeletal:         General: No swelling  Cervical back: Normal range of motion     Skin:     General: Skin is warm and dry  Coloration: Skin is not jaundiced or pale  Findings: No rash  Neurological:      General: No focal deficit present  Mental Status: He is alert and oriented to person, place, and time  Gait: Gait normal    Psychiatric:         Mood and Affect: Mood normal          Behavior: Behavior normal          Thought Content:  Thought content normal          Judgment: Judgment normal          Vital Signs     Vitals:    10/04/22 0938   BP: 110/70   Pulse: 93   SpO2: 99%   Weight: 84 8 kg (187 lb)   Height: 5' 7" (1 702 m)       Current Medications       Current Outpatient Medications:     acetaminophen (TYLENOL) 500 mg tablet, Take 1,000 mg by mouth every 8 (eight) hours as needed, Disp: , Rfl:     aspirin (ECOTRIN LOW STRENGTH) 81 mg EC tablet, Take 81 mg by mouth daily , Disp: , Rfl:     atorvastatin (LIPITOR) 40 mg tablet, Take 40 mg by mouth daily at bedtime , Disp: , Rfl:     Calcium 500 MG tablet, Take 1,000 mg by mouth daily , Disp: , Rfl:     cholecalciferol (VITAMIN D3) 1,000 units tablet, Take 1,000 Units by mouth daily , Disp: , Rfl:     diphenhydrAMINE HCl (BENADRYL ALLERGY PO), Take by mouth, Disp: , Rfl:     hydrochlorothiazide (HYDRODIURIL) 25 mg tablet, Take 1 tablet (25 mg total) by mouth daily, Disp: 90 tablet, Rfl: 1    losartan (COZAAR) 100 MG tablet, Take 1 tablet (100 mg total) by mouth daily, Disp: 90 tablet, Rfl: 1    metoprolol succinate (TOPROL-XL) 25 mg 24 hr tablet, Take 1 tablet (25 mg total) by mouth daily, Disp: 90 tablet, Rfl: 1    naproxen sodium (ALEVE) 220 MG tablet, , Disp: , Rfl:     tamsulosin (FLOMAX) 0 4 mg, take 1 capsule by mouth once daily with dinner, Disp: 90 capsule, Rfl: 3    Zoledronic Acid (ZOMETA IV), Infuse into a venous catheter every 3 (three) months, Disp: , Rfl:     leuprolide (LUPRON DEPOT 6 MONTH KIT) 45 mg, Inject 45 mg into a muscle every 6 (six) months, Disp: 45 mg, Rfl: 0    Mirabegron ER 50 MG TB24, Take 1 tablet (50 mg total) by mouth in the morning, Disp: 30 tablet, Rfl: 12    oxybutynin (DITROPAN-XL) 5 mg 24 hr tablet, take 1 tablet by mouth daily if needed (STENT PAIN,FREQUENT OR URGENT URINATION) (Patient not taking: Reported on 10/4/2022), Disp: , Rfl:     phenazopyridine (PYRIDIUM) 200 mg tablet, take 1 tablet by mouth three times a day if needed for bladder SPASM(S) (Patient not taking: Reported on 10/4/2022), Disp: , Rfl:     sulfamethoxazole-trimethoprim (BACTRIM DS) 800-160 mg per tablet, take every 12 hours THE DAY OF YOUR STENT REMOVAL APPOINTMENT, STARTING THE MORNIGN OF (Patient not taking: Reported on 10/4/2022), Disp: , Rfl:   No current facility-administered medications for this visit      Active Problems     Patient Active Problem List   Diagnosis    Benign essential hypertension    Renal cyst    Liver lesion    Pulmonary nodule    Combined arterial insufficiency and corporo-venous occlusive erectile dysfunction    Pro[facility] cancer ([facility] [address]    Coronary artery disease of native artery of native heart with stable angina pectoris ([facility] [address] )    DDD (degenerative disc disease), cervical    S/P coronary artery stent placement    Vitamin D deficiency    Abnormal radionuclide bone scan    Bone metastases (HCC)    Calculus of kidney    Mixed hyperlipidemia    Localized edema    Bilateral hearing loss    Chemotherapy-induced neutropenia (HCC)    Antineoplastic chemotherapy induced anemia    Chronic kidney disease, stage III (moderate) (HCC)    Urinary frequency    COVID-19    Bladder mass       Past Medical History     Past Medical History:   Diagnosis Date    Anemia     last assessed  1/7/14     BRCA1 negative     BRCA2 negative     Cancer ([facility] [address] )     PROSTATE, BONE CANCER    Coronary artery disease     DDD (degenerative disc disease), cervical     Hypercholesteremia     Hypertension     Kidney stone     kidney stones    Polyp of sigmoid colon     Prostate cancer ([address] )     Renal disorder elevated serum creat    Tinnitus     unspecified laterality / last assessed 4/9/13    Vitamin D deficiency        Surgical History     Past Surgical History:   Procedure Laterality Date    BREAST EXCISIONAL BIOPSY Left     age 16 - benign    CARDIAC SURGERY      CARD CATH W/ STENTS    COLONOSCOPY      CORONARY ANGIOPLASTY WITH STENT PLACEMENT      FL RETROGRADE PYELOGRAM  9/11/2019    HEMORRHOID SURGERY      NM CYSTO/URETERO W/LITHOTRIPSY &INDWELL STENT INSRT Left 9/11/2019    Procedure: CYSTO, URETEROSCOPY W/HOLMIUM LASER, BASKET STONE EXTRACTION, RETROGRADE PYELOGRAM, STENT EXCHANGE;  Surgeon: Anisa Menchaca MD;  [facility]: [facility];  Service: Urology    NM CYSTOURETHROSCOPY,FULGUR 0 5-2 CM LESN N/A 9/8/2021    Procedure: TRANSURETHRAL RESECTION OF BLADDER TUMOR (TURBT);   Surgeon: Anisa Menchaca MD;  [facility];  Service: Urology    PROSTATE BIOPSY  10/24/2018    TONSILLECTOMY         Family History     Family History   Problem Relation Age of Onset    Breast cancer Mother 79    Hypertension Father     No Known Problems Sister     No Known Problems Maternal Grandmother     No Known Problems Maternal Grandfather     No Known Problems Paternal Grandmother     No Known Problems Paternal Grandfather     No Known Problems Sister     No Known Problems Maternal Aunt     No Known Problems Maternal Aunt     No Known Problems Maternal Aunt     No Known Problems Paternal Aunt     No Known Problems Paternal Aunt     Cancer Cousin         bladder cancer       Social History     Social History     Social History     Tobacco Use   Smoking Status Never Smoker   Smokeless Tobacco Never Used       Past Surgical History:   Procedure Laterality Date    BREAST EXCISIONAL BIOPSY Left     age 16 - benign    CARDIAC SURGERY      CARD CATH W/ STENTS    COLONOSCOPY      CORONARY ANGIOPLASTY WITH STENT PLACEMENT      FL RETROGRADE PYELOGRAM  9/11/2019    HEMORRHOID SURGERY      NM CYSTO/URETERO W/LITHOTRIPSY &INDWELL STENT INSRT Left 9/11/2019    Procedure: CYSTO, URETEROSCOPY W/HOLMIUM LASER, BASKET STONE EXTRACTION, RETROGRADE PYELOGRAM, STENT EXCHANGE;  Surgeon: Fco Darnell MD;  [facility];  Service: Urology    CA CYSTOURETHROSCOPY,FULGUR 0 5-2 CM LESN N/A 9/8/2021    Procedure: TRANSURETHRAL RESECTION OF BLADDER TUMOR (TURBT); Surgeon: Fco Darnell MD;  [facility];  Service: Urology    PROSTATE BIOPSY  10/24/2018    TONSILLECTOMY           The following portions of the patient's history were reviewed and updated as appropriate: allergies, current medications, past family history, past medical history, past social history, past surgical history and problem list    Please note :  Voice dictation software has been used to create this document  There may be inadvertent transcription errors      [id number] [address] [place]

## 2022-10-04 NOTE — ASSESSMENT & PLAN NOTE
· S/p cystoscopy, biopsy with stent placement by [facility] urology 09/27/2022  · Pathology pending  · Has appointment for stent removal and pathology review 10/12/2022  · S/p TURBT 09/08/2021; pathology consistent with high-grade prostatic adenocarcinoma

## 2022-10-04 NOTE — ASSESSMENT & PLAN NOTE
Bone scan done December 2021 indicates scattered sites of bony metastasis which may include pelvis and hip region  This may account for some have his pelvic and hip region pain  Patient can continue with Tylenol or Aleve as needed    Encouraged that him to continue with light activity as tolerated but avoid any high impact exercise

## 2022-10-13 ENCOUNTER — TELEPHONE (OUTPATIENT)
Dept: HEMATOLOGY ONCOLOGY | Facility: CLINIC | Age: 82
End: 2022-10-13

## 2022-10-13 ENCOUNTER — OFFICE VISIT (OUTPATIENT)
Dept: HEMATOLOGY ONCOLOGY | Facility: CLINIC | Age: 82
End: 2022-10-13
Payer: COMMERCIAL

## 2022-10-13 ENCOUNTER — APPOINTMENT (OUTPATIENT)
Dept: LAB | Facility: MEDICAL CENTER | Age: 82
End: 2022-10-13
Payer: COMMERCIAL

## 2022-10-13 VITALS
SYSTOLIC BLOOD PRESSURE: 132 MMHG | RESPIRATION RATE: 16 BRPM | BODY MASS INDEX: 29.7 KG/M2 | TEMPERATURE: 98 F | HEART RATE: 68 BPM | HEIGHT: 67 IN | OXYGEN SATURATION: 98 % | DIASTOLIC BLOOD PRESSURE: 82 MMHG

## 2022-10-13 DIAGNOSIS — C61 PROSTATE CANCER (HCC): Primary | ICD-10-CM

## 2022-10-13 DIAGNOSIS — D70.1 CHEMOTHERAPY-INDUCED NEUTROPENIA (HCC): ICD-10-CM

## 2022-10-13 DIAGNOSIS — T45.1X5A CHEMOTHERAPY-INDUCED NEUTROPENIA (HCC): ICD-10-CM

## 2022-10-13 DIAGNOSIS — C61 PROSTATE CANCER (HCC): ICD-10-CM

## 2022-10-13 DIAGNOSIS — C79.51 BONE METASTASES (HCC): ICD-10-CM

## 2022-10-13 DIAGNOSIS — D64.81 ANTINEOPLASTIC CHEMOTHERAPY INDUCED ANEMIA: Primary | ICD-10-CM

## 2022-10-13 DIAGNOSIS — T45.1X5A ANTINEOPLASTIC CHEMOTHERAPY INDUCED ANEMIA: Primary | ICD-10-CM

## 2022-10-13 LAB
ALBUMIN SERPL BCP-MCNC: 2.8 G/DL (ref 3.5–5)
ALP SERPL-CCNC: 1029 U/L (ref 46–116)
ALT SERPL W P-5'-P-CCNC: 81 U/L (ref 12–78)
ANION GAP SERPL CALCULATED.3IONS-SCNC: 10 MMOL/L (ref 4–13)
AST SERPL W P-5'-P-CCNC: 285 U/L (ref 5–45)
BASOPHILS # BLD AUTO: 0.03 THOUSANDS/ΜL (ref 0–0.1)
BASOPHILS NFR BLD AUTO: 0 % (ref 0–1)
BILIRUB SERPL-MCNC: 0.42 MG/DL (ref 0.2–1)
BUN SERPL-MCNC: 34 MG/DL (ref 5–25)
CALCIUM ALBUM COR SERPL-MCNC: 10.1 MG/DL (ref 8.3–10.1)
CALCIUM SERPL-MCNC: 9.1 MG/DL (ref 8.3–10.1)
CHLORIDE SERPL-SCNC: 97 MMOL/L (ref 96–108)
CO2 SERPL-SCNC: 22 MMOL/L (ref 21–32)
CREAT SERPL-MCNC: 1.68 MG/DL (ref 0.6–1.3)
EOSINOPHIL # BLD AUTO: 0.02 THOUSAND/ΜL (ref 0–0.61)
EOSINOPHIL NFR BLD AUTO: 0 % (ref 0–6)
ERYTHROCYTE [DISTWIDTH] IN BLOOD BY AUTOMATED COUNT: 15.7 % (ref 11.6–15.1)
GFR SERPL CREATININE-BSD FRML MDRD: 37 ML/MIN/1.73SQ M
GLUCOSE SERPL-MCNC: 117 MG/DL (ref 65–140)
HCT VFR BLD AUTO: 27.1 % (ref 36.5–49.3)
HGB BLD-MCNC: 8.9 G/DL (ref 12–17)
IMM GRANULOCYTES # BLD AUTO: 0.14 THOUSAND/UL (ref 0–0.2)
IMM GRANULOCYTES NFR BLD AUTO: 2 % (ref 0–2)
LYMPHOCYTES # BLD AUTO: 0.41 THOUSANDS/ΜL (ref 0.6–4.47)
LYMPHOCYTES NFR BLD AUTO: 5 % (ref 14–44)
MCH RBC QN AUTO: 28.8 PG (ref 26.8–34.3)
MCHC RBC AUTO-ENTMCNC: 32.8 G/DL (ref 31.4–37.4)
MCV RBC AUTO: 88 FL (ref 82–98)
MONOCYTES # BLD AUTO: 0.51 THOUSAND/ΜL (ref 0.17–1.22)
MONOCYTES NFR BLD AUTO: 6 % (ref 4–12)
NEUTROPHILS # BLD AUTO: 6.83 THOUSANDS/ΜL (ref 1.85–7.62)
NEUTS SEG NFR BLD AUTO: 87 % (ref 43–75)
NRBC BLD AUTO-RTO: 0 /100 WBCS
PLATELET # BLD AUTO: 274 THOUSANDS/UL (ref 149–390)
PMV BLD AUTO: 9.4 FL (ref 8.9–12.7)
POTASSIUM SERPL-SCNC: 4.7 MMOL/L (ref 3.5–5.3)
PROT SERPL-MCNC: 8.4 G/DL (ref 6.4–8.4)
RBC # BLD AUTO: 3.09 MILLION/UL (ref 3.88–5.62)
SODIUM SERPL-SCNC: 129 MMOL/L (ref 135–147)
WBC # BLD AUTO: 7.94 THOUSAND/UL (ref 4.31–10.16)

## 2022-10-13 PROCEDURE — 99215 OFFICE O/P EST HI 40 MIN: CPT | Performed by: INTERNAL MEDICINE

## 2022-10-13 PROCEDURE — 80053 COMPREHEN METABOLIC PANEL: CPT

## 2022-10-13 PROCEDURE — 85025 COMPLETE CBC W/AUTO DIFF WBC: CPT

## 2022-10-13 PROCEDURE — 36415 COLL VENOUS BLD VENIPUNCTURE: CPT

## 2022-10-13 RX ORDER — OXYCODONE HYDROCHLORIDE 5 MG/1
5 TABLET ORAL EVERY 6 HOURS PRN
Qty: 30 TABLET | Refills: 0 | Status: SHIPPED | OUTPATIENT
Start: 2022-10-13

## 2022-10-13 NOTE — TELEPHONE ENCOUNTER
Received call from pt reporting left shoulder pain that developed after visit with Dr Linares Res  Rates 7-8/10 and expresses interest in pain med  Oxycodone script pended for signature

## 2022-10-13 NOTE — TELEPHONE ENCOUNTER
Tobi Acosta, I cant have dates changed without patient being scheduled  Please schedule first cycle and I will have nurse change the date  Thanks!

## 2022-10-13 NOTE — TELEPHONE ENCOUNTER
Please schedule patient for treatment  Patient does not have a preference on the day but will like to be scheduled at 10am or after  Thanks!

## 2022-10-13 NOTE — TELEPHONE ENCOUNTER
Patient calling in for an update on his pain medication  He states the pain is unbearable, pain scale is an 8/10  I informed the patient the order has been sent to the Dr for approval      Patient verbalized understanding  Patient request a call back when order has been sent to the pharmacy     Patient's best call back number is [phone]

## 2022-10-13 NOTE — TELEPHONE ENCOUNTER
CALL RETURN FORM   Reason for patient call? Patient calling has question about chemo scheduling    Patient's primary oncologist? Pallavi De Dios    Name of person the patient was calling for? Faroun    Any additional information to add, if applicable? [phone]   Informed patient that the message will be forwarded to the team and someone will get back to them as soon as possible    Did you relay this information to the patient?   yes

## 2022-10-13 NOTE — PROGRESS NOTES
Hematology Outpatient Follow - Up Note  Keerthi Rasheed 81 y o  male MRN: @ Encounter: [id number]        Date:  10/13/2022        Assessment/ Plan:    45-year-old  male who was seen by Urology for elevation of the PSA, when he presented in June 2017 with PSA of 4 6, Subsequently in September of 2017 PSA was 7 3 and in May of 2018 PSA was 8  6   A biopsy was recommended but the patient had just undergone percutaneous stenting of the heart and he was on dual anti-platelet therapy      MRI of the abdomen in April 2018 showed 2 simple cyst in the right hepatic lobe, bilateral renal cysts the largest 1 measuring 10 cm in the left lower lobe     In October of 2018 PSA was 9 8  CT scan of the chest 9/28/2018 showed sclerotic lesions in T10, 5 mm nodule in the right lower lobe   Bone scan showed activity in the posterior T10 level and left posterior 7th rib area concerning for osseous metastases and increased activity in the distal right clavicle might be degenerative or metastatic area      He was diagnosed with castration sensitive metastatic prostate cancer  [facility] insurance company could not approve enzalutamide, he also has a high co-payment for abiraterone     The patient initiated on samples of enzalutamide 160 mg p o  daily since the beginning of December 2018   His insurance approved abiraterone however he had a high co-payment        We were finally able to get financial assistance through [employer] [employer] and Roseline started Zytiga (abiraterone) 2/3/2019 with prednisone 5mg po bid without side effects      In May 2020 PSA 0 5, bone scan showed activity in the anterior 5th rib area, right humerus area most likely consistent with metastatic disease, no visceral disease on the CT scan, we had hard time getting enzalutamide , the patient was initiated on enzalutamide on 06/02/2020, PSA at the time of 0 8     PSA 1 4 in October 2020, normal alkaline phosphatase           Genes analyzed of germ line test  LIONEL, BARD1, BRCA1, BRCA2, BRIP1, CDH1, CHEK2, DICER1, EPCAM*, HOXB13, MLH1, MSH2,  MSH6, NBN, NF1, PALB2, PMS2, PTEN, RAD50, RAD51C, RAD51D, SMARCA4, STK11, TP53 all of these were negative     Evaluated at [facility] the decision for radiation therapy to oligo metastases and the primary site of the tumor depends on the PET scan finding which showed multiple metastatic disease, patient to meet with Radiation Oncology      PET scan on 11/13/2020 showed radiotracer retention in the left posterior bladder suspicious for malignancy with prostate cancer invasion progressing from prior CT scan mild to moderate uptake in the few small inguinal lymph nodes, sclerotic lesions in the spine with uptake suspicious for metastases     Radiation therapy in the February 2021- April 2021      Progression of disease by bone scan in June 2021 with multiple thoracic, right ribs area, hip area involvement, PSA 13 ( 7 2 on 04/2021)        Cycle 1 Taxotere 7/12/2021    Cycle 10   On 01/17/2022     PSA 3 3 in March 2022, PSMA PET scan at [facility] showed 5 bony lesions no evidence of visceral metastases     Status post stereotactic radiation therapy at [facility] finished in June 2022, PSA went down to 2 8    Significant progression of disease in September 2022 with PSA in the range of 120s, PSMA showed significant disease in the skeleton, left hepatic lobe, right inferior hepatic lobe, evaluated at [facility] and the decision to start cabazitaxel 20 mg per m2 every 3 weeks followed by pegfilgrastim to prevent chemotherapy-induced neutropenia     Side effects such as fatigue, nausea, vomiting, alopecia, and this degree of neuropathy then Taxotere, sepsis, elevated liver enzymes told he agreed to proceed he signed the consent     We will follow PSA in 2-3 cycles as well as imaging studies and determine if he has significant decrease in the disease burden to continue cabazitaxel and consider ADRYAN 177    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, Aranesp 200 mcg every 3 weeks if hemoglobin below 9 9        Labs and imaging studies are reviewed by ordering provider once results are available  If there are findings that need immediate attention, you will be contacted when results available  Discussing results and the implication on your healthcare is best discussed in person at your follow-up visit  HPI:    26-year-old  male who was seen by Urology for elevation of the PSA, when he presented in June 2017 with PSA of 4 6, Subsequently in September of 2017 PSA was 7 3 and in May of 2018 PSA was 8  6   A biopsy was recommended but the patient had just undergone percutaneous stenting of the heart and he was on dual anti-platelet therapy      MRI of the abdomen in April 2018 showed 2 simple cyst in the right hepatic lobe, bilateral renal cysts the largest 1 measuring 10 cm in the left lower lobe     In October of 2018 PSA was 9 8  CT scan of the chest 9/28/2018 showed sclerotic lesions in T10, 5 mm nodule in the right lower lobe   Bone scan showed activity in the posterior T10 level and left posterior 7th rib area concerning for osseous metastases and increased activity in the distal right clavicle might be degenerative or metastatic area      He was diagnosed with castration sensitive metastatic prostate cancer  [facility] insurance company could not approve enzalutamide, he also has a high co-payment for abiraterone     The patient initiated on samples of enzalutamide 160 mg p o  daily since the beginning of December 2018   His insurance approved abiraterone however he had a high co-payment        We were finally able to get financial assistance through [employer] and Roseline started Zytiga (abiraterone) 2/3/2019 with prednisone 5mg po bid without side effects      In May 2020 PSA 0 5, bone scan showed activity in the anterior 5th rib area, right humerus area most likely consistent with metastatic disease, no visceral disease on the CT scan, we had hard time getting enzalutamide , the patient was initiated on enzalutamide on 06/02/2020, PSA at the time of 0 8     PSA 1 4 in October 2020, normal alkaline phosphatase           Genes analyzed of germ line test  LIONEL, BARD1, BRCA1, BRCA2, BRIP1, CDH1, CHEK2, DICER1, EPCAM*, HOXB13, MLH1, MSH2,  MSH6, NBN, NF1, PALB2, PMS2, PTEN, RAD50, RAD51C, RAD51D, SMARCA4, STK11, TP53 all of these were negative     Evaluated at [facility] the decision for radiation therapy to oligo metastases and the primary site of the tumor depends on the PET scan finding which showed multiple metastatic disease, patient to meet with Radiation Oncology      PET scan on 11/13/2020 showed radiotracer retention in the left posterior bladder suspicious for malignancy with prostate cancer invasion progressing from prior CT scan mild to moderate uptake in the few small inguinal lymph nodes, sclerotic lesions in the spine with uptake suspicious for metastases     Radiation therapy in the February 2021- April 2021      Progression of disease by bone scan in June 2021 with multiple thoracic, right ribs area, hip area involvement, PSA 13 ( 7 2 on 04/2021)        Cycle 1 Taxotere 7/12/2021      Cycle 10  On 01/17/2022 PSA in the range of 2     PSA 3 3 in March 2022     PSMA scan showed bony lesions in 5 locations including right humeri, ribs, right pelvis  Status post stereotactic radiation therapy finished in June 2022 at [facility]     PSA on June 2022 went down to 2 8, PSA on 09/09/2022 up to 122    PSMA scan showed significant progression of disease in the bones fetal, left hepatic lobe, posterior anterior right hepatic lobe  Interval History:    COVID-19 infection with possible pneumonia  Previous Treatment:         Test Results:    Imaging: No results found      Labs:   Lab Results   Component Value Date    WBC 4 6 09/09/2022    HGB 9 4 (L) 09/09/2022    HCT 28 1 (L) 09/09/2022 MCV 90 1 09/09/2022     09/09/2022     Lab Results   Component Value Date     09/30/2017    K 4 4 09/09/2022     09/09/2022    CO2 28 09/09/2022    BUN 28 (H) 09/09/2022    CREATININE 1 25 (H) 09/09/2022    GLUF 97 08/08/2019    CALCIUM 8 8 09/09/2022    AST 36 (H) 09/09/2022    ALT 46 09/09/2022    ALKPHOS 280 (H) 09/09/2022    PROT 6 5 09/30/2017    BILITOT 0 7 09/30/2017    EGFR 58 (L) 09/09/2022       No results found for: IRON, TIBC, FERRITIN    No results found for: [id number]      ROS: Review of Systems   Constitutional: Positive for appetite change, fatigue and unexpected weight change  Negative for chills, diaphoresis and fever  HENT:   Negative for hearing loss, lump/mass, mouth sores, nosebleeds, sore throat, trouble swallowing and voice change  Eyes: Negative  Negative for eye problems and icterus  Respiratory: Negative  Negative for chest tightness, cough, hemoptysis and shortness of breath  Cardiovascular: Negative for chest pain and leg swelling  Gastrointestinal: Negative for abdominal distention, abdominal pain, blood in stool, constipation, diarrhea and nausea  Endocrine: Negative  Genitourinary: Negative for dysuria, frequency, hematuria and pelvic pain  Musculoskeletal: Positive for arthralgias (Right shoulder pain)  Negative for back pain, flank pain, gait problem, myalgias and neck stiffness  Skin: Negative for itching and rash  Neurological: Negative for dizziness, gait problem, headaches, light-headedness, numbness and speech difficulty  Hematological: Negative for adenopathy  Does not bruise/bleed easily  Psychiatric/Behavioral: Positive for depression and sleep disturbance  Negative for confusion and decreased concentration  The patient is nervous/anxious  Current Medications: Reviewed  Allergies: Reviewed  PMH/FH/SH:  Reviewed      Physical Exam:    Body surface area is 1 98 meters squared      Wt Readings from Last 3 Encounters: 10/04/22 86 kg (189 lb 9 6 oz)   10/04/22 84 8 kg (187 lb)   09/15/22 87 kg (191 lb 12 8 oz)        Temp Readings from Last 3 Encounters:   10/13/22 98 °F (36 7 °C)   10/04/22 97 6 °F (36 4 °C) (Tympanic)   09/15/22 (!) 97 4 °F (36 3 °C) (Temporal)        BP Readings from Last 3 Encounters:   10/13/22 132/82   10/04/22 116/74   10/04/22 110/70         Pulse Readings from Last 3 Encounters:   10/13/22 68   10/04/22 69   10/04/22 93        Physical Exam  Vitals reviewed  Constitutional:       General: He is not in acute distress  Appearance: He is well-developed  He is not diaphoretic  HENT:      Head: Normocephalic and atraumatic  Eyes:      Conjunctiva/sclera: Conjunctivae normal    Neck:      Trachea: No tracheal deviation  Cardiovascular:      Rate and Rhythm: Normal rate and regular rhythm  Heart sounds: No murmur heard  No friction rub  No gallop  Pulmonary:      Effort: Pulmonary effort is normal  No respiratory distress  Breath sounds: Normal breath sounds  No wheezing or rales  Chest:      Chest wall: No tenderness  Abdominal:      General: There is no distension  Palpations: Abdomen is soft  Tenderness: There is no abdominal tenderness  Musculoskeletal:      Cervical back: Normal range of motion and neck supple  Right lower leg: No edema  Left lower leg: No edema  Lymphadenopathy:      Cervical: No cervical adenopathy  Skin:     General: Skin is warm and dry  Coloration: Skin is not pale  Findings: No erythema  Neurological:      Mental Status: He is alert and oriented to person, place, and time  Psychiatric:         Behavior: Behavior normal          Thought Content: Thought content normal          Judgment: Judgment normal          ECO  Goals and Barriers:  Current Goal: Minimize effects of disease  Barriers: None  Patient's Capacity to Self Care:  Patient is able to self care      Code Status: [unfilled]

## 2022-10-13 NOTE — TELEPHONE ENCOUNTER
Pt called again for pain med script  Advised Dr Whit Augustin typically signs all scripts at the end of the day after he is done seeing patients

## 2022-10-13 NOTE — TELEPHONE ENCOUNTER
While we try to accommodate patient requests, our priority is to schedule treatment according to Doctor's orders and site availability  1  Does the Provider use the intake sheet or checkout note? no  2  What would be a preferred day of the week that would work best for your infusion appointment? anyday  3  Do you prefer mornings or afternoons for your appointments? After 10 if possible  4  Are there any days or dates that do not work for your schedule, including any upcoming vacations?  none  5  We are going to try our best to schedule you at the infusion center closest to your home  In the event that we are unable to what would be your next preferred infusion site or sites? 1  [facility]  2  [facility]  3      6  Do you have transportation to take you to all of your appointments? yes  7   Would you like the infusion center to draw labs from your port? (disregard if patient doesn't have a port or need labs for infusion appointment)    No port    NEEDS A ONE MONTH F/U, IR BIOPSY

## 2022-10-14 ENCOUNTER — TELEPHONE (OUTPATIENT)
Dept: NUTRITION | Facility: CLINIC | Age: 82
End: 2022-10-14

## 2022-10-14 ENCOUNTER — HOSPITAL ENCOUNTER (OUTPATIENT)
Dept: INFUSION CENTER | Facility: CLINIC | Age: 82
End: 2022-10-14
Payer: COMMERCIAL

## 2022-10-14 ENCOUNTER — DOCUMENTATION (OUTPATIENT)
Dept: NUTRITION | Facility: CLINIC | Age: 82
End: 2022-10-14

## 2022-10-14 VITALS — WEIGHT: 180.78 LBS | HEIGHT: 67 IN | BODY MASS INDEX: 28.37 KG/M2

## 2022-10-14 DIAGNOSIS — D64.81 ANTINEOPLASTIC CHEMOTHERAPY INDUCED ANEMIA: ICD-10-CM

## 2022-10-14 DIAGNOSIS — C61 PROSTATE CANCER (HCC): Primary | ICD-10-CM

## 2022-10-14 DIAGNOSIS — T45.1X5A CHEMOTHERAPY-INDUCED NEUTROPENIA (HCC): ICD-10-CM

## 2022-10-14 DIAGNOSIS — T45.1X5A ANTINEOPLASTIC CHEMOTHERAPY INDUCED ANEMIA: ICD-10-CM

## 2022-10-14 DIAGNOSIS — T45.1X5A CHEMOTHERAPY-INDUCED NAUSEA: ICD-10-CM

## 2022-10-14 DIAGNOSIS — D70.1 CHEMOTHERAPY-INDUCED NEUTROPENIA (HCC): ICD-10-CM

## 2022-10-14 DIAGNOSIS — R11.0 CHEMOTHERAPY-INDUCED NAUSEA: ICD-10-CM

## 2022-10-14 PROCEDURE — 96372 THER/PROPH/DIAG INJ SC/IM: CPT

## 2022-10-14 PROCEDURE — 96413 CHEMO IV INFUSION 1 HR: CPT

## 2022-10-14 PROCEDURE — 96367 TX/PROPH/DG ADDL SEQ IV INF: CPT

## 2022-10-14 PROCEDURE — 96377 APPLICATON ON-BODY INJECTOR: CPT

## 2022-10-14 RX ORDER — ONDANSETRON 4 MG/1
4 TABLET, FILM COATED ORAL EVERY 8 HOURS PRN
Qty: 30 TABLET | Refills: 1 | Status: SHIPPED | OUTPATIENT
Start: 2022-10-14

## 2022-10-14 RX ORDER — SODIUM CHLORIDE 9 MG/ML
20 INJECTION, SOLUTION INTRAVENOUS ONCE
Status: COMPLETED | OUTPATIENT
Start: 2022-10-14 | End: 2022-10-14

## 2022-10-14 RX ORDER — PREDNISONE 1 MG/1
TABLET ORAL
Qty: 60 TABLET | Refills: 3 | Status: SHIPPED | OUTPATIENT
Start: 2022-10-14

## 2022-10-14 RX ADMIN — DIPHENHYDRAMINE HYDROCHLORIDE 25 MG: 50 INJECTION, SOLUTION INTRAMUSCULAR; INTRAVENOUS at 13:15

## 2022-10-14 RX ADMIN — DEXAMETHASONE SODIUM PHOSPHATE 10 MG: 10 INJECTION, SOLUTION INTRAMUSCULAR; INTRAVENOUS at 13:38

## 2022-10-14 RX ADMIN — DARBEPOETIN ALFA 200 MCG: 200 INJECTION, SOLUTION INTRAVENOUS; SUBCUTANEOUS at 14:27

## 2022-10-14 RX ADMIN — FAMOTIDINE 20 MG: 10 INJECTION INTRAVENOUS at 14:02

## 2022-10-14 RX ADMIN — PEGFILGRASTIM 6 MG: KIT SUBCUTANEOUS at 14:45

## 2022-10-14 RX ADMIN — SODIUM CHLORIDE 40 MG: 0.9 INJECTION, SOLUTION INTRAVENOUS at 14:30

## 2022-10-14 RX ADMIN — SODIUM CHLORIDE 20 ML/HR: 0.9 INJECTION, SOLUTION INTRAVENOUS at 13:14

## 2022-10-14 NOTE — PROGRESS NOTES
Received notification from infusion RNFrancine , on 10/14/22, that Mari Acevedo and his wife were requesting a nutrition consult  Kali Katz during infusion  Introduced self and explained reason for visit  They are interested in meeting with RD to discuss Christopher's nutrition throughout his treatment  Explained oncology nutrition services available to him  They would like to schedule an appointment, but his wife asked if she could call RD on Monday once they review their schedules  Will wait for wife to call next week to schedule RD appointment  Provided them with RD contact information today

## 2022-10-14 NOTE — PROGRESS NOTES
Jeana RN to call in script for zofran and new script for Prednisone  Pt's wife is aware  Pt tolerated treatment without incident  Pt was provided with AVS and is aware of future appts

## 2022-10-14 NOTE — PROGRESS NOTES
Spoke with Sarah House RN  OK to proceed with treatment despite increase in LFT's and BUN and creatinine  Balta Jacobsen also to call in prescription for nausea med  Pt states he is taking po prednisone 2 times daily as directed  Also, pt has Aranesp ordered and meets parameters for same today

## 2022-10-17 ENCOUNTER — APPOINTMENT (OUTPATIENT)
Dept: LAB | Facility: MEDICAL CENTER | Age: 82
End: 2022-10-17
Payer: COMMERCIAL

## 2022-10-17 ENCOUNTER — TELEPHONE (OUTPATIENT)
Dept: HEMATOLOGY ONCOLOGY | Facility: CLINIC | Age: 82
End: 2022-10-17

## 2022-10-17 DIAGNOSIS — D64.9 ANEMIA, UNSPECIFIED TYPE: Primary | ICD-10-CM

## 2022-10-17 DIAGNOSIS — D64.9 ANEMIA, UNSPECIFIED TYPE: ICD-10-CM

## 2022-10-17 LAB
ANISOCYTOSIS BLD QL SMEAR: PRESENT
BASOPHILS # BLD MANUAL: 0 THOUSAND/UL (ref 0–0.1)
BASOPHILS NFR MAR MANUAL: 0 % (ref 0–1)
EOSINOPHIL # BLD MANUAL: 0.14 THOUSAND/UL (ref 0–0.4)
EOSINOPHIL NFR BLD MANUAL: 1 % (ref 0–6)
ERYTHROCYTE [DISTWIDTH] IN BLOOD BY AUTOMATED COUNT: 15.9 % (ref 11.6–15.1)
HCT VFR BLD AUTO: 27.4 % (ref 36.5–49.3)
HGB BLD-MCNC: 8.6 G/DL (ref 12–17)
LYMPHOCYTES # BLD AUTO: 0.41 THOUSAND/UL (ref 0.6–4.47)
LYMPHOCYTES # BLD AUTO: 3 % (ref 14–44)
MCH RBC QN AUTO: 28.4 PG (ref 26.8–34.3)
MCHC RBC AUTO-ENTMCNC: 31.4 G/DL (ref 31.4–37.4)
MCV RBC AUTO: 90 FL (ref 82–98)
METAMYELOCYTES NFR BLD MANUAL: 10 % (ref 0–1)
MICROCYTES BLD QL AUTO: PRESENT
MONOCYTES # BLD AUTO: 0.28 THOUSAND/UL (ref 0–1.22)
MONOCYTES NFR BLD: 2 % (ref 4–12)
MYELOCYTES NFR BLD MANUAL: 10 % (ref 0–1)
NEUTROPHILS # BLD MANUAL: 9.82 THOUSAND/UL (ref 1.85–7.62)
NEUTS BAND NFR BLD MANUAL: 33 % (ref 0–8)
NEUTS SEG NFR BLD AUTO: 38 % (ref 43–75)
OVALOCYTES BLD QL SMEAR: PRESENT
PLATELET # BLD AUTO: 240 THOUSANDS/UL (ref 149–390)
PLATELET BLD QL SMEAR: ADEQUATE
PMV BLD AUTO: 9.7 FL (ref 8.9–12.7)
POIKILOCYTOSIS BLD QL SMEAR: PRESENT
POLYCHROMASIA BLD QL SMEAR: PRESENT
RBC # BLD AUTO: 3.03 MILLION/UL (ref 3.88–5.62)
RBC MORPH BLD: PRESENT
SCHISTOCYTES BLD QL SMEAR: PRESENT
VARIANT LYMPHS # BLD AUTO: 3 %
WBC # BLD AUTO: 13.83 THOUSAND/UL (ref 4.31–10.16)

## 2022-10-17 PROCEDURE — 36415 COLL VENOUS BLD VENIPUNCTURE: CPT

## 2022-10-17 PROCEDURE — 85027 COMPLETE CBC AUTOMATED: CPT

## 2022-10-17 PROCEDURE — 85007 BL SMEAR W/DIFF WBC COUNT: CPT

## 2022-10-17 NOTE — TELEPHONE ENCOUNTER
Reviewed with Dr Ami Pacheco who recommends bloodwork as he may need a transfusion  CBC and BB hold tube orders placed  Pt's wife notified

## 2022-10-17 NOTE — TELEPHONE ENCOUNTER
Received call from pt who reports being very tired/fatigued  Developed SOB a bit yesterday and worsened this morning  Taking a shower was "an effort"  Reports O2 sats in the 90's at home and heart rate in the 80's  Denies lightheadedness but stands up cautiously  Also reports night sweats have diminished somewhat  Urinary frequency is affecting sleep - plans to discuss further with urology  Appetite good

## 2022-10-18 ENCOUNTER — TELEPHONE (OUTPATIENT)
Dept: HEMATOLOGY ONCOLOGY | Facility: CLINIC | Age: 82
End: 2022-10-18

## 2022-10-18 NOTE — TELEPHONE ENCOUNTER
Reviewed with Dr Rocio Stokes  Spoke with wife and explained hgb 8 6 does not meet threshold for transfusion  Encouraged call back or ED visit if symptoms worsen

## 2022-10-19 ENCOUNTER — TELEPHONE (OUTPATIENT)
Dept: HEMATOLOGY ONCOLOGY | Facility: CLINIC | Age: 82
End: 2022-10-19

## 2022-10-19 NOTE — TELEPHONE ENCOUNTER
Okay for liver biopsy which was ordered 10/13  Per IR  9/23 CT scan images needed prior to scheduling  This was done at [facility]  Pt to  disk and deliver to our office for upload

## 2022-10-20 ENCOUNTER — TELEPHONE (OUTPATIENT)
Dept: HEMATOLOGY ONCOLOGY | Facility: CLINIC | Age: 82
End: 2022-10-20

## 2022-10-20 NOTE — TELEPHONE ENCOUNTER
Received call from wife stating they are having trouble obtaining the 9/23 CT images on disk  A bit later received second call stating the radiology group at [facility] just sent this to us electronically

## 2022-10-20 NOTE — TELEPHONE ENCOUNTER
Received images  Spoke with IR for scheduling - IR physician to review images and then they will reach out to pt to schedule  Pt updated

## 2022-10-25 ENCOUNTER — PREP FOR PROCEDURE (OUTPATIENT)
Dept: INTERVENTIONAL RADIOLOGY/VASCULAR | Facility: CLINIC | Age: 82
End: 2022-10-25

## 2022-10-25 DIAGNOSIS — K76.9 LIVER LESION: Primary | ICD-10-CM

## 2022-10-26 ENCOUNTER — TELEPHONE (OUTPATIENT)
Dept: NUTRITION | Facility: CLINIC | Age: 82
End: 2022-10-26

## 2022-10-26 DIAGNOSIS — I10 ESSENTIAL HYPERTENSION: ICD-10-CM

## 2022-10-26 RX ORDER — LOSARTAN POTASSIUM 100 MG/1
100 TABLET ORAL DAILY
Qty: 90 TABLET | Refills: 1 | Status: SHIPPED | OUTPATIENT
Start: 2022-10-26

## 2022-10-26 NOTE — TELEPHONE ENCOUNTER
Received VM from 10/21 from Christopher's wife, Eufemia Hood, stating they would like to schedule appt with RD  Called Barbara Hood back today  Spoke with Eufemia Hood and offered to schedule RD appt   Initial RD appointment scheduled for 11/8/22 at 1 pm

## 2022-10-31 ENCOUNTER — TELEPHONE (OUTPATIENT)
Dept: HEMATOLOGY ONCOLOGY | Facility: CLINIC | Age: 82
End: 2022-10-31

## 2022-10-31 ENCOUNTER — APPOINTMENT (OUTPATIENT)
Dept: LAB | Facility: MEDICAL CENTER | Age: 82
End: 2022-10-31

## 2022-10-31 DIAGNOSIS — C61 PROSTATE CANCER (HCC): ICD-10-CM

## 2022-10-31 DIAGNOSIS — C61 PROSTATE CANCER (HCC): Primary | ICD-10-CM

## 2022-10-31 LAB
ALBUMIN SERPL BCP-MCNC: 3 G/DL (ref 3.5–5)
ALP SERPL-CCNC: 687 U/L (ref 46–116)
ALT SERPL W P-5'-P-CCNC: 55 U/L (ref 12–78)
ANION GAP SERPL CALCULATED.3IONS-SCNC: 5 MMOL/L (ref 4–13)
ANISOCYTOSIS BLD QL SMEAR: PRESENT
AST SERPL W P-5'-P-CCNC: 129 U/L (ref 5–45)
BASOPHILS # BLD MANUAL: 0 THOUSAND/UL (ref 0–0.1)
BASOPHILS NFR MAR MANUAL: 0 % (ref 0–1)
BILIRUB SERPL-MCNC: 0.45 MG/DL (ref 0.2–1)
BUN SERPL-MCNC: 34 MG/DL (ref 5–25)
CALCIUM ALBUM COR SERPL-MCNC: 9.7 MG/DL (ref 8.3–10.1)
CALCIUM SERPL-MCNC: 8.9 MG/DL (ref 8.3–10.1)
CHLORIDE SERPL-SCNC: 104 MMOL/L (ref 96–108)
CO2 SERPL-SCNC: 26 MMOL/L (ref 21–32)
CREAT SERPL-MCNC: 1.28 MG/DL (ref 0.6–1.3)
EOSINOPHIL # BLD MANUAL: 0 THOUSAND/UL (ref 0–0.4)
EOSINOPHIL NFR BLD MANUAL: 0 % (ref 0–6)
ERYTHROCYTE [DISTWIDTH] IN BLOOD BY AUTOMATED COUNT: 21.1 % (ref 11.6–15.1)
GFR SERPL CREATININE-BSD FRML MDRD: 52 ML/MIN/1.73SQ M
GLUCOSE P FAST SERPL-MCNC: 128 MG/DL (ref 65–99)
HCT VFR BLD AUTO: 26.8 % (ref 36.5–49.3)
HGB BLD-MCNC: 8 G/DL (ref 12–17)
LYMPHOCYTES # BLD AUTO: 0.42 THOUSAND/UL (ref 0.6–4.47)
LYMPHOCYTES # BLD AUTO: 4 % (ref 14–44)
MCH RBC QN AUTO: 28.9 PG (ref 26.8–34.3)
MCHC RBC AUTO-ENTMCNC: 29.9 G/DL (ref 31.4–37.4)
MCV RBC AUTO: 97 FL (ref 82–98)
METAMYELOCYTES NFR BLD MANUAL: 4 % (ref 0–1)
MICROCYTES BLD QL AUTO: PRESENT
MONOCYTES # BLD AUTO: 0.31 THOUSAND/UL (ref 0–1.22)
MONOCYTES NFR BLD: 3 % (ref 4–12)
MYELOCYTES NFR BLD MANUAL: 1 % (ref 0–1)
NEUTROPHILS # BLD MANUAL: 8.81 THOUSAND/UL (ref 1.85–7.62)
NEUTS BAND NFR BLD MANUAL: 12 % (ref 0–8)
NEUTS SEG NFR BLD AUTO: 72 % (ref 43–75)
OVALOCYTES BLD QL SMEAR: PRESENT
PLATELET # BLD AUTO: 102 THOUSANDS/UL (ref 149–390)
PLATELET BLD QL SMEAR: ABNORMAL
PMV BLD AUTO: 10.6 FL (ref 8.9–12.7)
POIKILOCYTOSIS BLD QL SMEAR: PRESENT
POLYCHROMASIA BLD QL SMEAR: PRESENT
POTASSIUM SERPL-SCNC: 3.9 MMOL/L (ref 3.5–5.3)
PROMYELOCYTES NFR BLD MANUAL: 1 % (ref 0–0)
PROT SERPL-MCNC: 6.8 G/DL (ref 6.4–8.4)
RBC # BLD AUTO: 2.77 MILLION/UL (ref 3.88–5.62)
RBC MORPH BLD: PRESENT
SODIUM SERPL-SCNC: 135 MMOL/L (ref 135–147)
TOXIC GRANULES BLD QL SMEAR: PRESENT
VARIANT LYMPHS # BLD AUTO: 3 %
WBC # BLD AUTO: 10.49 THOUSAND/UL (ref 4.31–10.16)

## 2022-10-31 NOTE — TELEPHONE ENCOUNTER
Spoke with patient verified, no fasting required for Dr Juan Jose Pink blood work, PSA ordered  Pt was appreciative of this

## 2022-11-01 LAB — PSA SERPL-MCNC: 334.5 NG/ML (ref 0–4)

## 2022-11-03 ENCOUNTER — TELEPHONE (OUTPATIENT)
Dept: RADIOLOGY | Facility: HOSPITAL | Age: 82
End: 2022-11-03

## 2022-11-03 ENCOUNTER — TELEPHONE (OUTPATIENT)
Dept: HEMATOLOGY ONCOLOGY | Facility: CLINIC | Age: 82
End: 2022-11-03

## 2022-11-04 ENCOUNTER — HOSPITAL ENCOUNTER (OUTPATIENT)
Dept: INFUSION CENTER | Facility: CLINIC | Age: 82
End: 2022-11-04

## 2022-11-04 VITALS
HEIGHT: 67 IN | BODY MASS INDEX: 28.37 KG/M2 | DIASTOLIC BLOOD PRESSURE: 66 MMHG | HEART RATE: 81 BPM | WEIGHT: 180.78 LBS | TEMPERATURE: 97.2 F | RESPIRATION RATE: 16 BRPM | SYSTOLIC BLOOD PRESSURE: 109 MMHG

## 2022-11-04 DIAGNOSIS — D70.1 CHEMOTHERAPY-INDUCED NEUTROPENIA (HCC): ICD-10-CM

## 2022-11-04 DIAGNOSIS — T45.1X5A ANTINEOPLASTIC CHEMOTHERAPY INDUCED ANEMIA: ICD-10-CM

## 2022-11-04 DIAGNOSIS — D64.81 ANTINEOPLASTIC CHEMOTHERAPY INDUCED ANEMIA: ICD-10-CM

## 2022-11-04 DIAGNOSIS — C61 PROSTATE CANCER (HCC): Primary | ICD-10-CM

## 2022-11-04 DIAGNOSIS — T45.1X5A CHEMOTHERAPY-INDUCED NEUTROPENIA (HCC): ICD-10-CM

## 2022-11-04 RX ORDER — SODIUM CHLORIDE 9 MG/ML
20 INJECTION, SOLUTION INTRAVENOUS ONCE
Status: COMPLETED | OUTPATIENT
Start: 2022-11-04 | End: 2022-11-04

## 2022-11-04 RX ADMIN — SODIUM CHLORIDE 20 ML/HR: 0.9 INJECTION, SOLUTION INTRAVENOUS at 10:16

## 2022-11-04 RX ADMIN — PEGFILGRASTIM 6 MG: KIT SUBCUTANEOUS at 12:40

## 2022-11-04 RX ADMIN — DIPHENHYDRAMINE HYDROCHLORIDE 25 MG: 50 INJECTION, SOLUTION INTRAMUSCULAR; INTRAVENOUS at 10:39

## 2022-11-04 RX ADMIN — FAMOTIDINE 20 MG: 10 INJECTION INTRAVENOUS at 11:00

## 2022-11-04 RX ADMIN — DEXAMETHASONE SODIUM PHOSPHATE 10 MG: 10 INJECTION, SOLUTION INTRAMUSCULAR; INTRAVENOUS at 10:16

## 2022-11-04 RX ADMIN — DARBEPOETIN ALFA 300 MCG: 300 INJECTION, SOLUTION INTRAVENOUS; SUBCUTANEOUS at 11:03

## 2022-11-04 RX ADMIN — SODIUM CHLORIDE 40 MG: 0.9 INJECTION, SOLUTION INTRAVENOUS at 11:28

## 2022-11-04 NOTE — PROGRESS NOTES
Pt tolerated aranesp injection to right arm without incident  During infusion of cabazitaxel, pt had small IV infiltration to right arm  Infusion was immediately stopped and IV site was removed  Per pharmacy ice was applied  New IV inserted and pt tolerated the rest of treatment without incident  Valery Griffin RN made aware  Instructed pt he may ice the area intermittently at home and if anything changes or worsens to call Dr Adams Ice office  Neulasta OnPro applied to left arm per protocol, pt and wife aware of completion date/time  Pt provided with AVS, aware of future appts

## 2022-11-08 ENCOUNTER — NUTRITION (OUTPATIENT)
Dept: NUTRITION | Facility: CLINIC | Age: 82
End: 2022-11-08

## 2022-11-08 DIAGNOSIS — Z71.3 NUTRITIONAL COUNSELING: Primary | ICD-10-CM

## 2022-11-08 NOTE — PROGRESS NOTES
Outpatient Oncology Nutrition Consultation   Type of Consult: Initial Consult  Care Location: Office Visit with wife, Serjio Hawley (consent received from Osmel Melendrez to have appt with wife  Difficult for Osmel Melendrez to come for appt d/t fatigue)    Reason for referral: Received notification from infusion RN, Natalie Shepherd , on 10/14/22, that Osmel Melendrez and his wife were requesting a nutrition consult       Nutrition Assessment:   Oncology Diagnosis & Treatments: -dx with prostate cancer 2018, with bone mets  -started Zytiga 2/3/2019 with prednisone  -started enzalutamide 6/2020  -s/p radiation from 2/22/21-3/24/21   -chemo 7/2021- 1/2022 for 10 cycles (taxotere)  -s/p stereotactic radiation therapy at [facility] finished in June 2022  -started [address] 10/2022    Oncology History   Prostate cancer ([address] )   5/2/2018 Initial Diagnosis    Prostate cancer ([address] )     2/22/2021 - 3/24/2021 Radiation    Plan ID Energy Fractions Dose per Fraction (cGy) Dose Correction (cGy) Total Dose Delivered (cGy) Elapsed Days   Prostate:1 10X 23 / 23 250 0 5,750 30      Treatment dates:  2/22/2021 - 3/24/2021     7/12/2021 - 1/18/2022 Chemotherapy    pegfilgrastim (NEULASTA), 6 mg, Subcutaneous, Once, 10 of 10 cycles  Administration: 6 mg (7/13/2021), 6 mg (8/3/2021), 6 mg (8/27/2021), 6 mg (9/14/2021), 6 mg (10/5/2021), 6 mg (10/26/2021), 6 mg (11/16/2021), 6 mg (12/7/2021), 6 mg (12/28/2021), 6 mg (1/18/2022)  pegfilgrastim (NEULASTA ONPRO), 6 mg, Subcutaneous, Once, 1 of 1 cycle  DOCEtaxel (TAXOTERE) chemo infusion, 75 mg/m2 = 150 8 mg, Intravenous, Once, 10 of 10 cycles  Administration: 150 8 mg (7/12/2021), 150 8 mg (8/2/2021), 150 8 mg (8/26/2021), 150 8 mg (9/13/2021), 150 8 mg (10/4/2021), 150 8 mg (10/25/2021), 150 8 mg (11/15/2021), 150 8 mg (12/6/2021), 150 8 mg (12/27/2021), 150 8 mg (1/17/2022)     10/14/2022 -  Chemotherapy    pegfilgrastim (NEULASTA ONPRO), 6 mg, Subcutaneous, Once, 2 of 6 cycles  Administration: 6 mg (10/14/2022), 6 mg (11/4/2022)     Bone metastases ([facility])   10/24/2018 Biopsy    Prostate biopsy:    A  Right lateral base prostate core biopsy:  - Adenocarcinoma, [place] score 4+ 5 = 9/10 (grade 5 comprises 10% of core biopsy), grade group 5, continuously involving greater than 95% of this core biopsy  - No perineural invasion is seen     B  Right lateral mid prostate core biopsy:  - Adenocarcinoma, [place] score 4+ 5 = 9/10 (grade 5 conprises 20% of core biopsy), grade group 5, continuously involving 90% of this core biopsy  - Perineural invasion is seen  C  Left lateral apex prostate core biopsy:  - Adenocarcinoma, [place] score 5 + 4 = 9/10 (grade 5 comprises 60% of core biopsy), grade group 5, continuously involving 60% of this core biopsy  - Perineural invasion is seen     D  Right base prostate core biopsy:  - Adenocarcinoma, [place] score 4 + 5 = 9/10 (grade 5 comprises 10% of core biopsy), grade group 5, continuously involving 85% of this core biopsy  - Perineural invasion is seen     E  Right mid prostate core biopsy   - Adenocarcinoma, Vitaliy score 5 + 4 = 9/10 (grade 5 comprises 95% of core biopsy), grade group 5, continuously involving 60% of this core biopsy  - Perineural invasion is seen     F  Right apex prostate core biopsy:  - Adenocarcinoma, Vitaliy score 4 + 5 = 9/10 (grade 5 comprises 45% of core biopsy), grade group 5, continuously involving 70% of this core biopsy  - Perineural invasion is seen     G  Left base prostate core biopsy:  - Adenocarcinoma, Vitaliy score 4 + 5 = 9/10 (grade 5 comprises 35% of core biopsy), grade group 5, continuously involving 70% of this core biopsy  - No perineural invasion is seen     H  Left mid prostate core biopsy:  - Adenocarcinoma, [place] score 4 + 4 = 8/10, grade group 4, discontinuously involving 20% of this core biopsy  - No perineural invasion is seen      I    Left apex prostate core biopsy:  - Benign prostatic tissue     J  Left lateral base prostate core biopsy:  - Adenocarcinoma, Vitaliy score 4 + 5 = 9/10, (grade 5 comprises 35% of core biopsy), grade group 5, discontinuously involving 20% of this core biopsy  - No perineural invasion is seen      K  Left lateral mid prostate core biopsy:  - Adenocarcinoma, [place] score 4 + 4 = 8/10, grade group 4, continuously involving 5% of this core biopsy  - No perineural invasion is seen      L   Left lateral apex prostate core biopsy:  - Adenocarcinoma, Vitaliy score 4 + 4 = 8/10, grade group 4, discontinuously involving 5% of this core biopsy  - No perineural invasion is seen      11/14/2018 -  Hormone Therapy    11/14/2018 Eligard- 12/2018  12/10/18 degarelix acetate (FIRMAGON) injection 80 mg initiated   1/7/19  Firmagon  80 mg given  2/12/19 Lupron 45 mg given  8/16/19 Lupron given-  2/28/2020 Lupron  8/28/2020 Lupron     12/2018 - 2/2/2019 Chemotherapy    Xtandi 160 mg po daily,      2/3/2019 -  Chemotherapy    Zytiga with prednisone daily      4/29/2019 Initial Diagnosis    Bone metastases ([address] )     2/22/2021 - 3/24/2021 Radiation    Plan ID Energy Fractions Dose per Fraction (cGy) Dose Correction (cGy) Total Dose Delivered (cGy) Elapsed Days   Prostate:1 10X 23 / 23 250 0 5,750 30      Treatment dates:  2/22/2021 - 3/24/2021       Past Medical & Surgical Hx:   Patient Active Problem List   Diagnosis   • Benign essential hypertension   • Renal cyst   • Liver lesion   • Pulmonary nodule   • Combined arterial insufficiency and corporo-venous occlusive erectile dysfunction   • Prostate cancer ([address] )   • Coronary artery disease of native artery of native heart with stable angina pectoris ([address] )   • DDD (degenerative disc disease), cervical   • S/P coronary artery stent placement   • Vitamin D deficiency   • Abnormal radionuclide bone scan   • Bone metastases (HCC)   • Calculus of kidney   • Mixed hyperlipidemia   • Localized edema   • Bilateral hearing loss   • Chemotherapy-induced neutropenia (HCC)   • Antineoplastic chemotherapy induced anemia   • Chronic kidney disease, stage III (moderate) (HCC)   • Urinary frequency   • Bladder mass     Past Medical History:   Diagnosis Date   • Anemia     last assessed  1/7/14    • BRCA1 negative    • BRCA2 negative    • Cancer ([facility] )     PROSTATE, BONE CANCER   • Coronary artery disease    • COVID-19 8/31/2022   • DDD (degenerative disc disease), cervical    • Hypercholesteremia    • Hypertension    • Kidney stone     kidney stones   • Polyp of sigmoid colon    • Prostate cancer ([facility] )    • Renal disorder     elevated serum creat   • Tinnitus     unspecified laterality / last assessed 4/9/13   • Vitamin D deficiency      Past Surgical History:   Procedure Laterality Date   • BREAST EXCISIONAL BIOPSY Left     age 16 - benign   • CARDIAC SURGERY      CARD CATH W/ STENTS   • COLONOSCOPY     • CORONARY ANGIOPLASTY WITH STENT PLACEMENT     • FL RETROGRADE PYELOGRAM  9/11/2019   • HEMORRHOID SURGERY     • FL CYSTO/URETERO W/LITHOTRIPSY &INDWELL STENT INSRT Left 9/11/2019    Procedure: CYSTO, URETEROSCOPY W/HOLMIUM LASER, BASKET STONE EXTRACTION, RETROGRADE PYELOGRAM, STENT EXCHANGE;  Surgeon: Alfonzo Monreal MD;  Location: [facility];  Service: Urology   • FL CYSTOURETHROSCOPY,FULGUR 0 5-2 CM LESN N/A 9/8/2021    Procedure: TRANSURETHRAL RESECTION OF BLADDER TUMOR (TURBT);   Surgeon: Alfonzo Monreal MD;  Location: [facility];  Service: Urology   • PROSTATE BIOPSY  10/24/2018   • TONSILLECTOMY         Review of Medications:   Vitamins, Supplements and Herbals: Med List Reviewed & pt is only taking: MVI daily, vitamin D, Discussed the potential interactions of high dose antioxidants with cancer tx and recommended exercising caution when taking these supplements and Discussed reading supplement labels and making sure supplements have <100% of the daily value for all nutrients to avoid over supplementation    Current Outpatient Medications:   •  aspirin (ECOTRIN LOW STRENGTH) 81 mg EC tablet, Take 81 mg by mouth daily , Disp: , Rfl:   •  atorvastatin (LIPITOR) 40 mg tablet, Take 40 mg by mouth daily at bedtime , Disp: , Rfl:   •  Calcium 500 MG tablet, Take 1,000 mg by mouth daily , Disp: , Rfl:   •  cholecalciferol (VITAMIN D3) 1,000 units tablet, Take 1,000 Units by mouth daily , Disp: , Rfl:   •  diphenhydrAMINE HCl (BENADRYL ALLERGY PO), Take by mouth, Disp: , Rfl:   •  hydrochlorothiazide (HYDRODIURIL) 25 mg tablet, Take 1 tablet (25 mg total) by mouth daily, Disp: 90 tablet, Rfl: 1  •  leuprolide (LUPRON DEPOT 6 MONTH KIT) 45 mg, Inject 45 mg into a muscle every 6 (six) months, Disp: 45 mg, Rfl: 0  •  losartan (COZAAR) 100 MG tablet, Take 1 tablet (100 mg total) by mouth daily, Disp: 90 tablet, Rfl: 1  •  metoprolol succinate (TOPROL-XL) 25 mg 24 hr tablet, Take 1 tablet (25 mg total) by mouth daily, Disp: 90 tablet, Rfl: 1  •  Mirabegron ER 50 MG TB24, Take 1 tablet (50 mg total) by mouth in the morning, Disp: 30 tablet, Rfl: 12  •  naproxen sodium (ALEVE) 220 MG tablet, Take 220 mg by mouth every 12 (twelve) hours as needed, Disp: , Rfl:   •  ondansetron (ZOFRAN) 4 mg tablet, Take 1 tablet (4 mg total) by mouth every 8 (eight) hours as needed for nausea or vomiting, Disp: 30 tablet, Rfl: 1  •  oxybutynin (DITROPAN-XL) 5 mg 24 hr tablet, Take 5 mg by mouth daily, Disp: , Rfl:   •  oxyCODONE (Roxicodone) 5 immediate release tablet, Take 1 tablet (5 mg total) by mouth every 6 (six) hours as needed for moderate pain Max Daily Amount: 20 mg, Disp: 30 tablet, Rfl: 0  •  phenazopyridine (PYRIDIUM) 200 mg tablet, , Disp: , Rfl:   •  predniSONE 5 mg tablet, Take 1 tablet by mouth twice a day, Disp: 60 tablet, Rfl: 3  •  sulfamethoxazole-trimethoprim (BACTRIM DS) 800-160 mg per tablet, , Disp: , Rfl:   •  tamsulosin (FLOMAX) 0 4 mg, take 1 capsule by mouth once daily with dinner, Disp: 90 capsule, Rfl: 3  •  Zoledronic Acid (ZOMETA IV), Infuse into a venous catheter every 3 (three) months, Disp: , Rfl:     Most Recent Lab Results:   Lab Results   Component Value Date    WBC 10 49 (H) 10/31/2022    NEUTROABS 6 83 10/13/2022    CHOLESTEROL 103 06/04/2022    CHOL 175 09/30/2017    TRIG 115 06/04/2022    HDL 38 (L) 06/04/2022    LDLCALC 45 06/04/2022    ALT 55 10/31/2022     (H) 10/31/2022    ALB 3 0 (L) 10/31/2022     09/30/2017     03/20/2017    SODIUM 135 10/31/2022    SODIUM 129 (L) 10/13/2022    K 3 9 10/31/2022    K 4 7 10/13/2022     10/31/2022    BUN 34 (H) 10/31/2022    BUN 34 (H) 10/13/2022    CREATININE 1 28 10/31/2022    CREATININE 1 68 (H) 10/13/2022    EGFR 52 10/31/2022    POCGLU 84 11/13/2020    GLUF 128 (H) 10/31/2022    GLUF 97 08/08/2019    GLUC 117 10/13/2022    CALCIUM 8 9 10/31/2022       Anthropometric Measurements:   Height: 67"  Ht Readings from Last 1 Encounters:   11/04/22 5' 7 01" (1 702 m)     Wt Readings from Last 20 Encounters:   11/04/22 82 kg (180 lb 12 4 oz)   10/14/22 82 kg (180 lb 12 4 oz)   10/04/22 86 kg (189 lb 9 6 oz)   10/04/22 84 8 kg (187 lb)   09/15/22 87 kg (191 lb 12 8 oz)   07/13/22 93 kg (205 lb)   06/08/22 95 7 kg (211 lb)   04/21/22 90 7 kg (200 lb)   04/14/22 95 7 kg (211 lb)   04/05/22 98 2 kg (216 lb 6 4 oz)   03/30/22 96 6 kg (213 lb)   03/21/22 96 4 kg (212 lb 8 4 oz)   03/01/22 97 5 kg (215 lb)   02/03/22 95 7 kg (211 lb)   01/17/22 95 8 kg (211 lb 3 2 oz)   12/27/21 95 8 kg (211 lb 1 5 oz)   12/06/21 94 4 kg (208 lb 3 6 oz)   12/06/21 95 7 kg (211 lb)   11/24/21 93 9 kg (207 lb)   11/15/21 96 4 kg (212 lb 8 4 oz)       Weight History:   • Usual Weight: 210#  • Varian: (2/22/21) 210 2#, (3/1/21) 206 6#, (3/8/21) 213 2#, (3/15/21) 212 8#, (3/22/21) 211 6#  • Home Scale: (11/6/22) 175#    Oncology Nutrition-Anthropometrics    Flowsheet Row Nutrition from 11/8/2022 in [address] [place] [address] Oncology Dietitian Services   Patient age (years): 81 years   Patient (male) height (in): 67 in   Current weight (lbs): 180 8 lbs   Current weight to be used for anthropometric calculations (kg) 82 2 kg   BMI: 28 3   IBW male 148 lb   IBW (kg) male 67 3 kg   IBW % (male) 122 2 %   Adjusted BW (male): 156 2 lbs   Adjusted BW in kg (male): 71 kg   % weight change after 1 month: -3 3 %   Weight change after 1 month (lbs) -6 2 lbs   % weight change after 1 year: -14 9 %   Weight change after 1 year (lbs) -31 7 lbs          Nutrition-Focused Physical Findings: patient not present    Food/Nutrition-Related History & Client/Social History:    Current Nutrition Impact Symptoms:  [] Nausea  [x] Reduced Appetite initially over the summer, improved now [] Acid Reflux    [] Vomiting  [x] Unintended Wt Loss  [] Malabsorption    [] Diarrhea  [] Unintended Wt Gain  [] Dumping Syndrome    [] Constipation  [] Thick Mucous/Secretions  [] Abdominal Pain    [] Dysgeusia (Altered Taste)  [] Xerostomia (Dry Mouth)  [] Gas    [] Dysosmia (Altered Smell)  [] Thrush  [] Difficulty Chewing    [] Oral Mucositis (Sore Mouth)  [x] Fatigue  [] Hyperglycemia    [] Odynophagia  [] Esophagitis  [] Other:    [] Dysphagia  [] Early Satiety  [] No Problems Eating      Food Allergies & Intolerances: no    Current Diet: Regular Diet, No Restrictions  Current Nutrition Intake: More than usual (was eating less than normal, but has been improving the last couple weeks)  Appetite: Good  Nutrition Route: PO  Oral Care: brushes twice daily and flosses  Activity level: very fatigued, gets out of breath easily   Will walk around outside of the house but this wears him out    24 Hr Diet Recall:   Breakfast: poached egg and toast OR frozen waffles (2) with maple syrup OR cereal (raisin brand or kashi) with fruit and 1% milk  Snack: blue cheese/cream cheese spread with celery  Lunch: oniel pizza with cauliflower crust (ate about 1/2)  Snack: chips and dip OR apple OR grapes  Dinner: last night has cottage pie (ground beef with carrots and mashed potatoes)  Snack: sometimes ice cream OR cupcakes    Beverages: water (16 9 oz x1), coke (6oz x1 sometimes), coffee (8oz x1), orange juice (8oz x1)  Supplements:   • None      Oncology Nutrition-Estimated Needs    Flowsheet Row Nutrition from 2022 in [address] [place] [address] Oncology Dietitian Services   Weight type used Actual weight   Weight in kilograms (kg) used for estimated needs 82 2 kg   Energy needs formula:  30-35 kcal/kg   Energy needs based on 30 kcal/k kcal   Energy needs based on 35 kcal/k kcal   Protein needs formula: 1 2-1 5 g/kg   Protein needs based on 1 2 g/k g   Protein needs based on 1 5 g/kg 123 g   Fluid needs formula: 30-35 mL/kg   Fluid needs based on 30 mL/kg 2469 mL   Fluid needs in ounces 83 oz   Fluid needs based on 35 mL/kg 2881 mL   Fluid needs in ounces 97 oz           Discussion & Intervention:   Sim Lou was evaluated today for an initial RD consultation regarding wt loss, poor po intake and patient and wife request   Sim Lou is currently undergoing tx for metastatic prostate cancer  Met with Christopher's wife, Paris Lou was not present as he is very fatigued and preferred to rest at home  He gave permission for appt to be held with wife  Paris Tong states she does all of the cooking and food shopping  Sim Lou has been losing weight over the last year  However, his appetite has started to improve  His portions are not as large as they used to be, but he's been able to eat 3 meals/day + snacks  Reviewed ways to add calories/protein to current diet  Also discussed adequate hydration, as his current fluid intake is inadequate  Reviewed 24 hour recall, which revealed an inadequate po intake, and discussed ways to increase kcal, protein, and fluid intakes and optimize nutrient intake  Also reviewed the importance of wt management throughout the tx process and the role of a high kcal/ high protein diet in managing wt and overall health    Based on today's assessment, discussion included: MNT for: wt loss, fatigue, hydration, how to modify foods for anticipated nutrition impact symptoms pt may experience during CA tx, a high kcal/protein diet & food choices to include at all meals & snacks (Examples of high kcal foods: cheese, full-fat dairy products, nut butter, plant-based fats, coconut oil/milk, avocado, butter, cream soup, etc  Examples of high protein foods: eggs, chicken, fish, beans/legumes, nuts/nut butters, bone broth, etc ) , fortifying foods for added kcal and protein (examples include: adding cheese to foods such as eggs, mashed potatoes, casseroles, etc ; Making oatmeal with whole milk rather than water; Making fortified mashed potatoes with cream, butter, dry milk powder, plain [place] yogurt, and cheese ), adequate hydration & fluid choices, sipping on calorie containing beverages (examples include: adding whole milk or cream to coffee, oral nutrition supplements, juice, electrolyte replacement beverages, milk, etc ), eating smaller more frequent meals every 2-3 hours (5-6 small meals/day), having consistent and planned snacks between meals, eating when feeling most hungry, utilizing oral nutrition supplements, recipe suggestions/resources, trying new recipes, & cooking at home more often and adding extra fat to foods while cooking such as butter, plant-based oil, coconut oil/milk, avocado, nut butters, etc    Moving forward, Ubaldo Dove was encouraged to increase kcal, protein, and fluid intakes   Materials Provided: [facility] Eating Hints book, Ensure samples, Ensure Coupons, Oncology Milkshake &  Smoothie Recipe Packet, Commercial Nutrition Supplements List, Increasing Calories and Protein handout, Oncology Nutrition Services Brochure and Oncology Nutrition Class Flyer  All questions and concerns addressed during today’s visit  Ubaldo Petty has RD contact information      Nutrition Diagnosis:   • Inadequate Energy Intake related to physiological causes, disease state and treatment related issues as evidenced by food recall, wt loss and discussion with pt and/or family  • Increased Nutrient Needs (kcal & pro) related to increased demand for nutrients and disease state as evidenced by cancer dx and pt undergoing tx for cancer  Monitoring & Evaluation:   Goals:  · weight maintenance/stabilization  · adequate nutrition impact symptom management  · pt to meet >/=75% estimated nutrition needs daily    · Progress Towards Goals: Initiated    Nutrition Rx & Recommendations:  · Diet: High Calorie, High Protein (for high calorie foods see pages 52-53, and for high protein foods see pages 49-51 in your Eating Hints book)  · Keep a daily food journal (pen/paper, quoc such as Jukedeck)  · Nutrition Supplements: start consuming 1 per day- aim for high calorie/protein options like Ensure Enlive, Complete, or Plus  May use homemade shakes/smoothies as desired  If using a pre-made shake/bar, choose ones with >300 calories and >10 grams protein (ex  Ensure Complete, Ensure Enlive, Ensure Plus, Boost Plus, Boost Very High Calorie, etc )  · Small, frequent meals/snacks may be easier to tolerate than 3 large daily meals  Aim for 5-6 small meals per day (every 2-3 hours)  · Include protein at all meals/snacks  · Include a variety of foods (as tolerated/allowed by diet)  · Incorporate physical activity as able/allowed  · Stay hydrated by sipping fluids of choice/tolerance throughout the day  · Liquid nutrition may be better tolerated than solids at times  · Alter food choices and eating patterns to accommodate changing needs  · Refer to Eating Hints book for other meal/snack ideas and symptom management    · For weight loss: monitor your weight at home at least 2x/week, record your weight, start by adding 250-500 extra calories per day, eat 5-6 small scheduled meals every 2-3 hrs, choose foods that are high in protein and calories (see pages 49-53 in your Eating Hints book), drink liquids with calories for example: milkshakes/smoothies/juice/soup/whole milk/chocolate milk, cook with protein fortified milk (see recipe on page 36 in your Eating Hints book), consider ready-to-drink oral nutrition supplements such as Ensure Plus, Ensure Enlive, Boost Plus, or Boost Very High Calorie, avoid "diet" and "light" foods when possible, avoid drinking too much with meals, contact your dietitian with any continued weight loss over the course of 1 week  For more info see pages 35-37 in your Eating Hints book  · Weigh yourself regularly  If you notice weight loss, make an effort to increase your daily food/calorie intake  If you continue to notice loss after these efforts, reach out to your dietitian to establish a plan to stabilize weight     · Try Fairlife milk  · Protein sources: eggs, beans/legumes, nuts/seeds, chicken, fish, turkey, greek yogurt, fairlife milk, oral nutrition supplements  · Small/frequent meal/snack ideas:  · Cottage cheese and fruit  · [place] yogurt  · Hard boiled eggs  · Smoothies/milkshakes in recipe packet  · Peanut butter or other nut butters  · Cheese + crackers  · Protein bar  · Work on increasing hydration: aim for 83 oz / day of non-caffeinated beverages  Choose liquids with calories: whole milk, Fairlife milk (higher protein/lactose-free milk), chocolate milk, 100% fruit juice, diluted juice, bone broth (higher protein broth), creamy soups, sports drinks (Gatorade, Poweraide, Pedialyte, etc ), [place] ice, popsicles, milkshakes, smoothies, oral nutrition supplements (Ensure, Boost, etc ), gelatin/Jello, etc     Follow Up Plan: 12/16/22 during infusion  Recommend Referral to Other Providers: none at this time

## 2022-11-08 NOTE — PATIENT INSTRUCTIONS
Nutrition Rx & Recommendations:  Diet: High Calorie, High Protein (for high calorie foods see pages 52-53, and for high protein foods see pages 49-51 in your Eating Hints book)  Keep a daily food journal (pen/paper, quoc such as Fandium)  Nutrition Supplements: start consuming 1 per day- aim for high calorie/protein options like Ensure Enlive, Complete, or Plus  May use homemade shakes/smoothies as desired  If using a pre-made shake/bar, choose ones with >300 calories and >10 grams protein (ex  Ensure Complete, Ensure Enlive, Ensure Plus, Boost Plus, Boost Very High Calorie, etc )  Small, frequent meals/snacks may be easier to tolerate than 3 large daily meals  Aim for 5-6 small meals per day (every 2-3 hours)  Include protein at all meals/snacks  Include a variety of foods (as tolerated/allowed by diet)  Incorporate physical activity as able/allowed  Stay hydrated by sipping fluids of choice/tolerance throughout the day  Liquid nutrition may be better tolerated than solids at times  Alter food choices and eating patterns to accommodate changing needs  Refer to Eating Hints book for other meal/snack ideas and symptom management  For weight loss: monitor your weight at home at least 2x/week, record your weight, start by adding 250-500 extra calories per day, eat 5-6 small scheduled meals every 2-3 hrs, choose foods that are high in protein and calories (see pages 49-53 in your Eating Hints book), drink liquids with calories for example: milkshakes/smoothies/juice/soup/whole milk/chocolate milk, cook with protein fortified milk (see recipe on page 36 in your Eating Hints book), consider ready-to-drink oral nutrition supplements such as Ensure Plus, Ensure Enlive, Boost Plus, or Boost Very High Calorie, avoid "diet" and "light" foods when possible, avoid drinking too much with meals, contact your dietitian with any continued weight loss over the course of 1 week    For more info see pages 35-37 in your Eating Hints book  Weigh yourself regularly  If you notice weight loss, make an effort to increase your daily food/calorie intake  If you continue to notice loss after these efforts, reach out to your dietitian to establish a plan to stabilize weight     Try Fairlife milk  Protein sources: eggs, beans/legumes, nuts/seeds, chicken, fish, turkey, greek yogurt, fairlife milk, oral nutrition supplements  Small/frequent meal/snack ideas:  Cottage cheese and fruit  Greek yogurt  Hard boiled eggs  Smoothies/milkshakes in recipe packet  Peanut butter or other nut butters  Cheese + crackers  Protein bar  Work on increasing hydration: aim for 83 oz / day of non-caffeinated beverages  Choose liquids with calories: whole milk, Fairlife milk (higher protein/lactose-free milk), chocolate milk, 100% fruit juice, diluted juice, bone broth (higher protein broth), creamy soups, sports drinks (Gatorade, Poweraide, Pedialyte, etc ), [place] ice, popsicles, milkshakes, smoothies, oral nutrition supplements (Ensure, Boost, etc ), gelatin/Jello, etc     Follow Up Plan: 12/16/22 during infusion  Recommend Referral to Other Providers: none at this time

## 2022-11-10 ENCOUNTER — HOSPITAL ENCOUNTER (OUTPATIENT)
Dept: RADIOLOGY | Facility: HOSPITAL | Age: 82
Discharge: HOME | End: 2022-11-10
Attending: STUDENT IN AN ORGANIZED HEALTH CARE EDUCATION/TRAINING PROGRAM

## 2022-11-10 ENCOUNTER — TELEPHONE (OUTPATIENT)
Dept: NUTRITION | Facility: CLINIC | Age: 82
End: 2022-11-10

## 2022-11-10 VITALS
WEIGHT: 179.9 LBS | HEIGHT: 67 IN | RESPIRATION RATE: 21 BRPM | TEMPERATURE: 97.8 F | DIASTOLIC BLOOD PRESSURE: 57 MMHG | BODY MASS INDEX: 28.24 KG/M2 | OXYGEN SATURATION: 97 % | SYSTOLIC BLOOD PRESSURE: 114 MMHG | HEART RATE: 73 BPM

## 2022-11-10 DIAGNOSIS — K76.9 LIVER LESION: ICD-10-CM

## 2022-11-10 LAB
INR PPP: 1.16 (ref 0.84–1.19)
PROTHROMBIN TIME: 14.8 SECONDS (ref 11.6–14.5)

## 2022-11-10 RX ORDER — SODIUM CHLORIDE 9 MG/ML
75 INJECTION, SOLUTION INTRAVENOUS CONTINUOUS
Status: DISCONTINUED | OUTPATIENT
Start: 2022-11-10 | End: 2022-11-11 | Stop reason: HOSPADM

## 2022-11-10 RX ORDER — MIDAZOLAM HYDROCHLORIDE 2 MG/2ML
INJECTION, SOLUTION INTRAMUSCULAR; INTRAVENOUS CODE/TRAUMA/SEDATION MEDICATION
Status: COMPLETED | OUTPATIENT
Start: 2022-11-10 | End: 2022-11-10

## 2022-11-10 RX ORDER — OXYCODONE HYDROCHLORIDE 5 MG/1
5 TABLET ORAL ONCE AS NEEDED
Status: DISCONTINUED | OUTPATIENT
Start: 2022-11-10 | End: 2022-11-11 | Stop reason: HOSPADM

## 2022-11-10 RX ORDER — FENTANYL CITRATE 50 UG/ML
INJECTION, SOLUTION INTRAMUSCULAR; INTRAVENOUS CODE/TRAUMA/SEDATION MEDICATION
Status: COMPLETED | OUTPATIENT
Start: 2022-11-10 | End: 2022-11-10

## 2022-11-10 RX ADMIN — MIDAZOLAM 1 MG: 1 INJECTION INTRAMUSCULAR; INTRAVENOUS at 10:25

## 2022-11-10 RX ADMIN — FENTANYL CITRATE 50 MCG: 50 INJECTION INTRAMUSCULAR; INTRAVENOUS at 10:30

## 2022-11-10 RX ADMIN — SODIUM CHLORIDE 75 ML/HR: 0.9 INJECTION, SOLUTION INTRAVENOUS at 08:55

## 2022-11-10 RX ADMIN — MIDAZOLAM 1 MG: 1 INJECTION INTRAMUSCULAR; INTRAVENOUS at 10:30

## 2022-11-10 RX ADMIN — FENTANYL CITRATE 50 MCG: 50 INJECTION INTRAMUSCULAR; INTRAVENOUS at 10:25

## 2022-11-10 NOTE — TELEPHONE ENCOUNTER
Received VM from Marlin Mars 's wife, Paul Mooney, yesterday asking about MVI  Called Pual Mooney back today  No answer  Unable to leave VM as the line kept ringing  Will try again at another time

## 2022-11-10 NOTE — DISCHARGE INSTRUCTIONS
Percutaneous Liver Biopsy   WHAT YOU NEED TO KNOW:   A PLB is a procedure to remove a sample of tissue from your liver  The sample can be sent to a lab and tested for liver disease, cancer, or infection  After the procedure you may have pain and bruising at the biopsy site  You may also have pain in your right shoulder  These symptoms should get better in 48 to 72 hours  DISCHARGE INSTRUCTIONS:     [address] and [facility] patients,  Contact Interventional Radiology at [phone] PATIENTS: Contact Interventional Radiology at [phone]   [facility] PATIENTS: Contact Interventional Radiology at [phone] if:    Fever greater than 101 or chills  You have severe pain in your abdomen  Your abdomen is larger than usual and feels hard  Your neck is more swollen and you have trouble swallowing  You feel weak or dizzy  Your heart is beating faster than usual    Your pain does not get better after you take pain medicine  Your wound is red, swollen, or draining pus  You have nausea or are vomiting  Your skin is itchy, swollen, or you have a rash  You have questions or concerns about your condition or care  Medicines:   Acetaminophen decreases pain and fever  It is available without a doctor's order  Acetaminophen can cause liver damage if not taken correctly  Take your home medicine as directed  Resume your normal diet  Small sips of flat soda will help with mild nausea  Self-care:   Rest as directed  Do not play sports, exercise, or lift anything heavier than 5 pounds for up to 1 week  Apply firm, steady pressure if bleeding occurs  A small amount of bleeding from your wound is possible  Apply pressure with a clean gauze or towel for 5 to 10 minutes  Call 911 if bleeding becomes heavy or does not stop  Ask your healthcare provider when to take your blood thinner or antiplatelet medicine  You may need to wait 24 to 72 hours to take your medicine   This will prevent bleeding  Follow up with your healthcare provider as directed: Write down your questions so you remember to ask them during your visits  Procedural Sedation   WHAT YOU NEED TO KNOW:   Procedural sedation is medicine used during procedures to help you feel relaxed and calm  You will remember little to none of the procedure  After sedation you may feel tired, weak, or unsteady on your feet  You may also have trouble concentrating or short-term memory loss  These symptoms should go away in 24 hours or less  DISCHARGE INSTRUCTIONS:   Call 911 or have someone else call for any of the following: You have sudden trouble breathing  You cannot be woken  Contact Interventional Radiology at [phone] PATIENTS: Contact Interventional Radiology at [phone] [facility] PATIENTS: Contact Interventional Radiology at [phone]) if any of the following occur: You have a severe headache or dizziness  Your heart is beating faster than usual     You have a fever or chills  Your skin is itchy, swollen, or you have a rash  You have nausea or are vomiting for more than 8 hours after the procedure  You have questions or concerns about your condition or care  Self-care:   Have someone stay with you for 24 hours  This person can drive you to errands and help you do things around the house  This person can also watch for problems  Rest and do quiet activities for 24 hours  Do not exercise, ride a bike, or play sports  Stand up slowly to prevent dizziness and falls  Take short walks around the house with another person  Slowly return to your usual activities the next day  Do not drive or use dangerous machines or tools for 24 hours  You may injure yourself or others  Examples include a lawnmower, saw, or drill  Do not return to work for 24 hours if you use dangerous machines or tools for work  Do not make important decisions for 24 hours   For example, do not sign important papers or invest money  Drink liquids as directed  Liquids help flush the sedation medicine out of your body  Ask how much liquid to drink each day and which liquids are best for you  Eat small, frequent meals to prevent nausea and vomiting  Start with clear liquids such as juice or broth  If you do not vomit after clear liquids, you can eat your usual foods  Do not drink alcohol or take medicines that make you drowsy  This includes medicines that help you sleep and anxiety medicines  Ask your healthcare provider if it is safe for you to take pain medicine  Follow up with your healthcare provider as directed: Write down your questions so you remember to ask them during your visits  Procedural Sedation   WHAT YOU NEED TO KNOW:   Procedural sedation is medicine used during procedures to help you feel relaxed and calm  You will remember little to none of the procedure  After sedation you may feel tired, weak, or unsteady on your feet  You may also have trouble concentrating or short-term memory loss  These symptoms should go away in 24 hours or less  DISCHARGE INSTRUCTIONS:   Call 911 or have someone else call for any of the following: You have sudden trouble breathing  You cannot be woken  Contact Interventional Radiology at [phone] PATIENTS: Contact Interventional Radiology at [phone] [facility] PATIENTS: Contact Interventional Radiology at [phone]) if any of the following occur: You have a severe headache or dizziness  Your heart is beating faster than usual     You have a fever or chills  Your skin is itchy, swollen, or you have a rash  You have nausea or are vomiting for more than 8 hours after the procedure  You have questions or concerns about your condition or care  Self-care:   Have someone stay with you for 24 hours  This person can drive you to errands and help you do things around the house   This person can also watch for problems  Rest and do quiet activities for 24 hours  Do not exercise, ride a bike, or play sports  Stand up slowly to prevent dizziness and falls  Take short walks around the house with another person  Slowly return to your usual activities the next day  Do not drive or use dangerous machines or tools for 24 hours  You may injure yourself or others  Examples include a lawnmower, saw, or drill  Do not return to work for 24 hours if you use dangerous machines or tools for work  Do not make important decisions for 24 hours  For example, do not sign important papers or invest money  Drink liquids as directed  Liquids help flush the sedation medicine out of your body  Ask how much liquid to drink each day and which liquids are best for you  Eat small, frequent meals to prevent nausea and vomiting  Start with clear liquids such as juice or broth  If you do not vomit after clear liquids, you can eat your usual foods  Do not drink alcohol or take medicines that make you drowsy  This includes medicines that help you sleep and anxiety medicines  Ask your healthcare provider if it is safe for you to take pain medicine  Follow up with your healthcare provider as directed: Write down your questions so you remember to ask them during your visits

## 2022-11-10 NOTE — PROGRESS NOTES
H&P reviewed  There have been no interval changes since the time the H&P was written  BP 91/51   Pulse 69   Temp 97 9 °F (36 6 °C) (Temporal)   Resp 14   Ht 5' 7" (1 702 m)   Wt 81 6 kg (179 lb 14 3 oz)   SpO2 100%   BMI 28 18 kg/m²     Prior imaging was reviewed  60-year-old with metastatic prostate cancer  Recent imaging evidence of recurrent disease  Plan for biopsy new liver mass probably left lobe  Ultrasound guidance  Procedure discussed and questions answered  Informed written consent was obtained      Therese Naranjo MD

## 2022-11-10 NOTE — BRIEF OP NOTE (RAD/CATH)
INTERVENTIONAL RADIOLOGY PROCEDURE NOTE    Date: 11/10/2022    Procedure:   Procedure Summary     Date: 11/10/22 Room / Location: [address] [place] [address] Interventional Radiology    Anesthesia Start:  Anesthesia Stop:     Procedure: IR BIOPSY LIVER MASS Diagnosis:       Liver lesion      (new liver masses)    Scheduled Providers:  Responsible Provider:     Anesthesia Type: Not recorded ASA Status: Not recorded          Preoperative diagnosis:   1  Liver lesion         Postoperative diagnosis: Same  Surgeon: Maicol Goel MD     Assistant: None  No qualified resident was available  Blood loss: minimal    Specimens: 4 passes 18g core     Findings: liver mass biopsy with US guidance  Diagnostic material obtained  Complications: None immediate      Anesthesia: conscious sedation

## 2022-11-17 ENCOUNTER — OFFICE VISIT (OUTPATIENT)
Dept: HEMATOLOGY ONCOLOGY | Facility: CLINIC | Age: 82
End: 2022-11-17

## 2022-11-17 VITALS
HEART RATE: 72 BPM | SYSTOLIC BLOOD PRESSURE: 116 MMHG | OXYGEN SATURATION: 98 % | HEIGHT: 67 IN | RESPIRATION RATE: 20 BRPM | BODY MASS INDEX: 28.56 KG/M2 | DIASTOLIC BLOOD PRESSURE: 64 MMHG | TEMPERATURE: 98.4 F | WEIGHT: 182 LBS

## 2022-11-17 DIAGNOSIS — C61 PROSTATE CANCER (HCC): Primary | ICD-10-CM

## 2022-11-17 DIAGNOSIS — C79.51 BONE METASTASES (HCC): ICD-10-CM

## 2022-11-17 NOTE — PROGRESS NOTES
Hematology Outpatient Follow - Up Note  Miguel Smith 81 y o  male MRN: @ Encounter: [id number]        Date:  11/17/2022        Assessment/ Plan:    46-year-old  male who was seen by Urology for elevation of the PSA, when he presented in June 2017 with PSA of 4 6, Subsequently in September of 2017 PSA was 7 3 and in May of 2018 PSA was 8  6   A biopsy was recommended but the patient had just undergone percutaneous stenting of the heart and he was on dual anti-platelet therapy      MRI of the abdomen in April 2018 showed 2 simple cyst in the right hepatic lobe, bilateral renal cysts the largest 1 measuring 10 cm in the left lower lobe     In October of 2018 PSA was 9 8  CT scan of the chest 9/28/2018 showed sclerotic lesions in T10, 5 mm nodule in the right lower lobe   Bone scan showed activity in the posterior T10 level and left posterior 7th rib area concerning for osseous metastases and increased activity in the distal right clavicle might be degenerative or metastatic area      He was diagnosed with castration sensitive metastatic prostate cancer  [facility] insurance company could not approve enzalutamide, he also has a high co-payment for abiraterone     The patient initiated on samples of enzalutamide 160 mg p o  daily since the beginning of December 2018   His insurance approved abiraterone however he had a high co-payment        We were finally able to get financial assistance through [employer] and Ros[employer] started Zytpabloa (abiraterone) 2/3/2019 with prednisone 5mg po bid without side effects      In May 2020 PSA 0 5, bone scan showed activity in the anterior 5th rib area, right humerus area most likely consistent with metastatic disease, no visceral disease on the CT scan, we had hard time getting enzalutamide , the patient was initiated on enzalutamide on 06/02/2020, PSA at the time of 0 8     PSA 1 4 in October 2020, normal alkaline phosphatase           Genes analyzed of germ line test  LIONEL, BARD1, BRCA1, BRCA2, BRIP1, CDH1, CHEK2, DICER1, EPCAM*, HOXB13, MLH1, MSH2,  MSH6, NBN, NF1, PALB2, PMS2, PTEN, RAD50, RAD51C, RAD51D, SMARCA4, STK11, TP53 all of these were negative     Evaluated at [facility] the decision for radiation therapy to oligo metastases and the primary site of the tumor depends on the PET scan finding which showed multiple metastatic disease, patient to meet with Radiation Oncology      PET scan on 11/13/2020 showed radiotracer retention in the left posterior bladder suspicious for malignancy with prostate cancer invasion progressing from prior CT scan mild to moderate uptake in the few small inguinal lymph nodes, sclerotic lesions in the spine with uptake suspicious for metastases     Radiation therapy in the February 2021- April 2021      Progression of disease by bone scan in June 2021 with multiple thoracic, right ribs area, hip area involvement, PSA 13 ( 7 2 on 04/2021)        Cycle 1 Taxotere 7/12/2021    Cycle 10  On 01/17/2022     PSA 3 3 in March 2022, PSMA PET scan at [facility] showed 5 bony lesions no evidence of visceral metastases     Status post stereotactic radiation therapy at [facility] finished in June 2022, PSA went down to 2 8     Significant progression of disease in September 2022 with PSA in the range of 120s, PSMA showed significant disease in the skeleton, left hepatic lobe, right inferior hepatic lobe    Copies ataxia 20 milligram/meter subcutaneously initiated in October 2022, proceed with cycle 3 , he has decrease in the liver enzymes, alkaline phosphatase     After cycle 3  Would do CT scan of the chest abdomen and pelvis, and PSA     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 if he has progression in the future we might introduce etoposide/carboplatin        Labs and imaging studies are reviewed by ordering provider once results are available   If there are findings that need immediate attention, you will be contacted when results available  Discussing results and the implication on your healthcare is best discussed in person at your follow-up visit  HPI:    81-year-old  male who was seen by Urology for elevation of the PSA, when he presented in June 2017 with PSA of 4 6, Subsequently in September of 2017 PSA was 7 3 and in May of 2018 PSA was 8  6   A biopsy was recommended but the patient had just undergone percutaneous stenting of the heart and he was on dual anti-platelet therapy      MRI of the abdomen in April 2018 showed 2 simple cyst in the right hepatic lobe, bilateral renal cysts the largest 1 measuring 10 cm in the left lower lobe     In October of 2018 PSA was 9 8  CT scan of the chest 9/28/2018 showed sclerotic lesions in T10, 5 mm nodule in the right lower lobe   Bone scan showed activity in the posterior T10 level and left posterior 7th rib area concerning for osseous metastases and increased activity in the distal right clavicle might be degenerative or metastatic area      He was diagnosed with castration sensitive metastatic prostate cancer  [facility] insurance company could not approve enzalutamide, he also has a high co-payment for abiraterone     The patient initiated on samples of enzalutamide 160 mg p o  daily since the beginning of December 2018   His insurance approved abiraterone however he had a high co-payment        We were finally able to get financial assistance through [place] started Zytpabloa (abiraterone) 2/3/2019 with prednisone 5mg po bid without side effects      In May 2020 PSA 0 5, bone scan showed activity in the anterior 5th rib area, right humerus area most likely consistent with metastatic disease, no visceral disease on the CT scan, we had hard time getting enzalutamide , the patient was initiated on enzalutamide on 06/02/2020, PSA at the time of 0 8     PSA 1 4 in October 2020, normal alkaline phosphatase           Genes analyzed of germ line test  LIONEL, BARD1, BRCA1, BRCA2, BRIP1, CDH1, CHEK2, DICER1, EPCAM*, HOXB13, MLH1, MSH2,  MSH6, NBN, NF1, PALB2, PMS2, PTEN, RAD50, RAD51C, RAD51D, SMARCA4, STK11, TP53 all of these were negative     Evaluated at [facility] the decision for radiation therapy to oligo metastases and the primary site of the tumor depends on the PET scan finding which showed multiple metastatic disease, patient to meet with Radiation Oncology      PET scan on 11/13/2020 showed radiotracer retention in the left posterior bladder suspicious for malignancy with prostate cancer invasion progressing from prior CT scan mild to moderate uptake in the few small inguinal lymph nodes, sclerotic lesions in the spine with uptake suspicious for metastases     Radiation therapy in the February 2021- April 2021      Progression of disease by bone scan in June 2021 with multiple thoracic, right ribs area, hip area involvement, PSA 13 ( 7 2 on 04/2021)        Cycle 1 Taxotere 7/12/2021      Cycle 10  On 01/17/2022 PSA in the range of 2     PSA 3 3 in March 2022     PSMA scan showed bony lesions in 5 locations including right humeri, ribs, right pelvis   Status post stereotactic radiation therapy finished in June 2022 at [facility]     PSA on June 2022 went down to 2 8, PSA on 09/09/2022 up to 122     PSMA scan showed significant progression of disease in the bones fetal, left hepatic lobe, posterior anterior right hepatic lobe, initiation on cabazitaxel 20 mg per m2 every 3 weeks followed by pegfilgrastim     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  Interval History:        Previous Treatment:         Test Results:    Imaging: IR biopsy liver mass    Result Date: 11/11/2022  Narrative: IMAGE-GUIDED BIOPSY Indication: History of metastatic prostate cancer  New liver masses  Procedure:  In the supine position, a suitable location for biopsy was identified with ultrasound   The site and surrounding skin were prepped and draped in sterile fashion  1% lidocaine was used for local anesthetic and conscious sedation was administered  Using direct sonographic guidance, a 17 gauge introducer needle was advanced into left lobe liver mass using a anterior approach   Core biopsy was performed with 4 passes made coaxially with a 18 gauge Biopince biopsy device  Specimens were submitted to on-site cytology  The introducer needle was removed with simultaneous injection of D-Stat collagen-thrombin solution   The puncture site was cleansed and a dressing was applied  Sedation time: 30 minutes Findings: Diagnostic material was identified preliminarily based on touch prep    Nondominant left lobe mass in the medial segment was sampled  Impression: Impression: Technically successful image-guided biopsy of left lobe liver mass   Workstation performed: [id number]       Labs:   Lab Results   Component Value Date    WBC 10 49 (H) 10/31/2022    HGB 8 0 (L) 10/31/2022    HCT 26 8 (L) 10/31/2022    MCV 97 10/31/2022     (L) 10/31/2022     Lab Results   Component Value Date     09/30/2017    K 3 9 10/31/2022     10/31/2022    CO2 26 10/31/2022    BUN 34 (H) 10/31/2022    CREATININE 1 28 10/31/2022    GLUF 128 (H) 10/31/2022    CALCIUM 8 9 10/31/2022    CORRECTEDCA 9 7 10/31/2022     (H) 10/31/2022    ALT 55 10/31/2022    ALKPHOS 687 (H) 10/31/2022    PROT 6 5 09/30/2017    BILITOT 0 7 09/30/2017    EGFR 52 10/31/2022       No results found for: IRON, TIBC, FERRITIN    No results found for: [id number]      ROS: Review of Systems   Constitutional: Positive for appetite change (Increase) and unexpected weight change ( increase)  Negative for chills, diaphoresis, fatigue and fever  HENT:   Negative for hearing loss, lump/mass, mouth sores, nosebleeds, sore throat, trouble swallowing and voice change  Eyes: Negative  Negative for eye problems and icterus  Respiratory: Negative  Negative for chest tightness, cough, hemoptysis and shortness of breath  Cardiovascular: Negative for chest pain and leg swelling  Gastrointestinal: Negative for abdominal distention, abdominal pain, blood in stool, constipation, diarrhea and nausea  Endocrine: Negative  Genitourinary: Negative for dysuria, frequency, hematuria and pelvic pain  Musculoskeletal: Negative  Negative for arthralgias, back pain, flank pain, gait problem, myalgias and neck stiffness  Skin: Negative for itching and rash  Neurological: Negative for dizziness, gait problem, headaches, light-headedness, numbness and speech difficulty  Hematological: Negative for adenopathy  Does not bruise/bleed easily  Psychiatric/Behavioral: Negative for confusion, decreased concentration, depression and sleep disturbance  The patient is not nervous/anxious  Current Medications: Reviewed  Allergies: Reviewed  PMH/FH/SH:  Reviewed      Physical Exam:    Body surface area is 1 94 meters squared  Wt Readings from Last 3 Encounters:   11/17/22 82 6 kg (182 lb)   11/10/22 81 6 kg (179 lb 14 3 oz)   11/04/22 82 kg (180 lb 12 4 oz)        Temp Readings from Last 3 Encounters:   11/17/22 98 4 °F (36 9 °C) (Tympanic)   11/10/22 97 8 °F (36 6 °C)   11/04/22 (!) 97 2 °F (36 2 °C) (Temporal)        BP Readings from Last 3 Encounters:   11/17/22 116/64   11/10/22 114/57   11/04/22 109/66         Pulse Readings from Last 3 Encounters:   11/17/22 72   11/10/22 73   11/04/22 81        Physical Exam  Vitals reviewed  Constitutional:       General: He is not in acute distress  Appearance: He is well-developed and well-nourished  He is not diaphoretic  HENT:      Head: Normocephalic and atraumatic  Eyes:      Conjunctiva/sclera: Conjunctivae normal    Neck:      Trachea: No tracheal deviation  Cardiovascular:      Rate and Rhythm: Normal rate and regular rhythm  Heart sounds: No murmur heard      No friction rub  No gallop  Pulmonary:      Effort: Pulmonary effort is normal  No respiratory distress  Breath sounds: Normal breath sounds  No wheezing or rales  Chest:      Chest wall: No tenderness  Abdominal:      General: There is no distension  Palpations: Abdomen is soft  Tenderness: There is no abdominal tenderness  Musculoskeletal:      Cervical back: Normal range of motion and neck supple  Right lower leg: No edema  Left lower leg: No edema  Lymphadenopathy:      Cervical: No cervical adenopathy  Skin:     General: Skin is warm and dry  Coloration: Skin is not pale  Findings: No erythema  Neurological:      Mental Status: He is alert and oriented to person, place, and time  Psychiatric:         Mood and Affect: Mood and affect normal          Behavior: Behavior normal          Thought Content: Thought content normal          Judgment: Judgment normal          ECO  Goals and Barriers:  Current Goal: Minimize effects of disease  Barriers: None  Patient's Capacity to Self Care:  Patient is able to self care      Code Status: [unfilled]

## 2022-11-18 ENCOUNTER — TELEPHONE (OUTPATIENT)
Dept: NUTRITION | Facility: CLINIC | Age: 82
End: 2022-11-18

## 2022-11-18 NOTE — TELEPHONE ENCOUNTER
Received VM from wife, Angel Zuniga, yesterday asking about MVI that Colletta Morgans is taking  Called Christopher and Angel Zuniga back today to discuss  Angel Efrain states that the MVI contains >100% RDA for vitamin C,A, and E  Discussed again today the potential interactions of high dose antioxidants with cancer tx and recommended exercising caution when taking these supplements and to make sure supplements have <100% of the daily value for all nutrients to avoid over supplementation  Since his current MVI has >100% daily value, discussed stopping supplement for, as he is eating well and getting a variety of foods (fruits, vegetables, whole grains)  All questions addressed today  Colletta Morgans has a f/u with RD on 12/16  They have RD contact information if needed

## 2022-11-21 ENCOUNTER — APPOINTMENT (OUTPATIENT)
Dept: LAB | Facility: MEDICAL CENTER | Age: 82
End: 2022-11-21

## 2022-11-21 DIAGNOSIS — C79.51 BONE METASTASES (HCC): ICD-10-CM

## 2022-11-21 DIAGNOSIS — C61 PROSTATE CANCER (HCC): ICD-10-CM

## 2022-11-21 LAB
ANISOCYTOSIS BLD QL SMEAR: PRESENT
BASOPHILS # BLD MANUAL: 0.29 THOUSAND/UL (ref 0–0.1)
BASOPHILS NFR MAR MANUAL: 2 % (ref 0–1)
DACRYOCYTES BLD QL SMEAR: PRESENT
EOSINOPHIL # BLD MANUAL: 0 THOUSAND/UL (ref 0–0.4)
EOSINOPHIL NFR BLD MANUAL: 0 % (ref 0–6)
ERYTHROCYTE [DISTWIDTH] IN BLOOD BY AUTOMATED COUNT: 22.9 % (ref 11.6–15.1)
HCT VFR BLD AUTO: 28.1 % (ref 36.5–49.3)
HGB BLD-MCNC: 8.2 G/DL (ref 12–17)
LYMPHOCYTES # BLD AUTO: 1.03 THOUSAND/UL (ref 0.6–4.47)
LYMPHOCYTES # BLD AUTO: 7 % (ref 14–44)
MACROCYTES BLD QL AUTO: PRESENT
MCH RBC QN AUTO: 29.7 PG (ref 26.8–34.3)
MCHC RBC AUTO-ENTMCNC: 29.2 G/DL (ref 31.4–37.4)
MCV RBC AUTO: 102 FL (ref 82–98)
METAMYELOCYTES NFR BLD MANUAL: 3 % (ref 0–1)
MONOCYTES # BLD AUTO: 0.44 THOUSAND/UL (ref 0–1.22)
MONOCYTES NFR BLD: 3 % (ref 4–12)
MYELOCYTES NFR BLD MANUAL: 3 % (ref 0–1)
NEUTROPHILS # BLD MANUAL: 12.01 THOUSAND/UL (ref 1.85–7.62)
NEUTS BAND NFR BLD MANUAL: 1 % (ref 0–8)
NEUTS SEG NFR BLD AUTO: 81 % (ref 43–75)
NRBC BLD AUTO-RTO: 2 /100 WBC (ref 0–2)
OVALOCYTES BLD QL SMEAR: PRESENT
PLATELET BLD QL SMEAR: ABNORMAL
POIKILOCYTOSIS BLD QL SMEAR: PRESENT
POLYCHROMASIA BLD QL SMEAR: PRESENT
RBC # BLD AUTO: 2.76 MILLION/UL (ref 3.88–5.62)
RBC MORPH BLD: PRESENT
WBC # BLD AUTO: 14.65 THOUSAND/UL (ref 4.31–10.16)

## 2022-11-22 ENCOUNTER — NURSE TRIAGE (OUTPATIENT)
Dept: OTHER | Facility: OTHER | Age: 82
End: 2022-11-22

## 2022-11-22 DIAGNOSIS — R39.9 UTI SYMPTOMS: Primary | ICD-10-CM

## 2022-11-22 DIAGNOSIS — N32.81 OAB (OVERACTIVE BLADDER): ICD-10-CM

## 2022-11-22 LAB
ALBUMIN SERPL BCP-MCNC: 2.9 G/DL (ref 3.5–5)
ALP SERPL-CCNC: 637 U/L (ref 46–116)
ALT SERPL W P-5'-P-CCNC: 62 U/L (ref 12–78)
ANION GAP SERPL CALCULATED.3IONS-SCNC: 10 MMOL/L (ref 4–13)
AST SERPL W P-5'-P-CCNC: 294 U/L (ref 5–45)
BILIRUB SERPL-MCNC: 0.37 MG/DL (ref 0.2–1)
BUN SERPL-MCNC: 32 MG/DL (ref 5–25)
CALCIUM ALBUM COR SERPL-MCNC: 9.4 MG/DL (ref 8.3–10.1)
CALCIUM SERPL-MCNC: 8.5 MG/DL (ref 8.3–10.1)
CHLORIDE SERPL-SCNC: 106 MMOL/L (ref 96–108)
CO2 SERPL-SCNC: 22 MMOL/L (ref 21–32)
CREAT SERPL-MCNC: 1.25 MG/DL (ref 0.6–1.3)
GFR SERPL CREATININE-BSD FRML MDRD: 53 ML/MIN/1.73SQ M
GLUCOSE SERPL-MCNC: 97 MG/DL (ref 65–140)
POTASSIUM SERPL-SCNC: 4.5 MMOL/L (ref 3.5–5.3)
PROT SERPL-MCNC: 6.8 G/DL (ref 6.4–8.4)
PSA SERPL-MCNC: 431.1 NG/ML (ref 0–4)
SODIUM SERPL-SCNC: 138 MMOL/L (ref 135–147)

## 2022-11-22 RX ORDER — MIRABEGRON 25 MG/1
25 TABLET, FILM COATED, EXTENDED RELEASE ORAL DAILY
Qty: 90 TABLET | Refills: 3 | Status: SHIPPED | OUTPATIENT
Start: 2022-11-22 | End: 2022-11-23 | Stop reason: SDUPTHER

## 2022-11-22 NOTE — TELEPHONE ENCOUNTER
Patient called in to report urinary frequency overnight with episodes of incontinence  Patient reports having frequency under control at [address] 3/30 with Mybetriq and stopped medication since the OV ans next OV 10/4/22  Patient is being treated for prostate cancer and is unsure of the cause for frequency and requests advice on what to do  No urine labs ordered at this time Trouble sleeping because of frequency  Please follow up with patient  Reason for Disposition  • Urinating more frequently than usual (i e , frequency)    Answer Assessment - Initial Assessment Questions  1  SYMPTOM: "What's the main symptom you're concerned about?" (e g , frequency, incontinence)      Frequency overnight hourly improved and now worsening with episodes of incontinence    2  ONSET: "When did the symptoms start?"      11/11/22    3  PAIN: "Is there any pain?" If Yes, ask: "How bad is it?" (Scale: 1-10; mild, moderate, severe)      Denies    4  CAUSE: "What do you think is causing the symptoms?"      Unknown    5   OTHER SYMPTOMS: "Do you have any other symptoms?" (e g , fever, flank pain, blood in urine, pain with urination)      Denies    Protocols used: URINARY SYMPTOMS-ADULT-OH (0) Alert; keenly responsive

## 2022-11-22 NOTE — TELEPHONE ENCOUNTER
Please have patient go for urine testing  Can you ask him why he is not taking the Myrbetriq    He was taking it at his appointment in October

## 2022-11-22 NOTE — TELEPHONE ENCOUNTER
Regarding: frequent urination  ----- Message from Levander Galeazzi, RN sent at 11/22/2022  1:15 PM EST -----  "I am urinating more frequently at night, and I'm not sure why  It's been keeping me up all night   I used to be on myrbetriq for overactive bladder "

## 2022-11-22 NOTE — TELEPHONE ENCOUNTER
Called and spoke with the patient  Asked patient why he stopped the Myrbetriq  Patient did not have anymore refills so he thought the medication was for short term use  Patient willing to restart  Will send encounter to [facility] for refill of Mybetriq to [facility] in [place]

## 2022-11-23 ENCOUNTER — TELEPHONE (OUTPATIENT)
Dept: HEMATOLOGY ONCOLOGY | Facility: CLINIC | Age: 82
End: 2022-11-23

## 2022-11-23 ENCOUNTER — TELEPHONE (OUTPATIENT)
Dept: OTHER | Facility: OTHER | Age: 82
End: 2022-11-23

## 2022-11-23 DIAGNOSIS — M25.531 RIGHT WRIST PAIN: Primary | ICD-10-CM

## 2022-11-23 DIAGNOSIS — N32.81 OAB (OVERACTIVE BLADDER): ICD-10-CM

## 2022-11-23 RX ORDER — MIRABEGRON 25 MG/1
25 TABLET, FILM COATED, EXTENDED RELEASE ORAL DAILY
Qty: 90 TABLET | Refills: 0 | Status: SHIPPED | OUTPATIENT
Start: 2022-11-23 | End: 2023-02-21

## 2022-11-23 NOTE — TELEPHONE ENCOUNTER
CALL RETURN FORM   Reason for patient call? Patient calling, sensitive area to touch on right wrist, below thumb, please advise    Patient's primary oncologist? Jason Smallwood    Name of person the patient was calling for? Faroun   Any additional information to add, if applicable? [phone]   Informed patient that the message will be forwarded to the team and someone will get back to them as soon as possible    Did you relay this information to the patient?  yes

## 2022-11-23 NOTE — TELEPHONE ENCOUNTER
Pt reports R side wrist tednerness/pain about three inches below his thumb  Pt denies any redness, swelling or trauma to the area  Pt's mobility of R wrist is at baseline  Pt only notices the tenderness when touching it, about three days now  Reviewed with Lilian Shaikh to be obtained if pain continues or worsens  Order was placed  Pt called and notified, he was agreeable to this

## 2022-11-25 ENCOUNTER — APPOINTMENT (OUTPATIENT)
Dept: RADIOLOGY | Facility: MEDICAL CENTER | Age: 82
End: 2022-11-25

## 2022-11-25 ENCOUNTER — TELEPHONE (OUTPATIENT)
Dept: HEMATOLOGY ONCOLOGY | Facility: CLINIC | Age: 82
End: 2022-11-25

## 2022-11-25 ENCOUNTER — HOSPITAL ENCOUNTER (OUTPATIENT)
Dept: INFUSION CENTER | Facility: CLINIC | Age: 82
End: 2022-11-25

## 2022-11-25 VITALS
TEMPERATURE: 97.7 F | WEIGHT: 187.83 LBS | SYSTOLIC BLOOD PRESSURE: 113 MMHG | DIASTOLIC BLOOD PRESSURE: 63 MMHG | BODY MASS INDEX: 29.42 KG/M2 | HEART RATE: 92 BPM | RESPIRATION RATE: 18 BRPM

## 2022-11-25 DIAGNOSIS — R52 PAIN: ICD-10-CM

## 2022-11-25 DIAGNOSIS — M25.531 RIGHT WRIST PAIN: ICD-10-CM

## 2022-11-25 DIAGNOSIS — C61 PROSTATE CANCER (HCC): Primary | ICD-10-CM

## 2022-11-25 DIAGNOSIS — C79.51 BONE METASTASES (HCC): ICD-10-CM

## 2022-11-25 DIAGNOSIS — D64.81 ANTINEOPLASTIC CHEMOTHERAPY INDUCED ANEMIA: ICD-10-CM

## 2022-11-25 DIAGNOSIS — C61 PROSTATE CANCER (HCC): ICD-10-CM

## 2022-11-25 DIAGNOSIS — D70.1 CHEMOTHERAPY-INDUCED NEUTROPENIA (HCC): ICD-10-CM

## 2022-11-25 DIAGNOSIS — T45.1X5A ANTINEOPLASTIC CHEMOTHERAPY INDUCED ANEMIA: ICD-10-CM

## 2022-11-25 DIAGNOSIS — T45.1X5A CHEMOTHERAPY-INDUCED NEUTROPENIA (HCC): ICD-10-CM

## 2022-11-25 DIAGNOSIS — C79.51 BONE METASTASES (HCC): Primary | ICD-10-CM

## 2022-11-25 RX ADMIN — DARBEPOETIN ALFA 300 MCG: 300 INJECTION, SOLUTION INTRAVENOUS; SUBCUTANEOUS at 11:24

## 2022-11-25 NOTE — PROGRESS NOTES
Pt here for today for Jevtana and aranesp  Hemoglobin = 8 2 which is below parameters  Platelets are clumped and not resulted  Contacted Estrada Medina RN via [employer]  Per Dr Madalyn Christopher, pt's chemo is cancelled today and on hold  Pt is to have a CBC repeated on Monday  Pt is also having xrays of his rib area and wrist area due to pain  All this explained to pt and wife who verbally understand    Pt was given aranesp today as ordered and provided with AVS

## 2022-11-25 NOTE — TELEPHONE ENCOUNTER
Pt reports right side pain under rib cage which started yesterday  Denies trauma to the area  Pain is constant and worsens with deep inhalation

## 2022-11-25 NOTE — TELEPHONE ENCOUNTER
CALL RETURN FORM   Reason for patient call? Patient is calling as he is having pains on his side  As he breathes in it gets a little worst      Patient's primary oncologist? Maye Schuler     Name of person the patient was calling for? Abilio Devine    Any additional information to add, if applicable? [phone]   Informed patient that the message will be forwarded to the team and someone will get back to them as soon as possible    Did you relay this information to the patient?  Yes

## 2022-11-28 ENCOUNTER — TELEPHONE (OUTPATIENT)
Dept: HEMATOLOGY ONCOLOGY | Facility: CLINIC | Age: 82
End: 2022-11-28

## 2022-11-28 ENCOUNTER — APPOINTMENT (OUTPATIENT)
Dept: LAB | Facility: MEDICAL CENTER | Age: 82
End: 2022-11-28

## 2022-11-28 DIAGNOSIS — T45.1X5A CHEMOTHERAPY-INDUCED NEUTROPENIA (HCC): ICD-10-CM

## 2022-11-28 DIAGNOSIS — D70.1 CHEMOTHERAPY-INDUCED NEUTROPENIA (HCC): ICD-10-CM

## 2022-11-28 DIAGNOSIS — C61 PROSTATE CANCER (HCC): ICD-10-CM

## 2022-11-28 LAB
ANISOCYTOSIS BLD QL SMEAR: PRESENT
BASOPHILS # BLD MANUAL: 0 THOUSAND/UL (ref 0–0.1)
BASOPHILS NFR MAR MANUAL: 0 % (ref 0–1)
DACRYOCYTES BLD QL SMEAR: PRESENT
EOSINOPHIL # BLD MANUAL: 0 THOUSAND/UL (ref 0–0.4)
EOSINOPHIL NFR BLD MANUAL: 0 % (ref 0–6)
ERYTHROCYTE [DISTWIDTH] IN BLOOD BY AUTOMATED COUNT: 21.7 % (ref 11.6–15.1)
HCT VFR BLD AUTO: 24.1 % (ref 36.5–49.3)
HGB BLD-MCNC: 7.5 G/DL (ref 12–17)
LYMPHOCYTES # BLD AUTO: 0.26 THOUSAND/UL (ref 0.6–4.47)
LYMPHOCYTES # BLD AUTO: 2 % (ref 14–44)
MCH RBC QN AUTO: 30 PG (ref 26.8–34.3)
MCHC RBC AUTO-ENTMCNC: 31.1 G/DL (ref 31.4–37.4)
MCV RBC AUTO: 96 FL (ref 82–98)
METAMYELOCYTES NFR BLD MANUAL: 4 % (ref 0–1)
MONOCYTES # BLD AUTO: 0.53 THOUSAND/UL (ref 0–1.22)
MONOCYTES NFR BLD: 4 % (ref 4–12)
MYELOCYTES NFR BLD MANUAL: 4 % (ref 0–1)
NEUTROPHILS # BLD MANUAL: 11.25 THOUSAND/UL (ref 1.85–7.62)
NEUTS BAND NFR BLD MANUAL: 7 % (ref 0–8)
NEUTS SEG NFR BLD AUTO: 78 % (ref 43–75)
OVALOCYTES BLD QL SMEAR: PRESENT
PLATELET # BLD AUTO: 100 THOUSANDS/UL (ref 149–390)
PLATELET BLD QL SMEAR: ABNORMAL
PMV BLD AUTO: 11.3 FL (ref 8.9–12.7)
POIKILOCYTOSIS BLD QL SMEAR: PRESENT
POLYCHROMASIA BLD QL SMEAR: PRESENT
PROMYELOCYTES NFR BLD MANUAL: 1 % (ref 0–0)
RBC # BLD AUTO: 2.5 MILLION/UL (ref 3.88–5.62)
RBC MORPH BLD: PRESENT
WBC # BLD AUTO: 13.23 THOUSAND/UL (ref 4.31–10.16)

## 2022-11-28 NOTE — TELEPHONE ENCOUNTER
CALL RETURN FORM   Reason for patient call? Call back need to Dr Lisa Biswas at [facility]   Patient's primary oncologist? Nolberto Álvarez    Name of person the patient was calling for? Faroun    Any additional information to add, if applicable? [phone]   Informed patient that the message will be forwarded to the team and someone will get back to them as soon as possible    Did you relay this information to the patient?  yes

## 2022-11-29 ENCOUNTER — TELEPHONE (OUTPATIENT)
Dept: HEMATOLOGY ONCOLOGY | Facility: CLINIC | Age: 82
End: 2022-11-29

## 2022-11-29 DIAGNOSIS — T45.1X5A CHEMOTHERAPY-INDUCED NEUTROPENIA (HCC): ICD-10-CM

## 2022-11-29 DIAGNOSIS — C79.51 BONE METASTASES (HCC): ICD-10-CM

## 2022-11-29 DIAGNOSIS — C61 PROSTATE CANCER (HCC): Primary | ICD-10-CM

## 2022-11-29 DIAGNOSIS — D70.1 CHEMOTHERAPY-INDUCED NEUTROPENIA (HCC): ICD-10-CM

## 2022-11-29 NOTE — TELEPHONE ENCOUNTER
VM was left by Dr Katelin Bashir notifying patient CT scan needs to be moved up  CT r/s for 12/5/22 1:30 PM [facility]  Pt called and notified, he was agreeable to this apt

## 2022-11-30 ENCOUNTER — TELEPHONE (OUTPATIENT)
Dept: HEMATOLOGY ONCOLOGY | Facility: CLINIC | Age: 82
End: 2022-11-30

## 2022-11-30 ENCOUNTER — RA CDI HCC (OUTPATIENT)
Dept: OTHER | Facility: HOSPITAL | Age: 82
End: 2022-11-30

## 2022-11-30 NOTE — PROGRESS NOTES
I70 0  [facility] 75  coding opportunities          Chart Reviewed number of suggestions sent to Provider: 1     Patients Insurance     Medicare Insurance: [employer]

## 2022-11-30 NOTE — TELEPHONE ENCOUNTER
Spoke with patient reviewed no additional oral supplementation for anemia  Pt denied any symptoms at this time  He reported feeling great  Reviewed CT instructions for mondays apt  Pt was appreciative of this  Hemoglobin of 7 5 reviewed with Dr Medardo Hardy, will continue to monitor

## 2022-12-05 ENCOUNTER — HOSPITAL ENCOUNTER (OUTPATIENT)
Dept: CT IMAGING | Facility: HOSPITAL | Age: 82
Discharge: HOME | End: 2022-12-05
Attending: INTERNAL MEDICINE

## 2022-12-05 DIAGNOSIS — C61 PROSTATE CANCER (HCC): ICD-10-CM

## 2022-12-05 DIAGNOSIS — C79.51 BONE METASTASES (HCC): ICD-10-CM

## 2022-12-05 RX ADMIN — IOHEXOL 100 ML: 350 INJECTION, SOLUTION INTRAVENOUS at 13:42

## 2022-12-06 ENCOUNTER — TELEPHONE (OUTPATIENT)
Dept: HEMATOLOGY ONCOLOGY | Facility: CLINIC | Age: 82
End: 2022-12-06

## 2022-12-06 ENCOUNTER — APPOINTMENT (OUTPATIENT)
Dept: LAB | Facility: MEDICAL CENTER | Age: 82
End: 2022-12-06

## 2022-12-06 DIAGNOSIS — C61 PROSTATE CANCER (HCC): ICD-10-CM

## 2022-12-06 DIAGNOSIS — D64.9 ANEMIA, UNSPECIFIED TYPE: ICD-10-CM

## 2022-12-06 LAB
ALBUMIN SERPL BCP-MCNC: 2.6 G/DL (ref 3.5–5)
ALP SERPL-CCNC: 829 U/L (ref 46–116)
ALT SERPL W P-5'-P-CCNC: 183 U/L (ref 12–78)
ANION GAP SERPL CALCULATED.3IONS-SCNC: 6 MMOL/L (ref 4–13)
ANISOCYTOSIS BLD QL SMEAR: PRESENT
AST SERPL W P-5'-P-CCNC: 269 U/L (ref 5–45)
BASOPHILS # BLD MANUAL: 0.16 THOUSAND/UL (ref 0–0.1)
BASOPHILS NFR MAR MANUAL: 2 % (ref 0–1)
BILIRUB SERPL-MCNC: 0.51 MG/DL (ref 0.2–1)
BUN SERPL-MCNC: 24 MG/DL (ref 5–25)
CALCIUM ALBUM COR SERPL-MCNC: 9.8 MG/DL (ref 8.3–10.1)
CALCIUM SERPL-MCNC: 8.7 MG/DL (ref 8.3–10.1)
CHLORIDE SERPL-SCNC: 105 MMOL/L (ref 96–108)
CO2 SERPL-SCNC: 24 MMOL/L (ref 21–32)
CREAT SERPL-MCNC: 1.05 MG/DL (ref 0.6–1.3)
DACRYOCYTES BLD QL SMEAR: PRESENT
EOSINOPHIL # BLD MANUAL: 0.16 THOUSAND/UL (ref 0–0.4)
EOSINOPHIL NFR BLD MANUAL: 2 % (ref 0–6)
ERYTHROCYTE [DISTWIDTH] IN BLOOD BY AUTOMATED COUNT: 21.2 % (ref 11.6–15.1)
GFR SERPL CREATININE-BSD FRML MDRD: 66 ML/MIN/1.73SQ M
GLUCOSE SERPL-MCNC: 102 MG/DL (ref 65–140)
HCT VFR BLD AUTO: 24.2 % (ref 36.5–49.3)
HGB BLD-MCNC: 7.3 G/DL (ref 12–17)
LYMPHOCYTES # BLD AUTO: 0.4 THOUSAND/UL (ref 0.6–4.47)
LYMPHOCYTES # BLD AUTO: 5 % (ref 14–44)
MCH RBC QN AUTO: 29.7 PG (ref 26.8–34.3)
MCHC RBC AUTO-ENTMCNC: 30.2 G/DL (ref 31.4–37.4)
MCV RBC AUTO: 98 FL (ref 82–98)
METAMYELOCYTES NFR BLD MANUAL: 3 % (ref 0–1)
MONOCYTES # BLD AUTO: 0.32 THOUSAND/UL (ref 0–1.22)
MONOCYTES NFR BLD: 4 % (ref 4–12)
MYELOCYTES NFR BLD MANUAL: 3 % (ref 0–1)
NEUTROPHILS # BLD MANUAL: 6.42 THOUSAND/UL (ref 1.85–7.62)
NEUTS BAND NFR BLD MANUAL: 4 % (ref 0–8)
NEUTS SEG NFR BLD AUTO: 76 % (ref 43–75)
NRBC BLD AUTO-RTO: 1 /100 WBC (ref 0–2)
OVALOCYTES BLD QL SMEAR: PRESENT
PLATELET # BLD AUTO: 98 THOUSANDS/UL (ref 149–390)
PLATELET BLD QL SMEAR: ABNORMAL
PMV BLD AUTO: 10 FL (ref 8.9–12.7)
POIKILOCYTOSIS BLD QL SMEAR: PRESENT
POLYCHROMASIA BLD QL SMEAR: PRESENT
POTASSIUM SERPL-SCNC: 5.1 MMOL/L (ref 3.5–5.3)
PROT SERPL-MCNC: 6.2 G/DL (ref 6.4–8.4)
RBC # BLD AUTO: 2.46 MILLION/UL (ref 3.88–5.62)
RBC MORPH BLD: PRESENT
SODIUM SERPL-SCNC: 135 MMOL/L (ref 135–147)
VARIANT LYMPHS # BLD AUTO: 1 %
WBC # BLD AUTO: 8.02 THOUSAND/UL (ref 4.31–10.16)

## 2022-12-06 NOTE — TELEPHONE ENCOUNTER
Spoke with patient who reports increased fatigue  Pt denies any lightheadedness/dizziness or SOB  Pt reports sleeping lousy Monday night and was up multiple times  Pt reports he has been sleeping today, but as the day goes on he is feeling better  Pt will have repeat CBC today, due to hemoglobin of 7 5 from last week  Pt was advised to proceed to the ED with any worsening fatigue, SOB or lightlessness/dizziness  Pt verbalized understanding and was agreeable to this

## 2022-12-06 NOTE — TELEPHONE ENCOUNTER
CALL RETURN FORM   Reason for patient call? Patient would like to speak with Dr Maura Mora regarding his care plan     Patient's primary oncologist? Dr Maura Mora   Name of person the patient was calling for? Dr Maura Mora   Any additional information to add, if applicable? N/A   Informed patient that the message will be forwarded to the team and someone will get back to them as soon as possible    Did you relay this information to the patient?  Yes

## 2022-12-07 ENCOUNTER — OFFICE VISIT (OUTPATIENT)
Dept: HEMATOLOGY ONCOLOGY | Facility: CLINIC | Age: 82
End: 2022-12-07

## 2022-12-07 ENCOUNTER — APPOINTMENT (EMERGENCY)
Dept: LAB | Facility: MEDICAL CENTER | Age: 82
End: 2022-12-07

## 2022-12-07 ENCOUNTER — TELEPHONE (OUTPATIENT)
Dept: HEMATOLOGY ONCOLOGY | Facility: CLINIC | Age: 82
End: 2022-12-07

## 2022-12-07 VITALS
SYSTOLIC BLOOD PRESSURE: 140 MMHG | HEIGHT: 67 IN | OXYGEN SATURATION: 96 % | HEART RATE: 88 BPM | RESPIRATION RATE: 16 BRPM | DIASTOLIC BLOOD PRESSURE: 70 MMHG | WEIGHT: 182 LBS | BODY MASS INDEX: 28.56 KG/M2 | TEMPERATURE: 97.2 F

## 2022-12-07 DIAGNOSIS — C61 PROSTATE CANCER (HCC): ICD-10-CM

## 2022-12-07 DIAGNOSIS — C79.51 BONE METASTASES (HCC): Primary | ICD-10-CM

## 2022-12-07 LAB — PSA SERPL-MCNC: 736 NG/ML (ref 0–4)

## 2022-12-07 NOTE — PROGRESS NOTES
Hematology Outpatient Follow - Up Note  Ant Hopson 81 y o  male MRN: @ Encounter: [id number]        Date:  12/7/2022        Assessment/ Plan:    42-year-old  male who was seen by Urology for elevation of the PSA, when he presented in June 2017 with PSA of 4 6, Subsequently in September of 2017 PSA was 7 3 and in May of 2018 PSA was 8  6   A biopsy was recommended but the patient had just undergone percutaneous stenting of the heart and he was on dual anti-platelet therapy      MRI of the abdomen in April 2018 showed 2 simple cyst in the right hepatic lobe, bilateral renal cysts the largest 1 measuring 10 cm in the left lower lobe     In October of 2018 PSA was 9 8  CT scan of the chest 9/28/2018 showed sclerotic lesions in T10, 5 mm nodule in the right lower lobe   Bone scan showed activity in the posterior T10 level and left posterior 7th rib area concerning for osseous metastases and increased activity in the distal right clavicle might be degenerative or metastatic area      He was diagnosed with castration sensitive metastatic prostate cancer  [facility] insurance company could not approve enzalutamide, he also has a high co-payment for abiraterone     The patient initiated on samples of enzalutamide 160 mg p o  daily since the beginning of December 2018   His insurance approved abiraterone however he had a high co-payment        We were finally able to get financial assistance through [place] started WILLstacya (abiraterone) 2/3/2019 with prednisone 5mg po bid without side effects      In May 2020 PSA 0 5, bone scan showed activity in the anterior 5th rib area, right humerus area most likely consistent with metastatic disease, no visceral disease on the CT scan, we had hard time getting enzalutamide , the patient was initiated on enzalutamide on 06/02/2020, PSA at the time of 0 8     PSA 1 4 in October 2020, normal alkaline phosphatase           Genes analyzed of germ line test  LIONEL, BARD1, BRCA1, BRCA2, BRIP1, CDH1, CHEK2, DICER1, EPCAM*, HOXB13, MLH1, MSH2,  MSH6, NBN, NF1, PALB2, PMS2, PTEN, RAD50, RAD51C, RAD51D, SMARCA4, STK11, TP53 all of these were negative     Evaluated at [facility] the decision for radiation therapy to oligo metastases and the primary site of the tumor depends on the PET scan finding which showed multiple metastatic disease, patient to meet with Radiation Oncology      PET scan on 11/13/2020 showed radiotracer retention in the left posterior bladder suspicious for malignancy with prostate cancer invasion progressing from prior CT scan mild to moderate uptake in the few small inguinal lymph nodes, sclerotic lesions in the spine with uptake suspicious for metastases     Radiation therapy in the February 2021- April 2021      Progression of disease by bone scan in June 2021 with multiple thoracic, right ribs area, hip area involvement, PSA 13 ( 7 2 on 04/2021)        Cycle 1 Taxotere 7/12/2021    Cycle 10   On 01/17/2022     PSA 3 3 in March 2022, PSMA PET scan at [facility] showed 5 bony lesions no evidence of visceral metastases     Status post stereotactic radiation therapy at [facility] finished in June 2022, PSA went down to 2 8     Significant progression of disease in September 2022 with PSA in the range of 120s, PSMA showed significant disease in the skeleton, left hepatic lobe, right inferior hepatic lobe  Initiated on Jevtana 20 mg meter square, after 3 cycles CT scan showed significant progression of disease, liver biopsy showed neuroendocrine differentiation    Discontinue Jevtana    We offered to option hospice/palliative care versus therapeutic trial with etoposide/carboplatin    The patient said I want to fight it    Etoposide 75 mg per metered squared on day 1 and 2    Carboplatin AUC 4 on day 1 followed by pegfilgrastim to prevent chemotherapy-induced neutropenia    Side effect such as alopecia, nausea, vomiting, pancytopenia, sepsis etc  told he agreed to proceed he signed the consent    CBC every week to assess anemia and the need for blood transfusion if hemoglobin below 7 5 with 1 unit of packed RBC he signed the consent for blood transfusion as well    Prognosis is guarded        Labs and imaging studies are reviewed by ordering provider once results are available  If there are findings that need immediate attention, you will be contacted when results available  Discussing results and the implication on your healthcare is best discussed in person at your follow-up visit  HPI:    81-year-old  male who was seen by Urology for elevation of the PSA, when he presented in June 2017 with PSA of 4 6, Subsequently in September of 2017 PSA was 7 3 and in May of 2018 PSA was 8  6   A biopsy was recommended but the patient had just undergone percutaneous stenting of the heart and he was on dual anti-platelet therapy      MRI of the abdomen in April 2018 showed 2 simple cyst in the right hepatic lobe, bilateral renal cysts the largest 1 measuring 10 cm in the left lower lobe     In October of 2018 PSA was 9 8  CT scan of the chest 9/28/2018 showed sclerotic lesions in T10, 5 mm nodule in the right lower lobe   Bone scan showed activity in the posterior T10 level and left posterior 7th rib area concerning for osseous metastases and increased activity in the distal right clavicle might be degenerative or metastatic area      He was diagnosed with castration sensitive metastatic prostate cancer  [facility] insurance company could not approve enzalutamide, he also has a high co-payment for abiraterone     The patient initiated on samples of enzalutamide 160 mg p o  daily since the beginning of December 2018   His insurance approved abiraterone however he had a high co-payment        We were finally able to get financial assistance through [facility]  He started Zytiga (abiraterone) 2/3/2019 with prednisone 5mg po bid without side effects      In May 2020 PSA 0 5, bone scan showed activity in the anterior 5th rib area, right humerus area most likely consistent with metastatic disease, no visceral disease on the CT scan, we had hard time getting enzalutamide , the patient was initiated on enzalutamide on 06/02/2020, PSA at the time of 0 8     PSA 1 4 in October 2020, normal alkaline phosphatase           Genes analyzed of germ line test  LIONEL, BARD1, BRCA1, BRCA2, BRIP1, CDH1, CHEK2, DICER1, EPCAM*, HOXB13, MLH1, MSH2,  MSH6, NBN, NF1, PALB2, PMS2, PTEN, RAD50, RAD51C, RAD51D, SMARCA4, STK11, TP53 all of these were negative     Evaluated at [facility] the decision for radiation therapy to oligo metastases and the primary site of the tumor depends on the PET scan finding which showed multiple metastatic disease, patient to meet with Radiation Oncology      PET scan on 11/13/2020 showed radiotracer retention in the left posterior bladder suspicious for malignancy with prostate cancer invasion progressing from prior CT scan mild to moderate uptake in the few small inguinal lymph nodes, sclerotic lesions in the spine with uptake suspicious for metastases     Radiation therapy in the February 2021- April 2021      Progression of disease by bone scan in June 2021 with multiple thoracic, right ribs area, hip area involvement, PSA 13 ( 7 2 on 04/2021)        Cycle 1 Taxotere 7/12/2021      Cycle 10   On 01/17/2022 PSA in the range of 2     PSA 3 3 in March 2022     PSMA scan showed bony lesions in 5 locations including right humeri, ribs, right pelvis   Status post stereotactic radiation therapy finished in June 2022 at [address] June 2022 went down to 2 8, PSA on 09/09/2022 up to 122     PSMA scan showed significant progression of disease in the bones fetal, left hepatic lobe, posterior anterior right hepatic lobe, initiation on cabazitaxel 20 mg per m2 every 3 weeks followed by pegfilgrastim    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  Interval History:    CAT scan showed involvement of the liver, elevation of AST, ALT    Previous Treatment:         Test Results:    Imaging: XR chest pa & lateral    Result Date: 11/25/2022  Narrative: CHEST INDICATION:   C79 51: Secondary malignant neoplasm of bone C61: Malignant neoplasm of prostate R52: Pain, unspecified  COMPARISON:  Chest CT 9/23/2022  EXAM PERFORMED/VIEWS:  XR CHEST PA & LATERAL FINDINGS: Cardiomediastinal silhouette appears unremarkable  The lungs are clear  No pneumothorax or pleural effusion  Sclerotic lesions in the ribs and spine reidentified consistent with metastatic prostate cancer  Impression: No acute cardiopulmonary disease  Workstation performed: [id number]     XR wrist 2 vw right    Result Date: 11/25/2022  Narrative: RIGHT WRIST INDICATION:   M25 531: Pain in right wrist  COMPARISON:  None VIEWS:  XR WRIST 2 VW RIGHT FINDINGS: There is no acute fracture or dislocation  Mild degenerative changes in the wrist  No lytic or blastic osseous lesion  Soft tissues are unremarkable  Impression: No acute osseous abnormality  Workstation performed: [id number]     IR biopsy liver mass    Result Date: 11/11/2022  Narrative: IMAGE-GUIDED BIOPSY Indication: History of metastatic prostate cancer  New liver masses  Procedure: In the supine position, a suitable location for biopsy was identified with ultrasound   The site and surrounding skin were prepped and draped in sterile fashion  1% lidocaine was used for local anesthetic and conscious sedation was administered  Using direct sonographic guidance, a 17 gauge introducer needle was advanced into left lobe liver mass using a anterior approach   Core biopsy was performed with 4 passes made coaxially with a 18 gauge Biopince biopsy device  Specimens were submitted to on-site cytology   The introducer needle was removed with simultaneous injection of D-Stat collagen-thrombin solution   The puncture site was cleansed and a dressing was applied  Sedation time: 30 minutes Findings: Diagnostic material was identified preliminarily based on touch prep    Nondominant left lobe mass in the medial segment was sampled  Impression: Impression: Technically successful image-guided biopsy of left lobe liver mass   Workstation performed: [id number]       Labs:   Lab Results   Component Value Date    WBC 8 02 12/06/2022    HGB 7 3 (L) 12/06/2022    HCT 24 2 (L) 12/06/2022    MCV 98 12/06/2022    PLT 98 (L) 12/06/2022     Lab Results   Component Value Date     09/30/2017    K 5 1 12/06/2022     12/06/2022    CO2 24 12/06/2022    BUN 24 12/06/2022    CREATININE 1 05 12/06/2022    GLUF 128 (H) 10/31/2022    CALCIUM 8 7 12/06/2022    CORRECTEDCA 9 8 12/06/2022     (H) 12/06/2022     (H) 12/06/2022    ALKPHOS 829 (H) 12/06/2022    PROT 6 5 09/30/2017    BILITOT 0 7 09/30/2017    EGFR 66 12/06/2022       No results found for: IRON, TIBC, FERRITIN    No results found for: [id number]      ROS: Review of Systems   Constitutional: Positive for fatigue  Negative for appetite change, chills, diaphoresis, fever and unexpected weight change  HENT:   Negative for hearing loss, lump/mass, mouth sores, nosebleeds, sore throat, trouble swallowing and voice change  Eyes: Negative  Negative for eye problems and icterus  Respiratory: Positive for shortness of breath  Negative for chest tightness, cough and hemoptysis  Cardiovascular: Negative for chest pain and leg swelling  Gastrointestinal: Negative for abdominal distention, abdominal pain, blood in stool, constipation, diarrhea and nausea  Endocrine: Negative  Genitourinary: Negative for dysuria, frequency, hematuria and pelvic pain  Musculoskeletal: Negative  Negative for arthralgias, back pain, flank pain, gait problem, myalgias and neck stiffness     Skin: Negative for itching and rash  Neurological: Negative for dizziness, gait problem, headaches, light-headedness, numbness and speech difficulty  Hematological: Negative for adenopathy  Does not bruise/bleed easily  Psychiatric/Behavioral: Negative for confusion, decreased concentration, depression and sleep disturbance  The patient is not nervous/anxious  Current Medications: Reviewed  Allergies: Reviewed  PMH/FH/SH:  Reviewed      Physical Exam:    Body surface area is 1 94 meters squared  Wt Readings from Last 3 Encounters:   12/07/22 82 6 kg (182 lb)   11/25/22 85 2 kg (187 lb 13 3 oz)   11/17/22 82 6 kg (182 lb)        Temp Readings from Last 3 Encounters:   12/07/22 (!) 97 2 °F (36 2 °C)   11/25/22 97 7 °F (36 5 °C) (Temporal)   11/17/22 98 4 °F (36 9 °C) (Tympanic)        BP Readings from Last 3 Encounters:   12/07/22 140/70   11/25/22 113/63   11/17/22 116/64         Pulse Readings from Last 3 Encounters:   12/07/22 88   11/25/22 92   11/17/22 72        Physical Exam  Vitals reviewed  Constitutional:       General: He is not in acute distress  Appearance: He is well-developed  He is ill-appearing  He is not diaphoretic  HENT:      Head: Normocephalic and atraumatic  Eyes:      Conjunctiva/sclera: Conjunctivae normal    Neck:      Trachea: No tracheal deviation  Cardiovascular:      Rate and Rhythm: Normal rate and regular rhythm  Heart sounds: No murmur heard  No friction rub  No gallop  Pulmonary:      Effort: Pulmonary effort is normal  No respiratory distress  Breath sounds: Normal breath sounds  No wheezing or rales  Chest:      Chest wall: No tenderness  Abdominal:      General: There is no distension  Palpations: Abdomen is soft  Tenderness: There is no abdominal tenderness  Musculoskeletal:      Cervical back: Normal range of motion and neck supple  Right lower leg: No edema  Left lower leg: No edema     Lymphadenopathy:      Cervical: No cervical adenopathy  Skin:     General: Skin is warm and dry  Coloration: Skin is not pale  Findings: No erythema  Neurological:      Mental Status: He is alert and oriented to person, place, and time  Psychiatric:         Behavior: Behavior normal          Thought Content: Thought content normal          Judgment: Judgment normal          ECO  Goals and Barriers:  Current Goal: Minimize effects of disease  Barriers: None  Patient's Capacity to Self Care:  Patient is able to self care      Code Status: [unfilled]

## 2022-12-07 NOTE — TELEPHONE ENCOUNTER
Attempted to call patient 3 times and was unable to leave a voicemail  I will be sending patient a [employer] message with first infusion date and time and my teams number for any scheduling needs  Maura Olivier(Resident)

## 2022-12-07 NOTE — TELEPHONE ENCOUNTER
Please discontinue infusion appts on 12/16 and 1/6 and schedule new chemo regimen  Dr Juany Melvin would like pt to start Monday 12/12 if possible  Pt willing to go to [facility] (first choice) or        Thank you

## 2022-12-08 DIAGNOSIS — D64.81 ANTINEOPLASTIC CHEMOTHERAPY INDUCED ANEMIA: Primary | ICD-10-CM

## 2022-12-08 DIAGNOSIS — T45.1X5A ANTINEOPLASTIC CHEMOTHERAPY INDUCED ANEMIA: Primary | ICD-10-CM

## 2022-12-08 RX ORDER — SODIUM CHLORIDE 9 MG/ML
20 INJECTION, SOLUTION INTRAVENOUS ONCE
Status: CANCELLED | OUTPATIENT
Start: 2022-12-09

## 2022-12-08 NOTE — PROGRESS NOTES
Pt to receive 1 unit prbc's 12/9 for hgb 7 5  Pt is symptomatic and starting new chemo regimen 12/12  Zometa 12/9 as well  Pt notified

## 2022-12-09 ENCOUNTER — HOSPITAL ENCOUNTER (OUTPATIENT)
Dept: INFUSION CENTER | Facility: CLINIC | Age: 82
End: 2022-12-09

## 2022-12-09 ENCOUNTER — APPOINTMENT (OUTPATIENT)
Dept: LAB | Facility: HOSPITAL | Age: 82
End: 2022-12-09

## 2022-12-09 VITALS
BODY MASS INDEX: 28.04 KG/M2 | RESPIRATION RATE: 18 BRPM | DIASTOLIC BLOOD PRESSURE: 76 MMHG | HEART RATE: 75 BPM | WEIGHT: 179.01 LBS | SYSTOLIC BLOOD PRESSURE: 127 MMHG | TEMPERATURE: 98.6 F

## 2022-12-09 DIAGNOSIS — C61 PROSTATE CANCER (HCC): ICD-10-CM

## 2022-12-09 DIAGNOSIS — T45.1X5A ANTINEOPLASTIC CHEMOTHERAPY INDUCED ANEMIA: Primary | ICD-10-CM

## 2022-12-09 DIAGNOSIS — C79.51 BONE METASTASES (HCC): ICD-10-CM

## 2022-12-09 DIAGNOSIS — T45.1X5A CHEMOTHERAPY-INDUCED NEUTROPENIA (HCC): ICD-10-CM

## 2022-12-09 DIAGNOSIS — D64.81 ANTINEOPLASTIC CHEMOTHERAPY INDUCED ANEMIA: Primary | ICD-10-CM

## 2022-12-09 DIAGNOSIS — T45.1X5A ANTINEOPLASTIC CHEMOTHERAPY INDUCED ANEMIA: ICD-10-CM

## 2022-12-09 DIAGNOSIS — D70.1 CHEMOTHERAPY-INDUCED NEUTROPENIA (HCC): ICD-10-CM

## 2022-12-09 DIAGNOSIS — D64.81 ANTINEOPLASTIC CHEMOTHERAPY INDUCED ANEMIA: ICD-10-CM

## 2022-12-09 LAB
ABO GROUP BLD: NORMAL
ABO GROUP BLD: NORMAL
ALBUMIN SERPL BCP-MCNC: 2.3 G/DL (ref 3.5–5)
ALP SERPL-CCNC: 984 U/L (ref 46–116)
ALT SERPL W P-5'-P-CCNC: 183 U/L (ref 12–78)
ANION GAP SERPL CALCULATED.3IONS-SCNC: 11 MMOL/L (ref 4–13)
AST SERPL W P-5'-P-CCNC: 355 U/L (ref 5–45)
BILIRUB SERPL-MCNC: 0.87 MG/DL (ref 0.2–1)
BLD GP AB SCN SERPL QL: NEGATIVE
BUN SERPL-MCNC: 24 MG/DL (ref 5–25)
CALCIUM ALBUM COR SERPL-MCNC: 10.1 MG/DL (ref 8.3–10.1)
CALCIUM SERPL-MCNC: 8.7 MG/DL (ref 8.3–10.1)
CHLORIDE SERPL-SCNC: 103 MMOL/L (ref 96–108)
CO2 SERPL-SCNC: 19 MMOL/L (ref 21–32)
CREAT SERPL-MCNC: 1.32 MG/DL (ref 0.6–1.3)
GFR SERPL CREATININE-BSD FRML MDRD: 50 ML/MIN/1.73SQ M
GLUCOSE SERPL-MCNC: 158 MG/DL (ref 65–140)
POTASSIUM SERPL-SCNC: 4.8 MMOL/L (ref 3.5–5.3)
PROT SERPL-MCNC: 6.8 G/DL (ref 6.4–8.4)
RH BLD: POSITIVE
RH BLD: POSITIVE
SODIUM SERPL-SCNC: 133 MMOL/L (ref 135–147)
SPECIMEN EXPIRATION DATE: NORMAL

## 2022-12-09 RX ORDER — SODIUM CHLORIDE 9 MG/ML
20 INJECTION, SOLUTION INTRAVENOUS ONCE
Status: DISCONTINUED | OUTPATIENT
Start: 2022-12-09 | End: 2022-12-12 | Stop reason: HOSPADM

## 2022-12-09 RX ORDER — SODIUM CHLORIDE 9 MG/ML
20 INJECTION, SOLUTION INTRAVENOUS ONCE
Status: COMPLETED | OUTPATIENT
Start: 2022-12-09 | End: 2022-12-09

## 2022-12-09 RX ORDER — SODIUM CHLORIDE 9 MG/ML
20 INJECTION, SOLUTION INTRAVENOUS ONCE
Status: CANCELLED | OUTPATIENT
Start: 2022-12-09

## 2022-12-09 RX ORDER — SODIUM CHLORIDE 9 MG/ML
20 INJECTION, SOLUTION INTRAVENOUS ONCE
OUTPATIENT
Start: 2023-03-03

## 2022-12-09 RX ADMIN — SODIUM CHLORIDE 20 ML/HR: 0.9 INJECTION, SOLUTION INTRAVENOUS at 13:05

## 2022-12-09 RX ADMIN — ZOLEDRONIC ACID 3.3 MG: 4 INJECTION INTRAVENOUS at 13:10

## 2022-12-09 NOTE — PROGRESS NOTES
Pt arrived to unit without complaint  Pt tolerated Zometa and 1 unit of PRBCs without incident  AVS provided  Pt left unit via wheel chair in stable condition  Opioid Counseling: I discussed with the patient the potential side effects of opioids including but not limited to addiction, altered mental status, and depression. I stressed avoiding alcohol, benzodiazepines, muscle relaxants and sleep aids unless specifically okayed by a physician. The patient verbalized understanding of the proper use and possible adverse effects of opioids. All of the patient's questions and concerns were addressed. They were instructed to flush the remaining pills down the toilet if they did not need them for pain.

## 2022-12-10 LAB
ABO GROUP BLD BPU: NORMAL
BPU ID: NORMAL
CROSSMATCH: NORMAL
UNIT DISPENSE STATUS: NORMAL
UNIT PRODUCT CODE: NORMAL
UNIT PRODUCT VOLUME: 350 ML
UNIT RH: NORMAL

## 2022-12-12 ENCOUNTER — HOSPITAL ENCOUNTER (OUTPATIENT)
Dept: INFUSION CENTER | Facility: HOSPITAL | Age: 82
Discharge: HOME | End: 2022-12-12
Attending: INTERNAL MEDICINE

## 2022-12-12 VITALS
HEIGHT: 67 IN | SYSTOLIC BLOOD PRESSURE: 107 MMHG | WEIGHT: 179.01 LBS | TEMPERATURE: 97.6 F | RESPIRATION RATE: 16 BRPM | HEART RATE: 80 BPM | DIASTOLIC BLOOD PRESSURE: 65 MMHG | BODY MASS INDEX: 28.1 KG/M2

## 2022-12-12 DIAGNOSIS — D70.1 CHEMOTHERAPY-INDUCED NEUTROPENIA (HCC): ICD-10-CM

## 2022-12-12 DIAGNOSIS — C61 PROSTATE CANCER (HCC): Primary | ICD-10-CM

## 2022-12-12 DIAGNOSIS — D64.81 ANTINEOPLASTIC CHEMOTHERAPY INDUCED ANEMIA: ICD-10-CM

## 2022-12-12 DIAGNOSIS — T45.1X5A CHEMOTHERAPY-INDUCED NEUTROPENIA (HCC): ICD-10-CM

## 2022-12-12 DIAGNOSIS — T45.1X5A ANTINEOPLASTIC CHEMOTHERAPY INDUCED ANEMIA: Primary | ICD-10-CM

## 2022-12-12 DIAGNOSIS — D64.81 ANTINEOPLASTIC CHEMOTHERAPY INDUCED ANEMIA: Primary | ICD-10-CM

## 2022-12-12 DIAGNOSIS — T45.1X5A ANTINEOPLASTIC CHEMOTHERAPY INDUCED ANEMIA: ICD-10-CM

## 2022-12-12 RX ORDER — SODIUM CHLORIDE 9 MG/ML
20 INJECTION, SOLUTION INTRAVENOUS ONCE
Status: COMPLETED | OUTPATIENT
Start: 2022-12-12 | End: 2022-12-12

## 2022-12-12 RX ADMIN — SODIUM CHLORIDE 150 MG: 0.9 INJECTION, SOLUTION INTRAVENOUS at 11:36

## 2022-12-12 RX ADMIN — ETOPOSIDE 145.6 MG: 20 INJECTION INTRAVENOUS at 12:17

## 2022-12-12 RX ADMIN — DEXAMETHASONE SODIUM PHOSPHATE: 10 INJECTION, SOLUTION INTRAMUSCULAR; INTRAVENOUS at 11:00

## 2022-12-12 RX ADMIN — SODIUM CHLORIDE 20 ML/HR: 0.9 INJECTION, SOLUTION INTRAVENOUS at 10:50

## 2022-12-12 RX ADMIN — CARBOPLATIN 280.4 MG: 10 INJECTION, SOLUTION INTRAVENOUS at 13:30

## 2022-12-12 RX ADMIN — DARBEPOETIN ALFA 300 MCG: 300 INJECTION, SOLUTION INTRAVENOUS; SUBCUTANEOUS at 11:38

## 2022-12-12 NOTE — PROGRESS NOTES
No differential was ever resulted for labs drawn on 12/9    Confirmed w/[facility] lab that [address] is 3 [phone]

## 2022-12-12 NOTE — PROGRESS NOTES
Confirmed with Dr Dorothy Lezama Carboplatin dose is AUC4  Plan updated and  RN [facility] aware

## 2022-12-12 NOTE — PROGRESS NOTES
TIME OUT:  Confirmed w/ Manasa Xiao RN that carboplatin dose should be AUC 4, not AUC 5  Order revised  Notified Adan Costello in pharmacy

## 2022-12-12 NOTE — PROGRESS NOTES
Pt tolerated day 1 cycle 1 etoposide/carbo without difficulty  No s/s reaction noted  Also tolerated Aranesp injection to L arm  Aware of return time for tomorrow's tx  AVS provided  Left ambulatory with roller walker in stable condition

## 2022-12-13 ENCOUNTER — HOSPITAL ENCOUNTER (OUTPATIENT)
Dept: INFUSION CENTER | Facility: HOSPITAL | Age: 82
Discharge: HOME | End: 2022-12-13
Attending: INTERNAL MEDICINE

## 2022-12-13 VITALS
WEIGHT: 179.01 LBS | TEMPERATURE: 96.5 F | SYSTOLIC BLOOD PRESSURE: 121 MMHG | HEIGHT: 67 IN | OXYGEN SATURATION: 99 % | RESPIRATION RATE: 19 BRPM | HEART RATE: 73 BPM | BODY MASS INDEX: 28.1 KG/M2 | DIASTOLIC BLOOD PRESSURE: 76 MMHG

## 2022-12-13 DIAGNOSIS — C61 PROSTATE CANCER (HCC): Primary | ICD-10-CM

## 2022-12-13 DIAGNOSIS — T45.1X5A ANTINEOPLASTIC CHEMOTHERAPY INDUCED ANEMIA: ICD-10-CM

## 2022-12-13 DIAGNOSIS — D64.81 ANTINEOPLASTIC CHEMOTHERAPY INDUCED ANEMIA: ICD-10-CM

## 2022-12-13 DIAGNOSIS — T45.1X5A CHEMOTHERAPY-INDUCED NEUTROPENIA (HCC): ICD-10-CM

## 2022-12-13 DIAGNOSIS — D70.1 CHEMOTHERAPY-INDUCED NEUTROPENIA (HCC): ICD-10-CM

## 2022-12-13 RX ORDER — ACETAMINOPHEN 325 MG/1
650 TABLET ORAL ONCE
Status: COMPLETED | OUTPATIENT
Start: 2022-12-13 | End: 2022-12-13

## 2022-12-13 RX ORDER — SODIUM CHLORIDE 9 MG/ML
20 INJECTION, SOLUTION INTRAVENOUS ONCE
Status: COMPLETED | OUTPATIENT
Start: 2022-12-13 | End: 2022-12-13

## 2022-12-13 RX ADMIN — ACETAMINOPHEN 650 MG: 325 TABLET ORAL at 13:40

## 2022-12-13 RX ADMIN — ETOPOSIDE 145.6 MG: 20 INJECTION INTRAVENOUS at 14:26

## 2022-12-13 RX ADMIN — PEGFILGRASTIM 6 MG: KIT SUBCUTANEOUS at 15:08

## 2022-12-13 RX ADMIN — DEXAMETHASONE SODIUM PHOSPHATE: 10 INJECTION, SOLUTION INTRAMUSCULAR; INTRAVENOUS at 13:47

## 2022-12-13 RX ADMIN — SODIUM CHLORIDE 20 ML/HR: 9 INJECTION, SOLUTION INTRAVENOUS at 13:46

## 2022-12-13 NOTE — PROGRESS NOTES
Pt tolerated treatment today  Pt states that his head is still hurting but it comes and goes now  Reviewed Neulasta OnPro instructions and AVS  Confirmed with Jeana HILLIARD when pt should have labs drawn  Per Jeana either Thursday or friday is fine  Left unit via w/c with wife and son

## 2022-12-13 NOTE — PROGRESS NOTES
Notified Remington Leigh RN of pt arriving for his day 2 of treatment today and has been having body aches and headaches  He denies worsening SOB, nausea or vomiting  Pt states that his head pain is a 6/10 and didn't take any tylenol since this morning  Tylenol requested for pt's pain  Per Jeana tylenol will be ordered  Will continue to monitor

## 2022-12-14 ENCOUNTER — TELEPHONE (OUTPATIENT)
Dept: NUTRITION | Facility: CLINIC | Age: 82
End: 2022-12-14

## 2022-12-14 ENCOUNTER — RA CDI HCC (OUTPATIENT)
Dept: OTHER | Facility: HOSPITAL | Age: 82
End: 2022-12-14

## 2022-12-14 NOTE — PROGRESS NOTES
I70 0  [facility] 75  coding opportunities          Chart Reviewed number of suggestions sent to Provider: 1     Patients Insurance     Medicare Insurance: [employer]

## 2022-12-14 NOTE — TELEPHONE ENCOUNTER
Received VM from spouse, Edson Humphries, stating they need to reschedule RD follow up appt on 12/16/22 d/t treatment schedule change  Called Edson Humphries back  Offered to reschedule for during next infusion on 1/3/23, or sooner if they prefer an office visit  Edson Humphries stated that 1/3/23 would be fine  Encouraged them to reach out before appt if needed

## 2022-12-15 ENCOUNTER — TELEPHONE (OUTPATIENT)
Dept: HEMATOLOGY ONCOLOGY | Facility: CLINIC | Age: 82
End: 2022-12-15

## 2022-12-15 ENCOUNTER — APPOINTMENT (OUTPATIENT)
Dept: LAB | Facility: MEDICAL CENTER | Age: 82
End: 2022-12-15

## 2022-12-15 ENCOUNTER — TELEPHONE (OUTPATIENT)
Dept: OTHER | Facility: OTHER | Age: 82
End: 2022-12-15

## 2022-12-15 DIAGNOSIS — C61 PROSTATE CANCER (HCC): ICD-10-CM

## 2022-12-15 DIAGNOSIS — D64.9 ANEMIA, UNSPECIFIED TYPE: ICD-10-CM

## 2022-12-15 LAB
ANISOCYTOSIS BLD QL SMEAR: PRESENT
BASOPHILS # BLD MANUAL: 0 THOUSAND/UL (ref 0–0.1)
BASOPHILS NFR MAR MANUAL: 0 % (ref 0–1)
DACRYOCYTES BLD QL SMEAR: PRESENT
EOSINOPHIL # BLD MANUAL: 0 THOUSAND/UL (ref 0–0.4)
EOSINOPHIL NFR BLD MANUAL: 0 % (ref 0–6)
ERYTHROCYTE [DISTWIDTH] IN BLOOD BY AUTOMATED COUNT: 19.3 % (ref 11.6–15.1)
HCT VFR BLD AUTO: 25.2 % (ref 36.5–49.3)
HELMET CELLS BLD QL SMEAR: PRESENT
HGB BLD-MCNC: 7.8 G/DL (ref 12–17)
LYMPHOCYTES # BLD AUTO: 0.22 THOUSAND/UL (ref 0.6–4.47)
LYMPHOCYTES # BLD AUTO: 4 % (ref 14–44)
MCH RBC QN AUTO: 29.8 PG (ref 26.8–34.3)
MCHC RBC AUTO-ENTMCNC: 31 G/DL (ref 31.4–37.4)
MCV RBC AUTO: 96 FL (ref 82–98)
METAMYELOCYTES NFR BLD MANUAL: 1 % (ref 0–1)
MICROCYTES BLD QL AUTO: PRESENT
MONOCYTES # BLD AUTO: 0.39 THOUSAND/UL (ref 0–1.22)
MONOCYTES NFR BLD: 7 % (ref 4–12)
NEUTROPHILS # BLD MANUAL: 4.92 THOUSAND/UL (ref 1.85–7.62)
NEUTS BAND NFR BLD MANUAL: 12 % (ref 0–8)
NEUTS SEG NFR BLD AUTO: 76 % (ref 43–75)
NRBC BLD AUTO-RTO: 1 /100 WBC (ref 0–2)
PLATELET # BLD AUTO: 42 THOUSANDS/UL (ref 149–390)
PLATELET BLD QL SMEAR: ABNORMAL
PMV BLD AUTO: 10.2 FL (ref 8.9–12.7)
POIKILOCYTOSIS BLD QL SMEAR: PRESENT
POLYCHROMASIA BLD QL SMEAR: PRESENT
RBC # BLD AUTO: 2.62 MILLION/UL (ref 3.88–5.62)
RBC MORPH BLD: PRESENT
WBC # BLD AUTO: 5.59 THOUSAND/UL (ref 4.31–10.16)

## 2022-12-15 NOTE — TELEPHONE ENCOUNTER
Received call from pt's wife stating pt had constipation x4 days, took a Dulcolax and then had diarrhea yesterday and overnight  Seems to have resolved for now  Let her know he can try one Imodium if it persists  Suggested trying Senokot for constipation going forward  Pt also having pain - will alternative Tylenol and Ibuprofen as needed  Wife states Oxycodone 5mg "made him loopy"  Suggested she cut the tablet in half and try a 2 5mg dose  Wife also notes pt may be getting labs at [address], as they do not have a copay there  Explained CBC with blood bank hold tube needs to be drawn at [facility] but regular chemo labs can be drawn at [address] they will need a script if they choose to go there

## 2022-12-16 ENCOUNTER — TELEPHONE (OUTPATIENT)
Dept: HEMATOLOGY ONCOLOGY | Facility: CLINIC | Age: 82
End: 2022-12-16

## 2022-12-16 NOTE — TELEPHONE ENCOUNTER
Lab Result: Platelets 42   Date/Time Drawn: 12/15 13:44   Ordering Provider: Adrienne Osborn   Lab Personnel's Name: Shari Mitchell       The following critical/stat result was read back to the lab as stated above and [employer] to the on-call provider  The provider confirmed receipt of the message

## 2022-12-16 NOTE — TELEPHONE ENCOUNTER
Called patient's wife Edson Read after receiving voicemail  Questioned having  get up from naps as he is fatigued  Explained that mobility is good for everyone - even just sitting in a chair or marching legs in between naps and rest periods  Reviewed hydration methods such as gatorade, propel etc to keep hydrated with diarrhea  Edson Read thankful of call  Told to call back should any questions or concerns arise

## 2022-12-19 ENCOUNTER — TELEPHONE (OUTPATIENT)
Dept: HEMATOLOGY ONCOLOGY | Facility: CLINIC | Age: 82
End: 2022-12-19

## 2022-12-19 NOTE — TELEPHONE ENCOUNTER
Received call from wife reporting pt has increasing fatigue and sob with stairs (only goes up and down once or twice a day)  Discussed transfusion for hgb 7 5 or less  Pt having labs drawn this Thursday

## 2022-12-21 ENCOUNTER — TELEPHONE (OUTPATIENT)
Dept: OTHER | Facility: OTHER | Age: 82
End: 2022-12-21

## 2022-12-21 ENCOUNTER — TELEPHONE (OUTPATIENT)
Dept: HEMATOLOGY ONCOLOGY | Facility: CLINIC | Age: 82
End: 2022-12-21

## 2022-12-21 ENCOUNTER — APPOINTMENT (OUTPATIENT)
Dept: LAB | Facility: CLINIC | Age: 82
End: 2022-12-21

## 2022-12-21 ENCOUNTER — OFFICE VISIT (OUTPATIENT)
Dept: FAMILY MEDICINE CLINIC | Facility: CLINIC | Age: 82
End: 2022-12-21

## 2022-12-21 VITALS
HEIGHT: 67 IN | HEART RATE: 87 BPM | BODY MASS INDEX: 27.31 KG/M2 | OXYGEN SATURATION: 97 % | WEIGHT: 174 LBS | TEMPERATURE: 97.2 F | SYSTOLIC BLOOD PRESSURE: 92 MMHG | DIASTOLIC BLOOD PRESSURE: 62 MMHG | RESPIRATION RATE: 16 BRPM

## 2022-12-21 DIAGNOSIS — C61 PROSTATE CANCER (HCC): ICD-10-CM

## 2022-12-21 DIAGNOSIS — I10 BENIGN ESSENTIAL HYPERTENSION: Primary | ICD-10-CM

## 2022-12-21 DIAGNOSIS — D64.9 ANEMIA, UNSPECIFIED TYPE: ICD-10-CM

## 2022-12-21 DIAGNOSIS — I25.118 CORONARY ARTERY DISEASE OF NATIVE ARTERY OF NATIVE HEART WITH STABLE ANGINA PECTORIS (HCC): ICD-10-CM

## 2022-12-21 DIAGNOSIS — N18.31 STAGE 3A CHRONIC KIDNEY DISEASE (HCC): ICD-10-CM

## 2022-12-21 DIAGNOSIS — T45.1X5A ANTINEOPLASTIC CHEMOTHERAPY INDUCED ANEMIA: ICD-10-CM

## 2022-12-21 DIAGNOSIS — D64.81 ANTINEOPLASTIC CHEMOTHERAPY INDUCED ANEMIA: ICD-10-CM

## 2022-12-21 DIAGNOSIS — C79.51 BONE METASTASES (HCC): ICD-10-CM

## 2022-12-21 LAB
ANISOCYTOSIS BLD QL SMEAR: PRESENT
BASOPHILS # BLD MANUAL: 0.03 THOUSAND/UL (ref 0–0.1)
BASOPHILS NFR MAR MANUAL: 3 % (ref 0–1)
DACRYOCYTES BLD QL SMEAR: PRESENT
EOSINOPHIL # BLD MANUAL: 0.02 THOUSAND/UL (ref 0–0.4)
EOSINOPHIL NFR BLD MANUAL: 2 % (ref 0–6)
ERYTHROCYTE [DISTWIDTH] IN BLOOD BY AUTOMATED COUNT: 18.5 % (ref 11.6–15.1)
HCT VFR BLD AUTO: 23.3 % (ref 36.5–49.3)
HELMET CELLS BLD QL SMEAR: PRESENT
HGB BLD-MCNC: 7.2 G/DL (ref 12–17)
LYMPHOCYTES # BLD AUTO: 0.4 THOUSAND/UL (ref 0.6–4.47)
LYMPHOCYTES # BLD AUTO: 40 % (ref 14–44)
MCH RBC QN AUTO: 29.4 PG (ref 26.8–34.3)
MCHC RBC AUTO-ENTMCNC: 30.9 G/DL (ref 31.4–37.4)
MCV RBC AUTO: 95 FL (ref 82–98)
MICROCYTES BLD QL AUTO: PRESENT
MONOCYTES # BLD AUTO: 0.11 THOUSAND/UL (ref 0–1.22)
MONOCYTES NFR BLD: 11 % (ref 4–12)
NEUTROPHILS # BLD MANUAL: 0.37 THOUSAND/UL (ref 1.85–7.62)
NEUTS BAND NFR BLD MANUAL: 8 % (ref 0–8)
NEUTS SEG NFR BLD AUTO: 29 % (ref 43–75)
NRBC BLD AUTO-RTO: 15 /100 WBC (ref 0–2)
OVALOCYTES BLD QL SMEAR: PRESENT
PLASMA CELLS NFR BLD: 2 % (ref 0–0)
PLATELET # BLD AUTO: 16 THOUSANDS/UL (ref 149–390)
PLATELET BLD QL SMEAR: ABNORMAL
PMV BLD AUTO: 10.1 FL (ref 8.9–12.7)
POIKILOCYTOSIS BLD QL SMEAR: PRESENT
POLYCHROMASIA BLD QL SMEAR: PRESENT
RBC # BLD AUTO: 2.45 MILLION/UL (ref 3.88–5.62)
RBC MORPH BLD: PRESENT
TOXIC GRANULES BLD QL SMEAR: PRESENT
VARIANT LYMPHS # BLD AUTO: 5 %
WBC # BLD AUTO: 0.99 THOUSAND/UL (ref 4.31–10.16)

## 2022-12-21 NOTE — TELEPHONE ENCOUNTER
Received call from wife asking if pt can have cbc drawn today instead of tomorrow  Advised this is fine and reminded him to also have BB hold tube drawn along with cbc

## 2022-12-21 NOTE — PROGRESS NOTES
Name: Mayda Castillo      :       MRN: [id number]  Encounter Provider: Shraddha Huang DO  Encounter Date: 2022   Encounter department: [address] [place] [address]     1  Benign essential hypertension  Assessment & Plan:  Blood pressure has been stable on losartan and metoprolol however patient's blood pressure low today and he did not take his medication this morning  I instructed him to check his blood pressure once or twice per day and if his systolic blood pressure is less than 110 he is to hold his antihypertensive medications  2  Bone metastases ([facility] [address] )  Assessment & Plan:  Continue current treatment under management of hematology oncology  3  Stage 3a chronic kidney disease ([facility] [address] )    4  Coronary artery disease of native artery of native heart with stable angina pectoris [facility]    5  Prostate cancer [facility])  Assessment & Plan:  Patient has both bony and liver metastasis  Currently undergoing a new treatment protocol  He will be discussing with his hematologist at an upcoming visit his prognosis and discuss his treatment goals  6  Antineoplastic chemotherapy induced anemia  Assessment & Plan:  Most recent hemoglobin 7 8 approximately 1 week ago  Blood work from today pending             Subjective      Patient was seen for routine follow-up of chronic medical problems  He is being treated for hyperlipidemia, hypertension, metastatic prostate cancer and coronary artery disease  His primary issue at this time is his cancer  He recently started a new treatment  He has been moderate to severely anemic  He feels weak and tired today  Had blood work drawn earlier today at the direction of his oncologist     Review of Systems   Constitutional: Positive for fatigue  Respiratory: Negative  Cardiovascular: Negative  Gastrointestinal: Negative  Genitourinary: Negative  Musculoskeletal: Negative  Neurological: Positive for weakness  Psychiatric/Behavioral: Negative  Current Outpatient Medications on File Prior to Visit   Medication Sig   • aspirin (ECOTRIN LOW STRENGTH) 81 mg EC tablet Take 81 mg by mouth daily    • atorvastatin (LIPITOR) 40 mg tablet Take 40 mg by mouth daily at bedtime    • Calcium 500 MG tablet Take 1,000 mg by mouth daily    • cholecalciferol (VITAMIN D3) 1,000 units tablet Take 1,000 Units by mouth daily    • diphenhydrAMINE HCl (BENADRYL ALLERGY PO) Take by mouth   • leuprolide (LUPRON DEPOT 6 MONTH KIT) 45 mg Inject 45 mg into a muscle every 6 (six) months   • losartan (COZAAR) 100 MG tablet Take 1 tablet (100 mg total) by mouth daily (Patient taking differently: Take 25 mg by mouth daily)   • metoprolol succinate (TOPROL-XL) 25 mg 24 hr tablet Take 1 tablet (25 mg total) by mouth daily   • Mirabegron ER (Myrbetriq) 25 MG TB24 Take 25 mg by mouth in the morning   • oxybutynin (DITROPAN-XL) 5 mg 24 hr tablet Take 5 mg by mouth daily   • oxyCODONE (Roxicodone) 5 immediate release tablet Take 1 tablet (5 mg total) by mouth every 6 (six) hours as needed for moderate pain Max Daily Amount: 20 mg   • predniSONE 5 mg tablet Take 1 tablet by mouth twice a day   • tamsulosin (FLOMAX) 0 4 mg take 1 capsule by mouth once daily with dinner   • Zoledronic Acid (ZOMETA IV) Infuse into a venous catheter every 3 (three) months       Objective     BP 92/62 (BP Location: Right arm, Patient Position: Sitting, Cuff Size: Adult)   Pulse 87   Temp (!) 97 2 °F (36 2 °C) (Temporal)   Resp 16   Ht 5' 6 93" (1 7 m)   Wt 78 9 kg (174 lb)   SpO2 97%   BMI 27 31 kg/m²     Physical Exam  Vitals and nursing note reviewed  Constitutional:       General: He is not in acute distress  Appearance: He is well-developed  He is not diaphoretic  HENT:      Head: Normocephalic and atraumatic  Eyes:      General:         Right eye: No discharge        Conjunctiva/sclera: Conjunctivae normal       Pupils: Pupils are equal, round, and reactive to light  Neck:      Thyroid: No thyromegaly  Cardiovascular:      Rate and Rhythm: Normal rate and regular rhythm  Pulmonary:      Effort: Pulmonary effort is normal  No respiratory distress  Breath sounds: Normal breath sounds  Musculoskeletal:      Cervical back: Normal range of motion  Lymphadenopathy:      Cervical: No cervical adenopathy  Skin:     General: Skin is warm and dry  Neurological:      Mental Status: He is alert and oriented to person, place, and time  Psychiatric:         Behavior: Behavior normal          Thought Content:  Thought content normal          Judgment: Judgment normal        Morgan Palma DO

## 2022-12-22 ENCOUNTER — HOSPITAL ENCOUNTER (OUTPATIENT)
Dept: INFUSION CENTER | Facility: CLINIC | Age: 82
Discharge: HOME | End: 2022-12-22

## 2022-12-22 ENCOUNTER — TELEPHONE (OUTPATIENT)
Dept: HEMATOLOGY ONCOLOGY | Facility: CLINIC | Age: 82
End: 2022-12-22

## 2022-12-22 VITALS
DIASTOLIC BLOOD PRESSURE: 68 MMHG | RESPIRATION RATE: 16 BRPM | SYSTOLIC BLOOD PRESSURE: 126 MMHG | TEMPERATURE: 97.9 F | HEART RATE: 80 BPM

## 2022-12-22 DIAGNOSIS — T45.1X5A ANTINEOPLASTIC CHEMOTHERAPY INDUCED ANEMIA: ICD-10-CM

## 2022-12-22 DIAGNOSIS — D69.6 THROMBOCYTOPENIA (HCC): Primary | ICD-10-CM

## 2022-12-22 DIAGNOSIS — D64.81 ANTINEOPLASTIC CHEMOTHERAPY INDUCED ANEMIA: ICD-10-CM

## 2022-12-22 LAB
ABO GROUP BLD: NORMAL
BLD GP AB SCN SERPL QL: NEGATIVE
RH BLD: POSITIVE
SPECIMEN EXPIRATION DATE: NORMAL

## 2022-12-22 RX ORDER — SODIUM CHLORIDE 9 MG/ML
20 INJECTION, SOLUTION INTRAVENOUS ONCE
Status: CANCELLED | OUTPATIENT
Start: 2022-12-22

## 2022-12-22 RX ORDER — SODIUM CHLORIDE 9 MG/ML
20 INJECTION, SOLUTION INTRAVENOUS ONCE
Status: COMPLETED | OUTPATIENT
Start: 2022-12-22 | End: 2022-12-22

## 2022-12-22 RX ADMIN — SODIUM CHLORIDE 20 ML/HR: 0.9 INJECTION, SOLUTION INTRAVENOUS at 12:50

## 2022-12-22 NOTE — TELEPHONE ENCOUNTER
Spoke with spouse, reviewed lab results  Discussed the need for RBC and Platelet transfusion  She was agreeable to appointment today at 12:00 PM [facility]  She was appreciative of the call

## 2022-12-22 NOTE — PROGRESS NOTES
Pt presents for platelets x1 and PRBC x1  No new meds or concerns  Pt requested something for heart burn  Per Dr Stephanie Dc, pt may take OTC pepcid, none ordered during treatment  Pt tolerated infusion without adverse reaction  Future visits discussed  AVS declined

## 2022-12-22 NOTE — TELEPHONE ENCOUNTER
Lab Result: Critical Lab White Blood Cell Count 0 99 and Platelet Count 16   Date/Time Drawn: 12/21/22  @ 15:06 PM   Ordering Provider: Dr Paresh Chaves Name: Russel Sheppard following critical/stat result was read back to the lab as stated above and [employer] to the on-call provider  The provider confirmed receipt of the message

## 2022-12-22 NOTE — TELEPHONE ENCOUNTER
Hemoglobin of 7 2 and Platelet count of 11,469 from yesterday  Per Dr Dolly Braden 1 unit of RBC and 1 unit of Platelets to be given  Spoke with [facility] infusion who can accomodates patient today at 12:00  Attempted to call home phone no answer received VM did not   Called cell phone and left VM asking for a call back to discuss blood transfusion

## 2022-12-22 NOTE — ASSESSMENT & PLAN NOTE
Patient has both bony and liver metastasis  Currently undergoing a new treatment protocol  He will be discussing with his hematologist at an upcoming visit his prognosis and discuss his treatment goals

## 2022-12-22 NOTE — ASSESSMENT & PLAN NOTE
Blood pressure has been stable on losartan and metoprolol however patient's blood pressure low today and he did not take his medication this morning  I instructed him to check his blood pressure once or twice per day and if his systolic blood pressure is less than 110 he is to hold his antihypertensive medications

## 2022-12-23 ENCOUNTER — TELEPHONE (OUTPATIENT)
Dept: HEMATOLOGY ONCOLOGY | Facility: CLINIC | Age: 82
End: 2022-12-23

## 2022-12-23 LAB
ABO GROUP BLD BPU: NORMAL
ABO GROUP BLD BPU: NORMAL
BPU ID: NORMAL
BPU ID: NORMAL
CROSSMATCH: NORMAL
UNIT DISPENSE STATUS: NORMAL
UNIT DISPENSE STATUS: NORMAL
UNIT PRODUCT CODE: NORMAL
UNIT PRODUCT CODE: NORMAL
UNIT PRODUCT VOLUME: 219 ML
UNIT PRODUCT VOLUME: 350 ML
UNIT RH: NORMAL
UNIT RH: NORMAL

## 2022-12-23 NOTE — TELEPHONE ENCOUNTER
Spoke with pt and wife  He took two Senokot last night with no results  Will try half dose Miralax and increase to full dose if still no results  Confirmed he is passing flatus

## 2022-12-28 ENCOUNTER — OFFICE VISIT (OUTPATIENT)
Dept: HEMATOLOGY ONCOLOGY | Facility: CLINIC | Age: 82
End: 2022-12-28

## 2022-12-28 ENCOUNTER — TELEPHONE (OUTPATIENT)
Dept: OTHER | Facility: OTHER | Age: 82
End: 2022-12-28

## 2022-12-28 VITALS
HEIGHT: 66 IN | TEMPERATURE: 97.4 F | SYSTOLIC BLOOD PRESSURE: 124 MMHG | DIASTOLIC BLOOD PRESSURE: 72 MMHG | WEIGHT: 176 LBS | HEART RATE: 96 BPM | OXYGEN SATURATION: 99 % | BODY MASS INDEX: 28.28 KG/M2

## 2022-12-28 DIAGNOSIS — C79.51 BONE METASTASES (HCC): Primary | ICD-10-CM

## 2022-12-28 DIAGNOSIS — C61 PROSTATE CANCER (HCC): ICD-10-CM

## 2022-12-28 NOTE — TELEPHONE ENCOUNTER
Lab Result: Critical Lab Platelet Count 06,330   Date/Time Drawn: 12/28/22   @  11:37 AM   Ordering Provider: Dr Laila Quintanilla Name: Dangelo Salvador following critical/stat result was read back to the lab as stated above and [employer] to the on-call provider  The provider confirmed receipt of the message

## 2022-12-28 NOTE — PROGRESS NOTES
Hematology Outpatient Follow - Up Note  Danna Castillo 81 y o  male MRN: @ Encounter: [id number]        Date:  12/28/2022        Assessment/ Plan:    81-year-old  male who was seen by Urology for elevation of the PSA, when he presented in June 2017 with PSA of 4 6, Subsequently in September of 2017 PSA was 7 3 and in May of 2018 PSA was 8  6   A biopsy was recommended but the patient had just undergone percutaneous stenting of the heart and he was on dual anti-platelet therapy      MRI of the abdomen in April 2018 showed 2 simple cyst in the right hepatic lobe, bilateral renal cysts the largest 1 measuring 10 cm in the left lower lobe     In October of 2018 PSA was 9 8  CT scan of the chest 9/28/2018 showed sclerotic lesions in T10, 5 mm nodule in the right lower lobe   Bone scan showed activity in the posterior T10 level and left posterior 7th rib area concerning for osseous metastases and increased activity in the distal right clavicle might be degenerative or metastatic area      He was diagnosed with castration sensitive metastatic prostate cancer  [facility] insurance company could not approve enzalutamide, he also has a high co-payment for abiraterone     The patient initiated on samples of enzalutamide 160 mg p o  daily since the beginning of December 2018   His insurance approved abiraterone however he had a high co-payment        We were finally able to get financial assistance through Kate Price and Roseline Manuel (abiraterone) 2/3/2019 with prednisone 5mg po bid without side effects      In May 2020 PSA 0 5, bone scan showed activity in the anterior 5th rib area, right humerus area most likely consistent with metastatic disease, no visceral disease on the CT scan, we had hard time getting enzalutamide , the patient was initiated on enzalutamide on 06/02/2020, PSA at the time of 0 8     PSA 1 4 in October 2020, normal alkaline phosphatase           Genes analyzed of germ line test  LIONEL, BARD1, BRCA1, BRCA2, BRIP1, CDH1, CHEK2, DICER1, EPCAM*, HOXB13, MLH1, MSH2,  MSH6, NBN, NF1, PALB2, PMS2, PTEN, RAD50, RAD51C, RAD51D, SMARCA4, STK11, TP53 all of these were negative     Evaluated at [facility] the decision for radiation therapy to oligo metastases and the primary site of the tumor depends on the PET scan finding which showed multiple metastatic disease, patient to meet with Radiation Oncology      PET scan on 11/13/2020 showed radiotracer retention in the left posterior bladder suspicious for malignancy with prostate cancer invasion progressing from prior CT scan mild to moderate uptake in the few small inguinal lymph nodes, sclerotic lesions in the spine with uptake suspicious for metastases     Radiation therapy in the February 2021- April 2021      Progression of disease by bone scan in June 2021 with multiple thoracic, right ribs area, hip area involvement, PSA 13 ( 7 2 on 04/2021)        Cycle 1 Taxotere 7/12/2021    Cycle 10   On 01/17/2022     PSA 3 3 in March 2022, PSMA PET scan at [facility] showed 5 bony lesions no evidence of visceral metastases     Status post stereotactic radiation therapy at [facility] finished in June 2022, PSA went down to 2 8     Significant progression of disease in September 2022 with PSA in the range of 120s, PSMA showed significant disease in the skeleton, left hepatic lobe, right inferior hepatic lobe  Initiated on Jevtana 20 mg meter square, after 3 cycles CT scan showed significant progression of disease, liver biopsy showed neuroendocrine differentiation     Discontinue Jevtana     We offered to option hospice/palliative care versus therapeutic trial with etoposide/carboplatin     The patient said I want to fight it     Etoposide 75 mg per metered squared on day 1 and 2     Carboplatin AUC 4 on day 1 followed by pegfilgrastim to prevent chemotherapy-induced neutropenia    Initiated on 12/9/2022, more fatigue, more anemia, physical examination showed enlarged liver, ascites    Will check PSA, CMP, if there is progression with PSA and deterioration of liver enzyme we will consult hospice/palliative care    Emotional support was provided, the case was discussed with his wife, and 2 sons        Labs and imaging studies are reviewed by ordering provider once results are available  If there are findings that need immediate attention, you will be contacted when results available  Discussing results and the implication on your healthcare is best discussed in person at your follow-up visit  HPI:    81-year-old  male who was seen by Urology for elevation of the PSA, when he presented in June 2017 with PSA of 4 6, Subsequently in September of 2017 PSA was 7 3 and in May of 2018 PSA was 8  6   A biopsy was recommended but the patient had just undergone percutaneous stenting of the heart and he was on dual anti-platelet therapy      MRI of the abdomen in April 2018 showed 2 simple cyst in the right hepatic lobe, bilateral renal cysts the largest 1 measuring 10 cm in the left lower lobe     In October of 2018 PSA was 9 8  CT scan of the chest 9/28/2018 showed sclerotic lesions in T10, 5 mm nodule in the right lower lobe   Bone scan showed activity in the posterior T10 level and left posterior 7th rib area concerning for osseous metastases and increased activity in the distal right clavicle might be degenerative or metastatic area      He was diagnosed with castration sensitive metastatic prostate cancer  [facility] insurance company could not approve enzalutamide, he also has a high co-payment for abiraterone     The patient initiated on samples of enzalutamide 160 mg p o  daily since the beginning of December 2018   His insurance approved abiraterone however he had a high co-payment        We were finally able to get financial assistance through [employer]  He started Zytiga (abiraterone) 2/3/2019 with prednisone 5mg po bid without side effects      In May 2020 PSA 0 5, bone scan showed activity in the anterior 5th rib area, right humerus area most likely consistent with metastatic disease, no visceral disease on the CT scan, we had hard time getting enzalutamide , the patient was initiated on enzalutamide on 06/02/2020, PSA at the time of 0 8     PSA 1 4 in October 2020, normal alkaline phosphatase           Genes analyzed of germ line test  LIONEL, BARD1, BRCA1, BRCA2, BRIP1, CDH1, CHEK2, DICER1, EPCAM*, HOXB13, MLH1, MSH2,  MSH6, NBN, NF1, PALB2, PMS2, PTEN, RAD50, RAD51C, RAD51D, SMARCA4, STK11, TP53 all of these were negative     Evaluated at [facility] the decision for radiation therapy to oligo metastases and the primary site of the tumor depends on the PET scan finding which showed multiple metastatic disease, patient to meet with Radiation Oncology      PET scan on 11/13/2020 showed radiotracer retention in the left posterior bladder suspicious for malignancy with prostate cancer invasion progressing from prior CT scan mild to moderate uptake in the few small inguinal lymph nodes, sclerotic lesions in the spine with uptake suspicious for metastases     Radiation therapy in the February 2021- April 2021      Progression of disease by bone scan in June 2021 with multiple thoracic, right ribs area, hip area involvement, PSA 13 ( 7 2 on 04/2021)        Cycle 1 Taxotere 7/12/2021      Cycle 10   On 01/17/2022 PSA in the range of 2     PSA 3 3 in March 2022     PSMA scan showed bony lesions in 5 locations including right humeri, ribs, right pelvis   Status post stereotactic radiation therapy finished in June 2022 at [address] June 2022 went down to 2 8, PSA on 09/09/2022 up to 122     PSMA scan showed significant progression of disease in the bones fetal, left hepatic lobe, posterior anterior right hepatic lobe, initiation on cabazitaxel 20 mg per m2 every 3 weeks followed by pegfilgrastim    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    Etoposide 75 mg per metered squared, carboplatin AUC 4    He had anemia induced by chemotherapy requiring blood transfusion as well as Aranesp  Interval History:    Interval History:        Previous Treatment:         Test Results:    Imaging: CT chest abdomen pelvis w contrast    Result Date: 12/7/2022  Narrative: CT CHEST, ABDOMEN AND PELVIS WITH IV CONTRAST INDICATION:   C79 51: Secondary malignant neoplasm of bone C61: Malignant neoplasm of prostate  History of prostate cancer with biopsy-proven metastasis to the liver COMPARISON:  Multiple prior CT chest and CT abdomen/pelvis studies with the most recent comparison being obtained 9/23/2022  TECHNIQUE: CT examination of the chest, abdomen and pelvis was performed  In addition to portal venous phase postcontrast scanning through the abdomen and pelvis, delayed phase postcontrast scanning was performed through the upper abdominal viscera  Axial, sagittal, and coronal 2D reformatted images were created from the source data and submitted for interpretation  Radiation dose length product (DLP) for this visit:  726 mGy-cm   This examination, like all CT scans performed in the [facility], was performed utilizing techniques to minimize radiation dose exposure, including the use of iterative reconstruction and automated exposure control  IV Contrast:  100 mL of iohexol (OMNIPAQUE) Enteric Contrast: Enteric contrast was not administered  FINDINGS: CHEST LUNGS:  No new or enlarging pulmonary nodules  Tiny nodular density measuring 2 mm within the right lower lobe (3:101) is stable dating back to 2018 There is a trace right pleural effusion with associated atelectasis, new from exam of 9/23/2022  PLEURA:  Trace right pleural effusion  HEART/GREAT VESSELS: Heart is normal in size  Coronary calcifications are noted    Scattered atherosclerotic calcifications are noted within the thoracic aorta and branching vessels  MEDIASTINUM AND JOSE ANTONIO:  Small hiatal hernia is noted  Trace fluid is noted within the hiatal hernia  CHEST WALL AND LOWER NECK:  Bilateral gynecomastia is noted  ABDOMEN LIVER/BILIARY TREE:  Multiple heterogeneous lesions hypoenhancing lesions are seen within the liver, in keeping with patient's biopsy-proven hepatic metastasis  Metastasis has worsened as compared to prior CT of 9/23/2022 with dominant lesions as follows  -There is a confluent area of heterogeneous hypoenhancement seen throughout segments 2 and 3 measuring up to 9 5 x 8 2 cm on axial images (2:50)  A hypoenhancing lesion measuring 6 8 x 4 5 cm was noted on most recent CT of 9/23/2022  -A similar area of confluent hypoenhancement is seen within the right hepatic lobe primarily centered within segments 6 and 7 measuring up to 10 5 x 6 9 cm on axial images (2:52)  Most recent comparison CT of 9/23/2022 demonstrated a segment 7 lesion measuring 2 3 x 2 1 cm  -Segment IVb lesion which previously measured approximately 1 9 x 1 7 cm on CT of 9/23/2022 now measures approximately 3 1 x 2 7 cm (2:65)  -There is a new area of hypoenhancement within the caudate lobe which measures 4 3 x 5 2 cm (2:56)  Stable hepatic cysts are noted within the right hepatic lobe  The hepatic veins are partially attenuated due to mass effect from the aforementioned lesions  The portal vein is patent  The left portal branches also appear attenuated due to mass effect from the aforementioned liver lesions  GALLBLADDER:  No calcified gallstones  No pericholecystic inflammatory change  SPLEEN:  Enlarged measuring 15 2 cm in AP dimension PANCREAS:  Pancreatic parenchyma is atrophic with scattered parenchymal calcifications as can be seen in the setting of chronic pancreatitis  ADRENAL GLANDS:  Unremarkable  KIDNEYS/URETERS:  No hydronephrosis    Stable bilateral renal cysts including an exophytic left renal cyst which measures 8 8 x 9 9 cm multiple parapelvic cysts are seen, more numerous on the right  No suspicious filling defects within the opacified portions of the bilateral renal collecting systems and proximal ureters  Subsegmental hypodensities within the right kidney are too small to characterize but stable from prior exam  STOMACH AND BOWEL:  Small hiatal hernia  No abnormal small bowel dilatation  APPENDIX:  No findings to suggest appendicitis  ABDOMINOPELVIC CAVITY:  No pneumoperitoneum  No adenopathy  Trace free fluid is seen within the dependent pelvis  VESSELS:  Moderate atherosclerotic calcifications are noted within the abdominal aorta and branching vessels  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 measuring approximately 0 9 x 1 0 cm with peripheral calcification, stable (2:56)  There is ectasia of the infrarenal abdominal aorta measuring 2 6 x 2 6 cm (2:74), stable  PELVIS REPRODUCTIVE ORGANS:  Prostate is normal in size  Radiation beads are noted near the prostate apex within the deep pelvis  URINARY BLADDER:  Unremarkable  The area of soft tissue fullness near the left ureterovesicular junction noted on prior CT of 9/23/2022 is not evident  Please note evaluation of the bladder lumen is limited as delayed images were not obtained through the pelvis  ABDOMINAL WALL/INGUINAL REGIONS:  Unremarkable  OSSEOUS STRUCTURES:  Numerous sclerotic lesions are seen throughout the visualized osseous structures  Several lesions appear new and/or increased in size as compared to previous study  For example, round sclerotic lesion within the anterior manubrium measuring 0 8 cm is not readily seen on prior study  Sclerotic lesion along the superior endplate of T6 measuring 0 8 x 0 7 cm (5:91) appears new from prior study  There is increased sclerosis seen throughout the T8 vertebral body    Increased sclerosis  is also seen throughout several vertebral bodies with an the lower thoracic/lumbar spine, most conspicuous within the L2 vertebral body  No evidence of acute fracture  Impression: 1  Interval worsening of patient's biopsy-proven hepatic metastasis  There are new lesions and the previously described lesions have enlarged in the interval and now demonstrate an ill-defined somewhat infiltrative appearance which may represent interval enlargement and/or a conglomerate of new lesions  2   Interval worsening of widespread osseous metastasis  3   The previously seen soft tissue density at the left ureterovesicular junction is not clearly seen on this study  Please note that evaluation of the bladder is limited as delayed images are not obtained through the pelvis on this exam  4   Trace free fluid within the pelvis and new trace right pleural effusion  5   Other incidental findings as described above  The study was marked in [facility] for immediate notification  Workstation performed: [id number]       Labs:   Lab Results   Component Value Date    WBC 0 99 (LL) 12/21/2022    HGB 7 2 (L) 12/21/2022    HCT 23 3 (L) 12/21/2022    MCV 95 12/21/2022    PLT 16 (LL) 12/21/2022     Lab Results   Component Value Date     09/30/2017    K 4 8 12/09/2022     12/09/2022    CO2 19 (L) 12/09/2022    BUN 24 12/09/2022    CREATININE 1 32 (H) 12/09/2022    GLUF 128 (H) 10/31/2022    CALCIUM 8 7 12/09/2022    CORRECTEDCA 10 1 12/09/2022     (H) 12/09/2022     (H) 12/09/2022    ALKPHOS 984 (H) 12/09/2022    PROT 6 5 09/30/2017    BILITOT 0 7 09/30/2017    EGFR 50 12/09/2022       No results found for: IRON, TIBC, FERRITIN    No results found for: [id number]      ROS: Review of Systems   Constitutional: Positive for appetite change, fatigue and unexpected weight change  Negative for chills, diaphoresis and fever  HENT:   Negative for hearing loss, lump/mass, mouth sores, nosebleeds, sore throat, trouble swallowing and voice change  Eyes: Negative  Negative for eye problems and icterus     Respiratory: Positive for shortness of breath  Negative for chest tightness, cough and hemoptysis  Cardiovascular: Negative for chest pain and leg swelling  Gastrointestinal: Positive for abdominal distention, abdominal pain and constipation  Negative for blood in stool, diarrhea and nausea  Endocrine: Negative  Genitourinary: Negative for dysuria, frequency, hematuria and pelvic pain  Musculoskeletal: Negative  Negative for arthralgias, back pain, flank pain, gait problem, myalgias and neck stiffness  Skin: Negative for itching and rash  Neurological: Negative for dizziness, gait problem, headaches, light-headedness, numbness and speech difficulty  Hematological: Negative for adenopathy  Does not bruise/bleed easily  Psychiatric/Behavioral: Negative for confusion, decreased concentration, depression and sleep disturbance  The patient is not nervous/anxious  Current Medications: Reviewed  Allergies: Reviewed  PMH/FH/SH:  Reviewed      Physical Exam:    Body surface area is 1 89 meters squared  Wt Readings from Last 3 Encounters:   12/28/22 79 8 kg (176 lb)   12/21/22 78 9 kg (174 lb)   12/13/22 81 2 kg (179 lb 0 2 oz)        Temp Readings from Last 3 Encounters:   12/28/22 (!) 97 4 °F (36 3 °C) (Temporal)   12/22/22 97 9 °F (36 6 °C) (Temporal)   12/21/22 (!) 97 2 °F (36 2 °C) (Temporal)        BP Readings from Last 3 Encounters:   12/28/22 124/72   12/22/22 126/68   12/21/22 92/62         Pulse Readings from Last 3 Encounters:   12/28/22 96   12/22/22 80   12/21/22 87        Physical Exam  Vitals reviewed  Constitutional:       General: He is in acute distress  Appearance: He is well-developed  He is ill-appearing  He is not diaphoretic  HENT:      Head: Normocephalic and atraumatic  Eyes:      Conjunctiva/sclera: Conjunctivae normal    Neck:      Trachea: No tracheal deviation  Cardiovascular:      Rate and Rhythm: Normal rate and regular rhythm        Heart sounds: No murmur heard     No friction rub  No gallop  Pulmonary:      Effort: Pulmonary effort is normal  No respiratory distress  Breath sounds: Normal breath sounds  No wheezing or rales  Chest:      Chest wall: No tenderness  Abdominal:      General: There is distension  Palpations: Abdomen is soft  There is hepatomegaly  Tenderness: There is no abdominal tenderness  Musculoskeletal:      Cervical back: Normal range of motion and neck supple  Right lower leg: Edema present  Left lower leg: Edema present  Lymphadenopathy:      Cervical: No cervical adenopathy  Skin:     General: Skin is warm and dry  Coloration: Skin is not pale  Findings: No erythema  Neurological:      Mental Status: He is alert and oriented to person, place, and time  Psychiatric:         Behavior: Behavior normal          Thought Content: Thought content normal          Judgment: Judgment normal          ECOG:3  Goals and Barriers:  Current Goal: Minimize effects of disease  Barriers: None  Patient's Capacity to Self Care:  Patient is able to self care      Code Status: [unfilled]

## 2022-12-29 ENCOUNTER — TELEPHONE (OUTPATIENT)
Dept: HEMATOLOGY ONCOLOGY | Facility: CLINIC | Age: 82
End: 2022-12-29

## 2022-12-29 DIAGNOSIS — D69.6 THROMBOCYTOPENIA (HCC): ICD-10-CM

## 2022-12-29 DIAGNOSIS — D64.9 ANEMIA, UNSPECIFIED TYPE: ICD-10-CM

## 2022-12-29 DIAGNOSIS — C61 PROSTATE CANCER (HCC): Primary | ICD-10-CM

## 2022-12-29 DIAGNOSIS — C79.51 BONE METASTASES (HCC): ICD-10-CM

## 2022-12-29 NOTE — TELEPHONE ENCOUNTER
Dr Melisa Degroot called and spoke with patient and spouse, I was present in the room  Due to patients performance status, labs and recent PSA, Dr Melisa Degroot recommended hospice care  Pt was agreeable, referral was placed  AL INFUSION: Please cancel all patients treatment  Pt will proceed with hospice care

## 2022-12-31 ENCOUNTER — HOME CARE VISIT (OUTPATIENT)
Dept: HOME HOSPICE | Facility: HOSPICE | Age: 82
End: 2022-12-31

## 2023-01-01 ENCOUNTER — HOME CARE VISIT (OUTPATIENT)
Dept: HOME HOSPICE | Facility: HOSPICE | Age: 83
End: 2023-01-01

## 2023-01-01 ENCOUNTER — HOME CARE VISIT (OUTPATIENT)
Dept: HOME HEALTH SERVICES | Facility: HOME HEALTHCARE | Age: 83
End: 2023-01-01

## 2023-01-01 ENCOUNTER — TELEPHONE (OUTPATIENT)
Dept: NUTRITION | Facility: CLINIC | Age: 83
End: 2023-01-01

## 2023-01-01 VITALS
WEIGHT: 176 LBS | RESPIRATION RATE: 20 BRPM | HEIGHT: 66 IN | SYSTOLIC BLOOD PRESSURE: 110 MMHG | BODY MASS INDEX: 28.28 KG/M2 | HEART RATE: 92 BPM | DIASTOLIC BLOOD PRESSURE: 58 MMHG

## 2023-01-01 DIAGNOSIS — C61 PROSTATE CANCER (HCC): ICD-10-CM

## 2023-01-01 DIAGNOSIS — Z51.5 HOSPICE CARE PATIENT: Primary | ICD-10-CM

## 2023-01-01 DIAGNOSIS — C79.51 BONE METASTASES (HCC): ICD-10-CM

## 2023-01-01 RX ORDER — OXYCODONE HYDROCHLORIDE 5 MG/1
TABLET ORAL
Qty: 15 TABLET | Refills: 0 | Status: SHIPPED | OUTPATIENT
Start: 2023-01-01 | End: 2023-01-01

## 2023-01-01 RX ORDER — OXYCODONE HYDROCHLORIDE 10 MG/1
10 TABLET ORAL EVERY 4 HOURS
Qty: 45 TABLET | Refills: 0 | Status: SHIPPED | OUTPATIENT
Start: 2023-01-01

## 2023-01-01 RX ORDER — LORAZEPAM 0.5 MG/1
0.5 TABLET ORAL EVERY 4 HOURS
Qty: 45 TABLET | Refills: 0 | Status: SHIPPED | OUTPATIENT
Start: 2023-01-01

## 2023-01-01 RX ORDER — LORAZEPAM 0.5 MG/1
0.5 TABLET ORAL EVERY 6 HOURS PRN
Qty: 15 TABLET | Refills: 0 | Status: SHIPPED | OUTPATIENT
Start: 2023-01-01 | End: 2023-01-01

## 2023-01-01 RX ADMIN — OXYCODONE HYDROCHLORIDE 5 MG: 5 TABLET ORAL at 13:17

## 2023-01-02 NOTE — CASE COMMUNICATION
Volunteer Referral Template     [place] [address] OF [facility]     Patient Name: Edilma Boateng    Patient YOB: 1940    Family Contact Name: Ye Saldana    Family Contact Phone:  [phone]    Contact isolation precautions:  Rodrigo Dodd      *Please list any additional precautions: _____________________________________              Other information to assist in scheduling volunteer:     x_Patient companionship     x_Pa tient socialization     x_Caregiver respite     _Caregiver socialization     _Caregiver companionship     _Music     Francella Lesch with household tasks     _Vigil     Please note any special interests or other information about patient for volunteer assignment:     Retired   Enjoys soccer and painting paintings

## 2023-01-03 NOTE — TELEPHONE ENCOUNTER
Ye Diehl was scheduled for an RD follow up appointment during infusion  Per chart review, infusion was cancelled as Ye Diehl is now on hospice  Cancelled today's RD appt  Reached out to Ye Diehl to touch base  He states he's doing OK but does c/o constipation  Reviewed MNT for this, including adequate hydration, high fiber foods, and recommended prunes/prune juice  He states he's doing most of this and then passed the phone to his DIL  Reviewed recommendations with DIL as well, and provided her with prune/apple sauce (1/3 cup unprocessed bran, 1/3 cup applesauce, 1/3 cup mashed/stewed prunes) and encouraged them to try 1-2 tbsp at night with 8 oz water  Encouraged them to reach out to RD if they have any additional questions/concerns

## 2023-01-07 NOTE — PROGRESS NOTES
1/7/2023 10:03 AM  [facility] home patient requests emergency fill until [facility] order arrives  Filled electronically via Epic as per [place] State Law  Requested Prescriptions     Pending Prescriptions Disp Refills   • oxyCODONE (ROXICODONE) 10 MG TABS 45 tablet 0     Sig: Take 1 tablet (10 mg total) by mouth every 4 (four) hours Take 10mg every 4 hours atc and can take 10mg every hr as needed Max Daily Amount: 60 mg   • LORazepam (ATIVAN) 0 5 mg tablet 45 tablet 0     Sig: Take 1 tablet (0 5 mg total) by mouth every 4 (four) hours Take 0 5mg every 4 hours and can take 0 5mg every 1 hour as needed       Lizzeth Davey DO  [facility]  Hospice Answering Service: [phone]  You can find me on [employer]!

## 2023-01-13 ENCOUNTER — HOME CARE VISIT (OUTPATIENT)
Dept: HOME HOSPICE | Facility: HOSPICE | Age: 83
End: 2023-01-13

## 2023-03-27 NOTE — PROGRESS NOTES
Pt arrived to unit without complaint  Pt tolerated Neulasta injection in [place] without incident  AVS declined, but aware of future appts  Pt left unit in stable condition  Show Applicator Variable?: Yes Render Post-Care Instructions In Note?: no Medical Necessity Clause: This procedure was medically necessary because the lesions that were treated were: Consent: The patient's consent was obtained including but not limited to risks of crusting, scabbing, blistering, scarring, darker or lighter pigmentary change, recurrence, incomplete removal and infection. Spray Paint Text: The liquid nitrogen was applied to the skin utilizing a spray paint frosting technique. Medical Necessity Information: It is in your best interest to select a reason for this procedure from the list below. All of these items fulfill various CMS LCD requirements except the new and changing color options. Post-Care Instructions: I reviewed with the patient in detail post-care instructions. Patient is to wear sunprotection, and avoid picking at any of the treated lesions. Pt may apply Vaseline to crusted or scabbing areas. Detail Level: Detailed

## 2023-07-06 NOTE — TELEPHONE ENCOUNTER
Last OV 12/6/21  Next OV 6/8/22
Patient called requesting metropolol refill  Patient has enough for this week 
stated

## 2024-03-25 NOTE — PROGRESS NOTES
CHRISTINA to use lab work from 6/30 for treatment today per Tye Saunders, office RN 
Patient tolerated chemotherapy as well as Zometa without complication  Aware to return tomorrow for Neulasta injection  AVS provided, left unit in stable condition 
Normal rate, regular rhythm.  Heart sounds S1, S2.  No murmurs, rubs or gallops.

## (undated) DEVICE — SHEATH URETERAL ACCESS 12/14FR 35CM PROXIS

## (undated) DEVICE — BASKET SPECIMEN RETRIVAL 1.9FR 120CM

## (undated) DEVICE — PACK TUR

## (undated) DEVICE — SPECIMEN CONTAINER STERILE PEEL PACK

## (undated) DEVICE — GLOVE SRG BIOGEL 8

## (undated) DEVICE — BASIC SINGLE BASIN-LF: Brand: MEDLINE INDUSTRIES, INC.

## (undated) DEVICE — SCD SEQUENTIAL COMPRESSION COMFORT SLEEVE MEDIUM KNEE LENGTH: Brand: KENDALL SCD

## (undated) DEVICE — Device: Brand: OLYMPUS

## (undated) DEVICE — TELFA NON-ADHERENT ABSORBENT DRESSING: Brand: TELFA

## (undated) DEVICE — EXIDINE 4 PCT

## (undated) DEVICE — LASER HOLMIUM FIBER 365 MIC

## (undated) DEVICE — TUBING SUCTION 5MM X 12 FT

## (undated) DEVICE — GLOVE INDICATOR PI UNDERGLOVE SZ 8 BLUE

## (undated) DEVICE — 3M™ STERI-STRIP™ COMPOUND BENZOIN TINCTURE 40 BAGS/CARTON 4 CARTONS/CASE C1544: Brand: 3M™ STERI-STRIP™

## (undated) DEVICE — UROCATCH BAG

## (undated) DEVICE — GUARDIAN LVC: Brand: GUARDIAN

## (undated) DEVICE — URETERAL DUAL LUMEN CATH

## (undated) DEVICE — GUIDEWIRE STRGHT TIP 0.035 IN  SOLO PLUS

## (undated) DEVICE — PREMIUM DRY TRAY LF: Brand: MEDLINE INDUSTRIES, INC.